# Patient Record
Sex: FEMALE | Race: WHITE | NOT HISPANIC OR LATINO | Employment: FULL TIME | URBAN - METROPOLITAN AREA
[De-identification: names, ages, dates, MRNs, and addresses within clinical notes are randomized per-mention and may not be internally consistent; named-entity substitution may affect disease eponyms.]

---

## 2023-10-17 ENCOUNTER — APPOINTMENT (EMERGENCY)
Dept: RADIOLOGY | Facility: HOSPITAL | Age: 58
DRG: 659 | End: 2023-10-17
Payer: COMMERCIAL

## 2023-10-17 ENCOUNTER — HOSPITAL ENCOUNTER (INPATIENT)
Facility: HOSPITAL | Age: 58
LOS: 8 days | Discharge: HOME | DRG: 659 | End: 2023-10-25
Attending: EMERGENCY MEDICINE | Admitting: INTERNAL MEDICINE
Payer: COMMERCIAL

## 2023-10-17 DIAGNOSIS — N17.9 AKI (ACUTE KIDNEY INJURY) (HCC): ICD-10-CM

## 2023-10-17 DIAGNOSIS — R79.89 ELEVATED SERUM CREATININE: ICD-10-CM

## 2023-10-17 DIAGNOSIS — N12 EMPHYSEMATOUS PYELONEPHRITIS OF RIGHT KIDNEY: ICD-10-CM

## 2023-10-17 DIAGNOSIS — E83.51 HYPOCALCEMIA: ICD-10-CM

## 2023-10-17 DIAGNOSIS — R11.2 NAUSEA AND VOMITING: ICD-10-CM

## 2023-10-17 DIAGNOSIS — E11.65 TYPE 2 DIABETES MELLITUS WITH HYPERGLYCEMIA, WITHOUT LONG-TERM CURRENT USE OF INSULIN (HCC): ICD-10-CM

## 2023-10-17 DIAGNOSIS — R10.9 ABDOMINAL PAIN: ICD-10-CM

## 2023-10-17 DIAGNOSIS — N39.0 UTI (URINARY TRACT INFECTION): ICD-10-CM

## 2023-10-17 DIAGNOSIS — E11.00 HYPEROSMOLAR HYPERGLYCEMIC STATE (HHS) (HCC): ICD-10-CM

## 2023-10-17 DIAGNOSIS — N12 EMPHYSEMATOUS PYELONEPHRITIS: ICD-10-CM

## 2023-10-17 DIAGNOSIS — E27.9 LESION OF ADRENAL GLAND (HCC): ICD-10-CM

## 2023-10-17 DIAGNOSIS — E11.01 HHNC (HYPERGLYCEMIC HYPEROSMOLAR NONKETOTIC COMA) (HCC): Primary | ICD-10-CM

## 2023-10-17 DIAGNOSIS — N13.30 HYDRONEPHROSIS, UNSPECIFIED HYDRONEPHROSIS TYPE: ICD-10-CM

## 2023-10-17 DIAGNOSIS — E86.0 DEHYDRATION: ICD-10-CM

## 2023-10-17 PROBLEM — E78.5 HYPERLIPIDEMIA: Status: ACTIVE | Noted: 2023-10-17

## 2023-10-17 PROBLEM — E16.2 HYPOGLYCEMIA: Status: ACTIVE | Noted: 2023-10-17

## 2023-10-17 PROBLEM — E11.9 TYPE 2 DIABETES MELLITUS (HCC): Status: ACTIVE | Noted: 2023-10-17

## 2023-10-17 PROBLEM — D69.6 THROMBOCYTOPENIA (HCC): Status: ACTIVE | Noted: 2023-10-17

## 2023-10-17 PROBLEM — E66.01 MORBID OBESITY (HCC): Status: ACTIVE | Noted: 2023-10-17

## 2023-10-17 PROBLEM — D72.829 LEUKOCYTOSIS: Status: ACTIVE | Noted: 2023-10-17

## 2023-10-17 PROBLEM — E87.1 HYPONATREMIA: Status: ACTIVE | Noted: 2023-10-17

## 2023-10-17 PROBLEM — I10 ESSENTIAL HYPERTENSION: Status: ACTIVE | Noted: 2023-10-17

## 2023-10-17 PROBLEM — F32.A DEPRESSION: Status: ACTIVE | Noted: 2023-10-17

## 2023-10-17 LAB
2HR DELTA HS TROPONIN: 3 NG/L
4HR DELTA HS TROPONIN: 11 NG/L
ALBUMIN SERPL BCP-MCNC: 2.5 G/DL (ref 3.5–5)
ALP SERPL-CCNC: 85 U/L (ref 34–104)
ALT SERPL W P-5'-P-CCNC: 14 U/L (ref 7–52)
AMPHETAMINES SERPL QL SCN: NEGATIVE
ANION GAP SERPL CALCULATED.3IONS-SCNC: 14 MMOL/L
AST SERPL W P-5'-P-CCNC: 15 U/L (ref 13–39)
ATRIAL RATE: 91 BPM
BACTERIA UR QL AUTO: ABNORMAL /HPF
BARBITURATES UR QL: NEGATIVE
BASE EX.OXY STD BLDV CALC-SCNC: 66.1 % (ref 60–80)
BASE EXCESS BLDV CALC-SCNC: -3.2 MMOL/L
BASOPHILS # BLD MANUAL: 0 THOUSAND/UL (ref 0–0.1)
BASOPHILS NFR MAR MANUAL: 0 % (ref 0–1)
BENZODIAZ UR QL: NEGATIVE
BETA-HYDROXYBUTYRATE: 0.5 MMOL/L
BILIRUB SERPL-MCNC: 0.74 MG/DL (ref 0.2–1)
BILIRUB UR QL STRIP: NEGATIVE
BUN SERPL-MCNC: 68 MG/DL (ref 5–25)
CALCIUM ALBUM COR SERPL-MCNC: 8.8 MG/DL (ref 8.3–10.1)
CALCIUM SERPL-MCNC: 7.6 MG/DL (ref 8.4–10.2)
CARDIAC TROPONIN I PNL SERPL HS: 36 NG/L
CARDIAC TROPONIN I PNL SERPL HS: 39 NG/L
CARDIAC TROPONIN I PNL SERPL HS: 47 NG/L
CHLORIDE SERPL-SCNC: 86 MMOL/L (ref 96–108)
CLARITY UR: ABNORMAL
CO2 SERPL-SCNC: 19 MMOL/L (ref 21–32)
COCAINE UR QL: NEGATIVE
COLOR UR: COLORLESS
CREAT SERPL-MCNC: 1.67 MG/DL (ref 0.6–1.3)
EOSINOPHIL # BLD MANUAL: 0 THOUSAND/UL (ref 0–0.4)
EOSINOPHIL NFR BLD MANUAL: 0 % (ref 0–6)
ERYTHROCYTE [DISTWIDTH] IN BLOOD BY AUTOMATED COUNT: 14 % (ref 11.6–15.1)
FLUAV RNA RESP QL NAA+PROBE: NEGATIVE
FLUBV RNA RESP QL NAA+PROBE: NEGATIVE
GFR SERPL CREATININE-BSD FRML MDRD: 33 ML/MIN/1.73SQ M
GLUCOSE SERPL-MCNC: 105 MG/DL (ref 65–140)
GLUCOSE SERPL-MCNC: 324 MG/DL (ref 65–140)
GLUCOSE SERPL-MCNC: 371 MG/DL (ref 65–140)
GLUCOSE SERPL-MCNC: 77 MG/DL (ref 65–140)
GLUCOSE SERPL-MCNC: 849 MG/DL (ref 65–140)
GLUCOSE SERPL-MCNC: >500 MG/DL (ref 65–140)
GLUCOSE SERPL-MCNC: >500 MG/DL (ref 65–140)
GLUCOSE UR STRIP-MCNC: ABNORMAL MG/DL
HCO3 BLDV-SCNC: 21.9 MMOL/L (ref 24–30)
HCT VFR BLD AUTO: 44 % (ref 34.8–46.1)
HGB BLD-MCNC: 14.9 G/DL (ref 11.5–15.4)
HGB UR QL STRIP.AUTO: ABNORMAL
KETONES UR STRIP-MCNC: NEGATIVE MG/DL
LEUKOCYTE ESTERASE UR QL STRIP: ABNORMAL
LIPASE SERPL-CCNC: 34 U/L (ref 11–82)
LYMPHOCYTES # BLD AUTO: 0.7 THOUSAND/UL (ref 0.6–4.47)
LYMPHOCYTES # BLD AUTO: 5 % (ref 14–44)
MAGNESIUM SERPL-MCNC: 2 MG/DL (ref 1.9–2.7)
MCH RBC QN AUTO: 28.9 PG (ref 26.8–34.3)
MCHC RBC AUTO-ENTMCNC: 33.9 G/DL (ref 31.4–37.4)
MCV RBC AUTO: 85 FL (ref 82–98)
METHADONE UR QL: NEGATIVE
MONOCYTES # BLD AUTO: 0.7 THOUSAND/UL (ref 0–1.22)
MONOCYTES NFR BLD: 5 % (ref 4–12)
MYELOCYTES NFR BLD MANUAL: 2 % (ref 0–1)
NEUTROPHILS # BLD MANUAL: 12.38 THOUSAND/UL (ref 1.85–7.62)
NEUTS BAND NFR BLD MANUAL: 4 % (ref 0–8)
NEUTS SEG NFR BLD AUTO: 84 % (ref 43–75)
NITRITE UR QL STRIP: NEGATIVE
NON-SQ EPI CELLS URNS QL MICRO: ABNORMAL /HPF
O2 CT BLDV-SCNC: 13.3 ML/DL
OPIATES UR QL SCN: NEGATIVE
OSMOLALITY UR/SERPL-RTO: 319 MMOL/KG (ref 282–298)
OSMOLALITY UR: 447 MMOL/KG
OTHER STN SPEC: ABNORMAL
OXYCODONE+OXYMORPHONE UR QL SCN: NEGATIVE
P AXIS: 43 DEGREES
PCO2 BLDV: 39.5 MM HG (ref 42–50)
PCP UR QL: NEGATIVE
PH BLDV: 7.36 [PH] (ref 7.3–7.4)
PH UR STRIP.AUTO: 5.5 [PH]
PLATELET # BLD AUTO: 137 THOUSANDS/UL (ref 149–390)
PLATELET BLD QL SMEAR: ABNORMAL
PMV BLD AUTO: 10.9 FL (ref 8.9–12.7)
PO2 BLDV: 34.4 MM HG (ref 35–45)
POTASSIUM SERPL-SCNC: 3.9 MMOL/L (ref 3.5–5.3)
PR INTERVAL: 140 MS
PROT SERPL-MCNC: 5.9 G/DL (ref 6.4–8.4)
PROT UR STRIP-MCNC: ABNORMAL MG/DL
QRS AXIS: 20 DEGREES
QRSD INTERVAL: 90 MS
QT INTERVAL: 346 MS
QTC INTERVAL: 425 MS
RBC # BLD AUTO: 5.16 MILLION/UL (ref 3.81–5.12)
RBC #/AREA URNS AUTO: ABNORMAL /HPF
RBC MORPH BLD: NORMAL
RSV RNA RESP QL NAA+PROBE: NEGATIVE
SARS-COV-2 RNA RESP QL NAA+PROBE: NEGATIVE
SODIUM 24H UR-SCNC: <10 MOL/L
SODIUM SERPL-SCNC: 119 MMOL/L (ref 135–147)
SP GR UR STRIP.AUTO: <=1.005 (ref 1–1.03)
T WAVE AXIS: 50 DEGREES
THC UR QL: NEGATIVE
TOXIC GRANULES BLD QL SMEAR: PRESENT
UROBILINOGEN UR QL STRIP.AUTO: 0.2 E.U./DL
VENTRICULAR RATE: 91 BPM
WBC # BLD AUTO: 14.07 THOUSAND/UL (ref 4.31–10.16)
WBC #/AREA URNS AUTO: ABNORMAL /HPF

## 2023-10-17 PROCEDURE — 81001 URINALYSIS AUTO W/SCOPE: CPT | Performed by: EMERGENCY MEDICINE

## 2023-10-17 PROCEDURE — 87086 URINE CULTURE/COLONY COUNT: CPT | Performed by: EMERGENCY MEDICINE

## 2023-10-17 PROCEDURE — 99291 CRITICAL CARE FIRST HOUR: CPT | Performed by: EMERGENCY MEDICINE

## 2023-10-17 PROCEDURE — 83930 ASSAY OF BLOOD OSMOLALITY: CPT | Performed by: EMERGENCY MEDICINE

## 2023-10-17 PROCEDURE — 87077 CULTURE AEROBIC IDENTIFY: CPT | Performed by: EMERGENCY MEDICINE

## 2023-10-17 PROCEDURE — 87186 SC STD MICRODIL/AGAR DIL: CPT | Performed by: EMERGENCY MEDICINE

## 2023-10-17 PROCEDURE — G1004 CDSM NDSC: HCPCS

## 2023-10-17 PROCEDURE — 96374 THER/PROPH/DIAG INJ IV PUSH: CPT

## 2023-10-17 PROCEDURE — 74177 CT ABD & PELVIS W/CONTRAST: CPT

## 2023-10-17 PROCEDURE — 80307 DRUG TEST PRSMV CHEM ANLYZR: CPT | Performed by: EMERGENCY MEDICINE

## 2023-10-17 PROCEDURE — 82010 KETONE BODYS QUAN: CPT | Performed by: EMERGENCY MEDICINE

## 2023-10-17 PROCEDURE — 0241U HB NFCT DS VIR RESP RNA 4 TRGT: CPT | Performed by: EMERGENCY MEDICINE

## 2023-10-17 PROCEDURE — 82948 REAGENT STRIP/BLOOD GLUCOSE: CPT

## 2023-10-17 PROCEDURE — 96361 HYDRATE IV INFUSION ADD-ON: CPT

## 2023-10-17 PROCEDURE — 71045 X-RAY EXAM CHEST 1 VIEW: CPT

## 2023-10-17 PROCEDURE — 36415 COLL VENOUS BLD VENIPUNCTURE: CPT | Performed by: EMERGENCY MEDICINE

## 2023-10-17 PROCEDURE — 99223 1ST HOSP IP/OBS HIGH 75: CPT | Performed by: INTERNAL MEDICINE

## 2023-10-17 PROCEDURE — 85027 COMPLETE CBC AUTOMATED: CPT | Performed by: EMERGENCY MEDICINE

## 2023-10-17 PROCEDURE — 84300 ASSAY OF URINE SODIUM: CPT | Performed by: INTERNAL MEDICINE

## 2023-10-17 PROCEDURE — 93010 ELECTROCARDIOGRAM REPORT: CPT | Performed by: INTERNAL MEDICINE

## 2023-10-17 PROCEDURE — 87081 CULTURE SCREEN ONLY: CPT | Performed by: INTERNAL MEDICINE

## 2023-10-17 PROCEDURE — 85007 BL SMEAR W/DIFF WBC COUNT: CPT | Performed by: EMERGENCY MEDICINE

## 2023-10-17 PROCEDURE — 83735 ASSAY OF MAGNESIUM: CPT | Performed by: EMERGENCY MEDICINE

## 2023-10-17 PROCEDURE — 96375 TX/PRO/DX INJ NEW DRUG ADDON: CPT

## 2023-10-17 PROCEDURE — 84484 ASSAY OF TROPONIN QUANT: CPT | Performed by: EMERGENCY MEDICINE

## 2023-10-17 PROCEDURE — 93005 ELECTROCARDIOGRAM TRACING: CPT

## 2023-10-17 PROCEDURE — 99285 EMERGENCY DEPT VISIT HI MDM: CPT

## 2023-10-17 PROCEDURE — 82947 ASSAY GLUCOSE BLOOD QUANT: CPT | Performed by: INTERNAL MEDICINE

## 2023-10-17 PROCEDURE — 80053 COMPREHEN METABOLIC PANEL: CPT | Performed by: EMERGENCY MEDICINE

## 2023-10-17 PROCEDURE — 82805 BLOOD GASES W/O2 SATURATION: CPT | Performed by: EMERGENCY MEDICINE

## 2023-10-17 PROCEDURE — 83690 ASSAY OF LIPASE: CPT | Performed by: EMERGENCY MEDICINE

## 2023-10-17 PROCEDURE — 83935 ASSAY OF URINE OSMOLALITY: CPT | Performed by: INTERNAL MEDICINE

## 2023-10-17 RX ORDER — PRAVASTATIN SODIUM 20 MG
20 TABLET ORAL DAILY
COMMUNITY

## 2023-10-17 RX ORDER — PRAVASTATIN SODIUM 20 MG
20 TABLET ORAL
Status: DISCONTINUED | OUTPATIENT
Start: 2023-10-17 | End: 2023-10-25 | Stop reason: HOSPADM

## 2023-10-17 RX ORDER — PAROXETINE HYDROCHLORIDE 20 MG/1
20 TABLET, FILM COATED ORAL DAILY
Status: DISCONTINUED | OUTPATIENT
Start: 2023-10-18 | End: 2023-10-25 | Stop reason: HOSPADM

## 2023-10-17 RX ORDER — PERINDOPRIL ERBUMINE 8 MG/1
8 TABLET ORAL DAILY
Status: ON HOLD | COMMUNITY
End: 2023-10-19

## 2023-10-17 RX ORDER — EZETIMIBE 10 MG/1
10 TABLET ORAL DAILY
Status: DISCONTINUED | OUTPATIENT
Start: 2023-10-18 | End: 2023-10-25 | Stop reason: HOSPADM

## 2023-10-17 RX ORDER — CEFTRIAXONE 1 G/50ML
1000 INJECTION, SOLUTION INTRAVENOUS ONCE
Status: DISCONTINUED | OUTPATIENT
Start: 2023-10-17 | End: 2023-10-17

## 2023-10-17 RX ORDER — HYDROCHLOROTHIAZIDE 12.5 MG/1
12.5 TABLET ORAL DAILY
COMMUNITY

## 2023-10-17 RX ORDER — CEFTRIAXONE 1 G/50ML
1000 INJECTION, SOLUTION INTRAVENOUS EVERY 24 HOURS
Status: DISCONTINUED | OUTPATIENT
Start: 2023-10-18 | End: 2023-10-17

## 2023-10-17 RX ORDER — CALCIUM CARBONATE 500(1250)
1 TABLET ORAL
Status: DISCONTINUED | OUTPATIENT
Start: 2023-10-18 | End: 2023-10-25 | Stop reason: HOSPADM

## 2023-10-17 RX ORDER — PIOGLITAZONEHYDROCHLORIDE 15 MG/1
15 TABLET ORAL DAILY
COMMUNITY
End: 2023-10-25

## 2023-10-17 RX ORDER — EZETIMIBE 10 MG/1
10 TABLET ORAL DAILY
COMMUNITY

## 2023-10-17 RX ORDER — FAMOTIDINE 10 MG/ML
20 INJECTION, SOLUTION INTRAVENOUS ONCE
Status: COMPLETED | OUTPATIENT
Start: 2023-10-17 | End: 2023-10-17

## 2023-10-17 RX ORDER — MELATONIN
2000 DAILY
Status: DISCONTINUED | OUTPATIENT
Start: 2023-10-18 | End: 2023-10-25 | Stop reason: HOSPADM

## 2023-10-17 RX ORDER — ONDANSETRON 2 MG/ML
4 INJECTION INTRAMUSCULAR; INTRAVENOUS EVERY 4 HOURS PRN
Status: DISCONTINUED | OUTPATIENT
Start: 2023-10-17 | End: 2023-10-25 | Stop reason: HOSPADM

## 2023-10-17 RX ORDER — SODIUM CHLORIDE 9 MG/ML
125 INJECTION, SOLUTION INTRAVENOUS CONTINUOUS
Status: DISCONTINUED | OUTPATIENT
Start: 2023-10-17 | End: 2023-10-19

## 2023-10-17 RX ORDER — PAROXETINE HYDROCHLORIDE 20 MG/1
20 TABLET, FILM COATED ORAL DAILY
COMMUNITY

## 2023-10-17 RX ORDER — PHENOL 1.4 %
600 AEROSOL, SPRAY (ML) MUCOUS MEMBRANE DAILY
Status: ON HOLD | COMMUNITY
End: 2023-10-19

## 2023-10-17 RX ORDER — ACETAMINOPHEN 325 MG/1
650 TABLET ORAL EVERY 6 HOURS PRN
Status: DISCONTINUED | OUTPATIENT
Start: 2023-10-17 | End: 2023-10-25 | Stop reason: HOSPADM

## 2023-10-17 RX ORDER — HEPARIN SODIUM 5000 [USP'U]/ML
5000 INJECTION, SOLUTION INTRAVENOUS; SUBCUTANEOUS EVERY 8 HOURS SCHEDULED
Status: DISCONTINUED | OUTPATIENT
Start: 2023-10-17 | End: 2023-10-25 | Stop reason: HOSPADM

## 2023-10-17 RX ORDER — MELATONIN
2000 DAILY
COMMUNITY

## 2023-10-17 RX ORDER — ONDANSETRON 2 MG/ML
4 INJECTION INTRAMUSCULAR; INTRAVENOUS ONCE
Status: DISCONTINUED | OUTPATIENT
Start: 2023-10-17 | End: 2023-10-18

## 2023-10-17 RX ORDER — ONDANSETRON 2 MG/ML
4 INJECTION INTRAMUSCULAR; INTRAVENOUS ONCE
Status: COMPLETED | OUTPATIENT
Start: 2023-10-17 | End: 2023-10-17

## 2023-10-17 RX ADMIN — SODIUM CHLORIDE 1000 ML: 0.9 INJECTION, SOLUTION INTRAVENOUS at 14:50

## 2023-10-17 RX ADMIN — CEFTRIAXONE 1000 MG: 1 INJECTION, SOLUTION INTRAVENOUS at 16:17

## 2023-10-17 RX ADMIN — SODIUM CHLORIDE 1000 ML: 0.9 INJECTION, SOLUTION INTRAVENOUS at 13:00

## 2023-10-17 RX ADMIN — IOHEXOL 100 ML: 350 INJECTION, SOLUTION INTRAVENOUS at 15:40

## 2023-10-17 RX ADMIN — PIPERACILLIN AND TAZOBACTAM 3.38 G: 36; 4.5 INJECTION, POWDER, FOR SOLUTION INTRAVENOUS at 17:57

## 2023-10-17 RX ADMIN — SODIUM CHLORIDE 1000 ML: 0.9 INJECTION, SOLUTION INTRAVENOUS at 12:07

## 2023-10-17 RX ADMIN — PIPERACILLIN AND TAZOBACTAM 3.38 G: 36; 4.5 INJECTION, POWDER, FOR SOLUTION INTRAVENOUS at 23:11

## 2023-10-17 RX ADMIN — HEPARIN SODIUM 5000 UNITS: 5000 INJECTION INTRAVENOUS; SUBCUTANEOUS at 22:03

## 2023-10-17 RX ADMIN — SODIUM CHLORIDE 12 UNITS/HR: 9 INJECTION, SOLUTION INTRAVENOUS at 16:45

## 2023-10-17 RX ADMIN — INSULIN HUMAN 10 UNITS: 100 INJECTION, SOLUTION PARENTERAL at 15:46

## 2023-10-17 RX ADMIN — FAMOTIDINE 20 MG: 10 INJECTION, SOLUTION INTRAVENOUS at 12:06

## 2023-10-17 RX ADMIN — ONDANSETRON 4 MG: 2 INJECTION INTRAMUSCULAR; INTRAVENOUS at 12:06

## 2023-10-17 RX ADMIN — SODIUM CHLORIDE 125 ML/HR: 0.9 INJECTION, SOLUTION INTRAVENOUS at 17:41

## 2023-10-17 NOTE — ASSESSMENT & PLAN NOTE
Serum sodium only decreased at 119, however, in the setting of marked hyperglycemia, corrected sodium is 131  Anticipate progressive improvement in sodium levels with correction of blood sugars  Okay to continue IV fluids due to primary issue  Continue serial serum sodium monitoring

## 2023-10-17 NOTE — ASSESSMENT & PLAN NOTE
Presents with abdominal discomfort with nausea/vomiting and a profoundly elevated serum glucose of 849  BMP without evidence of anion gap and urinalysis without evidence of ketones  Suspect hyperosmolar hyperglycemic state -> initiated on a non-DKA insulin drip  Continue aggressive IV fluid hydration  Monitor serial Accu-Cheks  Chronically on oral hypoglycemics for type 2 diabetes mellitus -> states that due to recent nausea/vomiting over the last few days, doses were missed  Suspect pyelonephritis as likely triggering factor (see below)  Await endocrinology input

## 2023-10-17 NOTE — ASSESSMENT & PLAN NOTE
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

## 2023-10-17 NOTE — ED PROVIDER NOTES
History  Chief Complaint   Patient presents with    Abdominal Pain     States she started with pain upper abdomen and vomiting on 10/11. Seen at [facility] on 10/14 and was given albuterol inhaler " cause my lungs are tight ". Home test for Covid on 10/13 was negative. Patient on oral agents for diabetes and does not test her blood sugar. Sipping on gatorade on arrival.     Vomiting     13-year-old female with past history of hypertension, type 2 diabetes, presents to the ED for evaluation of nausea, vomiting, and generalized abdominal discomfort over the past week. Patient states that she went on vacation to [place] last week. On her way back patient had a long drive. Patient had some fast food and milkshake over period of 4 hours during her drive back home. Patient is not sure whether the milkshake was bad. Since that time patient has had nausea and vomiting. Patient is having difficulty keeping anything down. Patient is try to drink Gatorade at home. Patient came to the ED today as her symptoms are not getting any better. Patient denies any chest pain or shortness of breath. Patient denies any diarrhea, dysuria, or any increased urinary frequency. History provided by:  Patient  Abdominal Pain  Associated symptoms: nausea and vomiting    Associated symptoms: no chest pain, no chills, no cough, no dysuria, no fever, no hematuria, no shortness of breath and no sore throat    Vomiting  Associated symptoms: abdominal pain    Associated symptoms: no arthralgias, no chills, no cough, no fever and no sore throat        Prior to Admission Medications   Prescriptions Last Dose Informant Patient Reported? Taking?    PARoxetine (PAXIL) 20 mg tablet 10/17/2023  Yes Yes   Sig: Take 20 mg by mouth daily   calcium carbonate (OS-AWA) 600 MG tablet 10/17/2023  Yes Yes   Sig: Take 600 mg by mouth daily   cholecalciferol (VITAMIN D3) 1,000 units tablet 10/17/2023  Yes Yes   Sig: Take 2,000 Units by mouth daily ezetimibe (ZETIA) 10 mg tablet 10/17/2023  Yes Yes   Sig: Take 10 mg by mouth daily   hydrochlorothiazide (HYDRODIURIL) 12.5 mg tablet 10/17/2023  Yes Yes   Sig: Take 12.5 mg by mouth daily   perindopril (ACEON) 8 MG tablet 10/17/2023  Yes Yes   Sig: Take 8 mg by mouth daily   pioglitazone (ACTOS) 15 mg tablet 10/17/2023  Yes Yes   Sig: Take 15 mg by mouth daily   pravastatin (PRAVACHOL) 20 mg tablet 10/17/2023  Yes Yes   Sig: Take 20 mg by mouth daily      Facility-Administered Medications: None       Past Medical History:   Diagnosis Date    Diabetes mellitus ([address])     Hypertension        Past Surgical History:   Procedure Laterality Date    CHOLECYSTECTOMY         History reviewed. No pertinent family history. I have reviewed and agree with the history as documented. E-Cigarette/Vaping    E-Cigarette Use Never User      E-Cigarette/Vaping Substances     Social History     Tobacco Use    Smoking status: Never    Smokeless tobacco: Never   Vaping Use    Vaping Use: Never used   Substance Use Topics    Alcohol use: Never    Drug use: Never       Review of Systems   Constitutional:  Positive for appetite change. Negative for chills and fever. HENT:  Negative for ear pain and sore throat. Eyes:  Negative for pain and visual disturbance. Respiratory:  Negative for cough and shortness of breath. Cardiovascular:  Negative for chest pain and palpitations. Gastrointestinal:  Positive for abdominal pain, nausea and vomiting. Genitourinary:  Negative for dysuria and hematuria. Musculoskeletal:  Negative for arthralgias and back pain. Skin:  Negative for color change and rash. Neurological:  Negative for seizures and syncope. All other systems reviewed and are negative. Physical Exam  Physical Exam  Vitals and nursing note reviewed. Constitutional:       General: She is not in acute distress. Appearance: She is well-developed. HENT:      Head: Normocephalic and atraumatic. Mouth/Throat:      Mouth: Mucous membranes are dry. Comments: Mucous membranes are very dry. Eyes:      Conjunctiva/sclera: Conjunctivae normal.   Cardiovascular:      Rate and Rhythm: Normal rate and regular rhythm. Heart sounds: No murmur heard. Pulmonary:      Effort: Pulmonary effort is normal. No respiratory distress. Breath sounds: Normal breath sounds. Abdominal:      General: Abdomen is flat. Bowel sounds are normal. There is no distension. Palpations: Abdomen is soft. Tenderness: There is abdominal tenderness. Comments: Abdomen is soft, nondistended, with bowel sound present to all 4 quadrants. Generalized mild discomfort noted to suprapubic region. Musculoskeletal:         General: No swelling. Cervical back: Neck supple. Skin:     General: Skin is warm and dry. Capillary Refill: Capillary refill takes less than 2 seconds. Neurological:      Mental Status: She is alert.    Psychiatric:         Mood and Affect: Mood normal.         Vital Signs  ED Triage Vitals [10/17/23 1106]   Temperature Pulse Respirations Blood Pressure SpO2   (!) 97.4 °F (36.3 °C) 96 20 131/61 99 %      Temp Source Heart Rate Source Patient Position - Orthostatic VS BP Location FiO2 (%)   Tympanic Monitor Sitting Left arm --      Pain Score       7           Vitals:    10/17/23 1106 10/17/23 1309 10/17/23 1530 10/17/23 1649   BP: 131/61 137/64 131/61 147/85   Pulse: 96 86 82 90   Patient Position - Orthostatic VS: Sitting Lying Sitting Sitting         Visual Acuity      ED Medications  Medications   ondansetron (ZOFRAN) injection 4 mg (4 mg Intravenous Not Given 10/17/23 1300)   sodium chloride 0.9 % infusion (has no administration in time range)   insulin regular (HumuLIN R,NovoLIN R) 1 Units/mL in sodium chloride 0.9 % 100 mL infusion (12 Units/hr Intravenous New Bag 10/17/23 1645)   heparin (porcine) subcutaneous injection 5,000 Units (has no administration in time range) acetaminophen (TYLENOL) tablet 650 mg (has no administration in time range)   ondansetron (ZOFRAN) injection 4 mg (has no administration in time range)   calcium carbonate (OYSTER SHELL,OSCAL) 500 mg tablet 1 tablet (has no administration in time range)   cholecalciferol (VITAMIN D3) tablet 2,000 Units (has no administration in time range)   ezetimibe (ZETIA) tablet 10 mg (has no administration in time range)   PARoxetine (PAXIL) tablet 20 mg (has no administration in time range)   pravastatin (PRAVACHOL) tablet 20 mg (has no administration in time range)   piperacillin-tazobactam (ZOSYN) IVPB 3.375 g (has no administration in time range)   ondansetron (ZOFRAN) injection 4 mg (4 mg Intravenous Given 10/17/23 1206)   sodium chloride 0.9 % bolus 1,000 mL (0 mL Intravenous Stopped 10/17/23 1307)   Famotidine (PF) (PEPCID) injection 20 mg (20 mg Intravenous Given 10/17/23 1206)   sodium chloride 0.9 % bolus 1,000 mL (0 mL Intravenous Stopped 10/17/23 1423)   sodium chloride 0.9 % bolus 1,000 mL (1,000 mL Intravenous New Bag 10/17/23 1450)   insulin regular (HumuLIN R,NovoLIN R) injection 10 Units (10 Units Intravenous Given 10/17/23 1546)   iohexol (OMNIPAQUE) 350 MG/ML injection (SINGLE-DOSE) 100 mL (100 mL Intravenous Given 10/17/23 1540)       Diagnostic Studies  Results Reviewed       Procedure Component Value Units Date/Time    Sodium, urine, random [[id number]] Collected: 10/17/23 1420    Lab Status:  In process Specimen: Urine, Clean Catch Updated: 10/17/23 1642    Osmolality, urine [[id number]]     Lab Status: No result Specimen: Urine     Fingerstick Glucose (POCT) [[id number]]  (Abnormal) Collected: 10/17/23 1615    Lab Status: Final result Updated: 10/17/23 1616     POC Glucose >500 mg/dl     Urine culture [[id number]]     Lab Status: No result Specimen: Urine     Urine Microscopic [[id number]]  (Abnormal) Collected: 10/17/23 1420    Lab Status: Final result Specimen: Urine, Clean Catch Updated: 10/17/23 1545 RBC, UA 4-10 /hpf      WBC, UA Innumerable /hpf      Epithelial Cells Occasional /hpf      Bacteria, UA Moderate /hpf      OTHER OBSERVATIONS Transitional Epithelial Cells    Urine culture [[id number]] Collected: 10/17/23 1420    Lab Status: In process Specimen: Urine, Clean Catch Updated: 10/17/23 1549    Blood gas, venous [[id number]]  (Abnormal) Collected: 10/17/23 1530    Lab Status: Final result Specimen: Blood from Line, Venous Updated: 10/17/23 1544     pH, Maynor 7.362     pCO2, Maynor 39.5 mm Hg      pO2, Maynor 34.4 mm Hg      HCO3, Maynor 21.9 mmol/L      Base Excess, Maynor -3.2 mmol/L      O2 Content, Maynor 13.3 ml/dL      O2 HGB, VENOUS 66.1 %     Beta Hydroxybutyrate [[id number]]  (Normal) Collected: 10/17/23 1530    Lab Status: Final result Specimen: Blood from Line, Venous Updated: 10/17/23 1538     BETA-HYDROXYBUTYRATE 0.5 mmol/L     Osmolality-"If this is regarding a toxic alcohol, please STOP and consult medical  for further guidance." [[id number]] Collected: 10/17/23 1530    Lab Status: In process Specimen: Blood from Line, Venous Updated: 10/17/23 1533    Rapid drug screen, urine [[id number]]  (Normal) Collected: 10/17/23 1420    Lab Status: Final result Specimen: Urine, Clean Catch Updated: 10/17/23 1452     Amph/Meth UR Negative     Barbiturate Ur Negative     Benzodiazepine Urine Negative     Cocaine Urine Negative     Methadone Urine Negative     Opiate Urine Negative     PCP Ur Negative     THC Urine Negative     Oxycodone Urine Negative    Narrative:      FOR MEDICAL PURPOSES ONLY. IF CONFIRMATION NEEDED PLEASE CONTACT THE LAB WITHIN 5 DAYS.     Drug Screen Cutoff Levels:  AMPHETAMINE/METHAMPHETAMINES  1000 ng/mL  BARBITURATES     200 ng/mL  BENZODIAZEPINES     200 ng/mL  COCAINE      300 ng/mL  METHADONE      300 ng/mL  OPIATES      300 ng/mL  PHENCYCLIDINE     25 ng/mL  THC       50 ng/mL  OXYCODONE      100 ng/mL    UA w Reflex to Microscopic w Reflex to Culture [[id number]]  (Abnormal) Collected: 10/17/23 1420    Lab Status: Final result Specimen: Urine, Clean Catch Updated: 10/17/23 1446     Color, UA Colorless     Clarity, UA Cloudy     Specific Gravity, UA <=1.005     pH, UA 5.5     Leukocytes, UA Small     Nitrite, UA Negative     Protein, UA Trace mg/dl      Glucose, UA >=1000 (1%) mg/dl      Ketones, UA Negative mg/dl      Urobilinogen, UA 0.2 E.U./dl      Bilirubin, UA Negative     Occult Blood, UA Large    Comprehensive metabolic panel [[id number]]  (Abnormal) Collected: 10/17/23 1359    Lab Status: Final result Specimen: Blood from Arm, Right Updated: 10/17/23 1440     Sodium 119 mmol/L      Potassium 3.9 mmol/L      Chloride 86 mmol/L      CO2 19 mmol/L      ANION GAP 14 mmol/L      BUN 68 mg/dL      Creatinine 1.67 mg/dL      Glucose 849 mg/dL      Calcium 7.6 mg/dL      Corrected Calcium 8.8 mg/dL      AST 15 U/L      ALT 14 U/L      Alkaline Phosphatase 85 U/L      Total Protein 5.9 g/dL      Albumin 2.5 g/dL      Total Bilirubin 0.74 mg/dL      eGFR 33 ml/min/1.73sq m     Narrative:      [place] guidelines for Chronic Kidney Disease (CKD):     Stage 1 with normal or high GFR (GFR > 90 mL/min/1.73 square meters)    Stage 2 Mild CKD (GFR = 60-89 mL/min/1.73 square meters)    Stage 3A Moderate CKD (GFR = 45-59 mL/min/1.73 square meters)    Stage 3B Moderate CKD (GFR = 30-44 mL/min/1.73 square meters)    Stage 4 Severe CKD (GFR = 15-29 mL/min/1.73 square meters)    Stage 5 End Stage CKD (GFR <15 mL/min/1.73 square meters)  Note: GFR calculation is accurate only with a steady state creatinine    Magnesium [[id number]]  (Normal) Collected: 10/17/23 1359    Lab Status: Final result Specimen: Blood from Arm, Right Updated: 10/17/23 1438     Magnesium 2.0 mg/dL     Lipase [[id number]]  (Normal) Collected: 10/17/23 1359    Lab Status: Final result Specimen: Blood from Arm, Right Updated: 10/17/23 1438     Lipase 34 u/L     HS Troponin I 2hr [[id number]]  (Normal) Collected: 10/17/23 1359    Lab Status: Final result Specimen: Blood from Arm, Right Updated: 10/17/23 1430     hs TnI 2hr 39 ng/L      Delta 2hr hsTnI 3 ng/L     RBC Morphology Reflex Test [[id number]] Collected: 10/17/23 1200    Lab Status: Final result Specimen: Blood from Arm, Left Updated: 10/17/23 1301    FLU/RSV/COVID - if FLU/RSV clinically relevant [[id number]]  (Normal) Collected: 10/17/23 1200    Lab Status: Final result Specimen: Nares from Nose Updated: 10/17/23 1252     SARS-CoV-2 Negative     INFLUENZA A PCR Negative     INFLUENZA B PCR Negative     RSV PCR Negative    Narrative:      FOR PEDIATRIC PATIENTS - copy/paste COVID Guidelines URL to browser: [email or web address]    SARS-CoV-2 assay is a Nucleic Acid Amplification assay intended for the  qualitative detection of nucleic acid from SARS-CoV-2 in nasopharyngeal  swabs. Results are for the presumptive identification of SARS-CoV-2 RNA. Positive results are indicative of infection with SARS-CoV-2, the virus  causing COVID-19, but do not rule out bacterial infection or co-infection  with other viruses. Laboratories within the [facility] and its  territories are required to report all positive results to the appropriate  public health authorities. Negative results do not preclude SARS-CoV-2  infection and should not be used as the sole basis for treatment or other  patient management decisions. Negative results must be combined with  clinical observations, patient history, and epidemiological information. This test has not been FDA cleared or approved. This test has been authorized by FDA under an Emergency Use Authorization  (EUA).  This test is only authorized for the duration of time the  declaration that circumstances exist justifying the authorization of the  emergency use of an in vitro diagnostic tests for detection of SARS-CoV-2  virus and/or diagnosis of COVID-19 infection under section 564(b)(1) of  the Act, 21 U. S.C. 531ZOT-9(E)(0), unless the authorization is terminated  or revoked sooner. The test has been validated but independent review by FDA  and CLIA is pending. Test performed using [employer] GeneXpert: This RT-PCR assay targets N2,  a region unique to SARS-CoV-2. A conserved region in the E-gene was chosen  for pan-Sarbecovirus detection which includes SARS-CoV-2. According to CMS-2020-01-R, this platform meets the definition of high-throughput technology. CBC and differential [[id number]]  (Abnormal) Collected: 10/17/23 1200    Lab Status: Final result Specimen: Blood from Arm, Left Updated: 10/17/23 1237     WBC 14.07 Thousand/uL      RBC 5.16 Million/uL      Hemoglobin 14.9 g/dL      Hematocrit 44.0 %      MCV 85 fL      MCH 28.9 pg      MCHC 33.9 g/dL      RDW 14.0 %      MPV 10.9 fL      Platelets 305 Thousands/uL     Narrative: This is an appended report. These results have been appended to a previously verified report.     Manual Differential(PHLEBS Do Not Order) [[id number]]  (Abnormal) Collected: 10/17/23 1200    Lab Status: Final result Specimen: Blood from Arm, Left Updated: 10/17/23 1237     Segmented % 84 %      Bands % 4 %      Lymphocytes % 5 %      Monocytes % 5 %      Eosinophils, % 0 %      Basophils % 0 %      Myelocytes % 2 %      Absolute Neutrophils 12.38 Thousand/uL      Lymphocytes Absolute 0.70 Thousand/uL      Monocytes Absolute 0.70 Thousand/uL      Eosinophils Absolute 0.00 Thousand/uL      Basophils Absolute 0.00 Thousand/uL      Total Counted --     Toxic Granulation Present     RBC Morphology Normal     Platelet Estimate Borderline    HS Troponin I 4hr [[id number]]     Lab Status: No result Specimen: Blood     HS Troponin 0hr (reflex protocol) [[id number]]  (Normal) Collected: 10/17/23 1200    Lab Status: Final result Specimen: Blood from Arm, Left Updated: 10/17/23 1233     hs TnI 0hr 36 ng/L                    CT abdomen pelvis with contrast   Final Result by Andrew Lo MD (10/17 9808)   1. Right kidney emphysematous pyelonephritis. No evidence of abscess. 2. Bilobed left adrenal lesion predominantly composed of fat consistent with myelolipoma, previously characterized on 2006 MR abdomen. The study was marked in [facility] for immediate notification. Workstation performed: [id number]         XR chest 1 view portable   Final Result by Prince Mirna MD (10/17 3856)      No acute cardiopulmonary disease. Workstation performed: [id number]                    Procedures  ECG 12 Lead Documentation Only    Date/Time: 10/17/2023 11:22 AM    Performed by: Marta Bethea DO  Authorized by: Marta Bethea DO    Indications / Diagnosis:  Nausea and vomiting  ECG reviewed by me, the ED Provider: yes    Patient location:  ED  Previous ECG:     Previous ECG:  Unavailable    Comparison to cardiac monitor: Yes    Interpretation:     Interpretation: non-specific    Comments:      Sinus rhythm, rate 91, normal axis, normal intervals, no acute ST elevations noted, low amplitude T waves noted throughout suggesting nonspecific T wave abnormalities, no previous EKG available for comparison.   CriticalCare Time    Date/Time: 10/17/2023 4:02 PM    Performed by: Marta Bethea DO  Authorized by: Marta Bethea DO    Critical care provider statement:     Critical care time (minutes):  65    Critical care time was exclusive of:  Separately billable procedures and treating other patients    Critical care was necessary to treat or prevent imminent or life-threatening deterioration of the following conditions:  Endocrine crisis    Critical care was time spent personally by me on the following activities:  Blood draw for specimens, obtaining history from patient or surrogate, development of treatment plan with patient or surrogate, discussions with consultants, evaluation of patient's response to treatment, examination of patient, review of old charts, re-evaluation of patient's condition, ordering and review of laboratory studies, ordering and review of radiographic studies, interpretation of cardiac output measurements and ordering and performing treatments and interventions           ED Course                               SBIRT 20yo+      Flowsheet Row Most Recent Value   Initial Alcohol Screen: US AUDIT-C     1. How often do you have a drink containing alcohol? 0 Filed at: 10/17/2023 1110   Audit-C Score 0 Filed at: 10/17/2023 1110   JESSIE: How many times in the past year have you. .. Used an illegal drug or used a prescription medication for non-medical reasons? Never Filed at: 10/17/2023 1110                      Medical Decision Making  Obtain blood work, UA  Give IV fluids, antiemetics and continue to monitor patient for any worsening symptoms. Patient's lab work initially hemolyzed. Patient did have an IV access and 2L of IV fluid bolus given. Subsequently CMP was redrawn which showed elevated BUN and creatinine suggesting LEANDRO as well as dehydration. Patient's blood glucose level was over 800 without any anion gap noted. At this time patient's symptoms are most likely secondary to [facility]. Insulin bolus started in the ED. Patient is UA showed concern for UTI. CT scan showed concern for right emphysematous pyelonephritis. IV antibiotic started in the ED. At this time patient will be admitted for further evaluation and management. Patient agrees with admission plans. Amount and/or Complexity of Data Reviewed  External Data Reviewed: labs and ECG. Labs: ordered. Decision-making details documented in ED Course. Radiology: ordered. Decision-making details documented in ED Course. ECG/medicine tests: ordered and independent interpretation performed. Decision-making details documented in ED Course. Risk  OTC drugs. Prescription drug management. Decision regarding hospitalization. Disposition  Final diagnoses: HHNC (hyperglycemic hyperosmolar nonketotic coma) ([facility])   Nausea and vomiting   LEANDRO (acute kidney injury) ([address])   Dehydration   Abdominal pain   UTI (urinary tract infection)   Emphysematous pyelonephritis of right kidney   Lesion of adrenal gland ([address])     Time reflects when diagnosis was documented in both MDM as applicable and the Disposition within this note       Time User Action Codes Description Comment    10/17/2023  3:51 PM Danetta Screen [E11.01] [address] (hyperglycemic hyperosmolar nonketotic coma) ([address])     10/17/2023  3:51 PM Hedda Dears Add [R11.2] Nausea and vomiting     10/17/2023  3:51 PM Ferdous, Reesa Mireya Add [N17.9] LEANDRO (acute kidney injury) ([address])     10/17/2023  3:52 PM Danetta Screen [E86.0] Dehydration     10/17/2023  3:52 PM Hedda Dears Add [R10.9] Abdominal pain     10/17/2023  4:05 PM Hedda Dears Add [N39.0] UTI (urinary tract infection)     10/17/2023  4:29 PM Julianne Hernandes Add [E11.00] Hyperosmolar hyperglycemic state (HHS) ([address])     10/17/2023  4:40 PM Jwar Giraldo Add [N12] Emphysematous pyelonephritis     10/17/2023  4:49 PM Ferdous, Reesa Mireya Add [N12] Emphysematous pyelonephritis of right kidney     10/17/2023  4:49 PM Ferdous, Reesa Mireya Add [E27.9] Lesion of adrenal gland [facility])           ED Disposition       ED Disposition   Admit    Condition   Stable    Date/Time   Tue Oct 17, 2023 1502    Comment   Case was discussed with Dr. Ashwin Luna and the patient's admission status was agreed to be Admission Status: inpatient status to the service of Dr. Ashwin Luna. Follow-up Information    None         Patient's Medications   Discharge Prescriptions    No medications on file       No discharge procedures on file.     PDMP Review       None            ED Provider  Electronically Signed by             Octaviano Larson DO  [facility]23 [facility]  10/17/23 4669

## 2023-10-17 NOTE — ASSESSMENT & PLAN NOTE
Presenting creatinine of 1.67 -> continue IV fluid hydration  Likely secondary to renal hypoperfusion and insensible volume loss from recent vomiting  Limit/avoid nephrotoxins as possible - holding HCTZ/ACEI  Monitor renal function and urine output Mucosal Advancement Flap Text: Given the location of the defect, to restore function, the shape of the defect and the proximity to free margins of lip a mucosal advancement flap was deemed most appropriate. Incisions were made with a 15 blade scalpel in the appropriate fashion along the cutaneous vermilion border and the mucosal lip. The remaining actinically damaged mucosal tissue was excised.  The mucosal advancement flap was then elevated to the gingival sulcus with care taken to preserve the neurovascular structures and advanced into the primary defect. Care was taken to ensure that precise realignment of the vermilion border was achieved.

## 2023-10-17 NOTE — ASSESSMENT & PLAN NOTE
Check updated A1c  Initiated on an insulin drip due to marked hyperglycemia (see plan for primary issue above)  Carbohydrate restriction  Hold oral hypoglycemics while hospitalized  Endocrinology to follow

## 2023-10-17 NOTE — H&P
[id number] [facility]  H&P  Name: Marinell Quirk Apgar 60 y.o. female I MRN: [id number]  Unit/Bed#: ICU 06 I Date of Admission: 10/17/2023   Date of Service: 10/17/2023 I Hospital Day: 0        Assessment & Plan:    * Hyperosmolar hyperglycemic state (HHS) ([address])  Assessment & Plan  Presents with abdominal discomfort with nausea/vomiting and a profoundly elevated serum glucose of 849  BMP without evidence of anion gap and urinalysis without evidence of ketones  Suspect hyperosmolar hyperglycemic state -> initiated on a non-DKA insulin drip  Continue aggressive IV fluid hydration  Monitor serial Accu-Cheks  Chronically on oral hypoglycemics for type 2 diabetes mellitus -> states that due to recent nausea/vomiting over the last few days, doses were missed  Suspect pyelonephritis as likely triggering factor (see below)  Await endocrinology input    Emphysematous pyelonephritis  Assessment & Plan  Presented with nonspecific abdominal discomfort and CT imaging evidence of right-sided emphysematous pyelonephritis without abscess  Antibiotic coverage broadened to IV Zosyn pending urine culture   No blood cultures drawn in the ED  Will appreciate urology input  Leukocytosis noted, however, remains afebrile -> hemodynamically stable and currently nontoxic-appearing  PRN pain/emesis control    Type 2 diabetes mellitus ([address])  Assessment & Plan  Check updated A1c  Initiated on an insulin drip due to marked hyperglycemia (see plan for primary issue above)  Carbohydrate restriction  Hold oral hypoglycemics while hospitalized  Endocrinology to follow    Leukocytosis  Assessment & Plan  Likely reactive due to acute medical issue(s) coupled with hemoconcentration  Monitor WBC count    Hyponatremia  Assessment & Plan  Serum sodium only decreased at 119, however, in the setting of marked hyperglycemia, corrected sodium is 131  Anticipate progressive improvement in sodium levels with correction of blood sugars  Okay to continue IV fluids due to primary issue  Continue serial serum sodium monitoring    LEANDRO (acute kidney injury) ([address])  Assessment & Plan  Presenting creatinine of 1.67 -> continue IV fluid hydration  Likely secondary to renal hypoperfusion and insensible volume loss from recent vomiting  Limit/avoid nephrotoxins as possible - holding HCTZ/ACEI  Monitor renal function and urine output    Morbid obesity (HCC)  Assessment & Plan  BMI of 37.39  Lifestyle/diet modifications    Thrombocytopenia (HCC)  Assessment & Plan  Monitor platelet count - monitor for bleeding    Essential hypertension  Assessment & Plan  Holding HCTZ/ACEI in the setting of acute kidney injury  PRN IV Hydralazine for BP spikes    Hyperlipidemia  Assessment & Plan  Continue statin/Zetia    Depression  Assessment & Plan  Continue Paxil      DVT Prophylaxis:  Heparin SC    Code Status:  Full code    Discussion with:  Patient, along with other family members at bedside    Anticipated Length of Stay:  Patient will be admitted on an Inpatient basis with an anticipated length of stay of greater than 2 midnights. Justification for Hospital Stay: Uncontrolled diabetes mellitus with profound hyperglycemia suspicious for HHS, along with emphysematous pyelonephritis requiring IV antibiotics and fluids, and a continuous insulin infusion for blood sugar control. Total Time for Visit, including Counseling / Coordination of Care: 75 minutes. More than 50% of total time spent on counseling and coordination of care, on one or more of the following: performing physical exam; counseling and coordination of care; obtaining or reviewing history; documenting in the medical record; reviewing/ordering tests, medications or procedures; communicating with other healthcare professionals and discussing with patient's family/caregivers.       Chief Complaint:  Abdominal discomfort with nausea/vomiting      History of Present Illness:    Valdene Hutch S Apgar is a 60 y.o. female who presents with complaints of progressively worsening abdominal discomfort with nausea/vomiting over the last several days, impeding her ability to hold down oral meds or food. ED she was discovered to have profound hyperglycemia with a blood sugar > 800, further work-up was fortunately negative for diabetic ketoacidosis, renal, revealed suspicion of nonketotic hyperglycemia. She was initiated on IV fluids and intravenous insulin. Further work-up revealed evidence of a right-sided emphysematous pyelonephritis, without abscess. She was started on IV Rocephin initially, which has now been expanded to empiric IV Zosyn, pending urine culture. She denies any prior history of similar episodes. She states that she is usually compliant with her medications, including oral hypoglycemics, with the exception of the last few days due to nausea and inability to hold down pills. At the time of my encounter, she is resting in bed still complaining of intermittent generalized abdominal discomfort, however, feeling slightly better than on arrival.  Overall, she remains in pleasant and hopeful spirits. Review of Systems:    Review of Systems - A thorough 12 point review systems was conducted. Pertinent positives and negatives are mentioned in the history of present illness. Past Medical and Surgical History:     Past Medical History:   Diagnosis Date    Diabetes mellitus ([address])     Hypertension        Past Surgical History:   Procedure Laterality Date    CHOLECYSTECTOMY           Medications & Allergies:    Prior to Admission medications    Medication Sig Start Date End Date Taking?  Authorizing Provider   calcium carbonate (OS-AWA) 600 MG tablet Take 600 mg by mouth daily   Yes Historical Provider, MD   cholecalciferol (VITAMIN D3) 1,000 units tablet Take 2,000 Units by mouth daily   Yes Historical Provider, MD   ezetimibe (ZETIA) 10 mg tablet Take 10 mg by mouth daily   Yes Historical Provider, MD   hydrochlorothiazide (HYDRODIURIL) 12.5 mg tablet Take 12.5 mg by mouth daily   Yes Historical Provider, MD   PARoxetine (PAXIL) 20 mg tablet Take 20 mg by mouth daily   Yes Historical Provider, MD   perindopril (ACEON) 8 MG tablet Take 8 mg by mouth daily   Yes Historical Provider, MD   pioglitazone (ACTOS) 15 mg tablet Take 15 mg by mouth daily   Yes Historical Provider, MD   pravastatin (PRAVACHOL) 20 mg tablet Take 20 mg by mouth daily   Yes Historical Provider, MD         Allergies: No Known Allergies      Social History:     Substance Use History:   Social History     Substance and Sexual Activity   Alcohol Use Never     Social History     Tobacco Use   Smoking Status Never   Smokeless Tobacco Never     Social History     Substance and Sexual Activity   Drug Use Never         Family History:    Noncontributory      Physical Exam:     Vitals:   Blood Pressure: 141/65 (10/17/23 1800)  Pulse: 85 (10/17/23 1800)  Temperature: 98.3 °F (36.8 °C) (10/17/23 1738)  Temp Source: Tympanic (10/17/23 1738)  Respirations: (!) 35 (10/17/23 1800)  Height: 5' 7" (170.2 cm) (10/17/23 1738)  Weight - Scale: 108 kg (238 lb 12.1 oz) (10/17/23 1738)  SpO2: 98 % (10/17/23 1800)      GENERAL Obese - weak/fatigued   HEAD   Normocephalic - atraumatic   EYES   PERRL - EOMI    MOUTH   Mucosa moist   NECK   Supple - full range of motion   CARDIAC Rate controlled - S1/S2 positive   PULMONARY    Clear breath sounds bilaterally - nonlabored respirations   ABDOMEN   Soft - nondistended but generalized abdominal discomfort - active bowel sounds   MUSCULOSKELETAL   Motor strength/range of motion intact   NEUROLOGIC   Alert/oriented at baseline   SKIN   Chronic wrinkles/blemishes    PSYCHIATRIC   Mood/affect stable         Additional Data:     Labs & Recent Cultures:    Results from last 7 days   Lab Units 10/17/23  1200   WBC Thousand/uL 14.07*   HEMOGLOBIN g/dL 14.9   HEMATOCRIT % 44.0   PLATELETS Thousands/uL 137*   BANDS PCT % 4   LYMPHO PCT % 5*   MONO PCT % 5 EOS PCT % 0     Results from last 7 days   Lab Units 10/17/23  1805 10/17/23  1359   SODIUM mmol/L  --  119*   POTASSIUM mmol/L  --  3.9   CHLORIDE mmol/L  --  86*   CO2 mmol/L  --  19*   BUN mg/dL  --  68*   CREATININE mg/dL  --  1.67*   ANION GAP mmol/L  --  14   CALCIUM mg/dL  --  7.6*   ALBUMIN g/dL  --  2.5*   TOTAL BILIRUBIN mg/dL  --  0.74   ALK PHOS U/L  --  85   ALT U/L  --  14   AST U/L  --  15   GLUCOSE RANDOM mg/dL 324* 849*         Results from last 7 days   Lab Units 10/17/23  1756 10/17/23  1615   POC GLUCOSE mg/dl >500* >500*         Lines/Drains:  Invasive Devices       Peripheral Intravenous Line  Duration             Long-Dwell Peripheral IV (Midline) 10/17/23 Left Brachial <1 day    Peripheral IV 10/17/23 Right Hand <1 day    Peripheral IV 10/17/23 Right;Ventral (anterior) External Jugular <1 day                      Imaging:     CT abdomen pelvis with contrast    Result Date: 10/17/2023  Narrative: CT ABDOMEN AND PELVIS WITH IV CONTRAST INDICATION:   nausea, abd pain. COMPARISON: MR abdomen 2/14/2006. TECHNIQUE:  CT examination of the abdomen and pelvis was performed. Multiplanar 2D reformatted images were created from the source data. This examination, like all CT scans performed in the [facility], was performed utilizing techniques to minimize radiation dose exposure, including the use of iterative reconstruction and automated exposure control. Radiation dose length product (DLP) for this visit:  7681 mGy-cm IV Contrast:  100 mL of iohexol (OMNIPAQUE) Enteric Contrast:  Enteric contrast was not administered. FINDINGS: ABDOMEN LOWER CHEST:  No clinically significant abnormality identified in the visualized lower chest. LIVER/BILIARY TREE:  Unremarkable. GALLBLADDER:  There are gallstone(s) within the gallbladder, without pericholecystic inflammatory changes. SPLEEN:  Unremarkable. PANCREAS:  Unremarkable.  ADRENAL GLANDS: Bilobed left adrenal mass measuring up to 5.4 x 7.8 cm predominantly composed of fat most consistent with a myelolipoma. KIDNEYS/URETERS: Left kidney appears normal. The right kidney is edematous with streaky cortical enhancement. There is wall thickening and hyperemia noted throughout the right ureter. There is soft tissue emphysema surrounding the right kidney within the perinephric space especially inferiorly and medially. Overall findings are consistent with emphysematous pyelonephritis, class 3a by [place]-Sindi classification system. STOMACH AND BOWEL:  There is colonic diverticulosis without evidence of acute diverticulitis. APPENDIX:  A normal appendix was visualized. ABDOMINOPELVIC CAVITY:  No ascites. No pneumoperitoneum. No lymphadenopathy. VESSELS:  Unremarkable for patient's age. PELVIS REPRODUCTIVE ORGANS:  Unremarkable for patient's age. URINARY BLADDER:  Unremarkable. ABDOMINAL WALL/INGUINAL REGIONS:  There is a small fat-containing umbilical hernia. OSSEOUS STRUCTURES:  No acute fracture or destructive osseous lesion. Impression: 1. Right kidney emphysematous pyelonephritis. No evidence of abscess. 2. Bilobed left adrenal lesion predominantly composed of fat consistent with myelolipoma, previously characterized on 2006 MR abdomen. The study was marked in [facility] for immediate notification. Workstation performed: [id number]       XR chest 1 view portable    Result Date: 10/17/2023  Narrative: CHEST INDICATION:   pain. COMPARISON:  None. EXAM PERFORMED/VIEWS:  XR CHEST PORTABLE. FINDINGS: Cardiomediastinal silhouette normal. Lungs clear. No effusion or pneumothorax. Upper abdomen normal. Bones normal for age. Impression: No acute cardiopulmonary disease. Workstation performed: [id number]                 ** Please Note: This note is constructed using a voice recognition dictation system.   An occasional wrong word/phrase or “sound-a-like” substitution may have been picked up by dictation device due to the inherent limitations of voice recognition software. Read the chart carefully and recognize, using reasonable context, where substitutions may have occurred. **

## 2023-10-18 PROBLEM — R79.89 ELEVATED SERUM CREATININE: Status: ACTIVE | Noted: 2023-10-17

## 2023-10-18 LAB
ANION GAP SERPL CALCULATED.3IONS-SCNC: 10 MMOL/L
ANION GAP SERPL CALCULATED.3IONS-SCNC: 10 MMOL/L
ANION GAP SERPL CALCULATED.3IONS-SCNC: 12 MMOL/L
BASOPHILS # BLD AUTO: 0.17 THOUSANDS/ÂΜL (ref 0–0.1)
BUN SERPL-MCNC: 54 MG/DL (ref 5–25)
BUN SERPL-MCNC: 55 MG/DL (ref 5–25)
BUN SERPL-MCNC: 57 MG/DL (ref 5–25)
CALCIUM SERPL-MCNC: 7.1 MG/DL (ref 8.4–10.2)
CALCIUM SERPL-MCNC: 7.3 MG/DL (ref 8.4–10.2)
CALCIUM SERPL-MCNC: 7.3 MG/DL (ref 8.4–10.2)
CHLORIDE SERPL-SCNC: 94 MMOL/L (ref 96–108)
CHLORIDE SERPL-SCNC: 96 MMOL/L (ref 96–108)
CHLORIDE SERPL-SCNC: 98 MMOL/L (ref 96–108)
CO2 SERPL-SCNC: 22 MMOL/L (ref 21–32)
CREAT SERPL-MCNC: 1.61 MG/DL (ref 0.6–1.3)
CREAT SERPL-MCNC: 1.64 MG/DL (ref 0.6–1.3)
CREAT SERPL-MCNC: 1.77 MG/DL (ref 0.6–1.3)
ERYTHROCYTE [DISTWIDTH] IN BLOOD BY AUTOMATED COUNT: 12.9 % (ref 11.6–15.1)
EST. AVERAGE GLUCOSE BLD GHB EST-MCNC: 384 MG/DL
GFR SERPL CREATININE-BSD FRML MDRD: 30 ML/MIN/1.73SQ M
GFR SERPL CREATININE-BSD FRML MDRD: 33 ML/MIN/1.73SQ M
GFR SERPL CREATININE-BSD FRML MDRD: 34 ML/MIN/1.73SQ M
GLUCOSE SERPL-MCNC: 107 MG/DL (ref 65–140)
GLUCOSE SERPL-MCNC: 114 MG/DL (ref 65–140)
GLUCOSE SERPL-MCNC: 143 MG/DL (ref 65–140)
GLUCOSE SERPL-MCNC: 144 MG/DL (ref 65–140)
GLUCOSE SERPL-MCNC: 147 MG/DL (ref 65–140)
GLUCOSE SERPL-MCNC: 153 MG/DL (ref 65–140)
GLUCOSE SERPL-MCNC: 155 MG/DL (ref 65–140)
GLUCOSE SERPL-MCNC: 159 MG/DL (ref 65–140)
GLUCOSE SERPL-MCNC: 168 MG/DL (ref 65–140)
GLUCOSE SERPL-MCNC: 174 MG/DL (ref 65–140)
GLUCOSE SERPL-MCNC: 175 MG/DL (ref 65–140)
GLUCOSE SERPL-MCNC: 177 MG/DL (ref 65–140)
GLUCOSE SERPL-MCNC: 198 MG/DL (ref 65–140)
GLUCOSE SERPL-MCNC: 207 MG/DL (ref 65–140)
GLUCOSE SERPL-MCNC: 98 MG/DL (ref 65–140)
HBA1C MFR BLD: 15 %
HCT VFR BLD AUTO: 34.8 % (ref 34.8–46.1)
HGB BLD-MCNC: 12.5 G/DL (ref 11.5–15.4)
IMM GRANULOCYTES # BLD AUTO: >0.5 THOUSAND/UL (ref 0–0.2)
LACTATE SERPL-SCNC: 1.2 MMOL/L (ref 0.5–2)
MAGNESIUM SERPL-MCNC: 1.7 MG/DL (ref 1.9–2.7)
MCH RBC QN AUTO: 28.5 PG (ref 26.8–34.3)
MCHC RBC AUTO-ENTMCNC: 35.9 G/DL (ref 31.4–37.4)
MCV RBC AUTO: 80 FL (ref 82–98)
NRBC BLD AUTO-RTO: 0 /100 WBCS
PHOSPHATE SERPL-MCNC: 2.2 MG/DL (ref 2.7–4.5)
PLATELET # BLD AUTO: 146 THOUSANDS/UL (ref 149–390)
PMV BLD AUTO: 10.1 FL (ref 8.9–12.7)
POTASSIUM SERPL-SCNC: 2.9 MMOL/L (ref 3.5–5.3)
POTASSIUM SERPL-SCNC: 3 MMOL/L (ref 3.5–5.3)
POTASSIUM SERPL-SCNC: 3.5 MMOL/L (ref 3.5–5.3)
PROCALCITONIN SERPL-MCNC: 7.82 NG/ML
RBC # BLD AUTO: 4.38 MILLION/UL (ref 3.81–5.12)
SODIUM SERPL-SCNC: 128 MMOL/L (ref 135–147)
SODIUM SERPL-SCNC: 128 MMOL/L (ref 135–147)
SODIUM SERPL-SCNC: 130 MMOL/L (ref 135–147)
TSH SERPL DL<=0.05 MIU/L-ACNC: 1.42 UIU/ML (ref 0.45–4.5)
WBC # BLD AUTO: 25.77 THOUSAND/UL (ref 4.31–10.16)

## 2023-10-18 PROCEDURE — 84145 PROCALCITONIN (PCT): CPT | Performed by: INTERNAL MEDICINE

## 2023-10-18 PROCEDURE — 82948 REAGENT STRIP/BLOOD GLUCOSE: CPT

## 2023-10-18 PROCEDURE — 84443 ASSAY THYROID STIM HORMONE: CPT | Performed by: INTERNAL MEDICINE

## 2023-10-18 PROCEDURE — 85027 COMPLETE CBC AUTOMATED: CPT | Performed by: INTERNAL MEDICINE

## 2023-10-18 PROCEDURE — 90471 IMMUNIZATION ADMIN: CPT | Performed by: INTERNAL MEDICINE

## 2023-10-18 PROCEDURE — 90686 IIV4 VACC NO PRSV 0.5 ML IM: CPT | Performed by: INTERNAL MEDICINE

## 2023-10-18 PROCEDURE — 83605 ASSAY OF LACTIC ACID: CPT | Performed by: INTERNAL MEDICINE

## 2023-10-18 PROCEDURE — 99232 SBSQ HOSP IP/OBS MODERATE 35: CPT | Performed by: INTERNAL MEDICINE

## 2023-10-18 PROCEDURE — 80048 BASIC METABOLIC PNL TOTAL CA: CPT | Performed by: INTERNAL MEDICINE

## 2023-10-18 PROCEDURE — 83735 ASSAY OF MAGNESIUM: CPT | Performed by: INTERNAL MEDICINE

## 2023-10-18 PROCEDURE — 84100 ASSAY OF PHOSPHORUS: CPT | Performed by: INTERNAL MEDICINE

## 2023-10-18 PROCEDURE — 83036 HEMOGLOBIN GLYCOSYLATED A1C: CPT | Performed by: INTERNAL MEDICINE

## 2023-10-18 PROCEDURE — 99447 NTRPROF PH1/NTRNET/EHR 11-20: CPT | Performed by: INTERNAL MEDICINE

## 2023-10-18 PROCEDURE — 99222 1ST HOSP IP/OBS MODERATE 55: CPT | Performed by: INTERNAL MEDICINE

## 2023-10-18 RX ORDER — POTASSIUM CHLORIDE 20 MEQ/1
40 TABLET, EXTENDED RELEASE ORAL EVERY 4 HOURS
Status: COMPLETED | OUTPATIENT
Start: 2023-10-18 | End: 2023-10-18

## 2023-10-18 RX ORDER — CALCIUM CARBONATE 500 MG/1
500 TABLET, CHEWABLE ORAL 3 TIMES DAILY PRN
Status: DISCONTINUED | OUTPATIENT
Start: 2023-10-18 | End: 2023-10-25 | Stop reason: HOSPADM

## 2023-10-18 RX ORDER — MAGNESIUM SULFATE HEPTAHYDRATE 40 MG/ML
2 INJECTION, SOLUTION INTRAVENOUS ONCE
Status: COMPLETED | OUTPATIENT
Start: 2023-10-18 | End: 2023-10-18

## 2023-10-18 RX ORDER — POTASSIUM CHLORIDE 20 MEQ/1
40 TABLET, EXTENDED RELEASE ORAL ONCE
Status: COMPLETED | OUTPATIENT
Start: 2023-10-18 | End: 2023-10-18

## 2023-10-18 RX ADMIN — INFLUENZA VIRUS VACCINE 0.5 ML: 15; 15; 15; 15 SUSPENSION INTRAMUSCULAR at 08:16

## 2023-10-18 RX ADMIN — POTASSIUM CHLORIDE 40 MEQ: 1500 TABLET, EXTENDED RELEASE ORAL at 13:40

## 2023-10-18 RX ADMIN — SODIUM CHLORIDE 125 ML/HR: 0.9 INJECTION, SOLUTION INTRAVENOUS at 11:02

## 2023-10-18 RX ADMIN — PAROXETINE 20 MG: 20 TABLET, FILM COATED ORAL at 08:13

## 2023-10-18 RX ADMIN — POTASSIUM CHLORIDE 40 MEQ: 1500 TABLET, EXTENDED RELEASE ORAL at 04:19

## 2023-10-18 RX ADMIN — HEPARIN SODIUM 5000 UNITS: 5000 INJECTION INTRAVENOUS; SUBCUTANEOUS at 21:40

## 2023-10-18 RX ADMIN — ONDANSETRON 4 MG: 2 INJECTION INTRAMUSCULAR; INTRAVENOUS at 18:33

## 2023-10-18 RX ADMIN — HEPARIN SODIUM 5000 UNITS: 5000 INJECTION INTRAVENOUS; SUBCUTANEOUS at 06:14

## 2023-10-18 RX ADMIN — PIPERACILLIN AND TAZOBACTAM 3.38 G: 36; 4.5 INJECTION, POWDER, FOR SOLUTION INTRAVENOUS at 23:08

## 2023-10-18 RX ADMIN — EZETIMIBE 10 MG: 10 TABLET ORAL at 08:13

## 2023-10-18 RX ADMIN — ANTACID TABLETS 500 MG: 500 TABLET, CHEWABLE ORAL at 18:33

## 2023-10-18 RX ADMIN — PRAVASTATIN SODIUM 20 MG: 20 TABLET ORAL at 17:54

## 2023-10-18 RX ADMIN — PIPERACILLIN AND TAZOBACTAM 3.38 G: 36; 4.5 INJECTION, POWDER, FOR SOLUTION INTRAVENOUS at 06:14

## 2023-10-18 RX ADMIN — Medication 2000 UNITS: at 08:16

## 2023-10-18 RX ADMIN — HEPARIN SODIUM 5000 UNITS: 5000 INJECTION INTRAVENOUS; SUBCUTANEOUS at 13:40

## 2023-10-18 RX ADMIN — POTASSIUM PHOSPHATE, MONOBASIC AND POTASSIUM PHOSPHATE, DIBASIC 30 MMOL: 224; 236 INJECTION, SOLUTION, CONCENTRATE INTRAVENOUS at 09:40

## 2023-10-18 RX ADMIN — PIPERACILLIN AND TAZOBACTAM 3.38 G: 36; 4.5 INJECTION, POWDER, FOR SOLUTION INTRAVENOUS at 11:58

## 2023-10-18 RX ADMIN — SODIUM CHLORIDE 125 ML/HR: 0.9 INJECTION, SOLUTION INTRAVENOUS at 19:19

## 2023-10-18 RX ADMIN — PIPERACILLIN AND TAZOBACTAM 3.38 G: 36; 4.5 INJECTION, POWDER, FOR SOLUTION INTRAVENOUS at 18:34

## 2023-10-18 RX ADMIN — MAGNESIUM SULFATE HEPTAHYDRATE 2 G: 40 INJECTION, SOLUTION INTRAVENOUS at 09:29

## 2023-10-18 RX ADMIN — SODIUM CHLORIDE 125 ML/HR: 0.9 INJECTION, SOLUTION INTRAVENOUS at 02:10

## 2023-10-18 RX ADMIN — SODIUM CHLORIDE 0.5 UNITS/HR: 9 INJECTION, SOLUTION INTRAVENOUS at 18:32

## 2023-10-18 RX ADMIN — CALCIUM 1 TABLET: 500 TABLET ORAL at 08:13

## 2023-10-18 RX ADMIN — POTASSIUM CHLORIDE 40 MEQ: 1500 TABLET, EXTENDED RELEASE ORAL at 09:20

## 2023-10-18 NOTE — TREATMENT PLAN
Contacted by urology, who have proceeded to ordered an IR consult/evaluation for possible nephrostomy tube placement for drainage of any present/fluid in the kidney. Also discussed elevation in WBC count today. If WBC count continues to elevate by tomorrow, will repeat CT scan to assess for progression.

## 2023-10-18 NOTE — CONSULTS
H&P Exam - Urology       Patient: Adel Gala Apgar   : 1965 Sex: female   MRN: [id number]     CSN: [id number]      History of Present Illness   HPI:  Adel Gala Apgar is a 60 y.o. female known diabetic developing mild nausea vomiting abdominal pain last Wednesday when vacationing in [place] presenting back to the area last Friday going to urgent care for nausea vomiting with no apparent urine analysis checked presenting to [address] [place] [address] ER yesterday afternoon with abdominal pain nausea vomiting found to have sugar of 800 and white count of 15 CAT scan confirms emphysematous right pyelonephritis patient admitted to the medical service 4:00 yesterday and put in the ICU urologic consult called today at 4:00 review of x-ray with increasing white count interventional radiology consult called for possible nephrostomy tube discussion with radiologist feels tube cannot be placed at this time due to just a minimal amount of gas in the perirenal space and no hydro pelvis hospitalist states that patient is doing well tolerating diet with no pain seen at the bedside tonight for official consultation denying gross hematuria dysuria or any voiding issues        Review of Systems:   Constitutional:  Negative for activity change, fever, chills and diaphoresis. HENT: Negative for hearing loss and trouble swallowing. Eyes: Negative for itching and visual disturbance. Respiratory: Negative for chest tightness and shortness of breath. Cardiovascular: Negative for chest pain, edema. Gastrointestinal: Negative for abdominal distention, na abdominal pain, constipation, diarrhea, Nausea and vomiting. Genitourinary: Negative for decreased urine volume, difficulty urinating, dysuria, enuresis, frequency, hematuria and urgency. Musculoskeletal: Negative for gait problem and myalgias. Neurological: Negative for dizziness and headaches. Hematological: Does not bruise/bleed easily.        Historical Information   Past Medical History:   Diagnosis Date    Diabetes mellitus ([address])     Hypertension      Past Surgical History:   Procedure Laterality Date    CHOLECYSTECTOMY       Social History   Social History     Substance and Sexual Activity   Alcohol Use Never     Social History     Substance and Sexual Activity   Drug Use Never     Social History     Tobacco Use   Smoking Status Never   Smokeless Tobacco Never     Family History: History reviewed. No pertinent family history. Meds/Allergies   Medications Prior to Admission   Medication    calcium carbonate (OS-AWA) 600 MG tablet    cholecalciferol (VITAMIN D3) 1,000 units tablet    ezetimibe (ZETIA) 10 mg tablet    hydrochlorothiazide (HYDRODIURIL) 12.5 mg tablet    PARoxetine (PAXIL) 20 mg tablet    perindopril (ACEON) 8 MG tablet    pioglitazone (ACTOS) 15 mg tablet    pravastatin (PRAVACHOL) 20 mg tablet     No Known Allergies    Objective   Vitals: /67   Pulse 92   Temp 98.6 °F (37 °C) (Tympanic)   Resp (!) 31   Ht 5' 7" (1.702 m)   Wt 107 kg (235 lb 7.2 oz)   SpO2 94%   BMI 36.88 kg/m²     Physical Exam:  General Alert awake   Normocephalic atraumatic PERRLA  Lungs clear bilaterally  Cardiac normal S1 normal S2  Abdomen soft, flank pain none  Extremities no edema    I/O last 24 hours:   In: 4401.6 [P.O.:880; I.V.:3071.6; IV Piggyback:450]  Out: -     Invasive Devices       Peripheral Intravenous Line  Duration             Long-Dwell Peripheral IV (Midline) 10/17/23 Left Brachial 1 day    Peripheral IV 10/17/23 Right Hand 1 day    Peripheral IV 10/17/23 Right;Ventral (anterior) External Jugular 1 day                        Lab Results: CBC:   Lab Results   Component Value Date    WBC 25.77 (H) 10/18/2023    HGB 12.5 10/18/2023    HCT 34.8 10/18/2023    MCV 80 (L) 10/18/2023     (L) 10/18/2023    RBC 4.38 10/18/2023    MCH 28.5 10/18/2023    MCHC 35.9 10/18/2023    RDW 12.9 10/18/2023    MPV 10.1 10/18/2023    NRBC 0 10/18/2023     CMP:   Lab Results Component Value Date    CL 98 10/18/2023    CO2 22 10/18/2023    BUN 54 (H) 10/18/2023    CREATININE 1.77 (H) 10/18/2023    CALCIUM 7.1 (L) 10/18/2023    AST 15 10/17/2023    ALT 14 10/17/2023    ALKPHOS 85 10/17/2023    EGFR 30 10/18/2023     Urinalysis:   Lab Results   Component Value Date    COLORU Colorless 10/17/2023    CLARITYU Cloudy 10/17/2023    SPECGRAV <=1.005 10/17/2023    PHUR 5.5 10/17/2023    LEUKOCYTESUR Small (A) 10/17/2023    NITRITE Negative 10/17/2023    GLUCOSEU >=1000 (1%) (A) 10/17/2023    KETONESU Negative 10/17/2023    BILIRUBINUR Negative 10/17/2023    BLOODU Large (A) 10/17/2023     Urine Culture:   Lab Results   Component Value Date    URINECX >100,000 cfu/ml Gram Negative Willian Enteric Like (A) 10/17/2023     PSA: No results found for: "PSA"        Assessment/ Plan:  Emphysematous pyelonephritis (71% chance of morbidity in light of significant infection and uncontrolled diabetes patient appears stable at this time would make n.p.o. after midnight repeat white count in the morning repeat CAT scan in the morning for reevaluation of gas and possible collection discussed with interventional radiologist if collection larger possible right nephrostomy tube placement tomorrow may need cystoscopy right open-ended stent with pyelogram to identify pelvis and area of concern will reevaluate in the morning Will need antibiotics for at least a few weeks to months      Brigette Mccarthy MD

## 2023-10-18 NOTE — ASSESSMENT & PLAN NOTE
A1c significantly uncontrolled at 15.0  Initiated on an insulin drip due to marked hyperglycemia (see plan for primary issue above)  Carbohydrate restriction  Holding oral hypoglycemics while hospitalized  Endocrinology consultation pending

## 2023-10-18 NOTE — CONSULTS
H&P Exam - Urology       Patient: Santiago Newton Apgar   : 1965 Sex: female   MRN: [id number]     CSN: [id number]      History of Present Illness         Review of Systems:   Constitutional:  Negative for activity change, fever, chills and diaphoresis. HENT: Negative for hearing loss and trouble swallowing. Eyes: Negative for itching and visual disturbance. Respiratory: Negative for chest tightness and shortness of breath. Cardiovascular: Negative for chest pain, edema. Gastrointestinal: Negative for abdominal distention, na abdominal pain, constipation, diarrhea, Nausea and vomiting. Genitourinary: Negative for decreased urine volume, difficulty urinating, dysuria, enuresis, frequency, hematuria and urgency. Musculoskeletal: Negative for gait problem and myalgias. Neurological: Negative for dizziness and headaches. Hematological: Does not bruise/bleed easily. Historical Information   Past Medical History:   Diagnosis Date    Diabetes mellitus ([address])     Hypertension      Past Surgical History:   Procedure Laterality Date    CHOLECYSTECTOMY       Social History   Social History     Substance and Sexual Activity   Alcohol Use Never     Social History     Substance and Sexual Activity   Drug Use Never     Social History     Tobacco Use   Smoking Status Never   Smokeless Tobacco Never     Family History: History reviewed. No pertinent family history.     Meds/Allergies   Medications Prior to Admission   Medication    calcium carbonate (OS-AWA) 600 MG tablet    cholecalciferol (VITAMIN D3) 1,000 units tablet    ezetimibe (ZETIA) 10 mg tablet    hydrochlorothiazide (HYDRODIURIL) 12.5 mg tablet    PARoxetine (PAXIL) 20 mg tablet    perindopril (ACEON) 8 MG tablet    pioglitazone (ACTOS) 15 mg tablet    pravastatin (PRAVACHOL) 20 mg tablet     No Known Allergies    Objective   Vitals: /67   Pulse 92   Temp 98.6 °F (37 °C) (Tympanic)   Resp (!) 31   Ht 5' 7" (1.702 m)   Wt 107 kg (235 lb 7.2 oz)   SpO2 94%   BMI 36.88 kg/m²     Physical Exam:  General Alert awake   Normocephalic atraumatic PERRLA  Lungs clear bilaterally  Cardiac normal S1 normal S2  Abdomen soft, flank pain  Extremities no edema    I/O last 24 hours:   In: 4401.6 [P.O.:880; I.V.:3071.6; IV Piggyback:450]  Out: -     Invasive Devices       Peripheral Intravenous Line  Duration             Long-Dwell Peripheral IV (Midline) 10/17/23 Left Brachial 1 day    Peripheral IV 10/17/23 Right Hand 1 day    Peripheral IV 10/17/23 Right;Ventral (anterior) External Jugular 1 day                        Lab Results: CBC:   Lab Results   Component Value Date    WBC 25.77 (H) 10/18/2023    HGB 12.5 10/18/2023    HCT 34.8 10/18/2023    MCV 80 (L) 10/18/2023     (L) 10/18/2023    RBC 4.38 10/18/2023    MCH 28.5 10/18/2023    MCHC 35.9 10/18/2023    RDW 12.9 10/18/2023    MPV 10.1 10/18/2023    NRBC 0 10/18/2023     CMP:   Lab Results   Component Value Date    CL 98 10/18/2023    CO2 22 10/18/2023    BUN 54 (H) 10/18/2023    CREATININE 1.77 (H) 10/18/2023    CALCIUM 7.1 (L) 10/18/2023    AST 15 10/17/2023    ALT 14 10/17/2023    ALKPHOS 85 10/17/2023    EGFR 30 10/18/2023     Urinalysis:   Lab Results   Component Value Date    COLORU Colorless 10/17/2023    CLARITYU Cloudy 10/17/2023    SPECGRAV <=1.005 10/17/2023    PHUR 5.5 10/17/2023    LEUKOCYTESUR Small (A) 10/17/2023    NITRITE Negative 10/17/2023    GLUCOSEU >=1000 (1%) (A) 10/17/2023    KETONESU Negative 10/17/2023    BILIRUBINUR Negative 10/17/2023    BLOODU Large (A) 10/17/2023     Urine Culture:   Lab Results   Component Value Date    URINECX >100,000 cfu/ml Gram Negative Willian Enteric Like (A) 10/17/2023     PSA: No results found for: "PSA"        Assessment/ Urmila      Kenton Dickens MD  H&P Exam - Urology       Patient: Merl Sabal Apgar   : 1965 Sex: female   MRN: [id number]     CSN: [id number]      History of Present Illness   HPI:  Merl Sabal Apgar is a 60 y.o. female who presented to [address] [place] [address] ER yesterday afternoon 4 PM with a complaint of nausea vomiting abdominal pain found to have uncontrolled diabetes with sugar of 800 undergoing CAT scan abdomen pelvis confirming gas in the right perirenal/renal tissue consistent with emphysematous pyelonephritis admitted for IV antibiotic therapy urologic consult called for at 4 PM today patient placed in the ICU stepdown unit but not ICU discussion with Dr. Gómez Holguin patient rock stable on diet despite leukocytosis reviewing CAT scan confirming gas collection in the right perirenal space immediate consultation with interventional radiology and discussion with Dr. Ashley Jolly feeling that there is no perirenal collection to drain patient kidney is also of poor function with no hydronephrosis or urine noted in the collecting system. After discussion with him and white count climbing will make n.p.o. after midnight despite no obvious symptoms repeat CAT scan in the morning for reevaluation if collection increasing in size then patient will go combined cystoscopy right open ended stent placement for retrograde contrast and elective right drain nephrostomy placement if possible pending clinical outcome tomorrow and white count patient seen at the bedside at this time with no urinary symptoms voiding 3-4 times a day denying fevers chills flank pain hematuria dysuria        Review of Systems:   Constitutional:  Negative for activity change, fever, chills and diaphoresis. HENT: Negative for hearing loss and trouble swallowing. Eyes: Negative for itching and visual disturbance. Respiratory: Negative for chest tightness and shortness of breath. Cardiovascular: Negative for chest pain, edema. Gastrointestinal: Negative for abdominal distention, na abdominal pain, constipation, diarrhea, Nausea and vomiting.    Genitourinary: Negative for decreased urine volume, difficulty urinating, dysuria, enuresis, frequency, hematuria and urgency. Musculoskeletal: Negative for gait problem and myalgias. Neurological: Negative for dizziness and headaches. Hematological: Does not bruise/bleed easily. Historical Information   Past Medical History:   Diagnosis Date    Diabetes mellitus ([address])     Hypertension      Past Surgical History:   Procedure Laterality Date    CHOLECYSTECTOMY       Social History   Social History     Substance and Sexual Activity   Alcohol Use Never     Social History     Substance and Sexual Activity   Drug Use Never     Social History     Tobacco Use   Smoking Status Never   Smokeless Tobacco Never     Family History: History reviewed. No pertinent family history. Meds/Allergies   Medications Prior to Admission   Medication    calcium carbonate (OS-AWA) 600 MG tablet    cholecalciferol (VITAMIN D3) 1,000 units tablet    ezetimibe (ZETIA) 10 mg tablet    hydrochlorothiazide (HYDRODIURIL) 12.5 mg tablet    PARoxetine (PAXIL) 20 mg tablet    perindopril (ACEON) 8 MG tablet    pioglitazone (ACTOS) 15 mg tablet    pravastatin (PRAVACHOL) 20 mg tablet     No Known Allergies    Objective   Vitals: /67   Pulse 92   Temp 98.6 °F (37 °C) (Tympanic)   Resp (!) 31   Ht 5' 7" (1.702 m)   Wt 107 kg (235 lb 7.2 oz)   SpO2 94%   BMI 36.88 kg/m²     Physical Exam:  General Alert awake   Normocephalic atraumatic PERRLA  Lungs clear bilaterally  Cardiac normal S1 normal S2  Abdomen soft, flank pain none  Postvoid residual apparently minimal  Extremities no edema    I/O last 24 hours:   In: 4401.6 [P.O.:880; I.V.:3071.6; IV Piggyback:450]  Out: -     Invasive Devices       Peripheral Intravenous Line  Duration             Long-Dwell Peripheral IV (Midline) 10/17/23 Left Brachial 1 day    Peripheral IV 10/17/23 Right Hand 1 day    Peripheral IV 10/17/23 Right;Ventral (anterior) External Jugular 1 day                        Lab Results: CBC:   Lab Results   Component Value Date    WBC 25.77 (H) 10/18/2023    HGB 12.5 10/18/2023    HCT 34.8 10/18/2023    MCV 80 (L) 10/18/2023     (L) 10/18/2023    RBC 4.38 10/18/2023    MCH 28.5 10/18/2023    MCHC 35.9 10/18/2023    RDW 12.9 10/18/2023    MPV 10.1 10/18/2023    NRBC 0 10/18/2023     CMP:   Lab Results   Component Value Date    CL 98 10/18/2023    CO2 22 10/18/2023    BUN 54 (H) 10/18/2023    CREATININE 1.77 (H) 10/18/2023    CALCIUM 7.1 (L) 10/18/2023    AST 15 10/17/2023    ALT 14 10/17/2023    ALKPHOS 85 10/17/2023    EGFR 30 10/18/2023     Urinalysis:   Lab Results   Component Value Date    COLORU Colorless 10/17/2023    CLARITYU Cloudy 10/17/2023    SPECGRAV <=1.005 10/17/2023    PHUR 5.5 10/17/2023    LEUKOCYTESUR Small (A) 10/17/2023    NITRITE Negative 10/17/2023    GLUCOSEU >=1000 (1%) (A) 10/17/2023    KETONESU Negative 10/17/2023    BILIRUBINUR Negative 10/17/2023    BLOODU Large (A) 10/17/2023     Urine Culture:   Lab Results   Component Value Date    URINECX >100,000 cfu/ml Gram Negative Willian Enteric Like (A) 10/17/2023     PSA: No results found for: "PSA"        Assessment/ Plan:  Emphysematous pyelonephritis uncontrolled diabetic with admitting white count of 15 climbing to 25 with sugars of 800 now regressing on insulin no symptoms at all  Interventional radiology consult does not feel nephrostomy tube can be placed with any type of drainage in light of no hydronephrosis or actual pus to collect  Discussion with IR and hospitalist patient to be made n.p.o. after midnight repeat CAT scan abdomen pelvis in the morning as well as white count if white count climbing and collection present will be scheduled for combined cystoscopy right open-ended stent placement retrograde pyelogram right nephrostomy tube drainage tomorrow emphysematous pyelonephritis [place] Sep is a 71% chance of morbidity if not treated aggressively and should be followed closely with possible nephrostomy tube/intravenous antibiotics for months post procedure we will follow tomorrow closely schedule procedure pending CAT scan      Dean Puga MD

## 2023-10-18 NOTE — TELEMEDICINE
e-Consult ([facility])  - Interventional Radiology  Veneda Graves Apgar 60 y.o. female MRN: [id number]  Unit/Bed#: ICU 06 Encounter: [id number]          Interventional Radiology has been consulted to evaluate Zena Briar S Apgar    We were consulted by urology concerning this patient with emphysematous pyelonephritis. Inpatient Consult to IR  Consult performed by: Mary Velazquez MD  Consult ordered by: Elenita Tellez MD        10/18/23    Assessment/Recommendation:   60year-old female found to have emphysematous pyelonephritis with leukocytosis. CT of the abdomen and pelvis showed emphysematous pyelonephritis of the right kidney with small amount of gas around the kidney, however with no perinephric fluid collections present. There is no dilation of the right renal collecting system. Placement of a PCN in a non-dilated system would be difficult in this particular patient as the CT shows no excretion of contrast on the delayed images. Intra-procedural contrast is usually given in the setting of a non-dilated system to delineate the calyces for puncture. Also, gas around the right kidney would limit ultrasound-guided placement. If obstruction is suspected to be the cause of the emphysematous pyelonephritis, a double J stent can be considered. At this time, I will defer to urology recommendations for conservative management vs double J stent placement vs surgical management. Please contact IR if there are any other questions or concerns. 11-20 minutes, >50% of the total time devoted to medical consultative verbal/EMR discussion between providers. Written report will be generated in the EMR. Thank you for allowing Interventional Radiology to participate in the care of Zena Briar S Apgar. Please don't hesitate to call or TigerText us with any questions.      Mary Velazquez MD

## 2023-10-18 NOTE — ASSESSMENT & PLAN NOTE
Presented with nonspecific abdominal discomfort and CT imaging evidence of right-sided emphysematous pyelonephritis without abscess  Antibiotic coverage broadened to IV Zosyn pending urine culture   No blood cultures were drawn in the ED  Pending appreciate urology input  Leukocytosis noted, however, remains afebrile -> hemodynamically stable and currently nontoxic-appearing  PRN pain/emesis control

## 2023-10-18 NOTE — ASSESSMENT & PLAN NOTE
Presentedwith abdominal discomfort with nausea/vomiting and a profoundly elevated serum glucose of 849 -> progressively improved overnight into this morning now in the 150-200s   BMP without evidence of anion gap and urinalysis without evidence of ketones  Suspect hyperosmolar hyperglycemic state -> initiated on a non-DKA insulin drip  Continue aggressive IV fluid hydration  Monitor serial Accu-Cheks  Chronically on oral hypoglycemics for type 2 diabetes mellitus -> states that due to recent nausea/vomiting over the last few days, doses were missed  Suspect pyelonephritis as likely triggering factor (see below)  Pending endocrinology input

## 2023-10-18 NOTE — UTILIZATION REVIEW
Initial Clinical Review    Admission: Date/Time/Statement:   Admission Orders (From admission, onward)       Ordered        10/17/23 1602  INPATIENT ADMISSION  Once                          Orders Placed This Encounter   Procedures    INPATIENT ADMISSION     Standing Status:   Standing     Number of Occurrences:   1     Order Specific Question:   Level of Care     Answer:   Level 2 Stepdown / HOT [14]     Order Specific Question:   Estimated length of stay     Answer:   More than 2 Midnights     Order Specific Question:   Certification     Answer:   I certify that inpatient services are medically necessary for this patient for a duration of greater than two midnights. See H&P and MD Progress Notes for additional information about the patient's course of treatment. ED Arrival Information       Expected   -    Arrival   10/17/2023 10:32    Acuity   Urgent              Means of arrival   Walk-In    Escorted by   Family Member    Service   Hospitalist    Admission type   Emergency              Arrival complaint   vomiting             Chief Complaint   Patient presents with    Abdominal Pain     States she started with pain upper abdomen and vomiting on 10/11. Seen at [facility] on 10/14 and was given albuterol inhaler " cause my lungs are tight ". Home test for Covid on 10/13 was negative. Patient on oral agents for diabetes and does not test her blood sugar. Sipping on gatorade on arrival.     Vomiting       Initial Presentation: 60 y.o. female from home to ED admitted inpatient due to Hyperosmolar hyperglycemic state/Emphysematous pyelonephritis/LEANDRO. PMH of DM 2. Morbid obesity, hyperlipidemia, depression. Presented due to nausea, vomiting and abdominal discomfort starting week prior to arrival after vacation. Unable to tolerate po or medications   On exam:  mucous membranes very dry. Abdominal tenderness, discomfort to suprapubic region. Wbc 14.07.   glucose 849. Na 119, corrected to 130.   Bun 68, creatinine 1.67 with no baseline documented in care everywhere. UA negative ketones. Serum osmolarity 319. Ct abdomen showed right kidney emphysematous pyelonephritis. In the ED given 1 liter IVF x 2 , Zofran x 2, Pepcid,  Regular insulin and started on insulin drip, started on ceftriaxone. Plan is continue non DKA insulin drip, aggressive IVF hydration, serial accuchecks. Consult Endocrine. Trend BMP and hold HCTZ/ACE. Continue antibiotics and switched to Zosyn, follow cultures, consult Urology. Date: 10/18/23   Day 2: abdominal discomfort improved. Slightly less fatigued. On exam obese. Improved weakness and fatigue. Abdomen general discomfort. Soft. Wbc 25.77.  K 3. Continue IVF, serial accuchecks. Continue IV Zosyn. Per endocrine 10/18/23  - Patient with poorly controlled type 2 DM with A1c of 15%/HHS/Significant weight loss. Mild postprandial hyperglycemia. Plan is continued insulin gtt. Will need insulin at dc. Check thyroid function tests. Replete electrolytes. 10/18/23 per IR patient has emphysematous pyelonephritis. Placement of PCN would be difficult. "If obstruction is suspected to be the cause of the emphysematous pyelonephritis, a double J stent can be considered "  at this time, no IR intervention, defer to Urology for conservative management vs double J stent. 10/18/23 per Urology - patient  has Emphysematous pyelonephritis and there is 71% chance of morbidity due to infection and uncontrolled DM. Plan is NPO after midnight, WBC and Ct in am, if collection increased then discuss with IR concerning possible right nephrostomy tube or may need cystoscopy right open ended stent with pyelogram.   Continued antibiotics and will need weeks or months.      ED Triage Vitals [10/17/23 1106]   Temperature Pulse Respirations Blood Pressure SpO2   (!) 97.4 °F (36.3 °C) 96 20 131/61 99 %      Temp Source Heart Rate Source Patient Position - Orthostatic VS BP Location FiO2 (%)   Tympanic Monitor Sitting Left arm --      Pain Score       7          Wt Readings from Last 1 Encounters:   10/18/23 107 kg (235 lb 7.2 oz)     Additional Vital Signs:   10/18/23 0800 97.5 °F (36.4 °C) 83 21 110/56 78 98 % None (Room air) Lying   10/18/23 0600 -- 84 25 Abnormal  124/60 86 97 % -- --   10/18/23 0421 -- 86 26 Abnormal  -- -- 96 % -- --   10/18/23 0400 99 °F (37.2 °C) 87 22 132/60 86 97 % None (Room air) Lying   10/18/23 0000 97.6 °F (36.4 °C) 87 26 Abnormal  136/62 89 98 % None (Room air) Lying   10/17/23 2200 -- 78 14 134/62 89 98 % -- --   10/17/23 2000 99.6 °F (37.6 °C) 75 20 142/65 94 97 % -- --   10/17/23 1800 -- 85 35 Abnormal  141/65 90 98 % -- --   10/17/23 1738 98.3 °F (36.8 °C) 84 18 144/80 104 98 % None (Room air) Lying   10/17/23 1649 -- 90 16 147/85 -- 100 % None (Room air) Sitting   10/17/23 1530 -- 82 16 131/61 -- 99 % None (Room air) Sitting   10/17/23 1309 -- 86 16 137/64 -- 98 % None (Room air)      Pertinent Labs/Diagnostic Test Results:   CT abdomen pelvis with contrast   Final Result by Madyson Betancur MD (10/17 8892)   1. Right kidney emphysematous pyelonephritis. No evidence of abscess. 2. Bilobed left adrenal lesion predominantly composed of fat consistent with myelolipoma, previously characterized on 2006 MR abdomen. The study was marked in [facility] for immediate notification. Workstation performed: [id number]         XR chest 1 view portable   Final Result by Chantale Simons MD (10/17 7965)      No acute cardiopulmonary disease.                Workstation performed: [id number]           10/17/23 ecg - Normal sinus rhythm  Nonspecific ST and T wave abnormality  Abnormal ECG  No previous ECGs available    Results from last 7 days   Lab Units 10/17/23  1200   SARS-COV-2  Negative     Results from last 7 days   Lab Units 10/18/23  0558 10/17/23  1200   WBC Thousand/uL 25.77* 14.07*   HEMOGLOBIN g/dL 12.5 14.9   HEMATOCRIT % 34.8 44.0   PLATELETS Thousands/uL 146* 137*   BANDS PCT %  --  4     Results from last 7 days   Lab Units 10/18/23  0808 10/18/23  0558 10/18/23  0132 10/17/23  1359   SODIUM mmol/L 128*  --  128* 119*   POTASSIUM mmol/L 3.0*  --  2.9* 3.9   CHLORIDE mmol/L 96  --  94* 86*   CO2 mmol/L 22  --  22 19*   ANION GAP mmol/L 10  --  12 14   BUN mg/dL 55*  --  57* 68*   CREATININE mg/dL 1.64*  --  1.61* 1.67*   EGFR ml/min/1.73sq m 33  --  34 33   CALCIUM mg/dL 7.3*  --  7.3* 7.6*   MAGNESIUM mg/dL  --  1.7*  --  2.0   PHOSPHORUS mg/dL  --  2.2*  --   --      Results from last 7 days   Lab Units 10/17/23  1359   AST U/L 15   ALT U/L 14   ALK PHOS U/L 85   TOTAL PROTEIN g/dL 5.9*   ALBUMIN g/dL 2.5*   TOTAL BILIRUBIN mg/dL 0.74     Results from last 7 days   Lab Units 10/18/23  0803 10/18/23  0600 10/18/23  0407 10/18/23  0132 10/18/23  0007 10/17/23  2310 10/17/23  2206 10/17/23  1946 10/17/23  1756 10/17/23  1615   POC GLUCOSE mg/dl 175* 159* 143* 144* 107 105 77 371* >500* >500*     Results from last 7 days   Lab Units 10/18/23  0808 10/18/23  0132 10/17/23  1805 10/17/23  1359   GLUCOSE RANDOM mg/dL 168* 147* 324* 849*     Results from last 7 days   Lab Units 10/17/23  1530   OSMOLALITY, SERUM mmol/*     BETA-HYDROXYBUTYRATE   Date Value Ref Range Status   10/17/2023 0.5 <0.6 mmol/L Final     Results from last 7 days   Lab Units 10/17/23  1530   PH ÓSCAR  7.362   PCO2 ÓSCAR mm Hg 39.5*   PO2 ÓSCAR mm Hg 34.4*   HCO3 ÓSCAR mmol/L 21.9*   BASE EXC ÓSCAR mmol/L -3.2   O2 CONTENT ÓSCAR ml/dL 13.3   O2 HGB, VENOUS % 66.1     Results from last 7 days   Lab Units 10/17/23  1805 10/17/23  1359 10/17/23  1200   HS TNI 0HR ng/L  --   --  36   HS TNI 2HR ng/L  --  39  --    HSTNI D2 ng/L  --  3  --    HS TNI 4HR ng/L 47  --   --    HSTNI D4 ng/L 11  --   --      Results from last 7 days   Lab Units 10/18/23  0808   PROCALCITONIN ng/ml 7.82*     Results from last 7 days   Lab Units 10/17/23  1359   LIPASE u/L 34     Results from last 7 days   Lab Units 10/17/23  1530 10/17/23  1420   OSMOLALITY, SERUM mmol/*  --    OSMO UR mmol/KG  --  447     Results from last 7 days   Lab Units 10/17/23  1420   CLARITY UA  Cloudy   COLOR UA  Colorless   SPEC GRAV UA  <=1.005   PH UA  5.5   GLUCOSE UA mg/dl >=1000 (1%)*   KETONES UA mg/dl Negative   BLOOD UA  Large*   PROTEIN UA mg/dl Trace*   NITRITE UA  Negative   BILIRUBIN UA  Negative   UROBILINOGEN UA E.U./dl 0.2   LEUKOCYTES UA  Small*   WBC UA /hpf Innumerable*   RBC UA /hpf 4-10*   BACTERIA UA /hpf Moderate*   EPITHELIAL CELLS WET PREP /hpf Occasional   SODIUM UR  <10     Results from last 7 days   Lab Units 10/17/23  1200   INFLUENZA A PCR  Negative   INFLUENZA B PCR  Negative   RSV PCR  Negative     Results from last 7 days   Lab Units 10/17/23  1420   AMPH/METH  Negative   BARBITURATE UR  Negative   BENZODIAZEPINE UR  Negative   COCAINE UR  Negative   METHADONE URINE  Negative   OPIATE UR  Negative   PCP UR  Negative   THC UR  Negative       ED Treatment:   Medication Administration from 10/17/2023 1032 to 10/17/2023 1732         Date/Time Order Dose Route Action Comments     10/17/2023 1206 EDT ondansetron (ZOFRAN) injection 4 mg 4 mg Intravenous Given --     10/17/2023 1207 EDT sodium chloride 0.9 % bolus 1,000 mL 1,000 mL Intravenous New Bag --     10/17/2023 1206 EDT Famotidine (PF) (PEPCID) injection 20 mg 20 mg Intravenous Given --     10/17/2023 1300 EDT sodium chloride 0.9 % bolus 1,000 mL 1,000 mL Intravenous New Bag --     10/17/2023 1300 EDT ondansetron (ZOFRAN) injection 4 mg 4 mg Intravenous Not Given pt reports no more nausea     10/17/2023 1450 EDT sodium chloride 0.9 % bolus 1,000 mL 1,000 mL Intravenous New Bag --     10/17/2023 1546 EDT insulin regular (HumuLIN R,NovoLIN R) injection 10 Units 10 Units Intravenous Given --     10/17/2023 1617 EDT cefTRIAXone (ROCEPHIN) IVPB (premix in dextrose) 1,000 mg 50 mL 1,000 mg Intravenous New Bag --     10/17/2023 1645 EDT insulin regular (HumuLIN R,NovoLIN R) 1 Units/mL in sodium chloride 0.9 % 100 mL infusion 12 Units/hr Intravenous New Bag --          Past Medical History:   Diagnosis Date    Diabetes mellitus ([address])     Hypertension      Present on Admission:  **None**      Admitting Diagnosis: Dehydration [E86.0]  Vomiting [R11.10]  UTI (urinary tract infection) [N39.0]  Abdominal pain [R10.9]  Nausea and vomiting [R11.2]  Emphysematous pyelonephritis [N12]  HHNC (hyperglycemic hyperosmolar nonketotic coma) (HCC) [E11.01]  LEANDRO (acute kidney injury) ([address]) [N17.9]  Emphysematous pyelonephritis of right kidney [N12]  Lesion of adrenal gland ([address]) [E27.9]  Hyperosmolar hyperglycemic state (HHS) ([address]) [E11.00]  Age/Sex: 60 y.o. female  Admission Orders: 10/17/23 1602 inpatient   Scheduled Medications:  calcium carbonate, 1 tablet, Oral, Daily With Breakfast  cholecalciferol, 2,000 Units, Oral, Daily  ezetimibe, 10 mg, Oral, Daily  heparin (porcine), 5,000 Units, Subcutaneous, Q8H NAZ  magnesium sulfate, 2 g, Intravenous, Once  ondansetron, 4 mg, Intravenous, Once  PARoxetine, 20 mg, Oral, Daily  piperacillin-tazobactam, 3.375 g, Intravenous, Q6H  potassium chloride, 40 mEq, Oral, Q4H 10/18 at 0920, 1300  potassium phosphate, 30 mmol, Intravenous, Once 0930 10/18/23   pravastatin, 20 mg, Oral, Daily With Dinner    potassium phosphates 30 mmol in sodium chloride 0.9 % 250 mL infusion  Dose: 30 mmol  Freq: Once Route: IV  Start: 10/18/23 0930     Continuous IV Infusions:  insulin regular (HumuLIN R,NovoLIN R) 1 Units/mL in sodium chloride 0.9 % 100 mL infusion, 0.3-21 Units/hr, Intravenous, Titrated  sodium chloride, 125 mL/hr, Intravenous, Continuous    PRN Meds: not used.    acetaminophen, 650 mg, Oral, Q6H PRN  ondansetron, 4 mg, Intravenous, Q4H PRN    Telemetry     IP CONSULT TO ENDOCRINOLOGY  IP CONSULT TO UROLOGY    Network Utilization Review Department  ATTENTION: Please call with any questions or concerns to [phone] and carefully listen to the prompts so that you are directed to the right person. All voicemails are confidential.   For Discharge needs, contact Care Management DC Support Team at [phone] opt. 2  Send all requests for admission clinical reviews, approved or denied determinations and any other requests to dedicated fax number below belonging to the campus where the patient is receiving treatment.  List of dedicated fax numbers for the Facilities:  [place] DENIALS (Administrative/Medical Necessity) [phone]   DISCHARGE SUPPORT TEAM (NETWORK) [id number] [address] (Maternity/NICU/Pediatrics) [phone]   [address] [facility] [address] [place] [address] [address] [facility] [phone]   [address] [address] [facility] [address] [phone]

## 2023-10-18 NOTE — PLAN OF CARE
Problem: MOBILITY - ADULT  Goal: Maintain or return to baseline ADL function  Description: INTERVENTIONS:  -  Assess patient's ability to carry out ADLs; assess patient's baseline for ADL function and identify physical deficits which impact ability to perform ADLs (bathing, care of mouth/teeth, toileting, grooming, dressing, etc.)  - Assess/evaluate cause of self-care deficits   - Assess range of motion  - Assess patient's mobility; develop plan if impaired  - Assess patient's need for assistive devices and provide as appropriate  - Encourage maximum independence but intervene and supervise when necessary  - Involve family in performance of ADLs  - Assess for home care needs following discharge   - Consider OT consult to assist with ADL evaluation and planning for discharge  - Provide patient education as appropriate  Outcome: Progressing  Goal: Maintains/Returns to pre admission functional level  Description: INTERVENTIONS:  - Perform BMAT or MOVE assessment daily.   - Set and communicate daily mobility goal to care team and patient/family/caregiver. - Collaborate with rehabilitation services on mobility goals if consulted  - Reposition patient every 2 hours.   - Out of bed to chair 2 times a day   - Out of bed for meals 2 times a day  - Out of bed for toileting  - Record patient progress and toleration of activity level   Outcome: Progressing     Problem: INFECTION - ADULT  Goal: Absence of fever/infection during neutropenic period  Description: INTERVENTIONS:  - Monitor WBC    Outcome: Progressing     Problem: SAFETY ADULT  Goal: Maintain or return to baseline ADL function  Description: INTERVENTIONS:  -  Assess patient's ability to carry out ADLs; assess patient's baseline for ADL function and identify physical deficits which impact ability to perform ADLs (bathing, care of mouth/teeth, toileting, grooming, dressing, etc.)  - Assess/evaluate cause of self-care deficits   - Assess range of motion  - Assess patient's mobility; develop plan if impaired  - Assess patient's need for assistive devices and provide as appropriate  - Encourage maximum independence but intervene and supervise when necessary  - Involve family in performance of ADLs  - Assess for home care needs following discharge   - Consider OT consult to assist with ADL evaluation and planning for discharge  - Provide patient education as appropriate  Outcome: Progressing  Goal: Maintains/Returns to pre admission functional level  Description: INTERVENTIONS:  - Perform BMAT or MOVE assessment daily.   - Set and communicate daily mobility goal to care team and patient/family/caregiver. - Collaborate with rehabilitation services on mobility goals if consulted  - Reposition patient every 2 hours.   - Out of bed to chair 2 times a day   - Out of bed for meals 2 times a day  - Out of bed for toileting  - Record patient progress and toleration of activity level   Outcome: Progressing  Goal: Patient will remain free of falls  Description: INTERVENTIONS:  - Educate patient/family on patient safety including physical limitations  - Instruct patient to call for assistance with activity   - Consult OT/PT to assist with strengthening/mobility   - Keep Call bell within reach  - Keep bed low and locked with side rails adjusted as appropriate  - Keep care items and personal belongings within reach  - Initiate and maintain comfort rounds  - Make Fall Risk Sign visible to staff  - Offer Toileting every 2 Hours, in advance of need  - Initiate/Maintain bed alarm  - Obtain necessary fall risk management equipment  - Apply yellow socks and bracelet for high fall risk patients  - Consider moving patient to room near nurses station  Outcome: Progressing     Problem: DISCHARGE PLANNING  Goal: Discharge to home or other facility with appropriate resources  Description: INTERVENTIONS:  - Identify barriers to discharge w/patient and caregiver  - Arrange for needed discharge resources and transportation as appropriate  - Identify discharge learning needs (meds, wound care, etc.)  - Arrange for interpretive services to assist at discharge as needed  - Refer to Case Management Department for coordinating discharge planning if the patient needs post-hospital services based on physician/advanced practitioner order or complex needs related to functional status, cognitive ability, or social support system  Outcome: Progressing     Problem: Knowledge Deficit  Goal: Patient/family/caregiver demonstrates understanding of disease process, treatment plan, medications, and discharge instructions  Description: Complete learning assessment and assess knowledge base.   Interventions:  - Provide teaching at level of understanding  - Provide teaching via preferred learning methods  Outcome: Progressing     Problem: GASTROINTESTINAL - ADULT  Goal: Minimal or absence of nausea and/or vomiting  Description: INTERVENTIONS:  - Administer IV fluids if ordered to ensure adequate hydration  - Maintain NPO status until nausea and vomiting are resolved  - Nasogastric tube if ordered  - Administer ordered antiemetic medications as needed  - Provide nonpharmacologic comfort measures as appropriate  - Advance diet as tolerated, if ordered  - Consider nutrition services referral to assist patient with adequate nutrition and appropriate food choices  Outcome: Progressing  Goal: Maintains adequate nutritional intake  Description: INTERVENTIONS:  - Monitor percentage of each meal consumed  - Identify factors contributing to decreased intake, treat as appropriate  - Assist with meals as needed  - Monitor I&O, weight, and lab values if indicated  - Obtain nutrition services referral as needed  Outcome: Progressing  Goal: Oral mucous membranes remain intact  Description: INTERVENTIONS  - Assess oral mucosa and hygiene practices  - Implement preventative oral hygiene regimen  - Implement oral medicated treatments as ordered  - Initiate Nutrition services referral as needed  Outcome: Progressing     Problem: METABOLIC, FLUID AND ELECTROLYTES - ADULT  Goal: Electrolytes maintained within normal limits  Description: INTERVENTIONS:  - Monitor labs and assess patient for signs and symptoms of electrolyte imbalances  - Administer electrolyte replacement as ordered  - Monitor response to electrolyte replacements, including repeat lab results as appropriate  - Instruct patient on fluid and nutrition as appropriate  Outcome: Progressing  Goal: Glucose maintained within target range  Description: INTERVENTIONS:  - Monitor Blood Glucose as ordered  - Assess for signs and symptoms of hyperglycemia and hypoglycemia  - Administer ordered medications to maintain glucose within target range  - Assess nutritional intake and initiate nutrition service referral as needed  Outcome: Progressing     Problem: SKIN/TISSUE INTEGRITY - ADULT  Goal: Skin Integrity remains intact(Skin Breakdown Prevention)  Description: Assess:  -Perform Jose assessment every shift  -Clean and moisturize skin every shift  -Inspect skin when repositioning, toileting, and assisting with ADLS  -Assess under medical devices  -Assess extremities for adequate circulation and sensation     Bed Management:  -Have minimal linens on bed & keep smooth, unwrinkled  -Change linens as needed when moist or perspiring  -Avoid sitting or lying in one position for more than 2 hours while in bed  -Keep HOB at 30degrees     Toileting:  -Offer bedside commode  -Assess for incontinence   -Use incontinent care products after each incontinent episode     Activity:  -Mobilize patient 2 times a day  -Encourage activity and walks on unit  -Encourage or provide ROM exercises   -Turn and reposition patient every 2 Hours  -Use appropriate equipment to lift or move patient in bed  -Instruct/ Assist with weight shifting every 2 when out of bed in chair  -Consider limitation of chair time 4 hour intervals    Skin Care:  -Avoid use of baby powder, tape, friction and shearing, hot water or constrictive clothing  -Relieve pressure over bony prominences  -Do not massage red bony areas    Next Steps:  -Teach patient strategies to minimize risks   -Consider consults to  interdisciplinary teams  Outcome: Progressing     Problem: MUSCULOSKELETAL - ADULT  Goal: Maintain or return mobility to safest level of function  Description: INTERVENTIONS:  - Assess patient's ability to carry out ADLs; assess patient's baseline for ADL function and identify physical deficits which impact ability to perform ADLs (bathing, care of mouth/teeth, toileting, grooming, dressing, etc.)  - Assess/evaluate cause of self-care deficits   - Assess range of motion  - Assess patient's mobility  - Assess patient's need for assistive devices and provide as appropriate  - Encourage maximum independence but intervene and supervise when necessary  - Involve family in performance of ADLs  - Assess for home care needs following discharge   - Consider OT consult to assist with ADL evaluation and planning for discharge  - Provide patient education as appropriate  Outcome: Progressing

## 2023-10-18 NOTE — CONSULTS
Consultation - Jack Pitts Apgar 60 y.o. female MRN: [id number]    Unit/Bed#: ICU 06 Encounter: [id number]      Assessment/Plan     Type 2 diabetes mellitus not long-term insulin therapy with hyperglycemia  2. HHS  3. Significant weight loss  Poorly controlled A1c 15%  Blood sugars fairly controlled, ranging 143-207 mg/dL  Mild postprandial hyperglycemia. Since oral intake is poor, would recommend continuing insulin drip per protocol for now. Once p.o. intakes improves, can start mealtime insulin and when appropriate, will transition to subcutaneous basal bolus insulin  - check thyroid function tests     Discussed with patient that she will need insulin on discharge. Will need insulin teaching, glucometer, supplies. Recommend following up with endocrinology as an outpatient. 4.  Hypokalemia-replete electrolytes    5. LEANDRO   6. Pyelonephritis   Continue IV fluids, IV antibiotics per primary team    CC: Diabetes Consult    History of Present Illness     HPI: Sydell Sayer S Apgar is a 60y.o. year old female with type diabetes mellitus, hypertension, hyperlipidemia, obesity, who presented with abdominal pain, nausea vomiting. Found to have HHS (BG>800mg, dl, no anion gap, serum osm 319), emphysematous pyelonephritis. Currently on antibiotics    Started on insulin drip for glycemic management. Endocrinology has been consulted to help with management of diabetes mellitus      Diagnosed with diabetes mellitus approximately 10 years ago  Has been following up with her primary care physician in [place] for the same, has not followed up in the last 1 year  Patient was on oral hypoglycemics, Actos, not sure of dose. Did not take over the last week when she was not feeling well. Did not check blood sugars at home  Says that since the beginning of the year she has been trying to eat better, avoid desserts, lost weight  278-->235 lbs  Denies exercise  No history of CAD/CVA/CKD.   Denies changes in vision, no numbness, tingling in hands or feet  Family history-mother with diabetes mellitus    States that she has a poor appetite, oral intake has been about 20%    Inpatient consult to Endocrinology  Consult performed by: Rachna Joseph MD  Consult ordered by: Maggi Zepeda MD          Review of Systems    Historical Information   Past Medical History:   Diagnosis Date    Diabetes mellitus ([address])     Hypertension      Past Surgical History:   Procedure Laterality Date    CHOLECYSTECTOMY       Social History   Social History     Substance and Sexual Activity   Alcohol Use Never     Social History     Substance and Sexual Activity   Drug Use Never     Social History     Tobacco Use   Smoking Status Never   Smokeless Tobacco Never     Family History: History reviewed. No pertinent family history.     Meds/Allergies   Current Facility-Administered Medications   Medication Dose Route Frequency Provider Last Rate Last Admin    acetaminophen (TYLENOL) tablet 650 mg  650 mg Oral Q6H PRN Maggi Zepeda MD        calcium carbonate (OYSTER SHELL,OSCAL) 500 mg tablet 1 tablet  1 tablet Oral Daily With Breakfast Maggi Zepeda MD   1 tablet at 10/18/23 0813    cholecalciferol (VITAMIN D3) tablet 2,000 Units  2,000 Units Oral Daily Maggi Zepeda MD   2,000 Units at 10/18/23 0816    ezetimibe (ZETIA) tablet 10 mg  10 mg Oral Daily Maggi Zepeda MD   10 mg at 10/18/23 0813    heparin (porcine) subcutaneous injection 5,000 Units  5,000 Units Subcutaneous [facility] Maggi Zepeda MD   5,000 Units at 10/18/23 1340    insulin regular (HumuLIN R,NovoLIN R) 1 Units/mL in sodium chloride 0.9 % 100 mL infusion  0.3-21 Units/hr Intravenous Titrated Maggi Zepeda MD 3 mL/hr at 10/18/23 1345 3 Units/hr at 10/18/23 1345    ondansetron (ZOFRAN) injection 4 mg  4 mg Intravenous Once Jourdan Rivera DO        ondansetron (ZOFRAN) injection 4 mg  4 mg Intravenous Q4H PRN Maggi Zepeda MD        PARoxetine (PAXIL) tablet 20 mg  20 mg Oral Daily Maggi Zepeda MD   20 mg at 10/18/23 0813    piperacillin-tazobactam (ZOSYN) IVPB 3.375 g  3.375 g Intravenous Q6H Reba Castillo MD   Stopped at 10/18/23 1300    potassium phosphates 30 mmol in sodium chloride 0.9 % 250 mL infusion  30 mmol Intravenous Once Reba Castillo MD 41.7 mL/hr at 10/18/23 0940 30 mmol at 10/18/23 0940    pravastatin (PRAVACHOL) tablet 20 mg  20 mg Oral Daily With Pancho Rocha MD        sodium chloride 0.9 % infusion  125 mL/hr Intravenous Continuous Reba Castillo  mL/hr at 10/18/23 1102 125 mL/hr at 10/18/23 1102     No Known Allergies    Objective   Vitals: Blood pressure 127/60, pulse 85, temperature 98 °F (36.7 °C), temperature source Tympanic, resp. rate 19, height 5' 7" (1.702 m), weight 107 kg (235 lb 7.2 oz), SpO2 99 %. Intake/Output Summary (Last 24 hours) at 10/18/2023 1421  Last data filed at 10/18/2023 1345  Gross per 24 hour   Intake 4473.82 ml   Output --   Net 4473.82 ml     Invasive Devices       Peripheral Intravenous Line  Duration             Peripheral IV 10/17/23 Right Hand 1 day    Long-Dwell Peripheral IV (Midline) 10/17/23 Left Brachial <1 day    Peripheral IV 10/17/23 Right;Ventral (anterior) External Jugular <1 day                    Physical Exam  Constitutional:Oriented to person, place, and time  Appears well-developed and well-nourished. Not in any acute distress. HENT:   Head: Normocephalic and atraumatic. Neck: Normal range of motion. Pulmonary/Chest: Effort normal/ breathing comfortably on room air   Musculoskeletal: Normal range of motion. Neurological: Alert and oriented to person, place, and time. Skin: Not diaphoretic. Psychiatric: Normal mood and affect. Behavior is normal.       The history was obtained from the review of the chart, patient.     Lab Results:   Results from last 7 days   Lab Units 10/18/23  0558   HEMOGLOBIN A1C % 15.0*     Lab Results   Component Value Date    WBC 25.77 (H) 10/18/2023    HGB 12.5 10/18/2023    HCT 34.8 10/18/2023    MCV 80 (L) 10/18/2023     (L) 10/18/2023     Lab Results   Component Value Date/Time    BUN 55 (H) 10/18/2023 08:08 AM    K 3.0 (L) 10/18/2023 08:08 AM    CL 96 10/18/2023 08:08 AM    CO2 22 10/18/2023 08:08 AM    CREATININE 1.64 (H) 10/18/2023 08:08 AM    AST 15 10/17/2023 01:59 PM    ALT 14 10/17/2023 01:59 PM    TP 5.9 (L) 10/17/2023 01:59 PM    ALB 2.5 (L) 10/17/2023 01:59 PM     No results for input(s): "CHOL", "HDL", "LDL", "TRIG", "VLDL" in the last 72 hours. No results found for: "Cleaster Delilah", "[id number]"  POC Glucose (mg/dl)   Date Value   10/18/2023 155 (H)   10/18/2023 207 (H)   10/18/2023 198 (H)   10/18/2023 175 (H)   10/18/2023 159 (H)   10/18/2023 143 (H)   10/18/2023 144 (H)   10/18/2023 107   10/17/2023 105   10/17/2023 77       Imaging Studies: I have personally reviewed pertinent reports. Portions of the record may have been created with voice recognition software. Occasional wrong word or "sound a like" substitutions may have occurred due to the inherent limitations of voice recognition software. Read the chart carefully and recognize, using context, where substitutions have occurred.

## 2023-10-18 NOTE — ASSESSMENT & PLAN NOTE
Serum sodium only decreased at 119, however, in the setting of marked hyperglycemia, "corrected" sodium was 131  Anticipate progressive improvement in sodium levels with correction of blood sugars -> serum sodium improved to 128 today  Okay to continue IV fluids due to primary issue  Continue serial serum sodium monitoring

## 2023-10-19 ENCOUNTER — APPOINTMENT (INPATIENT)
Dept: RADIOLOGY | Facility: HOSPITAL | Age: 58
DRG: 659 | End: 2023-10-19
Payer: COMMERCIAL

## 2023-10-19 ENCOUNTER — ANESTHESIA EVENT (INPATIENT)
Dept: PERIOP | Facility: HOSPITAL | Age: 58
End: 2023-10-19
Payer: COMMERCIAL

## 2023-10-19 ENCOUNTER — ANESTHESIA (INPATIENT)
Dept: PERIOP | Facility: HOSPITAL | Age: 58
End: 2023-10-19
Payer: COMMERCIAL

## 2023-10-19 PROBLEM — N20.0 NEPHROLITHIASIS: Status: ACTIVE | Noted: 2023-10-19

## 2023-10-19 LAB
ANION GAP SERPL CALCULATED.3IONS-SCNC: 7 MMOL/L
BACTERIA UR CULT: ABNORMAL
BUN SERPL-MCNC: 49 MG/DL (ref 5–25)
CALCIUM SERPL-MCNC: 7.1 MG/DL (ref 8.4–10.2)
CHLORIDE SERPL-SCNC: 104 MMOL/L (ref 96–108)
CO2 SERPL-SCNC: 20 MMOL/L (ref 21–32)
CREAT SERPL-MCNC: 1.73 MG/DL (ref 0.6–1.3)
ERYTHROCYTE [DISTWIDTH] IN BLOOD BY AUTOMATED COUNT: 13.6 % (ref 11.6–15.1)
GFR SERPL CREATININE-BSD FRML MDRD: 31 ML/MIN/1.73SQ M
GLUCOSE SERPL-MCNC: 147 MG/DL (ref 65–140)
GLUCOSE SERPL-MCNC: 147 MG/DL (ref 65–140)
GLUCOSE SERPL-MCNC: 148 MG/DL (ref 65–140)
GLUCOSE SERPL-MCNC: 153 MG/DL (ref 65–140)
GLUCOSE SERPL-MCNC: 160 MG/DL (ref 65–140)
GLUCOSE SERPL-MCNC: 163 MG/DL (ref 65–140)
GLUCOSE SERPL-MCNC: 167 MG/DL (ref 65–140)
GLUCOSE SERPL-MCNC: 171 MG/DL (ref 65–140)
GLUCOSE SERPL-MCNC: 172 MG/DL (ref 65–140)
GLUCOSE SERPL-MCNC: 186 MG/DL (ref 65–140)
GLUCOSE SERPL-MCNC: 194 MG/DL (ref 65–140)
GLUCOSE SERPL-MCNC: 199 MG/DL (ref 65–140)
HCT VFR BLD AUTO: 34.1 % (ref 34.8–46.1)
HGB BLD-MCNC: 12 G/DL (ref 11.5–15.4)
MAGNESIUM SERPL-MCNC: 2.1 MG/DL (ref 1.9–2.7)
MCH RBC QN AUTO: 28.6 PG (ref 26.8–34.3)
MCHC RBC AUTO-ENTMCNC: 35.2 G/DL (ref 31.4–37.4)
MCV RBC AUTO: 81 FL (ref 82–98)
MRSA NOSE QL CULT: NORMAL
PHOSPHATE SERPL-MCNC: 3 MG/DL (ref 2.7–4.5)
PLATELET # BLD AUTO: 190 THOUSANDS/UL (ref 149–390)
PMV BLD AUTO: 9.5 FL (ref 8.9–12.7)
POTASSIUM SERPL-SCNC: 3.8 MMOL/L (ref 3.5–5.3)
PROCALCITONIN SERPL-MCNC: 4.32 NG/ML
RBC # BLD AUTO: 4.19 MILLION/UL (ref 3.81–5.12)
SODIUM SERPL-SCNC: 131 MMOL/L (ref 135–147)
WBC # BLD AUTO: 26.35 THOUSAND/UL (ref 4.31–10.16)

## 2023-10-19 PROCEDURE — C2617 STENT, NON-COR, TEM W/O DEL: HCPCS | Performed by: SPECIALIST

## 2023-10-19 PROCEDURE — C1769 GUIDE WIRE: HCPCS | Performed by: SPECIALIST

## 2023-10-19 PROCEDURE — 84100 ASSAY OF PHOSPHORUS: CPT | Performed by: INTERNAL MEDICINE

## 2023-10-19 PROCEDURE — 94760 N-INVAS EAR/PLS OXIMETRY 1: CPT

## 2023-10-19 PROCEDURE — 74177 CT ABD & PELVIS W/CONTRAST: CPT

## 2023-10-19 PROCEDURE — 74420 UROGRAPHY RTRGR +-KUB: CPT

## 2023-10-19 PROCEDURE — 99232 SBSQ HOSP IP/OBS MODERATE 35: CPT | Performed by: INTERNAL MEDICINE

## 2023-10-19 PROCEDURE — 0T768DZ DILATION OF RIGHT URETER WITH INTRALUMINAL DEVICE, VIA NATURAL OR ARTIFICIAL OPENING ENDOSCOPIC: ICD-10-PCS | Performed by: SPECIALIST

## 2023-10-19 PROCEDURE — 99222 1ST HOSP IP/OBS MODERATE 55: CPT | Performed by: INTERNAL MEDICINE

## 2023-10-19 PROCEDURE — 80048 BASIC METABOLIC PNL TOTAL CA: CPT | Performed by: INTERNAL MEDICINE

## 2023-10-19 PROCEDURE — 85025 COMPLETE CBC W/AUTO DIFF WBC: CPT | Performed by: INTERNAL MEDICINE

## 2023-10-19 PROCEDURE — 82948 REAGENT STRIP/BLOOD GLUCOSE: CPT

## 2023-10-19 PROCEDURE — 84145 PROCALCITONIN (PCT): CPT | Performed by: INTERNAL MEDICINE

## 2023-10-19 PROCEDURE — G1004 CDSM NDSC: HCPCS

## 2023-10-19 PROCEDURE — 83735 ASSAY OF MAGNESIUM: CPT | Performed by: INTERNAL MEDICINE

## 2023-10-19 PROCEDURE — BT1DZZZ FLUOROSCOPY OF RIGHT KIDNEY, URETER AND BLADDER: ICD-10-PCS | Performed by: SPECIALIST

## 2023-10-19 DEVICE — INLAY URETERAL STENT W/O GUIDEWIRE
Type: IMPLANTABLE DEVICE | Site: URETER | Status: FUNCTIONAL
Brand: BARD® INLAY® URETERAL STENT

## 2023-10-19 RX ORDER — MAGNESIUM HYDROXIDE 1200 MG/15ML
LIQUID ORAL AS NEEDED
Status: DISCONTINUED | OUTPATIENT
Start: 2023-10-19 | End: 2023-10-19 | Stop reason: HOSPADM

## 2023-10-19 RX ORDER — MAGNESIUM HYDROXIDE/ALUMINUM HYDROXICE/SIMETHICONE 120; 1200; 1200 MG/30ML; MG/30ML; MG/30ML
30 SUSPENSION ORAL EVERY 6 HOURS PRN
Status: DISCONTINUED | OUTPATIENT
Start: 2023-10-19 | End: 2023-10-25 | Stop reason: HOSPADM

## 2023-10-19 RX ORDER — PIOGLITAZONEHYDROCHLORIDE 15 MG/1
15 TABLET ORAL DAILY
Status: DISCONTINUED | OUTPATIENT
Start: 2023-10-20 | End: 2023-10-20

## 2023-10-19 RX ORDER — FENTANYL CITRATE/PF 50 MCG/ML
50 SYRINGE (ML) INJECTION
Status: DISCONTINUED | OUTPATIENT
Start: 2023-10-19 | End: 2023-10-19 | Stop reason: HOSPADM

## 2023-10-19 RX ORDER — PROPOFOL 10 MG/ML
INJECTION, EMULSION INTRAVENOUS AS NEEDED
Status: DISCONTINUED | OUTPATIENT
Start: 2023-10-19 | End: 2023-10-19

## 2023-10-19 RX ORDER — ONDANSETRON 2 MG/ML
4 INJECTION INTRAMUSCULAR; INTRAVENOUS EVERY 6 HOURS PRN
Status: DISCONTINUED | OUTPATIENT
Start: 2023-10-19 | End: 2023-10-20

## 2023-10-19 RX ORDER — FENTANYL CITRATE 50 UG/ML
INJECTION, SOLUTION INTRAMUSCULAR; INTRAVENOUS AS NEEDED
Status: DISCONTINUED | OUTPATIENT
Start: 2023-10-19 | End: 2023-10-19

## 2023-10-19 RX ORDER — ONDANSETRON 2 MG/ML
4 INJECTION INTRAMUSCULAR; INTRAVENOUS ONCE AS NEEDED
Status: DISCONTINUED | OUTPATIENT
Start: 2023-10-19 | End: 2023-10-19 | Stop reason: HOSPADM

## 2023-10-19 RX ORDER — SODIUM CHLORIDE, SODIUM GLUCONATE, SODIUM ACETATE, POTASSIUM CHLORIDE, MAGNESIUM CHLORIDE, SODIUM PHOSPHATE, DIBASIC, AND POTASSIUM PHOSPHATE .53; .5; .37; .037; .03; .012; .00082 G/100ML; G/100ML; G/100ML; G/100ML; G/100ML; G/100ML; G/100ML
50 INJECTION, SOLUTION INTRAVENOUS CONTINUOUS
Status: DISCONTINUED | OUTPATIENT
Start: 2023-10-19 | End: 2023-10-23

## 2023-10-19 RX ORDER — LIDOCAINE HYDROCHLORIDE 10 MG/ML
INJECTION, SOLUTION EPIDURAL; INFILTRATION; INTRACAUDAL; PERINEURAL AS NEEDED
Status: DISCONTINUED | OUTPATIENT
Start: 2023-10-19 | End: 2023-10-19

## 2023-10-19 RX ORDER — ONDANSETRON 2 MG/ML
INJECTION INTRAMUSCULAR; INTRAVENOUS AS NEEDED
Status: DISCONTINUED | OUTPATIENT
Start: 2023-10-19 | End: 2023-10-19

## 2023-10-19 RX ORDER — HYDROCHLOROTHIAZIDE 12.5 MG/1
12.5 TABLET ORAL DAILY
Status: DISCONTINUED | OUTPATIENT
Start: 2023-10-20 | End: 2023-10-20

## 2023-10-19 RX ADMIN — IOHEXOL 100 ML: 350 INJECTION, SOLUTION INTRAVENOUS at 07:53

## 2023-10-19 RX ADMIN — SODIUM CHLORIDE, SODIUM GLUCONATE, SODIUM ACETATE, POTASSIUM CHLORIDE, MAGNESIUM CHLORIDE, SODIUM PHOSPHATE, DIBASIC, AND POTASSIUM PHOSPHATE 125 ML/HR: .53; .5; .37; .037; .03; .012; .00082 INJECTION, SOLUTION INTRAVENOUS at 11:58

## 2023-10-19 RX ADMIN — SODIUM CHLORIDE 125 ML/HR: 0.9 INJECTION, SOLUTION INTRAVENOUS at 03:09

## 2023-10-19 RX ADMIN — PIPERACILLIN AND TAZOBACTAM 3.38 G: 36; 4.5 INJECTION, POWDER, FOR SOLUTION INTRAVENOUS at 05:46

## 2023-10-19 RX ADMIN — FENTANYL CITRATE 50 MCG: 50 INJECTION, SOLUTION INTRAMUSCULAR; INTRAVENOUS at 18:34

## 2023-10-19 RX ADMIN — PIPERACILLIN AND TAZOBACTAM 3.38 G: 36; 4.5 INJECTION, POWDER, FOR SOLUTION INTRAVENOUS at 18:23

## 2023-10-19 RX ADMIN — HEPARIN SODIUM 5000 UNITS: 5000 INJECTION INTRAVENOUS; SUBCUTANEOUS at 05:48

## 2023-10-19 RX ADMIN — ONDANSETRON 4 MG: 2 INJECTION INTRAMUSCULAR; INTRAVENOUS at 18:44

## 2023-10-19 RX ADMIN — FENTANYL CITRATE 25 MCG: 50 INJECTION, SOLUTION INTRAMUSCULAR; INTRAVENOUS at 18:43

## 2023-10-19 RX ADMIN — LIDOCAINE HYDROCHLORIDE 50 MG: 10 INJECTION, SOLUTION EPIDURAL; INFILTRATION; INTRACAUDAL; PERINEURAL at 18:26

## 2023-10-19 RX ADMIN — FENTANYL CITRATE 50 MCG: 50 INJECTION, SOLUTION INTRAMUSCULAR; INTRAVENOUS at 18:38

## 2023-10-19 RX ADMIN — FENTANYL CITRATE 50 MCG: 50 INJECTION, SOLUTION INTRAMUSCULAR; INTRAVENOUS at 18:26

## 2023-10-19 RX ADMIN — HEPARIN SODIUM 5000 UNITS: 5000 INJECTION INTRAVENOUS; SUBCUTANEOUS at 22:20

## 2023-10-19 RX ADMIN — FENTANYL CITRATE 25 MCG: 50 INJECTION, SOLUTION INTRAMUSCULAR; INTRAVENOUS at 18:41

## 2023-10-19 RX ADMIN — PROPOFOL 150 MG: 10 INJECTION, EMULSION INTRAVENOUS at 18:26

## 2023-10-19 RX ADMIN — PIPERACILLIN AND TAZOBACTAM 3.38 G: 36; 4.5 INJECTION, POWDER, FOR SOLUTION INTRAVENOUS at 23:28

## 2023-10-19 RX ADMIN — SODIUM CHLORIDE, SODIUM GLUCONATE, SODIUM ACETATE, POTASSIUM CHLORIDE, MAGNESIUM CHLORIDE, SODIUM PHOSPHATE, DIBASIC, AND POTASSIUM PHOSPHATE 125 ML/HR: .53; .5; .37; .037; .03; .012; .00082 INJECTION, SOLUTION INTRAVENOUS at 20:00

## 2023-10-19 RX ADMIN — PIPERACILLIN AND TAZOBACTAM 3.38 G: 36; 4.5 INJECTION, POWDER, FOR SOLUTION INTRAVENOUS at 11:58

## 2023-10-19 NOTE — ANESTHESIA PREPROCEDURE EVALUATION
Procedure:  CYSTOSCOPY RETROGRADE PYELOGRAM WITH INSERTION STENT URETERAL (Right: Bladder)    Relevant Problems   CARDIO   (+) Essential hypertension   (+) Hyperlipidemia      ENDO   (+) Type 2 diabetes mellitus (HCC)      /RENAL   (+) Nephrolithiasis      HEMATOLOGY   (+) Thrombocytopenia (HCC)      NEURO/PSYCH   (+) Depression        Physical Exam    Airway    Mallampati score: II  TM Distance: >3 FB  Neck ROM: full     Dental       Cardiovascular  Rhythm: regular, Rate: normal    Pulmonary   Breath sounds clear to auscultation    Other Findings        Anesthesia Plan  ASA Score- 3 Emergent    Anesthesia Type- general with ASA Monitors. Additional Monitors:     Airway Plan: LMA. Plan Factors-    Chart reviewed. Patient is not a current smoker. Induction- intravenous. Postoperative Plan- Plan for postoperative opioid use. Informed Consent- Anesthetic plan and risks discussed with patient. I personally reviewed this patient with the CRNA. Discussed and agreed on the Anesthesia Plan with the CRNA. [place] Vidal

## 2023-10-19 NOTE — ASSESSMENT & PLAN NOTE
Presenting creatinine of 1.67 -> continue IV fluid hydration -> improved to 1.26 today  Unclear if acute kidney injury or CKD as no prior baseline available  Likely contributed to by renal hypoperfusion and insensible volume loss from recent vomiting  Limit/avoid nephrotoxins as possible - holding HCTZ/ACEI  Monitor renal function and urine output -> patient has now received 2 loads of IV contrast dye due to repeat CT scans  Appreciate nephrology input -> IV fluids transitioned to [facility]

## 2023-10-19 NOTE — PLAN OF CARE
Problem: MOBILITY - ADULT  Goal: Maintain or return to baseline ADL function  Description: INTERVENTIONS:  -  Assess patient's ability to carry out ADLs; assess patient's baseline for ADL function and identify physical deficits which impact ability to perform ADLs (bathing, care of mouth/teeth, toileting, grooming, dressing, etc.)  - Assess/evaluate cause of self-care deficits   - Assess range of motion  - Assess patient's mobility; develop plan if impaired  - Assess patient's need for assistive devices and provide as appropriate  - Encourage maximum independence but intervene and supervise when necessary  - Involve family in performance of ADLs  - Assess for home care needs following discharge   - Consider OT consult to assist with ADL evaluation and planning for discharge  - Provide patient education as appropriate  Outcome: Progressing  Goal: Maintains/Returns to pre admission functional level  Description: INTERVENTIONS:  - Perform BMAT or MOVE assessment daily.   - Set and communicate daily mobility goal to care team and patient/family/caregiver.    - Collaborate with rehabilitation services on mobility goals if consulted  - Out of bed for toileting  - Record patient progress and toleration of activity level   Outcome: Progressing     Problem: INFECTION - ADULT  Goal: Absence of fever/infection during neutropenic period  Description: INTERVENTIONS:  - Monitor WBC    Outcome: Progressing     Problem: SAFETY ADULT  Goal: Maintain or return to baseline ADL function  Description: INTERVENTIONS:  -  Assess patient's ability to carry out ADLs; assess patient's baseline for ADL function and identify physical deficits which impact ability to perform ADLs (bathing, care of mouth/teeth, toileting, grooming, dressing, etc.)  - Assess/evaluate cause of self-care deficits   - Assess range of motion  - Assess patient's mobility; develop plan if impaired  - Assess patient's need for assistive devices and provide as appropriate  - Encourage maximum independence but intervene and supervise when necessary  - Involve family in performance of ADLs  - Assess for home care needs following discharge   - Consider OT consult to assist with ADL evaluation and planning for discharge  - Provide patient education as appropriate  Outcome: Progressing  Goal: Maintains/Returns to pre admission functional level  Description: INTERVENTIONS:  - Perform BMAT or MOVE assessment daily.   - Set and communicate daily mobility goal to care team and patient/family/caregiver.    - Collaborate with rehabilitation services on mobility goals if consulted  - Out of bed for toileting  - Record patient progress and toleration of activity level   Outcome: Progressing  Goal: Patient will remain free of falls  Description: INTERVENTIONS:  - Educate patient/family on patient safety including physical limitations  - Instruct patient to call for assistance with activity   - Consult OT/PT to assist with strengthening/mobility   - Keep Call bell within reach  - Keep bed low and locked with side rails adjusted as appropriate  - Keep care items and personal belongings within reach  - Initiate and maintain comfort rounds  - Apply yellow socks and bracelet for high fall risk patients  - Consider moving patient to room near nurses station  Outcome: Progressing     Problem: DISCHARGE PLANNING  Goal: Discharge to home or other facility with appropriate resources  Description: INTERVENTIONS:  - Identify barriers to discharge w/patient and caregiver  - Arrange for needed discharge resources and transportation as appropriate  - Identify discharge learning needs (meds, wound care, etc.)  - Arrange for interpretive services to assist at discharge as needed  - Refer to Case Management Department for coordinating discharge planning if the patient needs post-hospital services based on physician/advanced practitioner order or complex needs related to functional status, cognitive ability, or social support system  Outcome: Progressing     Problem: Knowledge Deficit  Goal: Patient/family/caregiver demonstrates understanding of disease process, treatment plan, medications, and discharge instructions  Description: Complete learning assessment and assess knowledge base.   Interventions:  - Provide teaching at level of understanding  - Provide teaching via preferred learning methods  Outcome: Progressing     Problem: GASTROINTESTINAL - ADULT  Goal: Minimal or absence of nausea and/or vomiting  Description: INTERVENTIONS:  - Administer IV fluids if ordered to ensure adequate hydration  - Maintain NPO status until nausea and vomiting are resolved  - Nasogastric tube if ordered  - Administer ordered antiemetic medications as needed  - Provide nonpharmacologic comfort measures as appropriate  - Advance diet as tolerated, if ordered  - Consider nutrition services referral to assist patient with adequate nutrition and appropriate food choices  Outcome: Progressing  Goal: Maintains adequate nutritional intake  Description: INTERVENTIONS:  - Monitor percentage of each meal consumed  - Identify factors contributing to decreased intake, treat as appropriate  - Assist with meals as needed  - Monitor I&O, weight, and lab values if indicated  - Obtain nutrition services referral as needed  Outcome: Progressing  Goal: Oral mucous membranes remain intact  Description: INTERVENTIONS  - Assess oral mucosa and hygiene practices  - Implement preventative oral hygiene regimen  - Implement oral medicated treatments as ordered  - Initiate Nutrition services referral as needed  Outcome: Progressing     Problem: METABOLIC, FLUID AND ELECTROLYTES - ADULT  Goal: Electrolytes maintained within normal limits  Description: INTERVENTIONS:  - Monitor labs and assess patient for signs and symptoms of electrolyte imbalances  - Administer electrolyte replacement as ordered  - Monitor response to electrolyte replacements, including repeat lab results as appropriate  - Instruct patient on fluid and nutrition as appropriate  Outcome: Progressing  Goal: Glucose maintained within target range  Description: INTERVENTIONS:  - Monitor Blood Glucose as ordered  - Assess for signs and symptoms of hyperglycemia and hypoglycemia  - Administer ordered medications to maintain glucose within target range  - Assess nutritional intake and initiate nutrition service referral as needed  Outcome: Progressing     Problem: MUSCULOSKELETAL - ADULT  Goal: Maintain or return mobility to safest level of function  Description: INTERVENTIONS:  - Assess patient's ability to carry out ADLs; assess patient's baseline for ADL function and identify physical deficits which impact ability to perform ADLs (bathing, care of mouth/teeth, toileting, grooming, dressing, etc.)  - Assess/evaluate cause of self-care deficits   - Assess range of motion  - Assess patient's mobility  - Assess patient's need for assistive devices and provide as appropriate  - Encourage maximum independence but intervene and supervise when necessary  - Involve family in performance of ADLs  - Assess for home care needs following discharge   - Consider OT consult to assist with ADL evaluation and planning for discharge  - Provide patient education as appropriate  Outcome: Progressing

## 2023-10-19 NOTE — ASSESSMENT & PLAN NOTE
A1c significantly uncontrolled at 15.0  Initiated on an insulin drip due to marked hyperglycemia (see plan for primary issue above)  Carbohydrate restriction  Holding oral hypoglycemics while hospitalized  Endocrinology input appreciated (see plan for HHS above) -> transition to basal insulin with SSI coverage for Accu-Cheks -> titrate insulin requirements daily contingent on blood sugars  Patient informed of need for insulin dependence at this time -> will be provided diabetic supplies on discharge including glucometer, testing strips, lancets, and prescriptions for insulin  Will appreciate nutritionist evaluation for diabetic teaching

## 2023-10-19 NOTE — ASSESSMENT & PLAN NOTE
Presented with abdominal discomfort with nausea/vomiting and a profoundly elevated serum glucose of 849 -> progressively improved overnight into this morning now in the 100s  BMP without evidence of anion gap and urinalysis without evidence of ketones  Suspect hyperosmolar hyperglycemic state -> insulin drip now discontinued and transitioned to a basal insulin regimen with SSI coverage per Accu-Cheks  Continue IV fluid hydration  Monitor serial Accu-Cheks  Chronically on oral hypoglycemics for type 2 diabetes mellitus -> states that due to recent nausea/vomiting over the last few days prior to presentation, doses were missed  Suspect pyelonephritis as likely triggering factor (see below)  Appreciate endocrinology input  Highly suspect noncompliance to home medication regimen over a long/extended period of time

## 2023-10-19 NOTE — ASSESSMENT & PLAN NOTE
Likely reactive due to acute medical issue(s) coupled with hemoconcentration  Monitor WBC count -> see plan for pyelonephritis above

## 2023-10-19 NOTE — PROGRESS NOTES
[address] [facility]  Progress Note  Name: Filiberto Rankin Apgar I  MRN: [id number]  Unit/Bed#: 4 [place] 419-01 I Date of Admission: 10/17/2023   Date of Service: 10/19/2023 I Hospital Day: 2      Assessment & Plan:    * Hyperosmolar hyperglycemic state (HHS) ([address] )  Assessment & Plan  Presentedwith abdominal discomfort with nausea/vomiting and a profoundly elevated serum glucose of 849 -> progressively improved overnight into this morning now in the 150-200s   BMP without evidence of anion gap and urinalysis without evidence of ketones  Suspect hyperosmolar hyperglycemic state -> initiated on a non-DKA insulin drip  Continue aggressive IV fluid hydration  Monitor serial Accu-Cheks  Chronically on oral hypoglycemics for type 2 diabetes mellitus -> states that due to recent nausea/vomiting over the last few days, doses were missed  Suspect pyelonephritis as likely triggering factor (see below)  Appreciate endocrinology input -> plan to remain on an insulin drip until acute medical issue(s) stabilizes and patient back on an oral diet    Emphysematous pyelonephritis  Assessment & Plan  Presented with nonspecific abdominal discomfort and CT imaging evidence of right-sided emphysematous pyelonephritis without abscess  Antibiotic coverage broadened to IV Zosyn pending urine culture   No blood cultures were drawn in the ED  Pending appreciate urology input  Leukocytosis noted, however, remains afebrile -> hemodynamically stable and currently nontoxic-appearing  PRN pain/emesis control  Appreciate urology input -> repeat CT imaging today shows improvement (interval decrease in perirenal space soft tissue emphysema volume) -> plan for cystoscopy with right-sided stenting today  Initial imaging reviewed by IR -> no role for nephrostomy tube placement currently    Type 2 diabetes mellitus ([address])  Assessment & Plan  A1c significantly uncontrolled at 15.0  Initiated on an insulin drip due to marked hyperglycemia (see plan for primary issue above)  Carbohydrate restriction  Holding oral hypoglycemics while hospitalized  Endocrinology input appreciated (see plan for HHS above)    Leukocytosis  Assessment & Plan  Likely reactive due to acute medical issue(s) coupled with hemoconcentration  Monitor WBC count -> see plan for pyelonephritis above    Hyponatremia  Assessment & Plan  Serum sodium only decreased at 119, however, in the setting of marked hyperglycemia, "corrected" sodium was 131  Anticipate progressive improvement in sodium levels with correction of blood sugars -> serum sodium improved to 130 today  Okay to continue IV fluids due to primary issue  Continue serial serum sodium monitoring    Elevated serum creatinine  Assessment & Plan  Presenting creatinine of 1.67 -> continue IV fluid hydration -> at 1.73 this morning  Unclear if acute kidney injury or CKD as no prior baseline available  Likely contributed to by renal hypoperfusion and insensible volume loss from recent vomiting  Limit/avoid nephrotoxins as possible - holding HCTZ/ACEI  Monitor renal function and urine output -> patient has now received 2 loads of IV contrast dye due to repeat CT scans  Appreciate nephrology input -> IV fluids transitioned to Isolyte    Morbid obesity (HCC)  Assessment & Plan  BMI of 36.88  Lifestyle/diet modifications    Thrombocytopenia (HCC)  Assessment & Plan  Monitor platelet count - monitor for bleeding    Essential hypertension  Assessment & Plan  Holding HCTZ/ACEI in the setting of acute kidney injury  PRN IV Hydralazine for BP spikes    Hyperlipidemia  Assessment & Plan  Continue statin/Zetia    Depression  Assessment & Plan  Continue Paxil      DVT Prophylaxis:  Heparin SC    Patient Centered Rounds:  I have performed bedside rounds and discussed plan of care with nursing today.   Discussions with Specialists or Other Care Team Provider:  see above assessments if applicable    Education and Discussions with Family / Patient: Patient, along with multiple family members, at bedside today    Time Spent for Care:  35 minutes. More than 50% of total time spent on counseling and coordination of care, on one or more of the following: performing physical exam; counseling and coordination of care, obtaining or reviewing history, documenting in the medical record, reviewing/ordering tests/medications/procedures, and communicating with other healthcare professionals. Current Length of Stay: 2 day(s)  Current Patient Status: Inpatient   Certification Statement:  Patient will continue to require additional hospital stay due to assessments as noted above. Code Status: Level 1 - Full Code        Subjective:     Seen and examined earlier today. Resting fairly comfortably at the time my encounter. States weakness fatigue have improved. Denies fever/chills currently. Acknowledges plan for cystoscopy with right-sided ureteral stenting. Objective:     Vitals:   Temp (24hrs), Av °F (36.7 °C), Min:97.3 °F (36.3 °C), Max:99.1 °F (37.3 °C)    Temp:  [97.3 °F (36.3 °C)-99.1 °F (37.3 °C)] 97.3 °F (36.3 °C)  HR:  [79-92] 91  Resp:  [16-31] 18  BP: (115-143)/(65-81) 140/81  SpO2:  [94 %-100 %] 100 %  Body mass index is 36.88 kg/m². Input and Output Summary (last 24 hours):        Intake/Output Summary (Last 24 hours) at 10/19/2023 1703  Last data filed at 10/19/2023 0309  Gross per 24 hour   Intake 600.86 ml   Output 300 ml   Net 300.86 ml       Physical Exam:     GENERAL Improved weakness/fatigue - obese    HEAD   Normocephalic - atraumatic   EYES   PERRL - EOMI    MOUTH   Mucosa moist   NECK   Supple - full range of motion   CARDIAC Rate controlled - S1/S2 positive   PULMONARY Clear bilateral breath sounds - nonlabored respirations   ABDOMEN   Soft - improving generalized tenderness without rebound/guarding - nondistended - active bowel sounds   MUSCULOSKELETAL   Motor strength/range of motion deconditioned   NEUROLOGIC   Alert/oriented at baseline   SKIN   Chronic wrinkles/blemishes    PSYCHIATRIC   Mood/affect stable         Additional Data:     Labs & Recent Cultures:    Results from last 7 days   Lab Units 10/19/23  0553 10/18/23  0558 10/17/23  1200   WBC Thousand/uL 26.35*   < > 14.07*   HEMOGLOBIN g/dL 12.0   < > 14.9   HEMATOCRIT % 34.1*   < > 44.0   PLATELETS Thousands/uL 190   < > 137*   BANDS PCT %  --   --  4   LYMPHO PCT %  --   --  5*   MONO PCT %  --   --  5   EOS PCT %  --   --  0    < > = values in this interval not displayed. Results from last 7 days   Lab Units 10/19/23  0553 10/18/23  0132 10/17/23  1359   POTASSIUM mmol/L 3.8   < > 3.9   CHLORIDE mmol/L 104   < > 86*   CO2 mmol/L 20*   < > 19*   BUN mg/dL 49*   < > 68*   CREATININE mg/dL 1.73*   < > 1.67*   CALCIUM mg/dL 7.1*   < > 7.6*   ALK PHOS U/L  --   --  85   ALT U/L  --   --  14   AST U/L  --   --  15    < > = values in this interval not displayed.          Results from last 7 days   Lab Units 10/19/23  1557 10/19/23  1422 10/19/23  1212 10/19/23  1048 10/19/23  0756 10/19/23  0550 10/19/23  0359 10/19/23  0155 10/18/23  2355 10/18/23  2142 10/18/23  2000 10/18/23  1750   POC GLUCOSE mg/dl 167* 171* 160* 172* 153* 148* 147* 163* 174* 177* 153* 114     Results from last 7 days   Lab Units 10/18/23  0558   HEMOGLOBIN A1C % 15.0*     Results from last 7 days   Lab Units 10/19/23  0553 10/18/23  0941 10/18/23  0808   LACTIC ACID mmol/L  --  1.2  --    PROCALCITONIN ng/ml 4.32*  --  7.82*         Results from last 7 days   Lab Units 10/17/23  1420   URINE CULTURE  >100,000 cfu/ml Escherichia coli*         Lines/Drains:  Invasive Devices       Peripheral Intravenous Line  Duration             Peripheral IV 10/17/23 Right Hand 2 days    Long-Dwell Peripheral IV (Midline) 10/17/23 Left Brachial 1 day                      Last 24 Hours Medication List:   Current Facility-Administered Medications   Medication Dose Route Frequency Provider Last Rate    acetaminophen  650 mg Oral Q6H PRN KWAME Recinos      calcium carbonate  1 tablet Oral Daily With Breakfast Reba Castillo MD      calcium carbonate  500 mg Oral TID PRN KWAME Recinos      cholecalciferol  2,000 Units Oral Daily Reba Castillo MD      ezetimibe  10 mg Oral Daily Reba Castillo MD      heparin (porcine)  5,000 Units Subcutaneous Q8H [address] KWAME Recinos      insulin regular (HumuLIN R,NovoLIN R) 1 Units/mL in sodium chloride 0.9 % 100 mL infusion  0.3-21 Units/hr Intravenous Titrated Reba Castillo MD 1 Units/hr (10/19/23 1430)    multi-electrolyte  125 mL/hr Intravenous Continuous Elizabeth Shore  mL/hr (10/19/23 1158)    ondansetron  4 mg Intravenous Q4H PRN KWAME Recinos      PARoxetine  20 mg Oral Daily Reab Castillo MD      piperacillin-tazobactam  3.375 g Intravenous Q6H KWAME Recinos 0 g (10/18/23 2338)    pravastatin  20 mg Oral Daily With Pancho Rocha MD                      ** Please Note: This note is constructed using a voice recognition dictation system. An occasional wrong word/phrase or “sound-a-like” substitution may have been picked up by dictation device due to the inherent limitations of voice recognition software. Read the chart carefully and recognize, using reasonable context, where substitutions may have occurred. **

## 2023-10-19 NOTE — PROGRESS NOTES
Progress Note - Urology      Patient: Babs Hager Apgar   : 1965 Sex: female   MRN: [id number]     CSN: [id number]  Unit/Bed#: [address] [place] [address]     SUBJECTIVE:   Patient seen on morning rounds with family members  Notes minimal flank pain  Count 26 trending up  Repeat CAT scan confirming mild right hydro indicative of possible obstruction  Kidney with perirenal tissue looks somewhat better responding to antibiotics  And discussion with patient family to make n.p.o. for emergency cystoscopy right stent obstruction present  Can avoid interventional radiology nephrostomy placement since it does not appear to be anything to drain at this moment      Objective   Vitals: /81 (BP Location: Right arm)   Pulse 91   Temp (!) 97.3 °F (36.3 °C) (Oral)   Resp 18   Ht 5' 7" (1.702 m)   Wt 107 kg (235 lb 7.2 oz)   SpO2 100%   BMI 36.88 kg/m²     I/O last 24 hours:   In: 2424.9 [P.O.:480; I.V.:1594.9; IV Piggyback:350]  Out: 300 [Urine:300]      Physical Exam:   General Alert awake   Normocephalic atraumatic PERRLA  Lungs clear bilaterally  Cardiac normal S1 normal S2  Abdomen soft, flank pain none  Extremities no edema      Lab Results: CBC:   Lab Results   Component Value Date    WBC 26.35 (H) 10/19/2023    HGB 12.0 10/19/2023    HCT 34.1 (L) 10/19/2023    MCV 81 (L) 10/19/2023     10/19/2023    RBC 4.19 10/19/2023    MCH 28.6 10/19/2023    MCHC 35.2 10/19/2023    RDW 13.6 10/19/2023    MPV 9.5 10/19/2023    NRBC 0 10/18/2023     CMP:   Lab Results   Component Value Date    CL 98 10/18/2023    CO2 22 10/18/2023    BUN 54 (H) 10/18/2023    CREATININE 1.77 (H) 10/18/2023    CALCIUM 7.1 (L) 10/18/2023    AST 15 10/17/2023    ALT 14 10/17/2023    ALKPHOS 85 10/17/2023    EGFR 30 10/18/2023     Urinalysis:   Lab Results   Component Value Date    COLORU Colorless 10/17/2023    CLARITYU Cloudy 10/17/2023    SPECGRAV <=1.005 10/17/2023    PHUR 5.5 10/17/2023    LEUKOCYTESUR Small (A) 10/17/2023    NITRITE Negative 10/17/2023    GLUCOSEU >=1000 (1%) (A) 10/17/2023    KETONESU Negative 10/17/2023    BILIRUBINUR Negative 10/17/2023    BLOODU Large (A) 10/17/2023     Urine Culture:   Lab Results   Component Value Date    URINECX >100,000 cfu/ml Escherichia coli (A) 10/17/2023     PSA: No results found for: "PSA"      Assessment/ Plan:  Emphysematous pyelonephritis  White count 2 6 trending up  Urine culture growing E. Coli  . CAT scan confirming decreasing gas somewhat better from admission but now new right mild hydronephrosis indicative ATN starting to clear and urine production possible obstruction?   No stones noted  Discussed with patient and family members to continue n.p.o. to undergo cystoscopy right stent placement today in light of possible obstruction          Imelda Teixeira MD

## 2023-10-19 NOTE — CONSULTS
[address] [facility] S Apgar 60 y.o. female MRN: [id number]  Unit/Bed#: [address] [place] [address] Encounter: [id number]    ASSESSMENT and PLAN:    60 y.o. female with a past medical history diabetes, hyperlipidemia, hypertension, depression, who was admitted to [address] [place] [address] abdominal pain after presenting with . A renal consultation is requested today for assistance in the management of acute kidney injury. Patient initially presented with abdominal pain, nausea, vomiting. Was found to have hyperglycemia with HHNK. 1-acute kidney injury-    - Admission creatinine-1.7 mg/dL 10/17  - Peak creatinine-awaiting peak  - Baseline creatinine-unclear  - Etiology-likely in the setting of sepsis/emphysematous pyelonephritis  - Renal imaging-edematous appearance to the right kidney consistent with pyelonephritis, perirenal soft tissue emphysema redemonstrated with slight interval improvement from prior study. Wall hyperemia throughout right renal pelvis consistent with ureters and slight right hydroureter. No significant right hydronephrosis. No distal obstructive uropathy. - Urinalysis-urinalysis with innumerable WBC, moderate bacteria, 4-10 RBC, glucose urea, large blood, negative nitrate  -    Plan-    - I/os; avoid nephrotoxic agents  - Avoid hypotension  - Agree with holding HCTZ  - Continue holding ACE inhibitor  - Add a BMP this a.m. lab work  - Reviewed risk of contrast injury with the patient and may delay renal recovery but risk of contrast injury to the point where patient may need dialysis appears to be low at this juncture but need to monitor closely as the patient appears to have inflamed/emphysematous right kidney  - BMP in a.m.  - Change fluids to pH balance fluids  - Reviewed renal plan with primary team attending and we are both in agreement to change fluids to isolate.     2-acid/base-bicarbonate appropriate yesterday    3-electrolytes-hyponatremia but in the setting of hyperglycemia initially. Sodium level is appropriate yesterday but await BMP today. 4-emphysematous pyelonephritis-    - Per urology and primary teams  - On broad-spectrum antibiotics  - Awaiting stent placement today per urology team  - We will likely need prolonged antibiotic course  - Leukocytosis worsening. Awaiting urology eval and stent placement potential today  - Follow-up final cultures-thus far urine culture with E. 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 female with a past medical history diabetes, hyperlipidemia, hypertension, depression, who was admitted to [address] [place] [address] abdominal [facility] after presenting with . A renal consultation is requested today for assistance in the management of acute kidney injury. Patient initially presented with abdominal pain, nausea, vomiting. Was found to have hyperglycemia with HHNK. There is also concern for right-sided emphysematous pyelonephritis without abscess. Initially started on antibiotics. Nephrology has been brought on board due to elevated creatinine. Patient states she had 5 days of nausea, vomiting, abdominal pain in the anterior. No flank pain. No dysuria. Was not able to hold medications down and therefore was not taking antihypertensives. Was not taking NSAIDs. Currently feels back to baseline. Was found to have emphysematous pyelonephritis.     PAST MEDICAL HISTORY:  Past Medical History:   Diagnosis Date    Diabetes mellitus ([address])     Hypertension        PAST SURGICAL HISTORY:  Past Surgical History:   Procedure Laterality Date    CHOLECYSTECTOMY         ALLERGIES:  No Known Allergies    SOCIAL HISTORY:  Social History     Substance and Sexual Activity   Alcohol Use Never     Social History     Substance and Sexual Activity   Drug Use Never     Social History     Tobacco Use   Smoking Status Never   Smokeless Tobacco Never       FAMILY HISTORY:  History reviewed. No pertinent family history.     MEDICATIONS:    Current Facility-Administered Medications:     acetaminophen (TYLENOL) tablet 650 mg, 650 mg, Oral, Q6H PRN, KWAME Recinos    calcium carbonate (OYSTER SHELL,OSCAL) 500 mg tablet 1 tablet, 1 tablet, Oral, Daily With Breakfast, Jose Luis Masters MD, 1 tablet at 10/18/23 0813    calcium carbonate (TUMS) chewable tablet 500 mg, 500 mg, Oral, TID PRN, KWAME Recinos, 500 mg at 10/18/23 1833    cholecalciferol (VITAMIN D3) tablet 2,000 Units, 2,000 Units, Oral, Daily, Jose Luis Masters MD, 2,000 Units at 10/18/23 0816    ezetimibe (ZETIA) tablet 10 mg, 10 mg, Oral, Daily, Jose Luis Masters MD, 10 mg at 10/18/23 0813    heparin (porcine) subcutaneous injection 5,000 Units, 5,000 Units, Subcutaneous, Q8H [address], KWAME Recinos, 5,000 Units at 10/19/23 0548    insulin regular (HumuLIN R,NovoLIN R) 1 Units/mL in sodium chloride 0.9 % 100 mL infusion, 0.3-21 Units/hr, Intravenous, Titrated, Jose Luis Masters MD, Last Rate: 1 mL/hr at 10/19/23 0800, 1 Units/hr at 10/19/23 0800    ondansetron (ZOFRAN) injection 4 mg, 4 mg, Intravenous, Q4H PRN, KWAME Recinos, 4 mg at 10/18/23 1833    PARoxetine (PAXIL) tablet 20 mg, 20 mg, Oral, Daily, Jose Luis Masters MD, 20 mg at 10/18/23 0813    piperacillin-tazobactam (ZOSYN) IVPB 3.375 g, 3.375 g, Intravenous, Q6H, KWAME Recinos, Last Rate: 0 mL/hr at 10/18/23 2338, 3.375 g at 10/19/23 0546    pravastatin (PRAVACHOL) tablet 20 mg, 20 mg, Oral, Daily With Sydney Talamantes MD, 20 mg at 10/18/23 1754    sodium chloride 0.9 % infusion, 125 mL/hr, Intravenous, Continuous, Jose Luis Masters MD, Last Rate: 125 mL/hr at 10/19/23 0309, 125 mL/hr at 10/19/23 0309    REVIEW OF SYSTEMS:     All the systems were reviewed and were negative except as documented on the HPI.     PHYSICAL EXAM:  Current Weight: Weight - Scale: 107 kg (235 lb 7.2 oz)  First Weight: Weight - Scale: 106 kg (234 lb)  Vitals:    10/18/23 2100 10/18/23 2223 10/19/23 0316 10/19/23 0758   BP:  115/71 117/70 140/81   BP Location:   Right arm Right arm   Pulse: 86 84 79 91   Resp:  16 16 18   Temp:  98.1 °F (36.7 °C) (!) 97.4 °F (36.3 °C) (!) 97.3 °F (36.3 °C)   TempSrc:   Oral Oral   SpO2:  97% 97% 100%   Weight:       Height:           Intake/Output Summary (Last 24 hours) at 10/19/2023 1043  Last data filed at 10/19/2023 0309  Gross per 24 hour   Intake 2184.86 ml   Output 300 ml   Net 1884.86 ml     Physical Exam  General: NAD  Skin: no rash  Eyes: anicteric sclera  ENT: moist mucous membrane  Neck: supple  Chest: CTA b/l, no ronchii, no wheeze, no rubs, no rales  CVS: s1s2, no murmur, no gallop, no rub  Abdomen: soft, nontender, nl sounds  Extremities: Trace edema LE b/l, no flank tenderness  : no luna  Neuro: AAOX3  Psych: normal affect      Invasive Devices:      Lab Results:   Results from last 7 days   Lab Units 10/19/23  0553 10/18/23  1548 10/18/23  0808 10/18/23  0558 10/18/23  0132 10/17/23  1359 10/17/23  1359 10/17/23  1200   WBC Thousand/uL 26.35*  --   --  25.77*  --   --   --  14.07*   HEMOGLOBIN g/dL 12.0  --   --  12.5  --   --   --  14.9   HEMATOCRIT % 34.1*  --   --  34.8  --   --   --  44.0   PLATELETS Thousands/uL 190  --   --  146*  --   --   --  137*   POTASSIUM mmol/L  --  3.5 3.0*  --  2.9*   < > 3.9  --    CHLORIDE mmol/L  --  98 96  --  94*   < > 86*  --    CO2 mmol/L  --  22 22  --  22   < > 19*  --    BUN mg/dL  --  54* 55*  --  57*   < > 68*  --    CREATININE mg/dL  --  1.77* 1.64*  --  1.61*   < > 1.67*  --    CALCIUM mg/dL  --  7.1* 7.3*  --  7.3*   < > 7.6*  --    MAGNESIUM mg/dL 2.1  --   --  1.7*  --   --  2.0  --    PHOSPHORUS mg/dL 3.0  --   --  2.2*  --   -- --   --    ALK PHOS U/L  --   --   --   --   --   --  85  --    ALT U/L  --   --   --   --   --   --  14  --    AST U/L  --   --   --   --   --   --  15  --     < > = values in this interval not displayed.

## 2023-10-19 NOTE — OP NOTE
OPERATIVE REPORT  PATIENT NAME: Charise Ransom Apgar    :  1965  MRN: [id number]  Pt Location: [facility] OR ROOM 04    SURGERY DATE: 10/19/2023    Surgeon(s) and Role:     * Abdullahi Gonzalez MD - Primary    Preop Diagnosis:  Hydronephrosis, unspecified hydronephrosis type [N13.30]    Post-Op Diagnosis Codes: * Hydronephrosis, unspecified hydronephrosis type [N13.30]    Procedure(s):  Right - CYSTOSCOPY RETROGRADE PYELOGRAM WITH INSERTION STENT URETERAL    Specimen(s):  * No specimens in log *    Estimated Blood Loss:   Minimal    Drains:  * No LDAs found *    Anesthesia Type:   General/LMA    Operative Indications:  Hydronephrosis, unspecified hydronephrosis type []  69-year-old female seen in [address] [place] [address] ER 2 days ago with a 7-day complaint of nausea vomiting abdominal pain starting last week went on vacation in [place] found on ER CAT scan to have emphysematous pyelonephritis complicated UTI severe swelling of the right kidney and perirenal tissue with gas no obvious liquid collection no hydronephrosis and a sugar of 800 admitted started on broad-spectrum antibiotics urologic consult called for 24 hours after admission seen at the bedside with no pain or symptoms immediate interventional radiology consult called for to possibly perirenal drain.   Discussed with radiologist did not feel a drain was warranted since nothing to drain patient made n.p.o. last night repeating CAT scan today now noting some resolution of gas around kidney with mild hydronephrosis indicative of obstruction patient now to undergo urgent cystoscopy right stent placement    Operative Findings:  Severely edematous obstructed right ureteral orifice moderate hydronephrosis noted retrograde 24 cm 6 [place]i stent passed    Complications:   None    Procedure and Technique:  Patient identified in holding area consent signed right side marked placed or suite after anesthesia induced placed in lithotomy position draped and prepped standard fashion timeout performed 22 Japanese cystoscope placed significant polyuric urine noted the right orifice was edematous swollen and erythematous a 5 [place] open-ended catheter placed into it mild retrograde confirmed hydronephrosis to the stenotic orifice a 0.03 wire was then passed upward into the right renal pelvis and over the wire 24 cm 6 Japanese stent was passed the wire removed scope removed patient have [place] awakened taken to recovery in stable condition   I was present for the entire procedure.     Patient Disposition:  PACU         SIGNATURE: Zachary Weiner MD  DATE: October 19, 2023  TIME: 7:02 PM

## 2023-10-19 NOTE — ASSESSMENT & PLAN NOTE
Serum sodium only decreased at 119, however, in the setting of marked hyperglycemia, "corrected" sodium was 131  Anticipate progressive improvement in sodium levels with correction of blood sugars -> serum sodium progressively improved  Okay to continue IV fluids due to primary issue  Continue serial serum sodium monitoring

## 2023-10-19 NOTE — PROGRESS NOTES
Pt has a room 419 bed 1 and was transferred by w/MICHAEL de jesus X4,no c/o pain or distress,mother called to update re transfer ,number is not in service response received,RN aware,all belonging sent with pt

## 2023-10-19 NOTE — ANESTHESIA POSTPROCEDURE EVALUATION
Post-Op Assessment Note    CV Status:  Stable  Pain Score: 0    Pain management: adequate     Mental Status:  Awake   Hydration Status:  Stable   PONV Controlled:  None   Airway Patency:  Patent      Post Op Vitals Reviewed: Yes      Staff: Anesthesiologist         No notable events documented.     /98 (10/19/23 1855)    Temp 99.5 °F (37.5 °C) (10/19/23 1855)    Pulse 83 (10/19/23 1855)   Resp 16 (10/19/23 1855)    SpO2 100 % (10/19/23 1855)
Initial (On Arrival)

## 2023-10-20 LAB
ALBUMIN SERPL BCP-MCNC: 2.1 G/DL (ref 3.5–5)
ALP SERPL-CCNC: 106 U/L (ref 34–104)
ALT SERPL W P-5'-P-CCNC: 23 U/L (ref 7–52)
ANION GAP SERPL CALCULATED.3IONS-SCNC: 7 MMOL/L
AST SERPL W P-5'-P-CCNC: 22 U/L (ref 13–39)
BASOPHILS # BLD MANUAL: 0 THOUSAND/UL (ref 0–0.1)
BASOPHILS NFR MAR MANUAL: 0 % (ref 0–1)
BILIRUB SERPL-MCNC: 1.47 MG/DL (ref 0.2–1)
BUN SERPL-MCNC: 38 MG/DL (ref 5–25)
CALCIUM ALBUM COR SERPL-MCNC: 8.8 MG/DL (ref 8.3–10.1)
CALCIUM SERPL-MCNC: 7.3 MG/DL (ref 8.4–10.2)
CHLORIDE SERPL-SCNC: 107 MMOL/L (ref 96–108)
CO2 SERPL-SCNC: 21 MMOL/L (ref 21–32)
CREAT SERPL-MCNC: 1.64 MG/DL (ref 0.6–1.3)
DOHLE BOD BLD QL SMEAR: PRESENT
EOSINOPHIL # BLD MANUAL: 0.78 THOUSAND/UL (ref 0–0.4)
EOSINOPHIL NFR BLD MANUAL: 3 % (ref 0–6)
ERYTHROCYTE [DISTWIDTH] IN BLOOD BY AUTOMATED COUNT: 14 % (ref 11.6–15.1)
GFR SERPL CREATININE-BSD FRML MDRD: 33 ML/MIN/1.73SQ M
GLUCOSE SERPL-MCNC: 108 MG/DL (ref 65–140)
GLUCOSE SERPL-MCNC: 126 MG/DL (ref 65–140)
GLUCOSE SERPL-MCNC: 127 MG/DL (ref 65–140)
GLUCOSE SERPL-MCNC: 134 MG/DL (ref 65–140)
GLUCOSE SERPL-MCNC: 141 MG/DL (ref 65–140)
GLUCOSE SERPL-MCNC: 142 MG/DL (ref 65–140)
GLUCOSE SERPL-MCNC: 155 MG/DL (ref 65–140)
GLUCOSE SERPL-MCNC: 179 MG/DL (ref 65–140)
GLUCOSE SERPL-MCNC: 185 MG/DL (ref 65–140)
HCT VFR BLD AUTO: 33.5 % (ref 34.8–46.1)
HGB BLD-MCNC: 11.6 G/DL (ref 11.5–15.4)
LYMPHOCYTES # BLD AUTO: 0.78 THOUSAND/UL (ref 0.6–4.47)
LYMPHOCYTES # BLD AUTO: 3 % (ref 14–44)
MAGNESIUM SERPL-MCNC: 2 MG/DL (ref 1.9–2.7)
MCH RBC QN AUTO: 28.4 PG (ref 26.8–34.3)
MCHC RBC AUTO-ENTMCNC: 34.6 G/DL (ref 31.4–37.4)
MCV RBC AUTO: 82 FL (ref 82–98)
METAMYELOCYTES NFR BLD MANUAL: 1 % (ref 0–1)
MONOCYTES # BLD AUTO: 0.52 THOUSAND/UL (ref 0–1.22)
MONOCYTES NFR BLD: 2 % (ref 4–12)
NEUTROPHILS # BLD MANUAL: 23.56 THOUSAND/UL (ref 1.85–7.62)
NEUTS BAND NFR BLD MANUAL: 4 % (ref 0–8)
NEUTS SEG NFR BLD AUTO: 87 % (ref 43–75)
PHOSPHATE SERPL-MCNC: 2.7 MG/DL (ref 2.7–4.5)
PLATELET # BLD AUTO: 231 THOUSANDS/UL (ref 149–390)
PLATELET BLD QL SMEAR: ADEQUATE
PMV BLD AUTO: 9.9 FL (ref 8.9–12.7)
POTASSIUM SERPL-SCNC: 3.5 MMOL/L (ref 3.5–5.3)
PROCALCITONIN SERPL-MCNC: 2.04 NG/ML
PROT SERPL-MCNC: 4.8 G/DL (ref 6.4–8.4)
RBC # BLD AUTO: 4.08 MILLION/UL (ref 3.81–5.12)
RBC MORPH BLD: NORMAL
SODIUM SERPL-SCNC: 135 MMOL/L (ref 135–147)
TOXIC GRANULES BLD QL SMEAR: PRESENT
WBC # BLD AUTO: 25.89 THOUSAND/UL (ref 4.31–10.16)

## 2023-10-20 PROCEDURE — 99232 SBSQ HOSP IP/OBS MODERATE 35: CPT | Performed by: INTERNAL MEDICINE

## 2023-10-20 PROCEDURE — 82948 REAGENT STRIP/BLOOD GLUCOSE: CPT

## 2023-10-20 PROCEDURE — 80053 COMPREHEN METABOLIC PANEL: CPT | Performed by: SPECIALIST

## 2023-10-20 PROCEDURE — 84145 PROCALCITONIN (PCT): CPT | Performed by: SPECIALIST

## 2023-10-20 PROCEDURE — 83735 ASSAY OF MAGNESIUM: CPT | Performed by: SPECIALIST

## 2023-10-20 PROCEDURE — 84100 ASSAY OF PHOSPHORUS: CPT | Performed by: SPECIALIST

## 2023-10-20 PROCEDURE — 85027 COMPLETE CBC AUTOMATED: CPT | Performed by: SPECIALIST

## 2023-10-20 PROCEDURE — 85007 BL SMEAR W/DIFF WBC COUNT: CPT | Performed by: SPECIALIST

## 2023-10-20 PROCEDURE — 99223 1ST HOSP IP/OBS HIGH 75: CPT | Performed by: INTERNAL MEDICINE

## 2023-10-20 RX ORDER — INSULIN GLARGINE 100 [IU]/ML
10 INJECTION, SOLUTION SUBCUTANEOUS
Status: DISCONTINUED | OUTPATIENT
Start: 2023-10-20 | End: 2023-10-20

## 2023-10-20 RX ORDER — INSULIN GLARGINE 100 [IU]/ML
15 INJECTION, SOLUTION SUBCUTANEOUS
Status: DISCONTINUED | OUTPATIENT
Start: 2023-10-20 | End: 2023-10-25 | Stop reason: HOSPADM

## 2023-10-20 RX ORDER — SACCHAROMYCES BOULARDII 250 MG
250 CAPSULE ORAL 2 TIMES DAILY
Status: DISCONTINUED | OUTPATIENT
Start: 2023-10-20 | End: 2023-10-25 | Stop reason: HOSPADM

## 2023-10-20 RX ORDER — CEFAZOLIN SODIUM 2 G/50ML
2000 SOLUTION INTRAVENOUS EVERY 8 HOURS
Status: DISCONTINUED | OUTPATIENT
Start: 2023-10-20 | End: 2023-10-25 | Stop reason: HOSPADM

## 2023-10-20 RX ORDER — INSULIN LISPRO 100 [IU]/ML
1-6 INJECTION, SOLUTION INTRAVENOUS; SUBCUTANEOUS
Status: DISCONTINUED | OUTPATIENT
Start: 2023-10-20 | End: 2023-10-25 | Stop reason: HOSPADM

## 2023-10-20 RX ADMIN — INSULIN LISPRO 1 UNITS: 100 INJECTION, SOLUTION INTRAVENOUS; SUBCUTANEOUS at 21:50

## 2023-10-20 RX ADMIN — HEPARIN SODIUM 5000 UNITS: 5000 INJECTION INTRAVENOUS; SUBCUTANEOUS at 21:50

## 2023-10-20 RX ADMIN — Medication 2000 UNITS: at 08:09

## 2023-10-20 RX ADMIN — HEPARIN SODIUM 5000 UNITS: 5000 INJECTION INTRAVENOUS; SUBCUTANEOUS at 05:23

## 2023-10-20 RX ADMIN — CALCIUM 1 TABLET: 500 TABLET ORAL at 08:09

## 2023-10-20 RX ADMIN — Medication 250 MG: at 12:58

## 2023-10-20 RX ADMIN — HEPARIN SODIUM 5000 UNITS: 5000 INJECTION INTRAVENOUS; SUBCUTANEOUS at 14:43

## 2023-10-20 RX ADMIN — SODIUM CHLORIDE, SODIUM GLUCONATE, SODIUM ACETATE, POTASSIUM CHLORIDE, MAGNESIUM CHLORIDE, SODIUM PHOSPHATE, DIBASIC, AND POTASSIUM PHOSPHATE 125 ML/HR: .53; .5; .37; .037; .03; .012; .00082 INJECTION, SOLUTION INTRAVENOUS at 03:27

## 2023-10-20 RX ADMIN — PIPERACILLIN AND TAZOBACTAM 3.38 G: 36; 4.5 INJECTION, POWDER, FOR SOLUTION INTRAVENOUS at 11:50

## 2023-10-20 RX ADMIN — EZETIMIBE 10 MG: 10 TABLET ORAL at 08:08

## 2023-10-20 RX ADMIN — Medication 250 MG: at 17:20

## 2023-10-20 RX ADMIN — PRAVASTATIN SODIUM 20 MG: 20 TABLET ORAL at 15:35

## 2023-10-20 RX ADMIN — SODIUM CHLORIDE 3 UNITS/HR: 9 INJECTION, SOLUTION INTRAVENOUS at 02:09

## 2023-10-20 RX ADMIN — PIOGLITAZONE HYDROCHLORIDE 15 MG: 15 TABLET ORAL at 08:08

## 2023-10-20 RX ADMIN — HYDROCHLOROTHIAZIDE 12.5 MG: 12.5 TABLET ORAL at 08:09

## 2023-10-20 RX ADMIN — INSULIN LISPRO 1 UNITS: 100 INJECTION, SOLUTION INTRAVENOUS; SUBCUTANEOUS at 17:20

## 2023-10-20 RX ADMIN — PIPERACILLIN AND TAZOBACTAM 3.38 G: 36; 4.5 INJECTION, POWDER, FOR SOLUTION INTRAVENOUS at 05:28

## 2023-10-20 RX ADMIN — CEFAZOLIN SODIUM 2000 MG: 2 SOLUTION INTRAVENOUS at 17:20

## 2023-10-20 RX ADMIN — PAROXETINE 20 MG: 20 TABLET, FILM COATED ORAL at 08:09

## 2023-10-20 RX ADMIN — INSULIN GLARGINE 15 UNITS: 100 INJECTION, SOLUTION SUBCUTANEOUS at 21:50

## 2023-10-20 NOTE — CASE MANAGEMENT
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    Leukocytosis 10/17/2023    Hyponatremia 10/17/2023    Elevated serum creatinine 10/17/2023    Morbid obesity ([address]) 10/17/2023    Thrombocytopenia ([address]) 10/17/2023    Essential hypertension 10/17/2023    Hyperlipidemia 10/17/2023    Depression 10/17/2023      LOS (days): 3  Geometric Mean LOS (GMLOS) (days): 3.90  Days to GMLOS:1.1     OBJECTIVE:    Risk of Unplanned Readmission Score: 15.92      Current admission status: Inpatient     Preferred Pharmacy:   [place] [address]  Phone: [phone] Fax: [phone]    Primary Care Provider: Negrito Lindsey DO    Primary Insurance: [facility] [address]  Secondary Insurance:     ASSESSMENT:  [facility] Proxies    There are no active Health Care Proxies on file. Readmission Root Cause  30 Day Readmission: No    Patient Information  Admitted from[de-identified] Home  Mental Status: Alert  During Assessment patient was accompanied by: Parent  Assessment information provided by[de-identified] Parent, Patient  Primary Caregiver: Self  Support Systems: Family members, Parent  [place] of Residence: [address] [place] [address] do you live in?: [address] entry access options.  Select all that apply.: Stairs  Number of steps to enter home.: 2  Type of Current Residence: [address] [place] home  Upon entering residence, is there a bedroom on the main floor (no further steps)?: Yes  Upon entering residence, is there a bathroom on the main floor (no further steps)?: Yes  In the last 12 months, was there a time when you were not able to pay the mortgage or rent on time?: No  In the last 12 months, how many places have you lived?: 2  In the last 12 months, was there a time when you did not have a steady place to sleep or slept in a shelter (including now)?: No  Homeless/housing insecurity resource given?: N/A  Living Arrangements: Lives w/ Parent(s) (Pt currently staying with mother/aunt at [address] [facility], [address])    Activities of Daily Living Prior to Admission  Functional Status: Independent  Completes ADLs independently?: Yes  Ambulates independently?: Yes  Does patient use assisted devices?: No  Does patient currently own DME?: No     Patient Information Continued  Income Source: Employed  Does patient have prescription coverage?: Yes  Within the past 12 months, you worried that your food would run out before you got the money to buy more.: Never true  Within the past 12 months, the food you bought just didn't last and you didn't have money to get more.: Never true  Food insecurity resource given?: N/A  Does patient receive dialysis treatments?: No     Means of Transportation  Means of Transport to Appts[de-identified] Drives Self  In the past 12 months, has lack of transportation kept you from medical appointments or from getting medications?: No  In the past 12 months, has lack of transportation kept you from meetings, work, or from getting things needed for daily living?: No  Was application for public transport provided?: N/A    DISCHARGE DETAILS:    Discharge planning discussed with[de-identified] Patient, Mother Frank Hatch, [address] [place] [address] Road of Choice: Yes  Comments - Freedom of Choice: Choice is for patient to return back to mother/aunt's home on discharge. No anticipated discharge needs at this time.   CM contacted family/caregiver?: Yes  Were Treatment Team discharge recommendations reviewed with patient/caregiver?: Yes  Did patient/caregiver verbalize understanding of patient care needs?: Yes  Were patient/caregiver advised of the risks associated with not following Treatment Team discharge recommendations?: Yes    Contacts  Patient Contacts: Mary Apgar (mother)  Relationship to Patient[de-identified] Family  Contact Method:  In Person  Reason/Outcome: Continuity of Care, Emergency Contact, Discharge  [address]         Is the patient interested in [address] at discharge?: No    DME Referral Provided  Referral made for DME?: No    Other Referral/Resources/Interventions Provided:  Interventions: None Indicated     Treatment Team Recommendation: Home  Discharge Destination Plan[de-identified] Home  Transport at Discharge : Family

## 2023-10-20 NOTE — PROGRESS NOTES
Aimee SANDHU Apgar 60 y.o. female MRN: [id number]  Unit/Bed#: [address] [place] [address] Encounter: [id number]  Reason for Consult: LEANDRO    ASSESSMENT and PLAN:    Pt seen on 10/20. This Is addendum only. 60 y.o. female with a past medical history diabetes, hyperlipidemia, hypertension, depression, who was admitted to [address] [place] [address] abdominal pain after presenting with . A renal consultation is requested today for assistance in the management of acute kidney injury. Patient initially presented with abdominal pain, nausea, vomiting. Was found to have hyperglycemia with HHNK. 1-acute kidney injury-     - Admission creatinine-1.7 mg/dL 10/17  - Peak creatinine-awaiting peak  - Baseline creatinine-unclear  - Etiology-likely in the setting of sepsis/emphysematous pyelonephritis/contrast X 2  - Renal imaging-edematous appearance to the right kidney consistent with pyelonephritis, perirenal soft tissue emphysema redemonstrated with slight interval improvement from prior study. Wall hyperemia throughout right renal pelvis consistent with ureters and slight right hydroureter. No significant right hydronephrosis. No distal obstructive uropathy. - Urinalysis-urinalysis with innumerable WBC, moderate bacteria, 4-10 RBC, glucose urea, large blood, negative nitrate  - 10/19-patient went to the OR with cystoscopy retrograde pyelogram with stent insertion. Severely edematous obstructed right ureteral orifice. - 10/20-creatinine stable 1.6. Sodium, potassium are appropriate.      Plan-     - I/os; avoid nephrotoxic agents  - Avoid hypotension  - Agree with holding HCTZ-I have held off for now  - Continue holding ACE inhibitor  - Add a BMP this a.m. lab work  - Reviewed risk of contrast injury with the patient and may delay renal recovery but risk of contrast injury to the point where patient may need dialysis appears to be low at this juncture but need to monitor closely as the patient appears to have inflamed/emphysematous right kidney  - Change fluids to pH balance fluids-lower dose today  - Reviewed renal plan with primary team attending and we are both in agreement to monitor renal function today with intravenous fluids. This may be new baseline for now. -Patient is not been receiving lab work as outpatient in years. Patient still reporting.  - Hyperbilirubinemia-per primary team     2-acid/base-bicarbonate appropriate today     3-electrolytes-hyponatremia but in the setting of hyperglycemia initially. Sodium level is appropriate status post stent placement 10/19.     4-emphysematous pyelonephritis-     - Per urology and primary teams  - On broad-spectrum antibiotics  - Awaiting stent placement today per urology team  - We will likely need prolonged antibiotic course  - Leukocytosis worsening.    - Follow-up final cultures-thus far urine culture with E. coli     5-HH NK-per primary team and endocrine team     6-hypertension-     - Holding ACE inhibitor and HCTZ in the setting of acute kidney injury  - Continue holding HCTZ. Was restarted today but will hold for now. 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 Afebrile. On room air. Urine output 500 cc. Patient denies complaints.   States he is feeling improved overall    OBJECTIVE:  Current Weight: Weight - Scale: 107 kg (235 lb 7.2 oz)  Vitals:    10/20/23 0100 10/20/23 0331 10/20/23 0332 10/20/23 0737   BP: 130/73 131/75 131/75 134/74   Pulse: 78 86 87 90   Resp:   18 18   Temp:   (!) 97.2 °F (36.2 °C) 97.5 °F (36.4 °C)   TempSrc:       SpO2: 95% 97% 98% 94%   Weight:       Height:           Intake/Output Summary (Last 24 hours) at 10/20/2023 1148  Last data filed at 10/20/2023 0629  Gross per 24 hour   Intake 210 ml   Output 500 ml   Net -290 ml     General: NAD  Skin: no rash  Eyes: anicteric sclera  ENT: moist mucous membrane  Neck: supple  Chest: CTA b/l, no ronchii, no wheeze, no rubs, no rales  CVS: s1s2, no murmur, no gallop, no rub  Abdomen: soft, nontender, nl sounds  Extremities: no edema LE b/l  : no luna  Neuro: AAOX3  Psych: normal affect    Medications:    Current Facility-Administered Medications:     acetaminophen (TYLENOL) tablet 650 mg, 650 mg, Oral, Q6H PRN, Jennifer Castro MD    aluminum-magnesium hydroxide-simethicone (MAALOX) oral suspension 30 mL, 30 mL, Oral, Q6H PRN, Jennifer Castro MD    calcium carbonate (OYSTER SHELL,OSCAL) 500 mg tablet 1 tablet, 1 tablet, Oral, Daily With Breakfast, Jennifer Castro MD, 1 tablet at 10/20/23 0809    calcium carbonate (TUMS) chewable tablet 500 mg, 500 mg, Oral, TID PRN, Jennifer Castro MD, 500 mg at 10/18/23 1833    cholecalciferol (VITAMIN D3) tablet 2,000 Units, 2,000 Units, Oral, Daily, Jennifer Catsro MD, 2,000 Units at 10/20/23 0809    ezetimibe (ZETIA) tablet 10 mg, 10 mg, Oral, Daily, Jennifer Castro MD, 10 mg at 10/20/23 8637    heparin (porcine) subcutaneous injection 5,000 Units, 5,000 Units, Subcutaneous, Q8H [facility], Jennifer Castro MD, 5,000 Units at 10/20/23 0790    hydrochlorothiazide (HYDRODIURIL) tablet 12.5 mg, 12.5 mg, Oral, Daily, Jennifer Castro MD, 12.5 mg at 10/20/23 0809    insulin glargine (LANTUS) subcutaneous injection 10 Units 0.1 mL, 10 Units, Subcutaneous, HS, Marylin Aguilar MD    insulin lispro (HumaLOG) 100 units/mL subcutaneous injection 1-6 Units, 1-6 Units, Subcutaneous, 4x Daily (AC & HS) **AND** Fingerstick Glucose (POCT), , , 4x Daily AC and at bedtime, Marylin Aguilar MD    lactated ringers bolus 1,000 mL, 1,000 mL, Intravenous, Once PRN **AND** lactated ringers bolus 1,000 mL, 1,000 mL, Intravenous, Once PRN, Jennifer Castro MD    multi-electrolyte (PLASMALYTE-A/ISOLYTE-S PH 7.4) IV solution, 125 mL/hr, Intravenous, Continuous, Jennifer Castro MD, Last Rate: 125 mL/hr at 10/20/23 0327, 125 mL/hr at 10/20/23 0327    ondansetron (ZOFRAN) injection 4 mg, 4 mg, Intravenous, Q4H PRN, Bridget Keller MD, 4 mg at 10/18/23 1833    ondansetron (ZOFRAN) injection 4 mg, 4 mg, Intravenous, Q6H PRN, Bridget Keller MD    PARoxetine (PAXIL) tablet 20 mg, 20 mg, Oral, Daily, Bridget Keller MD, 20 mg at 10/20/23 0809    pioglitazone (ACTOS) tablet 15 mg, 15 mg, Oral, Daily, Bridget Keller MD, 15 mg at 10/20/23 0808    piperacillin-tazobactam (ZOSYN) IVPB 3.375 g, 3.375 g, Intravenous, Q6H, Bridget Keller MD, Last Rate: 0 mL/hr at 10/18/23 2338, 3.375 g at 10/20/23 0528    pravastatin (PRAVACHOL) tablet 20 mg, 20 mg, Oral, Daily With Astrid Jaffe MD, 20 mg at 10/18/23 1754    sodium chloride 0.9 % bolus 1,000 mL, 1,000 mL, Intravenous, Once PRN **AND** sodium chloride 0.9 % bolus 1,000 mL, 1,000 mL, Intravenous, Once PRN, Bridget Keller MD    Laboratory Results:  Results from last 7 days   Lab Units 10/20/23  0521 10/19/23  0553 10/18/23  1548 10/18/23  0808 10/18/23  0558 10/18/23  0132 10/17/23  1359 10/17/23  1200   WBC Thousand/uL 25.89* 26.35*  --   --  25.77*  --   --  14.07*   HEMOGLOBIN g/dL 11.6 12.0  --   --  12.5  --   --  14.9   HEMATOCRIT % 33.5* 34.1*  --   --  34.8  --   --  44.0   PLATELETS Thousands/uL 231 190  --   --  146*  --   --  137*   POTASSIUM mmol/L 3.5 3.8 3.5 3.0*  --  2.9* 3.9  --    CHLORIDE mmol/L 107 104 98 96  --  94* 86*  --    CO2 mmol/L 21 20* 22 22  --  22 19*  --    BUN mg/dL 38* 49* 54* 55*  --  57* 68*  --    CREATININE mg/dL 1.64* 1.73* 1.77* 1.64*  --  1.61* 1.67*  --    CALCIUM mg/dL 7.3* 7.1* 7.1* 7.3*  --  7.3* 7.6*  --    MAGNESIUM mg/dL 2.0 2.1  --   --  1.7*  --  2.0  --    PHOSPHORUS mg/dL 2.7 3.0  --   --  2.2*  --   --   --

## 2023-10-20 NOTE — PROGRESS NOTES
Progress Note - Urology      Patient: Dyana Monica Apgar   : 1965 Sex: female   MRN: [id number]     CSN: [id number]  Unit/Bed#: [address] [place] [address]     SUBJECTIVE:   Status post cystoscopy right stent insertion day #1  No pain discomfort some stent discomfort  Count not coming down but stabilizing      Objective   Vitals: /74   Pulse 90   Temp 97.5 °F (36.4 °C)   Resp 18   Ht 5' 7" (1.702 m)   Wt 107 kg (235 lb 7.2 oz)   SpO2 94%   BMI 36.88 kg/m²     I/O last 24 hours: In: 210 [P.O.:200;  I.V.:10]  Out: 500 [Urine:500]      Physical Exam:   General Alert awake   Normocephalic atraumatic PERRLA  Lungs clear bilaterally  Cardiac normal S1 normal S2  Abdomen soft, flank pain  Extremities no edema      Lab Results: CBC:   Lab Results   Component Value Date    WBC 25.89 (H) 10/20/2023    HGB 11.6 10/20/2023    HCT 33.5 (L) 10/20/2023    MCV 82 10/20/2023     10/20/2023    RBC 4.08 10/20/2023    MCH 28.4 10/20/2023    MCHC 34.6 10/20/2023    RDW 14.0 10/20/2023    MPV 9.9 10/20/2023    NRBC 0 10/18/2023     CMP:   Lab Results   Component Value Date     10/20/2023    CO2 21 10/20/2023    BUN 38 (H) 10/20/2023    CREATININE 1.64 (H) 10/20/2023    CALCIUM 7.3 (L) 10/20/2023    AST 22 10/20/2023    ALT 23 10/20/2023    ALKPHOS 106 (H) 10/20/2023    EGFR 33 10/20/2023     Urinalysis:   Lab Results   Component Value Date    COLORU Colorless 10/17/2023    CLARITYU Cloudy 10/17/2023    SPECGRAV <=1.005 10/17/2023    PHUR 5.5 10/17/2023    LEUKOCYTESUR Small (A) 10/17/2023    NITRITE Negative 10/17/2023    GLUCOSEU >=1000 (1%) (A) 10/17/2023    KETONESU Negative 10/17/2023    BILIRUBINUR Negative 10/17/2023    BLOODU Large (A) 10/17/2023     Urine Culture:   Lab Results   Component Value Date    URINECX >100,000 cfu/ml Escherichia coli (A) 10/17/2023     PSA: No results found for: "PSA"      Assessment/ Plan:  E. coli emphysematous pyelonephritis  Right hydronephrosis documented on repeat CAT scan yesterday status post cystoscopy right stent day #1  Recommend ID consult  No indication for right nephrostomy tube on last CAT scan if anything kidney looked better  If white count does not start to trend down would repeat CAT scan tomorrow          Elenita Tellez, MD

## 2023-10-20 NOTE — PROGRESS NOTES
[address]  Progress Note  Name: Claudia Mackie Apgar I  MRN: [id number]  Unit/Bed#: 4 [place] 419-01 I Date of Admission: 10/17/2023   Date of Service: 10/20/2023 I Hospital Day: 3      Assessment & Plan:    * Hyperosmolar hyperglycemic state (HHS) [facility])  Assessment & Plan  Presentedwith abdominal discomfort with nausea/vomiting and a profoundly elevated serum glucose of 849 -> progressively improved overnight into this morning now in the 100s  BMP without evidence of anion gap and urinalysis without evidence of ketones  Suspect hyperosmolar hyperglycemic state -> insulin drip now discontinued and transitioned to a basal insulin regimen with SSI coverage per Accu-Cheks  Continue aggressive IV fluid hydration  Monitor serial Accu-Cheks  Chronically on oral hypoglycemics for type 2 diabetes mellitus -> states that due to recent nausea/vomiting over the last few days, doses were missed  Suspect pyelonephritis as likely triggering factor (see below)  Appreciate endocrinology input    Emphysematous pyelonephritis  Assessment & Plan  Presented with nonspecific abdominal discomfort and CT imaging evidence of right-sided emphysematous pyelonephritis without abscess  Antibiotic coverage broadened to IV Zosyn pending urine culture   No blood cultures were drawn in the ED  Pending appreciate urology input  Leukocytosis noted, however, remains afebrile -> hemodynamically stable and currently nontoxic-appearing  PRN pain/emesis control  Appreciate urology input -> repeat CT imaging today showed improvement (interval decrease in perirenal space soft tissue emphysema volume) -> status post on 10/19 cystoscopy with right-sided stenting today  Initial imaging reviewed by IR -> no role for nephrostomy tube placement currently    Type 2 diabetes mellitus ([address])  Assessment & Plan  A1c significantly uncontrolled at 15.0  Initiated on an insulin drip due to marked hyperglycemia (see plan for primary issue above)  Carbohydrate restriction  Holding oral hypoglycemics while hospitalized  Endocrinology input appreciated (see plan for HHS above) -> transition to basal insulin with SSI coverage for Accu-Cheks -> titrate insulin requirements daily contingent on blood sugars  Patient informed of need for insulin dependence at this time -> will be provided diabetic supplies on discharge including glucometer, testing strips, lancets, and prescriptions for insulin  Will appreciate nutritionist evaluation for diabetic teaching    Leukocytosis  Assessment & Plan  Likely reactive due to acute medical issue(s) coupled with hemoconcentration  Monitor WBC count -> see plan for pyelonephritis above    Hyponatremia  Assessment & Plan  Serum sodium only decreased at 119, however, in the setting of marked hyperglycemia, "corrected" sodium was 131  Anticipate progressive improvement in sodium levels with correction of blood sugars -> serum sodium normalized today  Okay to continue IV fluids due to primary issue  Continue serial serum sodium monitoring    Elevated serum creatinine  Assessment & Plan  Presenting creatinine of 1.67 -> continue IV fluid hydration -> at 1.64this morning  Unclear if acute kidney injury or CKD as no prior baseline available  Likely contributed to by renal hypoperfusion and insensible volume loss from recent vomiting  Limit/avoid nephrotoxins as possible - holding HCTZ/ACEI  Monitor renal function and urine output -> patient has now received 2 loads of IV contrast dye due to repeat CT scans  Appreciate nephrology input -> IV fluids transitioned to Isolyte    Morbid obesity ([address])  Assessment & Plan  BMI of 36.88  Lifestyle/diet modifications    Thrombocytopenia (HCC)  Assessment & Plan  Monitor platelet count - monitor for bleeding    Essential hypertension  Assessment & Plan  Holding HCTZ/ACEI in the setting of acute kidney injury  PRN IV Hydralazine for BP spikes    Hyperlipidemia  Assessment & Plan  Continue statin/Zetia    Depression  Assessment & Plan  Continue Paxil      DVT Prophylaxis:  Heparin SC    Patient Centered Rounds:  I have performed bedside rounds and discussed plan of care with nursing today. Discussions with Specialists or Other Care Team Provider:  see above assessments if applicable    Education and Discussions with Family / Patient:  Patient, along with multiple family members, at bedside today    Time Spent for Care:  35 minutes. More than 50% of total time spent on counseling and coordination of care, on one or more of the following: performing physical exam; counseling and coordination of care, obtaining or reviewing history, documenting in the medical record, reviewing/ordering tests/medications/procedures, and communicating with other healthcare professionals. Current Length of Stay: 3 day(s)  Current Patient Status: Inpatient   Certification Statement:  Patient will continue to require additional hospital stay due to assessments as noted above. Code Status: Level 1 - Full Code        Subjective:     Encountered earlier in the day. States she feels "better" today. Denies fever/chills this time. Denies abdominal discomfort currently. Objective:     Vitals:   Temp (24hrs), Av.8 °F (36.6 °C), Min:97.2 °F (36.2 °C), Max:99.5 °F (37.5 °C)    Temp:  [97.2 °F (36.2 °C)-99.5 °F (37.5 °C)] 97.8 °F (36.6 °C)  HR:  [78-90] 88  Resp:  [16-20] 18  BP: (121-141)/(63-98) 134/72  SpO2:  [93 %-100 %] 96 %  Body mass index is 36.88 kg/m². Input and Output Summary (last 24 hours):        Intake/Output Summary (Last 24 hours) at 10/20/2023 1605  Last data filed at 10/20/2023 4971  Gross per 24 hour   Intake 210 ml   Output 500 ml   Net -290 ml       Physical Exam:     GENERAL Improved weakness/fatigue - obese    HEAD   Normocephalic - atraumatic   EYES   PERRL - EOMI    MOUTH   Mucosa moist   NECK   Supple - full range of motion   CARDIAC Rate controlled - S1/S2 positive   PULMONARY Clear bilateral breath sounds - nonlabored respirations   ABDOMEN   Soft - improving generalized tenderness without rebound/guarding - nondistended - active bowel sounds   MUSCULOSKELETAL   Motor strength/range of motion deconditioned   NEUROLOGIC   Alert/oriented at baseline   SKIN   Chronic wrinkles/blemishes    PSYCHIATRIC   Mood/affect stable         Additional Data:     Labs & Recent Cultures:    Results from last 7 days   Lab Units 10/20/23  0521   WBC Thousand/uL 25.89*   HEMOGLOBIN g/dL 11.6   HEMATOCRIT % 33.5*   PLATELETS Thousands/uL 231   BANDS PCT % 4   LYMPHO PCT % 3*   MONO PCT % 2*   EOS PCT % 3     Results from last 7 days   Lab Units 10/20/23  0521   POTASSIUM mmol/L 3.5   CHLORIDE mmol/L 107   CO2 mmol/L 21   BUN mg/dL 38*   CREATININE mg/dL 1.64*   CALCIUM mg/dL 7.3*   ALK PHOS U/L 106*   ALT U/L 23   AST U/L 22         Results from last 7 days   Lab Units 10/20/23  1122 10/20/23  0948 10/20/23  0756 10/20/23  0628 10/20/23  0409 10/20/23  0206 10/19/23  2357 10/19/23  2202 10/19/23  1953 10/19/23  1557 10/19/23  1422 10/19/23  1212   POC GLUCOSE mg/dl 126 108 127 142* 141* 155* 186* 199* 194* 167* 171* 160*     Results from last 7 days   Lab Units 10/18/23  0558   HEMOGLOBIN A1C % 15.0*     Results from last 7 days   Lab Units 10/20/23  0521 10/19/23  0553 10/18/23  0941 10/18/23  0808   LACTIC ACID mmol/L  --   --  1.2  --    PROCALCITONIN ng/ml 2.04* 4.32*  --  7.82*         Results from last 7 days   Lab Units 10/17/23  1420   URINE CULTURE  >100,000 cfu/ml Escherichia coli*         Lines/Drains:  Invasive Devices       Peripheral Intravenous Line  Duration             Peripheral IV 10/17/23 Right Hand 3 days    Long-Dwell Peripheral IV (Midline) 10/17/23 Left Brachial 2 days                      Last 24 Hours Medication List:   Current Facility-Administered Medications   Medication Dose Route Frequency Provider Last Rate    acetaminophen  650 mg Oral Q6H PRN Mary Jane Lucas MD aluminum-magnesium hydroxide-simethicone  30 mL Oral Q6H PRN Brigette Mccarthy MD      calcium carbonate  1 tablet Oral Daily With Breakfast Brigette Mccarthy MD      calcium carbonate  500 mg Oral TID PRN Brigette Mccarthy MD      cholecalciferol  2,000 Units Oral Daily Brigette Mccarthy MD      ezetimibe  10 mg Oral Daily Brigette Mccarthy MD      heparin (porcine)  5,000 Units Subcutaneous [facility] Brigette Mccarthy MD      insulin glargine  15 Units Subcutaneous HS Clementina Banuelos MD      insulin lispro  1-6 Units Subcutaneous 4x Daily (AC & HS) Clementina Banuelos MD      lactated ringers  1,000 mL Intravenous Once PRN Brigette Mccarthy MD      And    lactated ringers  1,000 mL Intravenous Once PRN Brigette Mccarthy MD      multi-electrolyte  50 mL/hr Intravenous Continuous Kristal Clark MD 50 mL/hr (10/20/23 1258)    ondansetron  4 mg Intravenous Q4H PRN Brigette Mccarthy MD      PARoxetine  20 mg Oral Daily Brigette Mccarthy MD      piperacillin-tazobactam  3.375 g Intravenous Q6H Brigette Mccarthy MD 0 g (10/18/23 5388)    pravastatin  20 mg Oral Daily With Payton Masters MD      saccharomyces boulardii  250 mg Oral BID Clementina Banuelos MD      sodium chloride  1,000 mL Intravenous Once PRN Brigette Mccarthy MD      And    sodium chloride  1,000 mL Intravenous Once PRN Brigette Mccarthy MD                      ** Please Note: This note is constructed using a voice recognition dictation system. An occasional wrong word/phrase or “sound-a-like” substitution may have been picked up by dictation device due to the inherent limitations of voice recognition software. Read the chart carefully and recognize, using reasonable context, where substitutions may have occurred. **

## 2023-10-20 NOTE — CONSULTS
Consultation - Infectious Disease   Alden Merle Apgar 60 y.o. female MRN: [id number]  Unit/Bed#: [address] [place] [address] Encounter: [id number]      IMPRESSION & RECOMMENDATIONS:   1.  E coli Emphysematous pyelonephritis. status post cystoscopy and stent placement on 10/19/2023.  10/19/2023 cystoscopy with findings of severely edematous obstructed right ureteral orifice with moderate hydronephrosis and placement of a 24 cm 6 French stent. Urine culture growing pansensitive E. coli  -Can de-escalate Zosyn down to cefazolin 2 g IV every 8 hours. -monitor temperature and hemodynamics  -serial exam  -Monitor urinary output  -Monitor serial WBC. -Follow-up repeat imaging early next week if not clinically improving  -Urology following     2. Leukocytosis, POA 14 < 25 K  -antibiotics as above  -work up/management as above and below  -monitor CBC     3. LEANDRO. POA. Creatinine 1.67  -renal dose adjust antibiotic as needed  -volume management   -recheck BMP     4. Uncontrolled diabetes mellitus with hyperglycemic state on admission. 10/18/23 HgbA1c 15. Risk factor for infection  -Glycemic management per primary care team    Antibiotics:  Cefazolin     I have discussed the above management plan in detail with patient, RN, and Dr. Sandhya Brown of the primary service. I have spent a total time of 80 minutes on 10/20/23 in caring for this patient including Diagnostic results, Prognosis, Risks and benefits of tx options, Instructions for management, Patient and family education, Importance of tx compliance, Risk factor reductions, Impressions, Counseling / Coordination of care, Documenting in the medical record, Reviewing / ordering tests, medicine, procedures  , Obtaining or reviewing history  , and Communicating with other healthcare professionals . HISTORY OF PRESENT ILLNESS:  Reason for Consult: 1.   Emphysematous pyelonephritis    HPI: Alden Merle Apgar is a 60y.o. year old female with uncontrolled diabetes mellitus and HTN who presented to the ER 10/17/23 with progressively worsening abdominal discomfort with predominant nausea/vomiting over the last several days. In the ER her blood sugar > 800, further work-up was fortunately negative for diabetic ketoacidosis. She had a white count of 14 and creatinine 1.67. She was initiated on IV fluids and intravenous insulin. 1/17/2023 CT abdomen pelvis showed evidence of a right-sided emphysematous pyelonephritis, without abscess. She was started on IV Rocephin initially, which was broadened to IV Zosyn on admission. Patient underwent 10/19/2023 cystoscopy with findings of severely edematous obstructed right ureteral orifice with moderate hydronephrosis and placement of a 24 cm 6 [place] stent. Infectious disease now being consulted regarding evaluation and management of emphysematous pyelonephritis. Denies any known fever, chills, sweats. No nausea and vomiting at present. No diarrhea, no chest pain, shortness of breath, cough, dysuria, back/flank pain, or suprapubic tenderness. REVIEW OF SYSTEMS:  A complete review of systems is negative other than that noted in the HPI.     PAST MEDICAL HISTORY:  Past Medical History:   Diagnosis Date    Diabetes mellitus ([address])     Hypertension      Past Surgical History:   Procedure Laterality Date    CHOLECYSTECTOMY      FL RETROGRADE PYELOGRAM  10/19/2023    MS CYSTO BLADDER W/URETERAL CATHETERIZATION Right 10/19/2023    Procedure: CYSTOSCOPY RETROGRADE PYELOGRAM WITH INSERTION STENT URETERAL;  Surgeon: Sunshine Antoine MD;  Location: [facility];  Service: Urology       FAMILY HISTORY:  Non-contributory    SOCIAL HISTORY:  Social History   Social History     Substance and Sexual Activity   Alcohol Use Never     Social History     Substance and Sexual Activity   Drug Use Never     Social History     Tobacco Use   Smoking Status Never   Smokeless Tobacco Never       ALLERGIES:  No Known Allergies    MEDICATIONS:  All current active medications have been reviewed. PHYSICAL EXAM:  Temp:  [97.2 °F (36.2 °C)-99.5 °F (37.5 °C)] 97.8 °F (36.6 °C)  HR:  [78-90] 88  Resp:  [16-20] 18  BP: (121-141)/(63-98) 134/72  SpO2:  [93 %-100 %] 96 %  Temp (24hrs), Av.8 °F (36.6 °C), Min:97.2 °F (36.2 °C), Max:99.5 °F (37.5 °C)  Current: Temperature: 97.8 °F (36.6 °C)    Intake/Output Summary (Last 24 hours) at 10/20/2023 1545  Last data filed at 10/20/2023 8694  Gross per 24 hour   Intake 210 ml   Output 500 ml   Net -290 ml       General Appearance:  60year old female, appearing propped comfortably in bed, nontoxic appearance at present, and in no distress   Head:  Normocephalic, without obvious abnormality, atraumatic   Eyes:  Conjunctiva pink and sclera anicteric, both eyes   Nose: Nares normal, mucosa normal, no drainage   Throat: Oropharynx moist without lesions   Neck: Supple, symmetrical, no adenopathy, no tenderness/mass/nodules   Back:   Symmetric, no curvature, ROM normal, no CVA tenderness   Lungs:   Clear to auscultation bilaterally, respirations unlabored   Chest Wall:  No tenderness or deformity   Heart:  RRR; no murmur, rub or gallop   Abdomen:   Soft, non-tender, non-distended, positive bowel sounds    : No luna, no SPT, flank or CVAT   Extremities: No cyanosis, clubbing or edema   Skin: No rashes or lesions. No draining wounds noted. IV sites nontender. Lymph nodes: Cervical, supraclavicular nodes normal   Neurologic: Alert and oriented times 3, extremity strength 5/5 and symmetric       LABS, IMAGING, & OTHER STUDIES:  Lab Results:  I have personally reviewed pertinent labs.   Results from last 7 days   Lab Units 10/20/23  0521 10/19/23  0553 10/18/23  0558   WBC Thousand/uL 25.89* 26.35* 25.77*   HEMOGLOBIN g/dL 11.6 12.0 12.5   PLATELETS Thousands/uL 231 190 146*     Results from last 7 days   Lab Units 10/20/23  0521 10/19/23  0553 10/18/23  1548 10/18/23  0132 10/17/23  1359   SODIUM mmol/L 135 131* 130*   < > 119*   POTASSIUM mmol/L 3.5 3.8 3.5   < > 3.9   CHLORIDE mmol/L 107 104 98   < > 86*   CO2 mmol/L 21 20* 22   < > 19*   BUN mg/dL 38* 49* 54*   < > 68*   CREATININE mg/dL 1.64* 1.73* 1.77*   < > 1.67*   EGFR ml/min/1.73sq m 33 31 30   < > 33   CALCIUM mg/dL 7.3* 7.1* 7.1*   < > 7.6*   AST U/L 22  --   --   --  15   ALT U/L 23  --   --   --  14   ALK PHOS U/L 106*  --   --   --  85    < > = values in this interval not displayed. Results from last 7 days   Lab Units 10/17/23  1748 10/17/23  1420   URINE CULTURE   --  >100,000 cfu/ml Escherichia coli*   MRSA CULTURE ONLY  No Methicillin Resistant Staphlyococcus aureus (MRSA) isolated  --      Results from last 7 days   Lab Units 10/20/23  0521 10/19/23  0553 10/18/23  0808   PROCALCITONIN ng/ml 2.04* 4.32* 7.82*                   Imaging Studies:   Imaging study reports and images in PACS reviewed personally. 10/19/2023 CT abdomen pelvis: Right emphysematous pyelonephritis and ureteritis with slight interval decrease in volume of perirenal space soft tissue emphysema. No abscess collection. No significant hydronephrosis. 10/17/2023 CT abdomen pelvis: Right kidney emphysematous pyelonephritis. No abscess. Other Studies:   I have personally reviewed pertinent reports. 10/19/2023 cystoscopy with findings of severely edematous obstructed right ureteral orifice with moderate hydronephrosis and placement of a 24 cm 6 Syrian stent.

## 2023-10-20 NOTE — RESPIRATORY THERAPY NOTE
RT Protocol Note  Zhen Radford Apgar 60 y.o. female MRN: [id number]  Unit/Bed#: [address] [place] [address] Encounter: [id number]    Assessment    Principal Problem:    Hyperosmolar hyperglycemic state (HHS) ([address] [facility])  Active Problems:    Type 2 diabetes mellitus (HCC)    Emphysematous pyelonephritis    Leukocytosis    Hyponatremia    Elevated serum creatinine    Morbid obesity (HCC)    Thrombocytopenia (HCC)    Essential hypertension    Hyperlipidemia    Depression      Home Pulmonary Medications:  none       Past Medical History:   Diagnosis Date    Diabetes mellitus ([address] [facility])     Hypertension      Social History     Socioeconomic History    Marital status: Single     Spouse name: None    Number of children: None    Years of education: None    Highest education level: None   Occupational History    None   Tobacco Use    Smoking status: Never    Smokeless tobacco: Never   Vaping Use    Vaping Use: Never used   Substance and Sexual Activity    Alcohol use: Never    Drug use: Never    Sexual activity: None   Other Topics Concern    None   Social History Narrative    None     Social Determinants of Health     Financial Resource Strain: Not on file   Food Insecurity: Not on file   Transportation Needs: Not on file   Physical Activity: Not on file   Stress: Not on file   Social Connections: Not on file   Intimate Partner Violence: Not on file   Housing Stability: Not on file       Subjective         Objective    Physical Exam:   Assessment Type: Assess only  General Appearance: Sleeping, Eyes open/responds to voice  Respiratory Pattern: Normal  Chest Assessment: Chest expansion symmetrical  Bilateral Breath Sounds: Diminished  Cough: None  O2 Device: RA    Vitals:  Blood pressure 121/70, pulse 80, temperature (!) 97.3 °F (36.3 °C), resp. rate 18, height 5' 7" (1.702 m), weight 107 kg (235 lb 7.2 oz), SpO2 96 %.               O2 Device: RA     Plan    Respiratory Plan: Discontinue Protocol

## 2023-10-20 NOTE — TREATMENT PLAN
Chart reviewed, discussed with Dr Anna Younger. Patient has started taking orally, insulin drip was discontinued earlier today. Recommend the following for now  -Discontinue Actos  -Start Lantus 15 units at bedtime  As oral intake improves, if patient has postprandial hyperglycemia, start Humalog 5-8 units with meals  -Start sliding scale insulin   Will continue to follow.      Avelino Hunter MD  Endocrinology

## 2023-10-20 NOTE — PLAN OF CARE
Problem: MOBILITY - ADULT  Goal: Maintain or return to baseline ADL function  Description: INTERVENTIONS:  -  Assess patient's ability to carry out ADLs; assess patient's baseline for ADL function and identify physical deficits which impact ability to perform ADLs (bathing, care of mouth/teeth, toileting, grooming, dressing, etc.)  - Assess/evaluate cause of self-care deficits   - Assess range of motion  - Assess patient's mobility; develop plan if impaired  - Assess patient's need for assistive devices and provide as appropriate  - Encourage maximum independence but intervene and supervise when necessary  - Involve family in performance of ADLs  - Assess for home care needs following discharge   - Consider OT consult to assist with ADL evaluation and planning for discharge  - Provide patient education as appropriate  Outcome: Progressing  Goal: Maintains/Returns to pre admission functional level  Description: INTERVENTIONS:  - Perform BMAT or MOVE assessment daily.   - Set and communicate daily mobility goal to care team and patient/family/caregiver.    - Collaborate with rehabilitation services on mobility goals if consulted  - Out of bed for toileting  - Record patient progress and toleration of activity level   Outcome: Progressing     Problem: INFECTION - ADULT  Goal: Absence of fever/infection during neutropenic period  Description: INTERVENTIONS:  - Monitor WBC    Outcome: Progressing     Problem: SAFETY ADULT  Goal: Maintain or return to baseline ADL function  Description: INTERVENTIONS:  -  Assess patient's ability to carry out ADLs; assess patient's baseline for ADL function and identify physical deficits which impact ability to perform ADLs (bathing, care of mouth/teeth, toileting, grooming, dressing, etc.)  - Assess/evaluate cause of self-care deficits   - Assess range of motion  - Assess patient's mobility; develop plan if impaired  - Assess patient's need for assistive devices and provide as appropriate  - Encourage maximum independence but intervene and supervise when necessary  - Involve family in performance of ADLs  - Assess for home care needs following discharge   - Consider OT consult to assist with ADL evaluation and planning for discharge  - Provide patient education as appropriate  Outcome: Progressing  Goal: Maintains/Returns to pre admission functional level  Description: INTERVENTIONS:  - Perform BMAT or MOVE assessment daily.   - Set and communicate daily mobility goal to care team and patient/family/caregiver.    - Collaborate with rehabilitation services on mobility goals if consulted  - Out of bed for toileting  - Record patient progress and toleration of activity level   Outcome: Progressing  Goal: Patient will remain free of falls  Description: INTERVENTIONS:  - Educate patient/family on patient safety including physical limitations  - Instruct patient to call for assistance with activity   - Consult OT/PT to assist with strengthening/mobility   - Keep Call bell within reach  - Keep bed low and locked with side rails adjusted as appropriate  - Keep care items and personal belongings within reach  - Initiate and maintain comfort rounds  - Make Fall Risk Sign visible to staff  - Offer Toileting every 2 Hours, in advance of need  - Initiate/Maintain bed alarm    - Apply yellow socks and bracelet for high fall risk patients  - Consider moving patient to room near nurses station  Outcome: Progressing     Problem: DISCHARGE PLANNING  Goal: Discharge to home or other facility with appropriate resources  Description: INTERVENTIONS:  - Identify barriers to discharge w/patient and caregiver  - Arrange for needed discharge resources and transportation as appropriate  - Identify discharge learning needs (meds, wound care, etc.)  - Arrange for interpretive services to assist at discharge as needed  - Refer to Case Management Department for coordinating discharge planning if the patient needs post-hospital services based on physician/advanced practitioner order or complex needs related to functional status, cognitive ability, or social support system  Outcome: Progressing     Problem: Knowledge Deficit  Goal: Patient/family/caregiver demonstrates understanding of disease process, treatment plan, medications, and discharge instructions  Description: Complete learning assessment and assess knowledge base.   Interventions:  - Provide teaching at level of understanding  - Provide teaching via preferred learning methods  Outcome: Progressing     Problem: GASTROINTESTINAL - ADULT  Goal: Minimal or absence of nausea and/or vomiting  Description: INTERVENTIONS:  - Administer IV fluids if ordered to ensure adequate hydration  - Maintain NPO status until nausea and vomiting are resolved  - Nasogastric tube if ordered  - Administer ordered antiemetic medications as needed  - Provide nonpharmacologic comfort measures as appropriate  - Advance diet as tolerated, if ordered  - Consider nutrition services referral to assist patient with adequate nutrition and appropriate food choices  Outcome: Progressing  Goal: Maintains adequate nutritional intake  Description: INTERVENTIONS:  - Monitor percentage of each meal consumed  - Identify factors contributing to decreased intake, treat as appropriate  - Assist with meals as needed  - Monitor I&O, weight, and lab values if indicated  - Obtain nutrition services referral as needed  Outcome: Progressing  Goal: Oral mucous membranes remain intact  Description: INTERVENTIONS  - Assess oral mucosa and hygiene practices  - Implement preventative oral hygiene regimen  - Implement oral medicated treatments as ordered  - Initiate Nutrition services referral as needed  Outcome: Progressing     Problem: METABOLIC, FLUID AND ELECTROLYTES - ADULT  Goal: Electrolytes maintained within normal limits  Description: INTERVENTIONS:  - Monitor labs and assess patient for signs and symptoms of electrolyte imbalances  - Administer electrolyte replacement as ordered  - Monitor response to electrolyte replacements, including repeat lab results as appropriate  - Instruct patient on fluid and nutrition as appropriate  Outcome: Progressing  Goal: Glucose maintained within target range  Description: INTERVENTIONS:  - Monitor Blood Glucose as ordered  - Assess for signs and symptoms of hyperglycemia and hypoglycemia  - Administer ordered medications to maintain glucose within target range  - Assess nutritional intake and initiate nutrition service referral as needed  Outcome: Progressing     Problem: SKIN/TISSUE INTEGRITY - ADULT  Goal: Skin Integrity remains intact(Skin Breakdown Prevention)  Description: Assess:  -Perform Jose assessment every shift  -Clean and moisturize skin every 2  -Inspect skin when repositioning, toileting, and assisting with ADLS  -Assess under medical devices such as tubings every 2 hrs  -Assess extremities for adequate circulation and sensation     Bed Management:  -Have minimal linens on bed & keep smooth, unwrinkled  -Change linens as needed when moist or perspiring  -Avoid sitting or lying in one position for more than 2 hours while in bed  -Keep HOB at 30 degrees     Toileting:  -Offer bedside commode  -Assess for incontinence every 2hrs      Activity:  -Mobilize patient 4 times a day  -Encourage activity and walks on unit  -Encourage or provide ROM exercises   -Turn and reposition patient every 2 Hours  -Use appropriate equipment to lift or move patient in bed  -Instruct/ Assist with weight shifting every 2hrs  when out of bed in chair  -Consider limitation of chair time 2 hour intervals    Skin Care:  -Avoid use of baby powder, tape, friction and shearing, hot water or constrictive clothing  -Relieve pressure over bony prominences using allevyn  -Do not massage red bony areas  Outcome: Progressing     Problem: MUSCULOSKELETAL - ADULT  Goal: Maintain or return mobility to safest level of function  Description: INTERVENTIONS:  - Assess patient's ability to carry out ADLs; assess patient's baseline for ADL function and identify physical deficits which impact ability to perform ADLs (bathing, care of mouth/teeth, toileting, grooming, dressing, etc.)  - Assess/evaluate cause of self-care deficits   - Assess range of motion  - Assess patient's mobility  - Assess patient's need for assistive devices and provide as appropriate  - Encourage maximum independence but intervene and supervise when necessary  - Involve family in performance of ADLs  - Assess for home care needs following discharge   - Consider OT consult to assist with ADL evaluation and planning for discharge  - Provide patient education as appropriate  Outcome: Progressing     Problem: Nutrition/Hydration-ADULT  Goal: Nutrient/Hydration intake appropriate for improving, restoring or maintaining nutritional needs  Description: Monitor and assess patient's nutrition/hydration status for malnutrition. Collaborate with interdisciplinary team and initiate plan and interventions as ordered. Monitor patient's weight and dietary intake as ordered or per policy. Utilize nutrition screening tool and intervene as necessary. Determine patient's food preferences and provide high-protein, high-caloric foods as appropriate.      INTERVENTIONS:  - Monitor oral intake, urinary output, labs, and treatment plans  - Assess nutrition and hydration status and recommend course of action  - Evaluate amount of meals eaten  - Assist patient with eating if necessary   - Allow adequate time for meals  - Recommend/ encourage appropriate diets, oral nutritional supplements, and vitamin/mineral supplements  - Order, calculate, and assess calorie counts as needed  - Recommend, monitor, and adjust tube feedings and TPN/PPN based on assessed needs  - Assess need for intravenous fluids  - Provide specific nutrition/hydration education as appropriate  - Include patient/family/caregiver in decisions related to nutrition  Outcome: Progressing

## 2023-10-21 LAB
ANION GAP SERPL CALCULATED.3IONS-SCNC: 8 MMOL/L
BUN SERPL-MCNC: 28 MG/DL (ref 5–25)
CALCIUM SERPL-MCNC: 7.1 MG/DL (ref 8.4–10.2)
CHLORIDE SERPL-SCNC: 106 MMOL/L (ref 96–108)
CO2 SERPL-SCNC: 19 MMOL/L (ref 21–32)
CREAT SERPL-MCNC: 1.38 MG/DL (ref 0.6–1.3)
ERYTHROCYTE [DISTWIDTH] IN BLOOD BY AUTOMATED COUNT: 14.1 % (ref 11.6–15.1)
GFR SERPL CREATININE-BSD FRML MDRD: 41 ML/MIN/1.73SQ M
GLUCOSE SERPL-MCNC: 145 MG/DL (ref 65–140)
GLUCOSE SERPL-MCNC: 169 MG/DL (ref 65–140)
GLUCOSE SERPL-MCNC: 181 MG/DL (ref 65–140)
GLUCOSE SERPL-MCNC: 184 MG/DL (ref 65–140)
GLUCOSE SERPL-MCNC: 234 MG/DL (ref 65–140)
HCT VFR BLD AUTO: 33.8 % (ref 34.8–46.1)
HGB BLD-MCNC: 11.9 G/DL (ref 11.5–15.4)
MAGNESIUM SERPL-MCNC: 1.8 MG/DL (ref 1.9–2.7)
MCH RBC QN AUTO: 28.7 PG (ref 26.8–34.3)
MCHC RBC AUTO-ENTMCNC: 35.2 G/DL (ref 31.4–37.4)
MCV RBC AUTO: 82 FL (ref 82–98)
PHOSPHATE SERPL-MCNC: 2.5 MG/DL (ref 2.7–4.5)
PLATELET # BLD AUTO: 263 THOUSANDS/UL (ref 149–390)
PMV BLD AUTO: 9.4 FL (ref 8.9–12.7)
POTASSIUM SERPL-SCNC: 3.3 MMOL/L (ref 3.5–5.3)
RBC # BLD AUTO: 4.14 MILLION/UL (ref 3.81–5.12)
SODIUM SERPL-SCNC: 133 MMOL/L (ref 135–147)
WBC # BLD AUTO: 24.77 THOUSAND/UL (ref 4.31–10.16)

## 2023-10-21 PROCEDURE — 80048 BASIC METABOLIC PNL TOTAL CA: CPT | Performed by: INTERNAL MEDICINE

## 2023-10-21 PROCEDURE — 82948 REAGENT STRIP/BLOOD GLUCOSE: CPT

## 2023-10-21 PROCEDURE — 83735 ASSAY OF MAGNESIUM: CPT | Performed by: SPECIALIST

## 2023-10-21 PROCEDURE — 84100 ASSAY OF PHOSPHORUS: CPT | Performed by: SPECIALIST

## 2023-10-21 PROCEDURE — 99232 SBSQ HOSP IP/OBS MODERATE 35: CPT | Performed by: INTERNAL MEDICINE

## 2023-10-21 PROCEDURE — 85027 COMPLETE CBC AUTOMATED: CPT | Performed by: SPECIALIST

## 2023-10-21 RX ORDER — INSULIN LISPRO 100 [IU]/ML
2 INJECTION, SOLUTION INTRAVENOUS; SUBCUTANEOUS
Status: DISCONTINUED | OUTPATIENT
Start: 2023-10-21 | End: 2023-10-25 | Stop reason: HOSPADM

## 2023-10-21 RX ORDER — MAGNESIUM SULFATE HEPTAHYDRATE 40 MG/ML
2 INJECTION, SOLUTION INTRAVENOUS ONCE
Status: COMPLETED | OUTPATIENT
Start: 2023-10-21 | End: 2023-10-21

## 2023-10-21 RX ORDER — POTASSIUM CHLORIDE 20 MEQ/1
40 TABLET, EXTENDED RELEASE ORAL ONCE
Status: COMPLETED | OUTPATIENT
Start: 2023-10-21 | End: 2023-10-21

## 2023-10-21 RX ADMIN — SODIUM PHOSPHATE, MONOBASIC, MONOHYDRATE AND SODIUM PHOSPHATE, DIBASIC, ANHYDROUS 30 MMOL: 142; 276 INJECTION, SOLUTION INTRAVENOUS at 11:48

## 2023-10-21 RX ADMIN — POTASSIUM CHLORIDE 40 MEQ: 1500 TABLET, EXTENDED RELEASE ORAL at 09:29

## 2023-10-21 RX ADMIN — PAROXETINE 20 MG: 20 TABLET, FILM COATED ORAL at 08:30

## 2023-10-21 RX ADMIN — CEFAZOLIN SODIUM 2000 MG: 2 SOLUTION INTRAVENOUS at 23:49

## 2023-10-21 RX ADMIN — INSULIN LISPRO 1 UNITS: 100 INJECTION, SOLUTION INTRAVENOUS; SUBCUTANEOUS at 17:06

## 2023-10-21 RX ADMIN — Medication 250 MG: at 17:06

## 2023-10-21 RX ADMIN — CEFAZOLIN SODIUM 2000 MG: 2 SOLUTION INTRAVENOUS at 08:30

## 2023-10-21 RX ADMIN — MAGNESIUM SULFATE HEPTAHYDRATE 2 G: 40 INJECTION, SOLUTION INTRAVENOUS at 11:28

## 2023-10-21 RX ADMIN — HEPARIN SODIUM 5000 UNITS: 5000 INJECTION INTRAVENOUS; SUBCUTANEOUS at 05:06

## 2023-10-21 RX ADMIN — INSULIN LISPRO 2 UNITS: 100 INJECTION, SOLUTION INTRAVENOUS; SUBCUTANEOUS at 12:41

## 2023-10-21 RX ADMIN — HEPARIN SODIUM 5000 UNITS: 5000 INJECTION INTRAVENOUS; SUBCUTANEOUS at 14:46

## 2023-10-21 RX ADMIN — INSULIN LISPRO 3 UNITS: 100 INJECTION, SOLUTION INTRAVENOUS; SUBCUTANEOUS at 12:41

## 2023-10-21 RX ADMIN — CALCIUM 1 TABLET: 500 TABLET ORAL at 08:30

## 2023-10-21 RX ADMIN — Medication 250 MG: at 08:30

## 2023-10-21 RX ADMIN — INSULIN LISPRO 2 UNITS: 100 INJECTION, SOLUTION INTRAVENOUS; SUBCUTANEOUS at 17:06

## 2023-10-21 RX ADMIN — Medication 2000 UNITS: at 08:30

## 2023-10-21 RX ADMIN — CEFAZOLIN SODIUM 2000 MG: 2 SOLUTION INTRAVENOUS at 00:15

## 2023-10-21 RX ADMIN — CEFAZOLIN SODIUM 2000 MG: 2 SOLUTION INTRAVENOUS at 17:06

## 2023-10-21 RX ADMIN — POTASSIUM CHLORIDE 40 MEQ: 1500 TABLET, EXTENDED RELEASE ORAL at 12:41

## 2023-10-21 RX ADMIN — PRAVASTATIN SODIUM 20 MG: 20 TABLET ORAL at 17:06

## 2023-10-21 RX ADMIN — SODIUM CHLORIDE, SODIUM GLUCONATE, SODIUM ACETATE, POTASSIUM CHLORIDE, MAGNESIUM CHLORIDE, SODIUM PHOSPHATE, DIBASIC, AND POTASSIUM PHOSPHATE 50 ML/HR: .53; .5; .37; .037; .03; .012; .00082 INJECTION, SOLUTION INTRAVENOUS at 06:15

## 2023-10-21 RX ADMIN — INSULIN LISPRO 1 UNITS: 100 INJECTION, SOLUTION INTRAVENOUS; SUBCUTANEOUS at 08:31

## 2023-10-21 RX ADMIN — EZETIMIBE 10 MG: 10 TABLET ORAL at 08:30

## 2023-10-21 RX ADMIN — INSULIN LISPRO 1 UNITS: 100 INJECTION, SOLUTION INTRAVENOUS; SUBCUTANEOUS at 21:05

## 2023-10-21 RX ADMIN — INSULIN GLARGINE 15 UNITS: 100 INJECTION, SOLUTION SUBCUTANEOUS at 21:05

## 2023-10-21 RX ADMIN — HEPARIN SODIUM 5000 UNITS: 5000 INJECTION INTRAVENOUS; SUBCUTANEOUS at 21:06

## 2023-10-21 NOTE — PROGRESS NOTES
[id number] [facility]  Progress Note  Name: Stacie Areola Apgar I  MRN: [id number]  Unit/Bed#: [address] [place] [address]-01 I Date of Admission: 10/17/2023   Date of Service: 10/21/2023 I Hospital Day: 4      Assessment & Plan:    * Hyperosmolar hyperglycemic state (HHS) ([address])  Assessment & Plan  Presented with abdominal discomfort with nausea/vomiting and a profoundly elevated serum glucose of 849 -> progressively improved overnight into this morning now in the 100s  BMP without evidence of anion gap and urinalysis without evidence of ketones  Suspect hyperosmolar hyperglycemic state -> insulin drip now discontinued and transitioned to a basal insulin regimen with SSI coverage per Accu-Cheks  Continue IV fluid hydration  Monitor serial Accu-Cheks  Chronically on oral hypoglycemics for type 2 diabetes mellitus -> states that due to recent nausea/vomiting over the last few days, doses were missed  Suspect pyelonephritis as likely triggering factor (see below)  Appreciate endocrinology input    Emphysematous pyelonephritis  Assessment & Plan  Presented with nonspecific abdominal discomfort and CT imaging evidence of right-sided emphysematous pyelonephritis without abscess  Antibiotic coverage broadened to IV Zosyn pending urine culture   No blood cultures were drawn in the ED  Pending appreciate urology input  Leukocytosis noted, however, remains afebrile -> hemodynamically stable and currently nontoxic-appearing  PRN pain/emesis control  Appreciate urology input -> repeat CT imaging showed improvement (interval decrease in perirenal space soft tissue emphysema volume) -> status post on 10/19 cystoscopy with right-sided stenting  Initial imaging reviewed by IR -> no role for nephrostomy tube placement currently    Type 2 diabetes mellitus ([address])  Assessment & Plan  A1c significantly uncontrolled at 15.0  Initiated on an insulin drip due to marked hyperglycemia (see plan for primary issue above)  Carbohydrate restriction  Holding oral hypoglycemics while hospitalized  Endocrinology input appreciated (see plan for HHS above) -> transition to basal insulin with SSI coverage for Accu-Cheks -> titrate insulin requirements daily contingent on blood sugars  Patient informed of need for insulin dependence at this time -> will be provided diabetic supplies on discharge including glucometer, testing strips, lancets, and prescriptions for insulin  Will appreciate nutritionist evaluation for diabetic teaching    Leukocytosis  Assessment & Plan  Likely reactive due to acute medical issue(s) coupled with hemoconcentration  Monitor WBC count -> see plan for pyelonephritis above    Hyponatremia  Assessment & Plan  Serum sodium only decreased at 119, however, in the setting of marked hyperglycemia, "corrected" sodium was 131  Anticipate progressive improvement in sodium levels with correction of blood sugars -> serum sodium progressively improved  Okay to continue IV fluids due to primary issue  Continue serial serum sodium monitoring    Elevated serum creatinine  Assessment & Plan  Presenting creatinine of 1.67 -> continue IV fluid hydration -> improved to 1.38 today  Unclear if acute kidney injury or CKD as no prior baseline available  Likely contributed to by renal hypoperfusion and insensible volume loss from recent vomiting  Limit/avoid nephrotoxins as possible - holding HCTZ/ACEI  Monitor renal function and urine output -> patient has now received 2 loads of IV contrast dye due to repeat CT scans  Appreciate nephrology input -> IV fluids transitioned to Isolyte    Morbid obesity ([address])  Assessment & Plan  BMI of 36.88  Lifestyle/diet modifications    Thrombocytopenia (HCC)  Assessment & Plan  Monitor platelet count - monitor for bleeding    Essential hypertension  Assessment & Plan  Holding HCTZ/ACEI in the setting of acute kidney injury  PRN IV Hydralazine for BP spikes    Hyperlipidemia  Assessment & Plan  Continue statin/Zetia    Depression  Assessment & Plan  Continue Paxil      DVT Prophylaxis:  Heparin SC    Patient Centered Rounds:  I have performed bedside rounds and discussed plan of care with nursing today. Discussions with Specialists or Other Care Team Provider:  see above assessments if applicable    Education and Discussions with Family / Patient:  Patient at bedside, who will self-update family today    Time Spent for Care:  35 minutes. More than 50% of total time spent on counseling and coordination of care, on one or more of the following: performing physical exam; counseling and coordination of care, obtaining or reviewing history, documenting in the medical record, reviewing/ordering tests/medications/procedures, and communicating with other healthcare professionals. Current Length of Stay: 4 day(s)  Current Patient Status: Inpatient   Certification Statement:  Patient will continue to require additional hospital stay due to assessments as noted above. Code Status: Level 1 - Full Code        Subjective:     Seen and examined earlier today. Denies pain at this time. Denies fever/chills currently. Does acknowledge some weakness/fatigue, however. Tolerating insulin thus far. Objective:     Vitals:   Temp (24hrs), Av.5 °F (36.4 °C), Min:97.3 °F (36.3 °C), Max:97.8 °F (36.6 °C)    Temp:  [97.3 °F (36.3 °C)-97.8 °F (36.6 °C)] 97.3 °F (36.3 °C)  HR:  [86-88] 86  Resp:  [16] 16  BP: (146-151)/(66-77) 146/66  SpO2:  [96 %-98 %] 98 %  Body mass index is 36.88 kg/m². Input and Output Summary (last 24 hours):        Intake/Output Summary (Last 24 hours) at 10/21/2023 1600  Last data filed at 10/20/2023 2301  Gross per 24 hour   Intake 480 ml   Output 950 ml   Net -470 ml       Physical Exam:     GENERAL Improved weakness/fatigue - obese    HEAD   Normocephalic - atraumatic   EYES   PERRL - EOMI    MOUTH   Mucosa moist   NECK   Supple - full range of motion   CARDIAC Rate controlled - S1/S2 positive PULMONARY Fairly clear to auscultation - nonlabored respirations   ABDOMEN   Soft - improving generalized tenderness without rebound/guarding - nondistended - active bowel sounds   MUSCULOSKELETAL   Motor strength/range of motion deconditioned   NEUROLOGIC   Alert/oriented at baseline   SKIN   Chronic wrinkles/blemishes    PSYCHIATRIC   Mood/affect stable         Additional Data:     Labs & Recent Cultures:    Results from last 7 days   Lab Units 10/21/23  0515 10/20/23  0521   WBC Thousand/uL 24.77* 25.89*   HEMOGLOBIN g/dL 11.9 11.6   HEMATOCRIT % 33.8* 33.5*   PLATELETS Thousands/uL 263 231   BANDS PCT %  --  4   LYMPHO PCT %  --  3*   MONO PCT %  --  2*   EOS PCT %  --  3     Results from last 7 days   Lab Units 10/21/23  0516 10/20/23  0521   POTASSIUM mmol/L 3.3* 3.5   CHLORIDE mmol/L 106 107   CO2 mmol/L 19* 21   BUN mg/dL 28* 38*   CREATININE mg/dL 1.38* 1.64*   CALCIUM mg/dL 7.1* 7.3*   ALK PHOS U/L  --  106*   ALT U/L  --  23   AST U/L  --  22         Results from last 7 days   Lab Units 10/21/23  1548 10/21/23  1133 10/21/23  0755 10/20/23  2127 10/20/23  1610 10/20/23  1122 10/20/23  0948 10/20/23  0756 10/20/23  0628 10/20/23  0409 10/20/23  0206 10/19/23  2357   POC GLUCOSE mg/dl 184* 234* 169* 179* 185* 126 108 127 142* 141* 155* 186*     Results from last 7 days   Lab Units 10/18/23  0558   HEMOGLOBIN A1C % 15.0*     Results from last 7 days   Lab Units 10/20/23  0521 10/19/23  0553 10/18/23  0941 10/18/23  0808   LACTIC ACID mmol/L  --   --  1.2  --    PROCALCITONIN ng/ml 2.04* 4.32*  --  7.82*         Results from last 7 days   Lab Units 10/17/23  1420   URINE CULTURE  >100,000 cfu/ml Escherichia coli*         Lines/Drains:  Invasive Devices       Peripheral Intravenous Line  Duration             Long-Dwell Peripheral IV (Midline) 10/17/23 Left Brachial 3 days    Peripheral IV 10/21/23 Distal;Right;Upper;Ventral (anterior) Arm <1 day                      Last 24 Hours Medication List:   Current Facility-Administered Medications   Medication Dose Route Frequency Provider Last Rate    acetaminophen  650 mg Oral Q6H PRN Janay Bañuelos MD      aluminum-magnesium hydroxide-simethicone  30 mL Oral Q6H PRN Janay Bañuelos MD      calcium carbonate  1 tablet Oral Daily With Breakfast Janay Bañuelos MD      calcium carbonate  500 mg Oral TID PRN Janay Bañuelos MD      cefazolin  2,000 mg Intravenous Q8H Anum Littlejohn PA-C 2,000 mg (10/21/23 0830)    cholecalciferol  2,000 Units Oral Daily Janay Bañuelos MD      ezetimibe  10 mg Oral Daily Janay Bañuelos MD      heparin (porcine)  5,000 Units Subcutaneous Count includes the [facility] Janay Bañuelos MD      insulin glargine  15 Units Subcutaneous HS Sherwood Soulier, MD      insulin lispro  1-6 Units Subcutaneous 4x Daily (AC & HS) Sherwood Soulier, MD      insulin lispro  2 Units Subcutaneous TID With Meals Sherwood Soulier, MD      multi-electrolyte  50 mL/hr Intravenous Continuous Cullen Lozano MD 50 mL/hr (10/21/23 0615)    ondansetron  4 mg Intravenous Q4H PRN Janay Bañuelos MD      PARoxetine  20 mg Oral Daily Janay Bañuelos MD      pravastatin  20 mg Oral Daily With Carlene Yanes MD      saccharomyces boulardii  250 mg Oral BID Sherwood Soulier, MD      sodium phosphate  30 mmol Intravenous Once Sherwood Soulier, MD 30 mmol (10/21/23 1148)                    ** Please Note: This note is constructed using a voice recognition dictation system. An occasional wrong word/phrase or “sound-a-like” substitution may have been picked up by dictation device due to the inherent limitations of voice recognition software. Read the chart carefully and recognize, using reasonable context, where substitutions may have occurred. **

## 2023-10-21 NOTE — TREATMENT PLAN
Renal short note-I attempted multiple times see the patient. She was not in her bed at that time. Reviewed with primary team attending. We are in agreement from renal standpoint to replete potassium and it is ordered. Defer rest of plans to primary team and please call the renal team back if questions or issues arise in the interim.   Thank you

## 2023-10-21 NOTE — PROGRESS NOTES
Progress Note - Urology      Patient: Lui Duckworth Apgar   : 1965 Sex: female   MRN: [id number]     CSN: [id number]  Unit/Bed#: [address] [place] [address]     SUBJECTIVE:   Vital signs stable  No pain discomfort feeling good      Objective   Vitals: /77   Pulse 88   Temp 97.5 °F (36.4 °C)   Resp 16   Ht 5' 7" (1.702 m)   Wt 107 kg (235 lb 7.2 oz)   SpO2 96%   BMI 36.88 kg/m²     I/O last 24 hours:   In: 1440 [P.O.:1440]  Out: 950 [Urine:950]      Physical Exam:   General Alert awake   Normocephalic atraumatic PERRLA  Lungs clear bilaterally  Cardiac normal S1 normal S2  Abdomen soft, flank pain  Extremities no edema      Lab Results: CBC:   Lab Results   Component Value Date    WBC 24.77 (H) 10/21/2023    HGB 11.9 10/21/2023    HCT 33.8 (L) 10/21/2023    MCV 82 10/21/2023     10/21/2023    RBC 4.14 10/21/2023    MCH 28.7 10/21/2023    MCHC 35.2 10/21/2023    RDW 14.1 10/21/2023    MPV 9.4 10/21/2023    NRBC 0 10/18/2023     CMP:   Lab Results   Component Value Date     10/21/2023    CO2 19 (L) 10/21/2023    BUN 28 (H) 10/21/2023    CREATININE 1.38 (H) 10/21/2023    CALCIUM 7.1 (L) 10/21/2023    AST 22 10/20/2023    ALT 23 10/20/2023    ALKPHOS 106 (H) 10/20/2023    EGFR 41 10/21/2023     Urinalysis:   Lab Results   Component Value Date    COLORU Colorless 10/17/2023    CLARITYU Cloudy 10/17/2023    SPECGRAV <=1.005 10/17/2023    PHUR 5.5 10/17/2023    LEUKOCYTESUR Small (A) 10/17/2023    NITRITE Negative 10/17/2023    GLUCOSEU >=1000 (1%) (A) 10/17/2023    KETONESU Negative 10/17/2023    BILIRUBINUR Negative 10/17/2023    BLOODU Large (A) 10/17/2023     Urine Culture:   Lab Results   Component Value Date    URINECX >100,000 cfu/ml Escherichia coli (A) 10/17/2023     PSA: No results found for: "PSA"      Assessment/ Plan:  Emphysematous pyelonephritis  ID consult noted  Ancef  Clinically patient looks great  Count still 25 should start to trend down once renal swelling subsides allowing adequate antibiotic flow within the next 24 to 48 hours  If no change in white count by October 23 we will repeat CAT scan          Surjit Lewis MD

## 2023-10-21 NOTE — PLAN OF CARE
Problem: MOBILITY - ADULT  Goal: Maintain or return to baseline ADL function  Description: INTERVENTIONS:  -  Assess patient's ability to carry out ADLs; assess patient's baseline for ADL function and identify physical deficits which impact ability to perform ADLs (bathing, care of mouth/teeth, toileting, grooming, dressing, etc.)  - Assess/evaluate cause of self-care deficits   - Assess range of motion  - Assess patient's mobility; develop plan if impaired  - Assess patient's need for assistive devices and provide as appropriate  - Encourage maximum independence but intervene and supervise when necessary  - Involve family in performance of ADLs  - Assess for home care needs following discharge   - Consider OT consult to assist with ADL evaluation and planning for discharge  - Provide patient education as appropriate  Outcome: Progressing  Goal: Maintains/Returns to pre admission functional level  Description: INTERVENTIONS:  - Perform BMAT or MOVE assessment daily.   - Set and communicate daily mobility goal to care team and patient/family/caregiver.    - Collaborate with rehabilitation services on mobility goals if consulted  - Out of bed for toileting  - Record patient progress and toleration of activity level   Outcome: Progressing     Problem: INFECTION - ADULT  Goal: Absence of fever/infection during neutropenic period  Description: INTERVENTIONS:  - Monitor WBC    Outcome: Progressing     Problem: SAFETY ADULT  Goal: Maintain or return to baseline ADL function  Description: INTERVENTIONS:  -  Assess patient's ability to carry out ADLs; assess patient's baseline for ADL function and identify physical deficits which impact ability to perform ADLs (bathing, care of mouth/teeth, toileting, grooming, dressing, etc.)  - Assess/evaluate cause of self-care deficits   - Assess range of motion  - Assess patient's mobility; develop plan if impaired  - Assess patient's need for assistive devices and provide as appropriate  - Encourage maximum independence but intervene and supervise when necessary  - Involve family in performance of ADLs  - Assess for home care needs following discharge   - Consider OT consult to assist with ADL evaluation and planning for discharge  - Provide patient education as appropriate  Outcome: Progressing  Goal: Maintains/Returns to pre admission functional level  Description: INTERVENTIONS:  - Perform BMAT or MOVE assessment daily.   - Set and communicate daily mobility goal to care team and patient/family/caregiver.    - Collaborate with rehabilitation services on mobility goals if consulted  - Out of bed for toileting  - Record patient progress and toleration of activity level   Outcome: Progressing  Goal: Patient will remain free of falls  Description: INTERVENTIONS:  - Educate patient/family on patient safety including physical limitations  - Instruct patient to call for assistance with activity   - Consult OT/PT to assist with strengthening/mobility   - Keep Call bell within reach  - Keep bed low and locked with side rails adjusted as appropriate  - Keep care items and personal belongings within reach  - Initiate and maintain comfort rounds  - Make Fall Risk Sign visible to staff  - Offer Toileting every 2 Hours, in advance of need  - Initiate/Maintain bed alarm  - Obtain necessary fall risk management equipment: bed alarm   - Apply yellow socks and bracelet for high fall risk patients  - Consider moving patient to room near nurses station  Outcome: Progressing     Problem: DISCHARGE PLANNING  Goal: Discharge to home or other facility with appropriate resources  Description: INTERVENTIONS:  - Identify barriers to discharge w/patient and caregiver  - Arrange for needed discharge resources and transportation as appropriate  - Identify discharge learning needs (meds, wound care, etc.)  - Arrange for interpretive services to assist at discharge as needed  - Refer to Case Management Department for coordinating discharge planning if the patient needs post-hospital services based on physician/advanced practitioner order or complex needs related to functional status, cognitive ability, or social support system  Outcome: Progressing     Problem: Knowledge Deficit  Goal: Patient/family/caregiver demonstrates understanding of disease process, treatment plan, medications, and discharge instructions  Description: Complete learning assessment and assess knowledge base.   Interventions:  - Provide teaching at level of understanding  - Provide teaching via preferred learning methods  Outcome: Progressing     Problem: GASTROINTESTINAL - ADULT  Goal: Minimal or absence of nausea and/or vomiting  Description: INTERVENTIONS:  - Administer IV fluids if ordered to ensure adequate hydration  - Maintain NPO status until nausea and vomiting are resolved  - Nasogastric tube if ordered  - Administer ordered antiemetic medications as needed  - Provide nonpharmacologic comfort measures as appropriate  - Advance diet as tolerated, if ordered  - Consider nutrition services referral to assist patient with adequate nutrition and appropriate food choices  Outcome: Progressing  Goal: Maintains adequate nutritional intake  Description: INTERVENTIONS:  - Monitor percentage of each meal consumed  - Identify factors contributing to decreased intake, treat as appropriate  - Assist with meals as needed  - Monitor I&O, weight, and lab values if indicated  - Obtain nutrition services referral as needed  Outcome: Progressing  Goal: Oral mucous membranes remain intact  Description: INTERVENTIONS  - Assess oral mucosa and hygiene practices  - Implement preventative oral hygiene regimen  - Implement oral medicated treatments as ordered  - Initiate Nutrition services referral as needed  Outcome: Progressing     Problem: METABOLIC, FLUID AND ELECTROLYTES - ADULT  Goal: Electrolytes maintained within normal limits  Description: INTERVENTIONS:  - Monitor labs and assess patient for signs and symptoms of electrolyte imbalances  - Administer electrolyte replacement as ordered  - Monitor response to electrolyte replacements, including repeat lab results as appropriate  - Instruct patient on fluid and nutrition as appropriate  Outcome: Progressing  Goal: Glucose maintained within target range  Description: INTERVENTIONS:  - Monitor Blood Glucose as ordered  - Assess for signs and symptoms of hyperglycemia and hypoglycemia  - Administer ordered medications to maintain glucose within target range  - Assess nutritional intake and initiate nutrition service referral as needed  Outcome: Progressing     Problem: SKIN/TISSUE INTEGRITY - ADULT  Goal: Skin Integrity remains intact(Skin Breakdown Prevention)  Description: Assess:  -Perform Jose assessment every shift  -Clean and moisturize skin every shift  -Inspect skin when repositioning, toileting, and assisting with ADLS  -Assess under medical devices such as IV every shift  -Assess extremities for adequate circulation and sensation     Bed Management:  -Have minimal linens on bed & keep smooth, unwrinkled  -Change linens as needed when moist or perspiring  -Avoid sitting or lying in one position for more than 2 hours while in bed  -Keep HOB at 30 degrees     Toileting:  -Offer bedside commode  -Assess for incontinence every hour  -Use incontinent care products after each incontinent episode such as bath wipes     Activity:  -Mobilize patient 3 times a day  -Encourage activity and walks on unit  -Encourage or provide ROM exercises   -Turn and reposition patient every 2 Hours  -Use appropriate equipment to lift or move patient in bed  -Instruct/ Assist with weight shifting every hour when out of bed in chair  -Consider limitation of chair time 1 hour intervals    Skin Care:  -Avoid use of baby powder, tape, friction and shearing, hot water or constrictive clothing  -Relieve pressure over bony prominences using pillows  -Do not massage red bony areas    Next Steps:  -Teach patient strategies to minimize risks such as using call bell   -Consider consults to  interdisciplinary teams such as PT  Outcome: Progressing     Problem: MUSCULOSKELETAL - ADULT  Goal: Maintain or return mobility to safest level of function  Description: INTERVENTIONS:  - Assess patient's ability to carry out ADLs; assess patient's baseline for ADL function and identify physical deficits which impact ability to perform ADLs (bathing, care of mouth/teeth, toileting, grooming, dressing, etc.)  - Assess/evaluate cause of self-care deficits   - Assess range of motion  - Assess patient's mobility  - Assess patient's need for assistive devices and provide as appropriate  - Encourage maximum independence but intervene and supervise when necessary  - Involve family in performance of ADLs  - Assess for home care needs following discharge   - Consider OT consult to assist with ADL evaluation and planning for discharge  - Provide patient education as appropriate  Outcome: Progressing     Problem: Nutrition/Hydration-ADULT  Goal: Nutrient/Hydration intake appropriate for improving, restoring or maintaining nutritional needs  Description: Monitor and assess patient's nutrition/hydration status for malnutrition. Collaborate with interdisciplinary team and initiate plan and interventions as ordered. Monitor patient's weight and dietary intake as ordered or per policy. Utilize nutrition screening tool and intervene as necessary. Determine patient's food preferences and provide high-protein, high-caloric foods as appropriate.      INTERVENTIONS:  - Monitor oral intake, urinary output, labs, and treatment plans  - Assess nutrition and hydration status and recommend course of action  - Evaluate amount of meals eaten  - Assist patient with eating if necessary   - Allow adequate time for meals  - Recommend/ encourage appropriate diets, oral nutritional supplements, and vitamin/mineral supplements  - Order, calculate, and assess calorie counts as needed  - Recommend, monitor, and adjust tube feedings and TPN/PPN based on assessed needs  - Assess need for intravenous fluids  - Provide specific nutrition/hydration education as appropriate  - Include patient/family/caregiver in decisions related to nutrition  Outcome: Progressing

## 2023-10-22 LAB
ANION GAP SERPL CALCULATED.3IONS-SCNC: 13 MMOL/L
BASOPHILS # BLD MANUAL: 0 THOUSAND/UL (ref 0–0.1)
BASOPHILS NFR MAR MANUAL: 0 % (ref 0–1)
BUN SERPL-MCNC: 19 MG/DL (ref 5–25)
CALCIUM SERPL-MCNC: 7.1 MG/DL (ref 8.4–10.2)
CHLORIDE SERPL-SCNC: 106 MMOL/L (ref 96–108)
CO2 SERPL-SCNC: 15 MMOL/L (ref 21–32)
CREAT SERPL-MCNC: 1.26 MG/DL (ref 0.6–1.3)
EOSINOPHIL # BLD MANUAL: 0.27 THOUSAND/UL (ref 0–0.4)
EOSINOPHIL NFR BLD MANUAL: 1 % (ref 0–6)
ERYTHROCYTE [DISTWIDTH] IN BLOOD BY AUTOMATED COUNT: 14.8 % (ref 11.6–15.1)
GFR SERPL CREATININE-BSD FRML MDRD: 46 ML/MIN/1.73SQ M
GLUCOSE SERPL-MCNC: 166 MG/DL (ref 65–140)
GLUCOSE SERPL-MCNC: 166 MG/DL (ref 65–140)
GLUCOSE SERPL-MCNC: 193 MG/DL (ref 65–140)
GLUCOSE SERPL-MCNC: 203 MG/DL (ref 65–140)
GLUCOSE SERPL-MCNC: 218 MG/DL (ref 65–140)
HCT VFR BLD AUTO: 35.5 % (ref 34.8–46.1)
HGB BLD-MCNC: 11.9 G/DL (ref 11.5–15.4)
HYPERCHROMIA BLD QL SMEAR: PRESENT
LYMPHOCYTES # BLD AUTO: 2.13 THOUSAND/UL (ref 0.6–4.47)
LYMPHOCYTES # BLD AUTO: 8 % (ref 14–44)
MAGNESIUM SERPL-MCNC: 1.8 MG/DL (ref 1.9–2.7)
MCH RBC QN AUTO: 28.7 PG (ref 26.8–34.3)
MCHC RBC AUTO-ENTMCNC: 33.5 G/DL (ref 31.4–37.4)
MCV RBC AUTO: 86 FL (ref 82–98)
MICROCYTES BLD QL AUTO: PRESENT
MONOCYTES # BLD AUTO: 1.6 THOUSAND/UL (ref 0–1.22)
MONOCYTES NFR BLD: 6 % (ref 4–12)
MYELOCYTES NFR BLD MANUAL: 3 % (ref 0–1)
NEUTROPHILS # BLD MANUAL: 21.84 THOUSAND/UL (ref 1.85–7.62)
NEUTS BAND NFR BLD MANUAL: 2 % (ref 0–8)
NEUTS SEG NFR BLD AUTO: 80 % (ref 43–75)
PHOSPHATE SERPL-MCNC: 2.6 MG/DL (ref 2.7–4.5)
PLATELET # BLD AUTO: 190 THOUSANDS/UL (ref 149–390)
PLATELET BLD QL SMEAR: ADEQUATE
PMV BLD AUTO: 9.8 FL (ref 8.9–12.7)
POTASSIUM SERPL-SCNC: 3.6 MMOL/L (ref 3.5–5.3)
RBC # BLD AUTO: 4.14 MILLION/UL (ref 3.81–5.12)
RBC MORPH BLD: PRESENT
SODIUM SERPL-SCNC: 134 MMOL/L (ref 135–147)
TOXIC GRANULES BLD QL SMEAR: PRESENT
WBC # BLD AUTO: 26.64 THOUSAND/UL (ref 4.31–10.16)

## 2023-10-22 PROCEDURE — 85007 BL SMEAR W/DIFF WBC COUNT: CPT | Performed by: SPECIALIST

## 2023-10-22 PROCEDURE — 99232 SBSQ HOSP IP/OBS MODERATE 35: CPT | Performed by: INTERNAL MEDICINE

## 2023-10-22 PROCEDURE — 85027 COMPLETE CBC AUTOMATED: CPT | Performed by: SPECIALIST

## 2023-10-22 PROCEDURE — 84100 ASSAY OF PHOSPHORUS: CPT | Performed by: SPECIALIST

## 2023-10-22 PROCEDURE — 82948 REAGENT STRIP/BLOOD GLUCOSE: CPT

## 2023-10-22 PROCEDURE — 80048 BASIC METABOLIC PNL TOTAL CA: CPT | Performed by: INTERNAL MEDICINE

## 2023-10-22 PROCEDURE — 83735 ASSAY OF MAGNESIUM: CPT | Performed by: SPECIALIST

## 2023-10-22 RX ORDER — MAGNESIUM SULFATE HEPTAHYDRATE 40 MG/ML
2 INJECTION, SOLUTION INTRAVENOUS ONCE
Status: COMPLETED | OUTPATIENT
Start: 2023-10-22 | End: 2023-10-22

## 2023-10-22 RX ADMIN — HEPARIN SODIUM 5000 UNITS: 5000 INJECTION INTRAVENOUS; SUBCUTANEOUS at 21:04

## 2023-10-22 RX ADMIN — PRAVASTATIN SODIUM 20 MG: 20 TABLET ORAL at 16:59

## 2023-10-22 RX ADMIN — INSULIN LISPRO 2 UNITS: 100 INJECTION, SOLUTION INTRAVENOUS; SUBCUTANEOUS at 21:04

## 2023-10-22 RX ADMIN — Medication 250 MG: at 08:29

## 2023-10-22 RX ADMIN — INSULIN LISPRO 2 UNITS: 100 INJECTION, SOLUTION INTRAVENOUS; SUBCUTANEOUS at 16:59

## 2023-10-22 RX ADMIN — CEFAZOLIN SODIUM 2000 MG: 2 SOLUTION INTRAVENOUS at 08:29

## 2023-10-22 RX ADMIN — EZETIMIBE 10 MG: 10 TABLET ORAL at 08:29

## 2023-10-22 RX ADMIN — MAGNESIUM SULFATE HEPTAHYDRATE 2 G: 40 INJECTION, SOLUTION INTRAVENOUS at 14:12

## 2023-10-22 RX ADMIN — INSULIN GLARGINE 15 UNITS: 100 INJECTION, SOLUTION SUBCUTANEOUS at 21:04

## 2023-10-22 RX ADMIN — INSULIN LISPRO 2 UNITS: 100 INJECTION, SOLUTION INTRAVENOUS; SUBCUTANEOUS at 12:07

## 2023-10-22 RX ADMIN — INSULIN LISPRO 1 UNITS: 100 INJECTION, SOLUTION INTRAVENOUS; SUBCUTANEOUS at 08:29

## 2023-10-22 RX ADMIN — INSULIN LISPRO 2 UNITS: 100 INJECTION, SOLUTION INTRAVENOUS; SUBCUTANEOUS at 08:29

## 2023-10-22 RX ADMIN — CEFAZOLIN SODIUM 2000 MG: 2 SOLUTION INTRAVENOUS at 18:16

## 2023-10-22 RX ADMIN — HEPARIN SODIUM 5000 UNITS: 5000 INJECTION INTRAVENOUS; SUBCUTANEOUS at 13:13

## 2023-10-22 RX ADMIN — Medication 250 MG: at 17:00

## 2023-10-22 RX ADMIN — CALCIUM 1 TABLET: 500 TABLET ORAL at 08:29

## 2023-10-22 RX ADMIN — PAROXETINE 20 MG: 20 TABLET, FILM COATED ORAL at 08:29

## 2023-10-22 RX ADMIN — POTASSIUM PHOSPHATE, MONOBASIC AND POTASSIUM PHOSPHATE, DIBASIC 30 MMOL: 224; 236 INJECTION, SOLUTION, CONCENTRATE INTRAVENOUS at 15:23

## 2023-10-22 RX ADMIN — SODIUM CHLORIDE, SODIUM GLUCONATE, SODIUM ACETATE, POTASSIUM CHLORIDE, MAGNESIUM CHLORIDE, SODIUM PHOSPHATE, DIBASIC, AND POTASSIUM PHOSPHATE 50 ML/HR: .53; .5; .37; .037; .03; .012; .00082 INJECTION, SOLUTION INTRAVENOUS at 09:36

## 2023-10-22 RX ADMIN — HEPARIN SODIUM 5000 UNITS: 5000 INJECTION INTRAVENOUS; SUBCUTANEOUS at 05:17

## 2023-10-22 RX ADMIN — Medication 2000 UNITS: at 08:29

## 2023-10-22 RX ADMIN — CEFAZOLIN SODIUM 2000 MG: 2 SOLUTION INTRAVENOUS at 23:16

## 2023-10-22 NOTE — PROGRESS NOTES
[address] [facility]  Progress Note  Name: Camillia Dines Apgar I  MRN: [id number]  Unit/Bed#: 4 [address] 419-01 I Date of Admission: 10/17/2023   Date of Service: 10/22/2023 I Hospital Day: 5      Assessment & Plan:    * Hyperosmolar hyperglycemic state (HHS) ([address])  Assessment & Plan  Presented with abdominal discomfort with nausea/vomiting and a profoundly elevated serum glucose of 849 -> progressively improved overnight into this morning now in the 100s  BMP without evidence of anion gap and urinalysis without evidence of ketones  Suspect hyperosmolar hyperglycemic state -> insulin drip now discontinued and transitioned to a basal insulin regimen with SSI coverage per Accu-Cheks  Continue IV fluid hydration  Monitor serial Accu-Cheks  Chronically on oral hypoglycemics for type 2 diabetes mellitus -> states that due to recent nausea/vomiting over the last few days prior to presentation, doses were missed  Suspect pyelonephritis as likely triggering factor (see below)  Appreciate endocrinology input  Highly suspect noncompliance to home medication regimen over a long/extended period of time    Emphysematous pyelonephritis  Assessment & Plan  Presented with nonspecific abdominal discomfort and 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 coli  No blood cultures were drawn in the ED  Leukocytosis noted, however, remains afebrile -> hemodynamically stable and currently nontoxic-appearing  PRN pain/emesis control  Appreciate urology input -> repeat CT imaging showed improvement (interval decrease in perirenal space soft tissue emphysema volume) -> status post on 10/19 cystoscopy with right-sided stenting  Initial imaging reviewed by IR -> no role for nephrostomy tube placement currently    Type 2 diabetes mellitus ([address])  Assessment & Plan  A1c significantly uncontrolled at 15.0  Initiated on an insulin drip due to marked hyperglycemia (see plan for primary issue above)  Carbohydrate restriction  Holding oral hypoglycemics while hospitalized  Endocrinology input appreciated (see plan for HHS above) -> transition to basal insulin with SSI coverage for Accu-Cheks -> titrate insulin requirements daily contingent on blood sugars  Patient informed of need for insulin dependence at this time -> will be provided diabetic supplies on discharge including glucometer, testing strips, lancets, and prescriptions for insulin  Will appreciate nutritionist evaluation for diabetic teaching    Leukocytosis  Assessment & Plan  Likely reactive due to acute medical issue(s) coupled with hemoconcentration  Monitor WBC count -> see plan for pyelonephritis above    Hyponatremia  Assessment & Plan  Serum sodium only decreased at 119, however, in the setting of marked hyperglycemia, "corrected" sodium was 131  Anticipate progressive improvement in sodium levels with correction of blood sugars -> serum sodium progressively improved  Okay to continue IV fluids due to primary issue  Continue serial serum sodium monitoring    Elevated serum creatinine  Assessment & Plan  Presenting creatinine of 1.67 -> continue IV fluid hydration -> improved to 1.26 today  Unclear if acute kidney injury or CKD as no prior baseline available  Likely contributed to by renal hypoperfusion and insensible volume loss from recent vomiting  Limit/avoid nephrotoxins as possible - holding HCTZ/ACEI  Monitor renal function and urine output -> patient has now received 2 loads of IV contrast dye due to repeat CT scans  Appreciate nephrology input -> IV fluids transitioned to Isolyte    Morbid obesity ([address])  Assessment & Plan  BMI of 36.88  Lifestyle/diet modifications    Thrombocytopenia (HCC)  Assessment & Plan  Monitor platelet count - monitor for bleeding    Essential hypertension  Assessment & Plan  Holding HCTZ/ACEI in the setting of acute kidney injury  PRN IV Hydralazine for BP spikes    Hyperlipidemia  Assessment & Plan  Continue statin/Zetia    Depression  Assessment & Plan  Continue Paxil      DVT Prophylaxis:  Heparin SC    Patient Centered Rounds:  I have performed bedside rounds and discussed plan of care with nursing today. Discussions with Specialists or Other Care Team Provider:  see above assessments if applicable    Education and Discussions with Family / Patient:  Patient at bedside, who will self update family as necessary    Time Spent for Care:  35 minutes. More than 50% of total time spent on counseling and coordination of care, on one or more of the following: performing physical exam; counseling and coordination of care, obtaining or reviewing history, documenting in the medical record, reviewing/ordering tests/medications/procedures, and communicating with other healthcare professionals. Current Length of Stay: 5 day(s)  Current Patient Status: Inpatient   Certification Statement:  Patient will continue to require additional hospital stay due to assessments as noted above. Code Status: Level 1 - Full Code        Subjective:     Encountered earlier in the day. Continues to deny abdominal pain currently. Weakness and fatigue are starting to improve. Objective:     Vitals:   Temp (24hrs), Av.6 °F (36.4 °C), Min:97.3 °F (36.3 °C), Max:97.9 °F (36.6 °C)    Temp:  [97.3 °F (36.3 °C)-97.9 °F (36.6 °C)] 97.8 °F (36.6 °C)  HR:  [86-93] 89  Resp:  [16-20] 20  BP: (138-152)/(63-69) 138/63  SpO2:  [94 %-98 %] 96 %  Body mass index is 36.88 kg/m². Input and Output Summary (last 24 hours):        Intake/Output Summary (Last 24 hours) at 10/22/2023 1405  Last data filed at 10/22/2023 1303  Gross per 24 hour   Intake 1200 ml   Output 500 ml   Net 700 ml       Physical Exam:     GENERAL Improved weakness/fatigue - obese    HEAD   Normocephalic - atraumatic   EYES   PERRL - EOMI MOUTH   Mucosa moist   NECK   Supple - full range of motion   CARDIAC Rate controlled - S1/S2 positive   PULMONARY Fairly clear to auscultation - nonlabored respirations   ABDOMEN   Soft - improving generalized tenderness without rebound/guarding - nondistended - active bowel sounds   MUSCULOSKELETAL   Motor strength/range of motion deconditioned   NEUROLOGIC   Alert/oriented at baseline   SKIN   Chronic wrinkles/blemishes    PSYCHIATRIC   Mood/affect stable           Additional Data:     Labs & Recent Cultures:    Results from last 7 days   Lab Units 10/22/23  0527   WBC Thousand/uL 26.64*   HEMOGLOBIN g/dL 11.9   HEMATOCRIT % 35.5   PLATELETS Thousands/uL 190   BANDS PCT % 2   LYMPHO PCT % 8*   MONO PCT % 6   EOS PCT % 1     Results from last 7 days   Lab Units 10/22/23  0527 10/21/23  0516 10/20/23  0521   POTASSIUM mmol/L 3.6   < > 3.5   CHLORIDE mmol/L 106   < > 107   CO2 mmol/L 15*   < > 21   BUN mg/dL 19   < > 38*   CREATININE mg/dL 1.26   < > 1.64*   CALCIUM mg/dL 7.1*   < > 7.3*   ALK PHOS U/L  --   --  106*   ALT U/L  --   --  23   AST U/L  --   --  22    < > = values in this interval not displayed.          Results from last 7 days   Lab Units 10/22/23  1147 10/22/23  0729 10/21/23  2015 10/21/23  1548 10/21/23  1133 10/21/23  0755 10/20/23  2127 10/20/23  1610 10/20/23  1122 10/20/23  0948 10/20/23  0756 10/20/23  0628   POC GLUCOSE mg/dl 193* 166* 181* 184* 234* 169* 179* 185* 126 108 127 142*     Results from last 7 days   Lab Units 10/18/23  0558   HEMOGLOBIN A1C % 15.0*     Results from last 7 days   Lab Units 10/20/23  0521 10/19/23  0553 10/18/23  0941 10/18/23  0808   LACTIC ACID mmol/L  --   --  1.2  --    PROCALCITONIN ng/ml 2.04* 4.32*  --  7.82*         Results from last 7 days   Lab Units 10/17/23  1420   URINE CULTURE  >100,000 cfu/ml Escherichia coli*         Lines/Drains:  Invasive Devices       Peripheral Intravenous Line  Duration             Long-Dwell Peripheral IV (Midline) 10/17/23 Left Brachial 4 days    Peripheral IV 10/21/23 Distal;Right;Upper;Ventral (anterior) Arm 1 day                      Last 24 Hours Medication List:   Current Facility-Administered Medications   Medication Dose Route Frequency Provider Last Rate    acetaminophen  650 mg Oral Q6H PRN Burgess Osiel MD      aluminum-magnesium hydroxide-simethicone  30 mL Oral Q6H PRN Burgess Osiel MD      calcium carbonate  1 tablet Oral Daily With Breakfast Burgess Osiel MD      calcium carbonate  500 mg Oral TID PRN Burgess Osiel MD      cefazolin  2,000 mg Intravenous Q8H Sierra Gordon PA-C 2,000 mg (10/22/23 6415)    cholecalciferol  2,000 Units Oral Daily Burgess Osiel MD      ezetimibe  10 mg Oral Daily Burgess Osiel MD      heparin (porcine)  5,000 Units Subcutaneous [facility] Burgess Osiel MD      insulin glargine  15 Units Subcutaneous HS Maggi Zepeda MD      insulin lispro  1-6 Units Subcutaneous 4x Daily (AC & HS) Maggi Zepeda MD      insulin lispro  2 Units Subcutaneous TID With Meals Maggi Zepeda MD      magnesium sulfate  2 g Intravenous Once Maggi Zepeda MD      multi-electrolyte  50 mL/hr Intravenous Continuous Jagdish West MD 50 mL/hr (10/22/23 0903)    ondansetron  4 mg Intravenous Q4H PRN Burgess Osiel MD      PARoxetine  20 mg Oral Daily Burgess Osiel MD      potassium phosphate  30 mmol Intravenous Once Maggi Zepeda MD      pravastatin  20 mg Oral Daily With Lexii Oglesby MD      saccharomyces boulardii  250 mg Oral BID Maggi Zepeda MD                    ** Please Note: This note is constructed using a voice recognition dictation system. An occasional wrong word/phrase or “sound-a-like” substitution may have been picked up by dictation device due to the inherent limitations of voice recognition software. Read the chart carefully and recognize, using reasonable context, where substitutions may have occurred. **

## 2023-10-22 NOTE — PROGRESS NOTES
Progress Note - Urology      Patient: Josiane Velazquez Apgar   : 1965 Sex: female   MRN: [id number]     CSN: [id number]  Unit/Bed#: [address] [place] [address]     SUBJECTIVE:   Patient seen on morning rounds  No pain discomfort issues whatsoever  White count still elevated  In light of limited renal artery access and severe edema from pyelonephritis may take a good 7 to 10 days for antibiotics to infuse her emphysematous pyelonephritis      Objective   Vitals: /63   Pulse 89   Temp 97.8 °F (36.6 °C)   Resp 20   Ht 5' 7" (1.702 m)   Wt 107 kg (235 lb 7.2 oz)   SpO2 96%   BMI 36.88 kg/m²     I/O last 24 hours:   In: 3120 [P.O.:3120]  Out: 300 [Urine:300]      Physical Exam:   General Alert awake   Normocephalic atraumatic PERRLA  Lungs clear bilaterally  Cardiac normal S1 normal S2  Abdomen soft, flank pain none  Extremities no edema      Lab Results: CBC:   Lab Results   Component Value Date    WBC 26.64 (H) 10/22/2023    HGB 11.9 10/22/2023    HCT 35.5 10/22/2023    MCV 86 10/22/2023     10/22/2023    RBC 4.14 10/22/2023    MCH 28.7 10/22/2023    MCHC 33.5 10/22/2023    RDW 14.8 10/22/2023    MPV 9.8 10/22/2023    NRBC 0 10/18/2023     CMP:   Lab Results   Component Value Date     10/22/2023    CO2 15 (L) 10/22/2023    BUN 19 10/22/2023    CREATININE 1.26 10/22/2023    CALCIUM 7.1 (L) 10/22/2023    AST 22 10/20/2023    ALT 23 10/20/2023    ALKPHOS 106 (H) 10/20/2023    EGFR 46 10/22/2023     Urinalysis:   Lab Results   Component Value Date    COLORU Colorless 10/17/2023    CLARITYU Cloudy 10/17/2023    SPECGRAV <=1.005 10/17/2023    PHUR 5.5 10/17/2023    LEUKOCYTESUR Small (A) 10/17/2023    NITRITE Negative 10/17/2023    GLUCOSEU >=1000 (1%) (A) 10/17/2023    KETONESU Negative 10/17/2023    BILIRUBINUR Negative 10/17/2023    BLOODU Large (A) 10/17/2023     Urine Culture:   Lab Results   Component Value Date    URINECX >100,000 cfu/ml Escherichia coli (A) 10/17/2023     PSA: No results found for: "PSA"      Assessment/ Plan:  Emphysematous pyelonephritis  Right hydronephrosis severe edema swelling at the right ureteral orifice status post cystoscopy right stent insertion day #3  Patient clinically rock stable  If white count does not start to trend down by tomorrow we will repeat CAT scan for evaluation possible pus/abscess?           Lawson Richardson MD

## 2023-10-22 NOTE — PLAN OF CARE
Problem: MOBILITY - ADULT  Goal: Maintain or return to baseline ADL function  Description: INTERVENTIONS:  -  Assess patient's ability to carry out ADLs; assess patient's baseline for ADL function and identify physical deficits which impact ability to perform ADLs (bathing, care of mouth/teeth, toileting, grooming, dressing, etc.)  - Assess/evaluate cause of self-care deficits   - Assess range of motion  - Assess patient's mobility; develop plan if impaired  - Assess patient's need for assistive devices and provide as appropriate  - Encourage maximum independence but intervene and supervise when necessary  - Involve family in performance of ADLs  - Assess for home care needs following discharge   - Consider OT consult to assist with ADL evaluation and planning for discharge  - Provide patient education as appropriate  Outcome: Progressing  Goal: Maintains/Returns to pre admission functional level  Description: INTERVENTIONS:  - Perform BMAT or MOVE assessment daily.   - Set and communicate daily mobility goal to care team and patient/family/caregiver.    - Collaborate with rehabilitation services on mobility goals if consulted  - Out of bed for toileting  - Record patient progress and toleration of activity level   Outcome: Progressing     Problem: INFECTION - ADULT  Goal: Absence of fever/infection during neutropenic period  Description: INTERVENTIONS:  - Monitor WBC    Outcome: Progressing     Problem: SAFETY ADULT  Goal: Maintain or return to baseline ADL function  Description: INTERVENTIONS:  -  Assess patient's ability to carry out ADLs; assess patient's baseline for ADL function and identify physical deficits which impact ability to perform ADLs (bathing, care of mouth/teeth, toileting, grooming, dressing, etc.)  - Assess/evaluate cause of self-care deficits   - Assess range of motion  - Assess patient's mobility; develop plan if impaired  - Assess patient's need for assistive devices and provide as appropriate  - Encourage maximum independence but intervene and supervise when necessary  - Involve family in performance of ADLs  - Assess for home care needs following discharge   - Consider OT consult to assist with ADL evaluation and planning for discharge  - Provide patient education as appropriate  Outcome: Progressing  Goal: Maintains/Returns to pre admission functional level  Description: INTERVENTIONS:  - Perform BMAT or MOVE assessment daily.   - Set and communicate daily mobility goal to care team and patient/family/caregiver.    - Collaborate with rehabilitation services on mobility goals if consulted  - Out of bed for toileting  - Record patient progress and toleration of activity level   Outcome: Progressing  Goal: Patient will remain free of falls  Description: INTERVENTIONS:  - Educate patient/family on patient safety including physical limitations  - Instruct patient to call for assistance with activity   - Consult OT/PT to assist with strengthening/mobility   - Keep Call bell within reach  - Keep bed low and locked with side rails adjusted as appropriate  - Keep care items and personal belongings within reach  - Initiate and maintain comfort rounds  - Make Fall Risk Sign visible to staff  - Offer Toileting every 2 Hours, in advance of need  - Initiate/Maintain bed alarm  - Obtain necessary fall risk management equipment: yellow socks  - Apply yellow socks and bracelet for high fall risk patients  - Consider moving patient to room near nurses station  Outcome: Progressing     Problem: DISCHARGE PLANNING  Goal: Discharge to home or other facility with appropriate resources  Description: INTERVENTIONS:  - Identify barriers to discharge w/patient and caregiver  - Arrange for needed discharge resources and transportation as appropriate  - Identify discharge learning needs (meds, wound care, etc.)  - Arrange for interpretive services to assist at discharge as needed  - Refer to Case Management Department for coordinating discharge planning if the patient needs post-hospital services based on physician/advanced practitioner order or complex needs related to functional status, cognitive ability, or social support system  Outcome: Progressing     Problem: Knowledge Deficit  Goal: Patient/family/caregiver demonstrates understanding of disease process, treatment plan, medications, and discharge instructions  Description: Complete learning assessment and assess knowledge base.   Interventions:  - Provide teaching at level of understanding  - Provide teaching via preferred learning methods  Outcome: Progressing     Problem: GASTROINTESTINAL - ADULT  Goal: Minimal or absence of nausea and/or vomiting  Description: INTERVENTIONS:  - Administer IV fluids if ordered to ensure adequate hydration  - Maintain NPO status until nausea and vomiting are resolved  - Nasogastric tube if ordered  - Administer ordered antiemetic medications as needed  - Provide nonpharmacologic comfort measures as appropriate  - Advance diet as tolerated, if ordered  - Consider nutrition services referral to assist patient with adequate nutrition and appropriate food choices  Outcome: Progressing  Goal: Maintains adequate nutritional intake  Description: INTERVENTIONS:  - Monitor percentage of each meal consumed  - Identify factors contributing to decreased intake, treat as appropriate  - Assist with meals as needed  - Monitor I&O, weight, and lab values if indicated  - Obtain nutrition services referral as needed  Outcome: Progressing  Goal: Oral mucous membranes remain intact  Description: INTERVENTIONS  - Assess oral mucosa and hygiene practices  - Implement preventative oral hygiene regimen  - Implement oral medicated treatments as ordered  - Initiate Nutrition services referral as needed  Outcome: Progressing     Problem: METABOLIC, FLUID AND ELECTROLYTES - ADULT  Goal: Electrolytes maintained within normal limits  Description: INTERVENTIONS:  - Monitor labs and assess patient for signs and symptoms of electrolyte imbalances  - Administer electrolyte replacement as ordered  - Monitor response to electrolyte replacements, including repeat lab results as appropriate  - Instruct patient on fluid and nutrition as appropriate  Outcome: Progressing  Goal: Glucose maintained within target range  Description: INTERVENTIONS:  - Monitor Blood Glucose as ordered  - Assess for signs and symptoms of hyperglycemia and hypoglycemia  - Administer ordered medications to maintain glucose within target range  - Assess nutritional intake and initiate nutrition service referral as needed  Outcome: Progressing     Problem: SKIN/TISSUE INTEGRITY - ADULT  Goal: Skin Integrity remains intact(Skin Breakdown Prevention)  Description: Assess:  -Perform Jose assessment every shift   -Clean and moisturize skin   -Inspect skin when repositioning, toileting, and assisting with ADLS  -Assess under medical devices   -Assess extremities for adequate circulation and sensation     Bed Management:  -Have minimal linens on bed & keep smooth, unwrinkled  -Change linens as needed when moist or perspiring  -Avoid sitting or lying in one position for more than 2 hours while in bed  -Keep HOB at 45 degrees     Toileting:  -Offer bedside commode  -Assess for incontinence   -Use incontinent care products after each incontinent episode     Activity:  -Mobilize patient 4 times a day  -Encourage activity and walks on unit  -Encourage or provide ROM exercises   -Turn and reposition patient every 2 Hours  -Use appropriate equipment to lift or move patient in bed  -Instruct/ Assist with weight shifting every 30 minutes when out of bed in chair  -Consider limitation of chair time 2 hour intervals    Skin Care:  -Avoid use of baby powder, tape, friction and shearing, hot water or constrictive clothing  -Relieve pressure over bony prominences   -Do not massage red bony areas    Next Steps:  -Teach patient strategies to minimize risks    -Consider consults to  interdisciplinary teams   Outcome: Progressing     Problem: MUSCULOSKELETAL - ADULT  Goal: Maintain or return mobility to safest level of function  Description: INTERVENTIONS:  - Assess patient's ability to carry out ADLs; assess patient's baseline for ADL function and identify physical deficits which impact ability to perform ADLs (bathing, care of mouth/teeth, toileting, grooming, dressing, etc.)  - Assess/evaluate cause of self-care deficits   - Assess range of motion  - Assess patient's mobility  - Assess patient's need for assistive devices and provide as appropriate  - Encourage maximum independence but intervene and supervise when necessary  - Involve family in performance of ADLs  - Assess for home care needs following discharge   - Consider OT consult to assist with ADL evaluation and planning for discharge  - Provide patient education as appropriate  Outcome: Progressing     Problem: Nutrition/Hydration-ADULT  Goal: Nutrient/Hydration intake appropriate for improving, restoring or maintaining nutritional needs  Description: Monitor and assess patient's nutrition/hydration status for malnutrition. Collaborate with interdisciplinary team and initiate plan and interventions as ordered. Monitor patient's weight and dietary intake as ordered or per policy. Utilize nutrition screening tool and intervene as necessary. Determine patient's food preferences and provide high-protein, high-caloric foods as appropriate.      INTERVENTIONS:  - Monitor oral intake, urinary output, labs, and treatment plans  - Assess nutrition and hydration status and recommend course of action  - Evaluate amount of meals eaten  - Assist patient with eating if necessary   - Allow adequate time for meals  - Recommend/ encourage appropriate diets, oral nutritional supplements, and vitamin/mineral supplements  - Order, calculate, and assess calorie counts as needed  - Recommend, monitor, and adjust tube feedings and TPN/PPN based on assessed needs  - Assess need for intravenous fluids  - Provide specific nutrition/hydration education as appropriate  - Include patient/family/caregiver in decisions related to nutrition  Outcome: Progressing

## 2023-10-22 NOTE — PLAN OF CARE
Problem: MOBILITY - ADULT  Goal: Maintain or return to baseline ADL function  Description: INTERVENTIONS:  -  Assess patient's ability to carry out ADLs; assess patient's baseline for ADL function and identify physical deficits which impact ability to perform ADLs (bathing, care of mouth/teeth, toileting, grooming, dressing, etc.)  - Assess/evaluate cause of self-care deficits   - Assess range of motion  - Assess patient's mobility; develop plan if impaired  - Assess patient's need for assistive devices and provide as appropriate  - Encourage maximum independence but intervene and supervise when necessary  - Involve family in performance of ADLs  - Assess for home care needs following discharge   - Consider OT consult to assist with ADL evaluation and planning for discharge  - Provide patient education as appropriate  Outcome: Progressing  Goal: Maintains/Returns to pre admission functional level  Description: INTERVENTIONS:  - Perform BMAT or MOVE assessment daily.   - Set and communicate daily mobility goal to care team and patient/family/caregiver.    - Collaborate with rehabilitation services on mobility goals if consulted  - Out of bed for toileting  - Record patient progress and toleration of activity level   Outcome: Progressing     Problem: INFECTION - ADULT  Goal: Absence of fever/infection during neutropenic period  Description: INTERVENTIONS:  - Monitor WBC    Outcome: Progressing     Problem: SAFETY ADULT  Goal: Maintain or return to baseline ADL function  Description: INTERVENTIONS:  -  Assess patient's ability to carry out ADLs; assess patient's baseline for ADL function and identify physical deficits which impact ability to perform ADLs (bathing, care of mouth/teeth, toileting, grooming, dressing, etc.)  - Assess/evaluate cause of self-care deficits   - Assess range of motion  - Assess patient's mobility; develop plan if impaired  - Assess patient's need for assistive devices and provide as appropriate  - Encourage maximum independence but intervene and supervise when necessary  - Involve family in performance of ADLs  - Assess for home care needs following discharge   - Consider OT consult to assist with ADL evaluation and planning for discharge  - Provide patient education as appropriate  Outcome: Progressing  Goal: Maintains/Returns to pre admission functional level  Description: INTERVENTIONS:  - Perform BMAT or MOVE assessment daily.   - Set and communicate daily mobility goal to care team and patient/family/caregiver. - Collaborate with rehabilitation services on mobility goals if consulted  - Out of bed for toileting  - Record patient progress and toleration of activity level   Outcome: Progressing     Problem: DISCHARGE PLANNING  Goal: Discharge to home or other facility with appropriate resources  Description: INTERVENTIONS:  - Identify barriers to discharge w/patient and caregiver  - Arrange for needed discharge resources and transportation as appropriate  - Identify discharge learning needs (meds, wound care, etc.)  - Arrange for interpretive services to assist at discharge as needed  - Refer to Case Management Department for coordinating discharge planning if the patient needs post-hospital services based on physician/advanced practitioner order or complex needs related to functional status, cognitive ability, or social support system  Outcome: Progressing     Problem: Knowledge Deficit  Goal: Patient/family/caregiver demonstrates understanding of disease process, treatment plan, medications, and discharge instructions  Description: Complete learning assessment and assess knowledge base.   Interventions:  - Provide teaching at level of understanding  - Provide teaching via preferred learning methods  Outcome: Progressing     Problem: GASTROINTESTINAL - ADULT  Goal: Minimal or absence of nausea and/or vomiting  Description: INTERVENTIONS:  - Administer IV fluids if ordered to ensure adequate hydration  - Maintain NPO status until nausea and vomiting are resolved  - Nasogastric tube if ordered  - Administer ordered antiemetic medications as needed  - Provide nonpharmacologic comfort measures as appropriate  - Advance diet as tolerated, if ordered  - Consider nutrition services referral to assist patient with adequate nutrition and appropriate food choices  Outcome: Progressing  Goal: Maintains adequate nutritional intake  Description: INTERVENTIONS:  - Monitor percentage of each meal consumed  - Identify factors contributing to decreased intake, treat as appropriate  - Assist with meals as needed  - Monitor I&O, weight, and lab values if indicated  - Obtain nutrition services referral as needed  Outcome: Progressing  Goal: Oral mucous membranes remain intact  Description: INTERVENTIONS  - Assess oral mucosa and hygiene practices  - Implement preventative oral hygiene regimen  - Implement oral medicated treatments as ordered  - Initiate Nutrition services referral as needed  Outcome: Progressing     Problem: METABOLIC, FLUID AND ELECTROLYTES - ADULT  Goal: Electrolytes maintained within normal limits  Description: INTERVENTIONS:  - Monitor labs and assess patient for signs and symptoms of electrolyte imbalances  - Administer electrolyte replacement as ordered  - Monitor response to electrolyte replacements, including repeat lab results as appropriate  - Instruct patient on fluid and nutrition as appropriate  Outcome: Progressing  Goal: Glucose maintained within target range  Description: INTERVENTIONS:  - Monitor Blood Glucose as ordered  - Assess for signs and symptoms of hyperglycemia and hypoglycemia  - Administer ordered medications to maintain glucose within target range  - Assess nutritional intake and initiate nutrition service referral as needed  Outcome: Progressing     Problem: SKIN/TISSUE INTEGRITY - ADULT  Goal: Skin Integrity remains intact(Skin Breakdown Prevention)  Description: Assess:  -Perform Jose assessment every shift   -Clean and moisturize skin every shift   -Inspect skin when repositioning, toileting, and assisting with ADLS  -Assess under medical devices such as liam every 4h  -Assess extremities for adequate circulation and sensation     Bed Management:  -Have minimal linens on bed & keep smooth, unwrinkled  -Change linens as needed when moist or perspiring  -Avoid sitting or lying in one position for more than 2 hours while in bed  -Keep HOB at 30degrees     Toileting:  -Offer bedside commode  -Assess for incontinence every 2  -Use incontinent care products after each incontinent episode such as foam cleanser     Activity:  -Mobilize patient 3 times a day  -Encourage activity and walks on unit  -Encourage or provide ROM exercises   -Turn and reposition patient every 2 Hours  -Use appropriate equipment to lift or move patient in bed  -Instruct/ Assist with weight shifting every 3 when out of bed in chair  -Consider limitation of chair time 4 hour intervals    Skin Care:  -Avoid use of baby powder, tape, friction and shearing, hot water or constrictive clothing  -Relieve pressure over bony prominences using waffle cushion   -Do not massage red bony areas    Problem: MUSCULOSKELETAL - ADULT  Goal: Maintain or return mobility to safest level of function  Description: INTERVENTIONS:  - Assess patient's ability to carry out ADLs; assess patient's baseline for ADL function and identify physical deficits which impact ability to perform ADLs (bathing, care of mouth/teeth, toileting, grooming, dressing, etc.)  - Assess/evaluate cause of self-care deficits   - Assess range of motion  - Assess patient's mobility  - Assess patient's need for assistive devices and provide as appropriate  - Encourage maximum independence but intervene and supervise when necessary  - Involve family in performance of ADLs  - Assess for home care needs following discharge   - Consider OT consult to assist with ADL evaluation and planning for discharge  - Provide patient education as appropriate  Outcome: Progressing     Problem: Nutrition/Hydration-ADULT  Goal: Nutrient/Hydration intake appropriate for improving, restoring or maintaining nutritional needs  Description: Monitor and assess patient's nutrition/hydration status for malnutrition. Collaborate with interdisciplinary team and initiate plan and interventions as ordered. Monitor patient's weight and dietary intake as ordered or per policy. Utilize nutrition screening tool and intervene as necessary. Determine patient's food preferences and provide high-protein, high-caloric foods as appropriate.      INTERVENTIONS:  - Monitor oral intake, urinary output, labs, and treatment plans  - Assess nutrition and hydration status and recommend course of action  - Evaluate amount of meals eaten  - Assist patient with eating if necessary   - Allow adequate time for meals  - Recommend/ encourage appropriate diets, oral nutritional supplements, and vitamin/mineral supplements  - Order, calculate, and assess calorie counts as needed  - Recommend, monitor, and adjust tube feedings and TPN/PPN based on assessed needs  - Assess need for intravenous fluids  - Provide specific nutrition/hydration education as appropriate  - Include patient/family/caregiver in decisions related to nutrition  Outcome: Progressing

## 2023-10-22 NOTE — TREATMENT TEAM
Renal short note. BMP pending today. Please call if any questions arise. Please replete K if low again today. Thank you.

## 2023-10-23 ENCOUNTER — APPOINTMENT (INPATIENT)
Dept: RADIOLOGY | Facility: HOSPITAL | Age: 58
DRG: 659 | End: 2023-10-23
Payer: COMMERCIAL

## 2023-10-23 LAB
ANION GAP SERPL CALCULATED.3IONS-SCNC: 7 MMOL/L
BASOPHILS # BLD AUTO: 0.1 THOUSANDS/ÂΜL (ref 0–0.1)
BUN SERPL-MCNC: 13 MG/DL (ref 5–25)
CALCIUM SERPL-MCNC: 6.9 MG/DL (ref 8.4–10.2)
CHLORIDE SERPL-SCNC: 105 MMOL/L (ref 96–108)
CO2 SERPL-SCNC: 21 MMOL/L (ref 21–32)
CREAT SERPL-MCNC: 1.04 MG/DL (ref 0.6–1.3)
ERYTHROCYTE [DISTWIDTH] IN BLOOD BY AUTOMATED COUNT: 14.5 % (ref 11.6–15.1)
GFR SERPL CREATININE-BSD FRML MDRD: 58 ML/MIN/1.73SQ M
GLUCOSE SERPL-MCNC: 132 MG/DL (ref 65–140)
GLUCOSE SERPL-MCNC: 191 MG/DL (ref 65–140)
GLUCOSE SERPL-MCNC: 268 MG/DL (ref 65–140)
GLUCOSE SERPL-MCNC: 82 MG/DL (ref 65–140)
GLUCOSE SERPL-MCNC: 86 MG/DL (ref 65–140)
HCT VFR BLD AUTO: 32 % (ref 34.8–46.1)
HGB BLD-MCNC: 10.8 G/DL (ref 11.5–15.4)
IMM GRANULOCYTES # BLD AUTO: >0.5 THOUSAND/UL (ref 0–0.2)
MAGNESIUM SERPL-MCNC: 1.8 MG/DL (ref 1.9–2.7)
MCH RBC QN AUTO: 27.8 PG (ref 26.8–34.3)
MCHC RBC AUTO-ENTMCNC: 33.8 G/DL (ref 31.4–37.4)
MCV RBC AUTO: 83 FL (ref 82–98)
NRBC BLD AUTO-RTO: 0 /100 WBCS
PHOSPHATE SERPL-MCNC: 2.5 MG/DL (ref 2.7–4.5)
PLATELET # BLD AUTO: 324 THOUSANDS/UL (ref 149–390)
PMV BLD AUTO: 9.1 FL (ref 8.9–12.7)
POTASSIUM SERPL-SCNC: 3.3 MMOL/L (ref 3.5–5.3)
RBC # BLD AUTO: 3.88 MILLION/UL (ref 3.81–5.12)
SODIUM SERPL-SCNC: 133 MMOL/L (ref 135–147)
WBC # BLD AUTO: 27.82 THOUSAND/UL (ref 4.31–10.16)

## 2023-10-23 PROCEDURE — 82948 REAGENT STRIP/BLOOD GLUCOSE: CPT

## 2023-10-23 PROCEDURE — 99232 SBSQ HOSP IP/OBS MODERATE 35: CPT | Performed by: STUDENT IN AN ORGANIZED HEALTH CARE EDUCATION/TRAINING PROGRAM

## 2023-10-23 PROCEDURE — G1004 CDSM NDSC: HCPCS

## 2023-10-23 PROCEDURE — 83735 ASSAY OF MAGNESIUM: CPT | Performed by: SPECIALIST

## 2023-10-23 PROCEDURE — 99233 SBSQ HOSP IP/OBS HIGH 50: CPT | Performed by: INTERNAL MEDICINE

## 2023-10-23 PROCEDURE — 99232 SBSQ HOSP IP/OBS MODERATE 35: CPT | Performed by: INTERNAL MEDICINE

## 2023-10-23 PROCEDURE — 85027 COMPLETE CBC AUTOMATED: CPT | Performed by: SPECIALIST

## 2023-10-23 PROCEDURE — 84100 ASSAY OF PHOSPHORUS: CPT | Performed by: SPECIALIST

## 2023-10-23 PROCEDURE — 80048 BASIC METABOLIC PNL TOTAL CA: CPT | Performed by: INTERNAL MEDICINE

## 2023-10-23 PROCEDURE — 74177 CT ABD & PELVIS W/CONTRAST: CPT

## 2023-10-23 RX ORDER — POTASSIUM CHLORIDE 20 MEQ/1
40 TABLET, EXTENDED RELEASE ORAL ONCE
Status: COMPLETED | OUTPATIENT
Start: 2023-10-23 | End: 2023-10-23

## 2023-10-23 RX ORDER — SODIUM CHLORIDE, SODIUM GLUCONATE, SODIUM ACETATE, POTASSIUM CHLORIDE, MAGNESIUM CHLORIDE, SODIUM PHOSPHATE, DIBASIC, AND POTASSIUM PHOSPHATE .53; .5; .37; .037; .03; .012; .00082 G/100ML; G/100ML; G/100ML; G/100ML; G/100ML; G/100ML; G/100ML
100 INJECTION, SOLUTION INTRAVENOUS CONTINUOUS
Status: DISCONTINUED | OUTPATIENT
Start: 2023-10-23 | End: 2023-10-24

## 2023-10-23 RX ADMIN — Medication 250 MG: at 18:19

## 2023-10-23 RX ADMIN — CEFAZOLIN SODIUM 2000 MG: 2 SOLUTION INTRAVENOUS at 08:27

## 2023-10-23 RX ADMIN — Medication 2000 UNITS: at 08:27

## 2023-10-23 RX ADMIN — PRAVASTATIN SODIUM 20 MG: 20 TABLET ORAL at 16:21

## 2023-10-23 RX ADMIN — HEPARIN SODIUM 5000 UNITS: 5000 INJECTION INTRAVENOUS; SUBCUTANEOUS at 21:07

## 2023-10-23 RX ADMIN — INSULIN LISPRO 2 UNITS: 100 INJECTION, SOLUTION INTRAVENOUS; SUBCUTANEOUS at 16:20

## 2023-10-23 RX ADMIN — INSULIN LISPRO 2 UNITS: 100 INJECTION, SOLUTION INTRAVENOUS; SUBCUTANEOUS at 11:30

## 2023-10-23 RX ADMIN — POTASSIUM CHLORIDE 40 MEQ: 1500 TABLET, EXTENDED RELEASE ORAL at 16:21

## 2023-10-23 RX ADMIN — CEFAZOLIN SODIUM 2000 MG: 2 SOLUTION INTRAVENOUS at 23:21

## 2023-10-23 RX ADMIN — IOHEXOL 100 ML: 350 INJECTION, SOLUTION INTRAVENOUS at 09:40

## 2023-10-23 RX ADMIN — INSULIN LISPRO 2 UNITS: 100 INJECTION, SOLUTION INTRAVENOUS; SUBCUTANEOUS at 16:21

## 2023-10-23 RX ADMIN — Medication 250 MG: at 08:27

## 2023-10-23 RX ADMIN — INSULIN LISPRO 3 UNITS: 100 INJECTION, SOLUTION INTRAVENOUS; SUBCUTANEOUS at 21:07

## 2023-10-23 RX ADMIN — INSULIN GLARGINE 15 UNITS: 100 INJECTION, SOLUTION SUBCUTANEOUS at 21:07

## 2023-10-23 RX ADMIN — CEFAZOLIN SODIUM 2000 MG: 2 SOLUTION INTRAVENOUS at 16:21

## 2023-10-23 RX ADMIN — PAROXETINE 20 MG: 20 TABLET, FILM COATED ORAL at 08:27

## 2023-10-23 RX ADMIN — CALCIUM 1 TABLET: 500 TABLET ORAL at 08:27

## 2023-10-23 RX ADMIN — HEPARIN SODIUM 5000 UNITS: 5000 INJECTION INTRAVENOUS; SUBCUTANEOUS at 16:20

## 2023-10-23 RX ADMIN — INSULIN LISPRO 2 UNITS: 100 INJECTION, SOLUTION INTRAVENOUS; SUBCUTANEOUS at 08:28

## 2023-10-23 RX ADMIN — EZETIMIBE 10 MG: 10 TABLET ORAL at 08:27

## 2023-10-23 RX ADMIN — SODIUM CHLORIDE, SODIUM GLUCONATE, SODIUM ACETATE, POTASSIUM CHLORIDE, MAGNESIUM CHLORIDE, SODIUM PHOSPHATE, DIBASIC, AND POTASSIUM PHOSPHATE 100 ML/HR: .53; .5; .37; .037; .03; .012; .00082 INJECTION, SOLUTION INTRAVENOUS at 09:28

## 2023-10-23 RX ADMIN — HEPARIN SODIUM 5000 UNITS: 5000 INJECTION INTRAVENOUS; SUBCUTANEOUS at 06:07

## 2023-10-23 NOTE — PROGRESS NOTES
Progress Note - Urology      Patient: Bryon Kiel Apgar   : 1965 Sex: female   MRN: [id number]     CSN: [id number]  Unit/Bed#: [address] [place] [address]     SUBJECTIVE:   Patient seen on evening rounds  Still with no symptoms feels fine repeat CAT scan confirms stable right emphysematous pyelonephritis no obvious pus collection White count has yet to trend down      Objective   Vitals: /74   Pulse 97   Temp 98.2 °F (36.8 °C)   Resp 18   Ht 5' 7" (1.702 m)   Wt 107 kg (235 lb 7.2 oz)   SpO2 97%   BMI 36.88 kg/m²     I/O last 24 hours:   In: 480 [P.O.:480]  Out: 1600 [Urine:1600]      Physical Exam:   General Alert awake   Normocephalic atraumatic PERRLA  Lungs clear bilaterally  Cardiac normal S1 normal S2  Abdomen soft, flank pain none  Extremities no edema      Lab Results: CBC:   Lab Results   Component Value Date    WBC 27.82 (H) 10/23/2023    HGB 10.8 (L) 10/23/2023    HCT 32.0 (L) 10/23/2023    MCV 83 10/23/2023     10/23/2023    RBC 3.88 10/23/2023    MCH 27.8 10/23/2023    MCHC 33.8 10/23/2023    RDW 14.5 10/23/2023    MPV 9.1 10/23/2023    NRBC 0 10/23/2023     CMP:   Lab Results   Component Value Date     10/23/2023    CO2 21 10/23/2023    BUN 13 10/23/2023    CREATININE 1.04 10/23/2023    CALCIUM 6.9 (L) 10/23/2023    AST 22 10/20/2023    ALT 23 10/20/2023    ALKPHOS 106 (H) 10/20/2023    EGFR 58 10/23/2023     Urinalysis:   Lab Results   Component Value Date    COLORU Colorless 10/17/2023    CLARITYU Cloudy 10/17/2023    SPECGRAV <=1.005 10/17/2023    PHUR 5.5 10/17/2023    LEUKOCYTESUR Small (A) 10/17/2023    NITRITE Negative 10/17/2023    GLUCOSEU >=1000 (1%) (A) 10/17/2023    KETONESU Negative 10/17/2023    BILIRUBINUR Negative 10/17/2023    BLOODU Large (A) 10/17/2023     Urine Culture:   Lab Results   Component Value Date    URINECX >100,000 cfu/ml Escherichia coli (A) 10/17/2023     PSA: No results found for: "PSA"      Assessment/ Plan:  Emphysematous pyelonephritis  Follow-up CAT scan showing no pus collections  Continue antibiotics as per ID  No surgical intervention warranted at this time patient completely stable   status post cystoscopy right stent for hydronephrosis day #4          Mary Jane Lucas MD

## 2023-10-23 NOTE — ASSESSMENT & PLAN NOTE
Improved. Pseudohyponatremia on presentation due to hyperglycemia.      Recent Labs     10/21/23  0516 10/22/23  0527 10/23/23  0411   SODIUM 133* 134* 133*     Results from last 7 days   Lab Units 10/17/23  1530 10/17/23  1420   OSMO UR mmol/KG  --  447   SODIUM UR   --  <10   OSMOLALITY, SERUM mmol/*  --

## 2023-10-23 NOTE — ASSESSMENT & PLAN NOTE
Admission creatinine 1.67. Etiology: ATN / pre-renal azotemia.  Improved  Monitor due to planned ct with contrast by urology today

## 2023-10-23 NOTE — PROGRESS NOTES
[id number] [facility]  Progress Note  Name: Alpheus Flor Apgar I  MRN: [id number]  Unit/Bed#: 4 [place] 419-01 I Date of Admission: 10/17/2023   Date of Service: 10/23/2023 I Hospital Day: 6    Assessment/Plan   * Hyperosmolar hyperglycemic state (HHS) [facility])  Assessment & Plan  70-year-old female presenting with abdominal discomfort, nausea, and vomiting, and HHS. Currently at appropriate levels with glargine 15 units daily, lispro 2 units 3 times daily with meals, and sliding scale. Appreciate endocrinology assistance. Recent Labs     10/21/23  2015 10/22/23  0729 10/22/23  1147 10/22/23  1553 10/22/23  2003 10/23/23  0730   POCGLU 181* 166* 193* 218* 203* 86       Type 2 diabetes mellitus ([address] [facility])  Assessment & Plan  Diabetic supplies on discharge. Treatment plan as written above    Emphysematous pyelonephritis  Assessment & Plan  CT Imaging showing right-sided emphysematous pyelonephritis without abscess. S/p 10/19 cystoscopy with right sided stenting  For CT abdomen and pelvis with contrast today as ordered by urology    Depression  Assessment & Plan  Continue Paxil    Hyperlipidemia  Assessment & Plan  Continue statin / zetia    Essential hypertension  Assessment & Plan  Above goal  HCTZ / ACEi on hold due to LEANDRO  Reported that she is on Aceon (perindopril) and HCTZ at home. Acute kidney injury [facility])  Assessment & Plan  Admission creatinine 1.67. Etiology: ATN / pre-renal azotemia. Improved  Monitor due to planned ct with contrast by urology today    Hyponatremia  Assessment & Plan  Improved. Pseudohyponatremia on presentation due to hyperglycemia.      Recent Labs     10/21/23  0516 10/22/23  0527 10/23/23  0411   SODIUM 133* 134* 133*     Results from last 7 days   Lab Units 10/17/23  1530 10/17/23  1420   OSMO UR mmol/KG  --  447   SODIUM UR   --  <10   OSMOLALITY, SERUM mmol/*  --          Leukocytosis  Assessment & Plan  Workup in progress to rule out abscess    Recent Labs 10/21/23  0515 10/22/23  0527 10/23/23  0411   WBC 24.77* 26.64* 27.82*                VTE Pharmacologic Prophylaxis:   Pharmacologic: Heparin  Mechanical VTE Prophylaxis in Place: Yes    Discussions with Specialists or Other Care Team Provider: nursing    Education and Discussions with Family / Patient: patient    Current Length of Stay: 6 day(s)    Current Patient Status: Inpatient   Certification Statement: The patient will continue to require additional inpatient hospital stay due to iv antibiotics, ct scan, monitor for ceasar    Discharge Plan: active    Code Status: Level 1 - Full Code      Subjective:   Patient seen and examined at bedside. Comfortable, she has no new complaints. Objective:     Vitals:   Temp (24hrs), Av.3 °F (36.8 °C), Min:98.1 °F (36.7 °C), Max:98.4 °F (36.9 °C)    Temp:  [98.1 °F (36.7 °C)-98.4 °F (36.9 °C)] 98.3 °F (36.8 °C)  HR:  [84-92] 84  Resp:  [18] 18  BP: (112-159)/(67-77) 140/67  SpO2:  [96 %-99 %] 96 %  Body mass index is 36.88 kg/m². Input and Output Summary (last 24 hours): Intake/Output Summary (Last 24 hours) at 10/23/2023 0928  Last data filed at 10/23/2023 0703  Gross per 24 hour   Intake 480 ml   Output 1600 ml   Net -1120 ml       Physical Exam:     Physical Exam  Vitals reviewed. Constitutional:       General: She is not in acute distress. HENT:      Head: Normocephalic. Nose: Nose normal.      Mouth/Throat:      Mouth: Mucous membranes are moist.   Eyes:      General: No scleral icterus. Cardiovascular:      Rate and Rhythm: Normal rate. Pulmonary:      Effort: Pulmonary effort is normal. No respiratory distress. Abdominal:      General: There is no distension. Palpations: Abdomen is soft. Tenderness: There is no abdominal tenderness. Skin:     General: Skin is warm. Neurological:      Mental Status: She is alert. Mental status is at baseline.    Psychiatric:         Mood and Affect: Mood normal.         Behavior: Behavior normal. Additional Data:     Labs:    Results from last 7 days   Lab Units 10/23/23  0411 10/22/23  0527   WBC Thousand/uL 27.82* 26.64*   HEMOGLOBIN g/dL 10.8* 11.9   HEMATOCRIT % 32.0* 35.5   PLATELETS Thousands/uL 324 190   BANDS PCT %  --  2   LYMPHO PCT %  --  8*   MONO PCT %  --  6   EOS PCT %  --  1     Results from last 7 days   Lab Units 10/23/23  0411 10/21/23  0516 10/20/23  0521   SODIUM mmol/L 133*   < > 135   POTASSIUM mmol/L 3.3*   < > 3.5   CHLORIDE mmol/L 105   < > 107   CO2 mmol/L 21   < > 21   BUN mg/dL 13   < > 38*   CREATININE mg/dL 1.04   < > 1.64*   ANION GAP mmol/L 7   < > 7   CALCIUM mg/dL 6.9*   < > 7.3*   ALBUMIN g/dL  --   --  2.1*   TOTAL BILIRUBIN mg/dL  --   --  1.47*   ALK PHOS U/L  --   --  106*   ALT U/L  --   --  23   AST U/L  --   --  22   GLUCOSE RANDOM mg/dL 82   < > 134    < > = values in this interval not displayed. Results from last 7 days   Lab Units 10/23/23  0730 10/22/23  2003 10/22/23  1553 10/22/23  1147 10/22/23  0729 10/21/23  2015 10/21/23  1548 10/21/23  1133 10/21/23  0755 10/20/23  2127 10/20/23  1610 10/20/23  1122   POC GLUCOSE mg/dl 86 203* 218* 193* 166* 181* 184* 234* 169* 179* 185* 126     Results from last 7 days   Lab Units 10/18/23  0558   HEMOGLOBIN A1C % 15.0*     Results from last 7 days   Lab Units 10/20/23  0521 10/19/23  0553 10/18/23  0941 10/18/23  0808   LACTIC ACID mmol/L  --   --  1.2  --    PROCALCITONIN ng/ml 2.04* 4.32*  --  7.82*           * I Have Reviewed All Lab Data Listed Above. * Additional Pertinent Lab Tests Reviewed:  [facility] Admission Reviewed      Lines:   Invasive Devices       Peripheral Intravenous Line  Duration             Long-Dwell Peripheral IV (Midline) 10/17/23 Left Brachial 5 days    Peripheral IV 10/21/23 Distal;Right;Upper;Ventral (anterior) Arm 1 day                       Imaging:    Imaging Reports Reviewed Today Include: imaging since admission    Recent Cultures (last 7 days): Results from last 7 days   Lab Units 10/17/23  1420   URINE CULTURE  >100,000 cfu/ml Escherichia coli*       Last 24 Hours Medication List:   Current Facility-Administered Medications   Medication Dose Route Frequency Provider Last Rate    acetaminophen  650 mg Oral Q6H PRN Lucio Hayes MD      aluminum-magnesium hydroxide-simethicone  30 mL Oral Q6H PRN Lucio Hayes MD      calcium carbonate  1 tablet Oral Daily With Breakfast Lucio Hayes MD      calcium carbonate  500 mg Oral TID PRN Lucio Hayes MD      cefazolin  2,000 mg Intravenous Q8H Michelle Candelario PA-C 2,000 mg (10/23/23 0808)    cholecalciferol  2,000 Units Oral Daily Lucio Hayes MD      ezetimibe  10 mg Oral Daily Lucio Hayes MD      heparin (porcine)  5,000 Units Subcutaneous [facility]      insulin glargine  15 Units Subcutaneous HS Cathi Jones MD      insulin lispro  1-6 Units Subcutaneous 4x Daily (AC & HS) Cathi Jones MD      insulin lispro  2 Units Subcutaneous TID With Meals Cathi Jones MD      multi-electrolyte  100 mL/hr Intravenous Continuous Day Walters MD      ondansetron  4 mg Intravenous Q4H PRN Lucio Hayes MD      PARoxetine  20 mg Oral Daily Lucio Hayes MD      potassium chloride  40 mEq Oral Once Day Walters MD      pravastatin  20 mg Oral Daily With Lashawn Bianchi MD      saccharomyces boulardii  250 mg Oral BID Cathi Jones MD          Today, Patient Was Seen By: Nicolette Ryan MD    ** Please Note: Dictation voice to text software may have been used in the creation of this document.  **

## 2023-10-23 NOTE — ASSESSMENT & PLAN NOTE
Workup in progress to rule out abscess    Recent Labs     10/21/23  0515 10/22/23  0527 10/23/23  0411   WBC 24.77* 26.64* 27.82*

## 2023-10-23 NOTE — PLAN OF CARE
Problem: MOBILITY - ADULT  Goal: Maintain or return to baseline ADL function  Description: INTERVENTIONS:  -  Assess patient's ability to carry out ADLs; assess patient's baseline for ADL function and identify physical deficits which impact ability to perform ADLs (bathing, care of mouth/teeth, toileting, grooming, dressing, etc.)  - Assess/evaluate cause of self-care deficits   - Assess range of motion  - Assess patient's mobility; develop plan if impaired  - Assess patient's need for assistive devices and provide as appropriate  - Encourage maximum independence but intervene and supervise when necessary  - Involve family in performance of ADLs  - Assess for home care needs following discharge   - Consider OT consult to assist with ADL evaluation and planning for discharge  - Provide patient education as appropriate  Outcome: Progressing  Goal: Maintains/Returns to pre admission functional level  Description: INTERVENTIONS:  - Perform BMAT or MOVE assessment daily.   - Set and communicate daily mobility goal to care team and patient/family/caregiver.    - Collaborate with rehabilitation services on mobility goals if consulted  - Out of bed for toileting  - Record patient progress and toleration of activity level   Outcome: Progressing     Problem: INFECTION - ADULT  Goal: Absence of fever/infection during neutropenic period  Description: INTERVENTIONS:  - Monitor WBC    Outcome: Progressing     Problem: SAFETY ADULT  Goal: Maintain or return to baseline ADL function  Description: INTERVENTIONS:  -  Assess patient's ability to carry out ADLs; assess patient's baseline for ADL function and identify physical deficits which impact ability to perform ADLs (bathing, care of mouth/teeth, toileting, grooming, dressing, etc.)  - Assess/evaluate cause of self-care deficits   - Assess range of motion  - Assess patient's mobility; develop plan if impaired  - Assess patient's need for assistive devices and provide as appropriate  - Encourage maximum independence but intervene and supervise when necessary  - Involve family in performance of ADLs  - Assess for home care needs following discharge   - Consider OT consult to assist with ADL evaluation and planning for discharge  - Provide patient education as appropriate  Outcome: Progressing  Goal: Maintains/Returns to pre admission functional level  Description: INTERVENTIONS:  - Perform BMAT or MOVE assessment daily.   - Set and communicate daily mobility goal to care team and patient/family/caregiver.    - Collaborate with rehabilitation services on mobility goals if consulted  - Out of bed for toileting  - Record patient progress and toleration of activity level   Outcome: Progressing  Goal: Patient will remain free of falls  Description: INTERVENTIONS:  - Educate patient/family on patient safety including physical limitations  - Instruct patient to call for assistance with activity   - Consult OT/PT to assist with strengthening/mobility   - Keep Call bell within reach  - Keep bed low and locked with side rails adjusted as appropriate  - Keep care items and personal belongings within reach  - Initiate and maintain comfort rounds  - Make Fall Risk Sign visible to staff  - Offer Toileting every 2 Hours, in advance of need  - Apply yellow socks and bracelet for high fall risk patients  - Consider moving patient to room near nurses station  Outcome: Progressing     Problem: DISCHARGE PLANNING  Goal: Discharge to home or other facility with appropriate resources  Description: INTERVENTIONS:  - Identify barriers to discharge w/patient and caregiver  - Arrange for needed discharge resources and transportation as appropriate  - Identify discharge learning needs (meds, wound care, etc.)  - Arrange for interpretive services to assist at discharge as needed  - Refer to Case Management Department for coordinating discharge planning if the patient needs post-hospital services based on physician/advanced practitioner order or complex needs related to functional status, cognitive ability, or social support system  Outcome: Progressing     Problem: Knowledge Deficit  Goal: Patient/family/caregiver demonstrates understanding of disease process, treatment plan, medications, and discharge instructions  Description: Complete learning assessment and assess knowledge base.   Interventions:  - Provide teaching at level of understanding  - Provide teaching via preferred learning methods  Outcome: Progressing     Problem: GASTROINTESTINAL - ADULT  Goal: Minimal or absence of nausea and/or vomiting  Description: INTERVENTIONS:  - Administer IV fluids if ordered to ensure adequate hydration  - Maintain NPO status until nausea and vomiting are resolved  - Nasogastric tube if ordered  - Administer ordered antiemetic medications as needed  - Provide nonpharmacologic comfort measures as appropriate  - Advance diet as tolerated, if ordered  - Consider nutrition services referral to assist patient with adequate nutrition and appropriate food choices  Outcome: Progressing  Goal: Maintains adequate nutritional intake  Description: INTERVENTIONS:  - Monitor percentage of each meal consumed  - Identify factors contributing to decreased intake, treat as appropriate  - Assist with meals as needed  - Monitor I&O, weight, and lab values if indicated  - Obtain nutrition services referral as needed  Outcome: Progressing  Goal: Oral mucous membranes remain intact  Description: INTERVENTIONS  - Assess oral mucosa and hygiene practices  - Implement preventative oral hygiene regimen  - Implement oral medicated treatments as ordered  - Initiate Nutrition services referral as needed  Outcome: Progressing     Problem: METABOLIC, FLUID AND ELECTROLYTES - ADULT  Goal: Electrolytes maintained within normal limits  Description: INTERVENTIONS:  - Monitor labs and assess patient for signs and symptoms of electrolyte imbalances  - Administer electrolyte replacement as ordered  - Monitor response to electrolyte replacements, including repeat lab results as appropriate  - Instruct patient on fluid and nutrition as appropriate  Outcome: Progressing  Goal: Glucose maintained within target range  Description: INTERVENTIONS:  - Monitor Blood Glucose as ordered  - Assess for signs and symptoms of hyperglycemia and hypoglycemia  - Administer ordered medications to maintain glucose within target range  - Assess nutritional intake and initiate nutrition service referral as needed  Outcome: Progressing     Problem: MUSCULOSKELETAL - ADULT  Goal: Maintain or return mobility to safest level of function  Description: INTERVENTIONS:  - Assess patient's ability to carry out ADLs; assess patient's baseline for ADL function and identify physical deficits which impact ability to perform ADLs (bathing, care of mouth/teeth, toileting, grooming, dressing, etc.)  - Assess/evaluate cause of self-care deficits   - Assess range of motion  - Assess patient's mobility  - Assess patient's need for assistive devices and provide as appropriate  - Encourage maximum independence but intervene and supervise when necessary  - Involve family in performance of ADLs  - Assess for home care needs following discharge   - Consider OT consult to assist with ADL evaluation and planning for discharge  - Provide patient education as appropriate  Outcome: Progressing     Problem: Nutrition/Hydration-ADULT  Goal: Nutrient/Hydration intake appropriate for improving, restoring or maintaining nutritional needs  Description: Monitor and assess patient's nutrition/hydration status for malnutrition. Collaborate with interdisciplinary team and initiate plan and interventions as ordered. Monitor patient's weight and dietary intake as ordered or per policy. Utilize nutrition screening tool and intervene as necessary.  Determine patient's food preferences and provide high-protein, high-caloric foods as appropriate.      INTERVENTIONS:  - Monitor oral intake, urinary output, labs, and treatment plans  - Assess nutrition and hydration status and recommend course of action  - Evaluate amount of meals eaten  - Assist patient with eating if necessary   - Allow adequate time for meals  - Recommend/ encourage appropriate diets, oral nutritional supplements, and vitamin/mineral supplements  - Order, calculate, and assess calorie counts as needed  - Recommend, monitor, and adjust tube feedings and TPN/PPN based on assessed needs  - Assess need for intravenous fluids  - Provide specific nutrition/hydration education as appropriate  - Include patient/family/caregiver in decisions related to nutrition  Outcome: Progressing

## 2023-10-23 NOTE — ASSESSMENT & PLAN NOTE
Above goal  HCTZ / ACEi on hold due to LEANDRO  Reported that she is on Aceon (perindopril) and HCTZ at home.

## 2023-10-23 NOTE — PLAN OF CARE
Problem: MOBILITY - ADULT  Goal: Maintain or return to baseline ADL function  Description: INTERVENTIONS:  -  Assess patient's ability to carry out ADLs; assess patient's baseline for ADL function and identify physical deficits which impact ability to perform ADLs (bathing, care of mouth/teeth, toileting, grooming, dressing, etc.)  - Assess/evaluate cause of self-care deficits   - Assess range of motion  - Assess patient's mobility; develop plan if impaired  - Assess patient's need for assistive devices and provide as appropriate  - Encourage maximum independence but intervene and supervise when necessary  - Involve family in performance of ADLs  - Assess for home care needs following discharge   - Consider OT consult to assist with ADL evaluation and planning for discharge  - Provide patient education as appropriate  Outcome: Progressing  Goal: Maintains/Returns to pre admission functional level  Description: INTERVENTIONS:  - Perform BMAT or MOVE assessment daily.   - Set and communicate daily mobility goal to care team and patient/family/caregiver.    - Collaborate with rehabilitation services on mobility goals if consulted  - Out of bed for toileting  - Record patient progress and toleration of activity level   Outcome: Progressing     Problem: INFECTION - ADULT  Goal: Absence of fever/infection during neutropenic period  Description: INTERVENTIONS:  - Monitor WBC    Outcome: Progressing     Problem: SAFETY ADULT  Goal: Maintain or return to baseline ADL function  Description: INTERVENTIONS:  -  Assess patient's ability to carry out ADLs; assess patient's baseline for ADL function and identify physical deficits which impact ability to perform ADLs (bathing, care of mouth/teeth, toileting, grooming, dressing, etc.)  - Assess/evaluate cause of self-care deficits   - Assess range of motion  - Assess patient's mobility; develop plan if impaired  - Assess patient's need for assistive devices and provide as appropriate  - Encourage maximum independence but intervene and supervise when necessary  - Involve family in performance of ADLs  - Assess for home care needs following discharge   - Consider OT consult to assist with ADL evaluation and planning for discharge  - Provide patient education as appropriate  Outcome: Progressing  Goal: Maintains/Returns to pre admission functional level  Description: INTERVENTIONS:  - Perform BMAT or MOVE assessment daily.   - Set and communicate daily mobility goal to care team and patient/family/caregiver.    - Collaborate with rehabilitation services on mobility goals if consulted  - Out of bed for toileting  - Record patient progress and toleration of activity level   Outcome: Progressing  Goal: Patient will remain free of falls  Description: INTERVENTIONS:  - Educate patient/family on patient safety including physical limitations  - Instruct patient to call for assistance with activity   - Consult OT/PT to assist with strengthening/mobility   - Keep Call bell within reach  - Keep bed low and locked with side rails adjusted as appropriate  - Keep care items and personal belongings within reach  - Initiate and maintain comfort rounds  - Make Fall Risk Sign visible to staff  - Offer Toileting every 2 Hours, in advance of need  - Initiate/Maintain bed alarm  - Obtain necessary fall risk management equipment: yellow socks  - Apply yellow socks and bracelet for high fall risk patients  - Consider moving patient to room near nurses station  Outcome: Progressing     Problem: DISCHARGE PLANNING  Goal: Discharge to home or other facility with appropriate resources  Description: INTERVENTIONS:  - Identify barriers to discharge w/patient and caregiver  - Arrange for needed discharge resources and transportation as appropriate  - Identify discharge learning needs (meds, wound care, etc.)  - Arrange for interpretive services to assist at discharge as needed  - Refer to Case Management Department for coordinating discharge planning if the patient needs post-hospital services based on physician/advanced practitioner order or complex needs related to functional status, cognitive ability, or social support system  Outcome: Progressing     Problem: Knowledge Deficit  Goal: Patient/family/caregiver demonstrates understanding of disease process, treatment plan, medications, and discharge instructions  Description: Complete learning assessment and assess knowledge base.   Interventions:  - Provide teaching at level of understanding  - Provide teaching via preferred learning methods  Outcome: Progressing     Problem: GASTROINTESTINAL - ADULT  Goal: Minimal or absence of nausea and/or vomiting  Description: INTERVENTIONS:  - Administer IV fluids if ordered to ensure adequate hydration  - Maintain NPO status until nausea and vomiting are resolved  - Nasogastric tube if ordered  - Administer ordered antiemetic medications as needed  - Provide nonpharmacologic comfort measures as appropriate  - Advance diet as tolerated, if ordered  - Consider nutrition services referral to assist patient with adequate nutrition and appropriate food choices  Outcome: Progressing  Goal: Maintains adequate nutritional intake  Description: INTERVENTIONS:  - Monitor percentage of each meal consumed  - Identify factors contributing to decreased intake, treat as appropriate  - Assist with meals as needed  - Monitor I&O, weight, and lab values if indicated  - Obtain nutrition services referral as needed  Outcome: Progressing  Goal: Oral mucous membranes remain intact  Description: INTERVENTIONS  - Assess oral mucosa and hygiene practices  - Implement preventative oral hygiene regimen  - Implement oral medicated treatments as ordered  - Initiate Nutrition services referral as needed  Outcome: Progressing     Problem: METABOLIC, FLUID AND ELECTROLYTES - ADULT  Goal: Electrolytes maintained within normal limits  Description: INTERVENTIONS:  - Monitor labs and assess patient for signs and symptoms of electrolyte imbalances  - Administer electrolyte replacement as ordered  - Monitor response to electrolyte replacements, including repeat lab results as appropriate  - Instruct patient on fluid and nutrition as appropriate  Outcome: Progressing  Goal: Glucose maintained within target range  Description: INTERVENTIONS:  - Monitor Blood Glucose as ordered  - Assess for signs and symptoms of hyperglycemia and hypoglycemia  - Administer ordered medications to maintain glucose within target range  - Assess nutritional intake and initiate nutrition service referral as needed  Outcome: Progressing     Problem: SKIN/TISSUE INTEGRITY - ADULT  Goal: Skin Integrity remains intact(Skin Breakdown Prevention)  Description: Assess:  -Perform Jose assessment every shift  -Clean and moisturize skin   -Inspect skin when repositioning, toileting, and assisting with ADLS  -Assess under medical devices   -Assess extremities for adequate circulation and sensation     Bed Management:  -Have minimal linens on bed & keep smooth, unwrinkled  -Change linens as needed when moist or perspiring  -Avoid sitting or lying in one position for more than 2 hours while in bed  -Keep HOB at 45 degrees     Toileting:  -Offer bedside commode  -Assess for incontinence   -Use incontinent care products after each incontinent episode     Activity:  -Mobilize patient 4 times a day  -Encourage activity and walks on unit  -Encourage or provide ROM exercises   -Turn and reposition patient every 2 Hours  -Use appropriate equipment to lift or move patient in bed  -Instruct/ Assist with weight shifting every 30 minutes when out of bed in chair  -Consider limitation of chair time 2 hour intervals    Skin Care:  -Avoid use of baby powder, tape, friction and shearing, hot water or constrictive clothing  -Relieve pressure over bony prominences   -Do not massage red bony areas    Next Steps:  -Teach patient strategies to minimize risks    -Consider consults to  interdisciplinary teams   Outcome: Progressing     Problem: MUSCULOSKELETAL - ADULT  Goal: Maintain or return mobility to safest level of function  Description: INTERVENTIONS:  - Assess patient's ability to carry out ADLs; assess patient's baseline for ADL function and identify physical deficits which impact ability to perform ADLs (bathing, care of mouth/teeth, toileting, grooming, dressing, etc.)  - Assess/evaluate cause of self-care deficits   - Assess range of motion  - Assess patient's mobility  - Assess patient's need for assistive devices and provide as appropriate  - Encourage maximum independence but intervene and supervise when necessary  - Involve family in performance of ADLs  - Assess for home care needs following discharge   - Consider OT consult to assist with ADL evaluation and planning for discharge  - Provide patient education as appropriate  Outcome: Progressing

## 2023-10-23 NOTE — PROGRESS NOTES
Progress Note - Infectious Disease   Raphael Moreno Apgar 60 y.o. female MRN: [id number]  Unit/Bed#: [address] [place] [address] Encounter: [id number]      Impression/Plan:  1.  E coli Emphysematous pyelonephritis. status post cystoscopy and stent placement on 10/19/2023.  10/19/2023 cystoscopy with findings of severely edematous obstructed right ureteral orifice with moderate hydronephrosis and placement of a 24 cm 6 French stent. Urine culture growing pansensitive E. Coli  10/23/23 CT A/P right ureteral stent in place, emphysematous pyelonephritis same  -Continues cefazolin 2 g IV every 8 hours while in house.  -monitor temperature and hemodynamics  -serial exam  -Monitor urinary output  -Monitor serial WBC  -Urology following  -can transition to po keflex upon discharge to complete 14 day total antibiotic course through 10/30/23      2. Leukocytosis, POA 14 < 27 K  -antibiotics as above  -work up/management as above and below  -monitor CBC     3. LEANDRO. POA. Creatinine 1.67 > 1.04  -renal dose adjust antibiotic as needed  -volume management   -recheck BMP     4. Uncontrolled diabetes mellitus with hyperglycemic state on admission. 10/18/23 HgbA1c 15. Risk factor for infection  -Glycemic management per primary care team     Antibiotics:  Cefazolin D4 - abx D7     Above impression and plan discussed in detail with patient, RN, and primary care team.    Subjective:  Patient has no fever, chills, sweats; no nausea, vomiting, diarrhea; no cough, shortness of breath; no flank pain, no dysuria. No new symptoms. Objective:  Vitals:  Temp:  [97.9 °F (36.6 °C)-98.3 °F (36.8 °C)] 98.2 °F (36.8 °C)  HR:  [84-97] 97  Resp:  [18] 18  BP: (140-159)/(67-74) 156/74  SpO2:  [96 %-97 %] 97 %  Temp (24hrs), Av.2 °F (36.8 °C), Min:97.9 °F (36.6 °C), Max:98.3 °F (36.8 °C)  Current: Temperature: 98.2 °F (36.8 °C)    Physical Exam:   General Appearance:  60year old female propped comfortably in bed, nontoxic appearance, no acute distress. HEENT: Atraumatic normocephalic   Throat: Oropharynx moist. Poor dentition   Pulmonary:   Normal respiratory excursion without accessory muscle use   Cardiac:  RRR   Abdomen:   Soft, non-tender, non-distended   Extremities: No edema   : No luna, no SPT, no right flank/CVAT   Psychiatric: Awake, cooperative   Skin: No new rashes. IV site nontender. Labs, Imaging, & Other studies:   All pertinent labs and imaging studies were personally reviewed  Results from last 7 days   Lab Units 10/23/23  0411 10/22/23  0527 10/21/23  0515   WBC Thousand/uL 27.82* 26.64* 24.77*   HEMOGLOBIN g/dL 10.8* 11.9 11.9   PLATELETS Thousands/uL 324 190 263     Results from last 7 days   Lab Units 10/23/23  0411 10/22/23  0527 10/21/23  0516 10/20/23  0521 10/18/23  0132 10/17/23  1359   SODIUM mmol/L 133* 134* 133* 135   < > 119*   POTASSIUM mmol/L 3.3* 3.6 3.3* 3.5   < > 3.9   CHLORIDE mmol/L 105 106 106 107   < > 86*   CO2 mmol/L 21 15* 19* 21   < > 19*   BUN mg/dL 13 19 28* 38*   < > 68*   CREATININE mg/dL 1.04 1.26 1.38* 1.64*   < > 1.67*   EGFR ml/min/1.73sq m 58 46 41 33   < > 33   CALCIUM mg/dL 6.9* 7.1* 7.1* 7.3*   < > 7.6*   AST U/L  --   --   --  22  --  15   ALT U/L  --   --   --  23  --  14   ALK PHOS U/L  --   --   --  106*  --  85    < > = values in this interval not displayed.      Results from last 7 days   Lab Units 10/17/23  1748 10/17/23  1420   URINE CULTURE   --  >100,000 cfu/ml Escherichia coli*   MRSA CULTURE ONLY  No Methicillin Resistant Staphlyococcus aureus (MRSA) isolated  --      Results from last 7 days   Lab Units 10/20/23  0521 10/19/23  0553 10/18/23  0808   PROCALCITONIN ng/ml 2.04* 4.32* 7.82*

## 2023-10-23 NOTE — PROGRESS NOTES
Aimee SANDHU Apgar 60 y.o. female MRN: [id number]  Unit/Bed#: [address] [place] [address] Encounter: [id number]  Reason for Consult: LEANDRO    ASSESSMENT and PLAN:  Acute kidney injury (POA)  Admission creatinine 1.67 on 10/17 2023. Etiology felt to be due to ATN and prerenal azotemia. Renal function improving. Creatinine 1.04 today. Monitor off IV fluids. Emphysematous pyelonephritis  Currently on cefazolin. ID following. WBC count still elevated. Urology ordered CT with IV contrast.     Hypertension:  Reports that she is on Aceon (perindopril) and HCTZ at home. Of note, perindopril is not on her MAR. BP is currently acceptable. Monitor off BP medications. Hyponatremia:  Na was 119 on admission in setting of Gluc 849. Na stable at 133 today. Monitor with isotonic fluids. Hypokalemia:  K 3.3. Give Kdur 40 meq PO x a. Hypomagnesemia:  Give Mg sulfate 2 gm IV x 1. Diabetes mellitus  Glucose 849 on admission. Hypophosphatemia:  IV K phos ordered by primary team.     DISPOSITION:  Give Isolyte at 100 cc/hr in light of going for contrast study. Agree with Mg and phos replacement. Give Kdur 40 meq PO x 1.     SUBJECTIVE / 24H INTERVAL HISTORY:  No new acute complaints. Admits poor compliance to medications prior to admission. No CP or SOB.      OBJECTIVE:  Current Weight: Weight - Scale: 107 kg (235 lb 7.2 oz)  Vitals:    10/22/23 1519 10/22/23 1814 10/22/23 2209 10/23/23 0759   BP: 157/77 112/74 159/73 140/67   BP Location:    Right arm   Pulse: 91 84 92 84   Resp: 18 18 18    Temp: 98.4 °F (36.9 °C) 98.1 °F (36.7 °C) 98.3 °F (36.8 °C) 98.3 °F (36.8 °C)   TempSrc:   Oral Oral   SpO2: 96% 99% 96% 96%   Weight:       Height:           Intake/Output Summary (Last 24 hours) at 10/23/2023 0859  Last data filed at 10/23/2023 0703  Gross per 24 hour   Intake 480 ml   Output 1600 ml   Net -1120 ml     General: conscious, cooperative, no distress  Skin: dry  Eyes: pink conjunctivae  ENT: moist mucous membranes  Respiratory: equal chest expansion, clear breath sounds. Cardiovascular: distinct heart sounds, normal rate, regular rhythm, no rub  Abdomen: soft, non tender, non distended, normal bowel sounds  Extremities: trace LE edema. Genitourinary: no luna catheter. Neuro: awake, alert.    Psych: appropriate affect    Medications:    Current Facility-Administered Medications:     acetaminophen (TYLENOL) tablet 650 mg, 650 mg, Oral, Q6H PRN, Sunshine Antoine MD    aluminum-magnesium hydroxide-simethicone (MAALOX) oral suspension 30 mL, 30 mL, Oral, Q6H PRN, Sunshine Antoine MD    calcium carbonate (OYSTER SHELL,OSCAL) 500 mg tablet 1 tablet, 1 tablet, Oral, Daily With Breakfast, Sunshine Antoine MD, 1 tablet at 10/23/23 0827    calcium carbonate (TUMS) chewable tablet 500 mg, 500 mg, Oral, TID PRN, Sunshine Antoine MD, 500 mg at 10/18/23 1833    ceFAZolin (ANCEF) IVPB (premix in dextrose) 2,000 mg 50 mL, 2,000 mg, Intravenous, Q8H, [facility], Last Rate: 100 mL/hr at 10/23/23 0827, 2,000 mg at 10/23/23 0827    cholecalciferol (VITAMIN D3) tablet 2,000 Units, 2,000 Units, Oral, Daily, Sunshine Antoine MD, 2,000 Units at 10/23/23 0827    ezetimibe (ZETIA) tablet 10 mg, 10 mg, Oral, Daily, Sunshine Antoine MD, 10 mg at 10/23/23 0827    heparin (porcine) subcutaneous injection 5,000 Units, 5,000 Units, Subcutaneous, Q8H [address], Sunshine Antoine MD, 5,000 Units at 10/23/23 0607    insulin glargine (LANTUS) subcutaneous injection 15 Units 0.15 mL, 15 Units, Subcutaneous, HS, Delmy Cotton MD, 15 Units at 10/22/23 2104    insulin lispro (HumaLOG) 100 units/mL subcutaneous injection 1-6 Units, 1-6 Units, Subcutaneous, 4x Daily (AC & HS), 2 Units at 10/22/23 2104 **AND** Fingerstick Glucose (POCT), , , 4x Daily AC and at bedtime, Delmy Cotton MD    insulin lispro (HumaLOG) 100 units/mL subcutaneous injection 2 Units, 2 Units, Subcutaneous, TID With Meals, Delmy Cotton MD, 2 Units at 10/23/23 0828    multi-electrolyte (PLASMALYTE-A/ISOLYTE-S PH 7.4) IV solution, 50 mL/hr, Intravenous, Continuous, Rubin Acevedo MD, Last Rate: 50 mL/hr at 10/22/23 0936, 50 mL/hr at 10/22/23 0936    ondansetron [facility]) injection 4 mg, 4 mg, Intravenous, Q4H PRN, Rahat Garcia MD, 4 mg at 10/18/23 1833    PARoxetine (PAXIL) tablet 20 mg, 20 mg, Oral, Daily, Rahat Garcia MD, 20 mg at 10/23/23 0827    pravastatin (PRAVACHOL) tablet 20 mg, 20 mg, Oral, Daily With Kevin Sumner MD, 20 mg at 10/22/23 1659    saccharomyces boulardii (FLORASTOR) capsule 250 mg, 250 mg, Oral, BID, Jw Giraldo MD, 250 mg at 10/23/23 0827    Laboratory Results:  Results from last 7 days   Lab Units 10/23/23  0411 10/22/23  0527 10/21/23  0516 10/21/23  0515 10/20/23  0521 10/19/23  0553 10/18/23  1548 10/18/23  0808 10/18/23  0558 10/18/23  0132 10/17/23  1359 10/17/23  1200   WBC Thousand/uL 27.82* 26.64*  --  24.77* 25.89* 26.35*  --   --  25.77*  --   --  14.07*   HEMOGLOBIN g/dL 10.8* 11.9  --  11.9 11.6 12.0  --   --  12.5  --   --  14.9   HEMATOCRIT % 32.0* 35.5  --  33.8* 33.5* 34.1*  --   --  34.8  --   --  44.0   PLATELETS Thousands/uL 324 190  --  263 231 190  --   --  146*  --   --  137*   POTASSIUM mmol/L 3.3* 3.6 3.3*  --  3.5 3.8 3.5 3.0*  --    < > 3.9  --    CHLORIDE mmol/L 105 106 106  --  107 104 98 96  --    < > 86*  --    CO2 mmol/L 21 15* 19*  --  21 20* 22 22  --    < > 19*  --    BUN mg/dL 13 19 28*  --  38* 49* 54* 55*  --    < > 68*  --    CREATININE mg/dL 1.04 1.26 1.38*  --  1.64* 1.73* 1.77* 1.64*  --    < > 1.67*  --    CALCIUM mg/dL 6.9* 7.1* 7.1*  --  7.3* 7.1* 7.1* 7.3*  --    < > 7.6*  --    MAGNESIUM mg/dL 1.8* 1.8* 1.8*  --  2.0 2.1  --   --  1.7*  --  2.0  --    PHOSPHORUS mg/dL 2.5* 2.6* 2.5*  --  2.7 3.0  --   --  2.2*  --   --   --     < > = values in this interval not displayed.

## 2023-10-23 NOTE — ASSESSMENT & PLAN NOTE
CT Imaging showing right-sided emphysematous pyelonephritis without abscess.   S/p 10/19 cystoscopy with right sided stenting  For CT abdomen and pelvis with contrast today as ordered by urology

## 2023-10-23 NOTE — ASSESSMENT & PLAN NOTE
11-year-old female presenting with abdominal discomfort, nausea, and vomiting, and HHS. Currently at appropriate levels with glargine 15 units daily, lispro 2 units 3 times daily with meals, and sliding scale. Appreciate endocrinology assistance.     Recent Labs     10/21/23  2015 10/22/23  0729 10/22/23  1147 10/22/23  1553 10/22/23  2003 10/23/23  0730   POCGLU 181* 166* 193* 218* 203* 86

## 2023-10-24 PROBLEM — E11.65 TYPE 2 DIABETES MELLITUS WITH HYPERGLYCEMIA, WITHOUT LONG-TERM CURRENT USE OF INSULIN (HCC): Status: ACTIVE | Noted: 2023-10-24

## 2023-10-24 LAB
ANION GAP SERPL CALCULATED.3IONS-SCNC: 6 MMOL/L
BASOPHILS # BLD AUTO: 0.08 THOUSANDS/ÂΜL (ref 0–0.1)
BASOPHILS NFR BLD AUTO: 0 % (ref 0–1)
BUN SERPL-MCNC: 10 MG/DL (ref 5–25)
CALCIUM SERPL-MCNC: 7.3 MG/DL (ref 8.4–10.2)
CHLORIDE SERPL-SCNC: 104 MMOL/L (ref 96–108)
CO2 SERPL-SCNC: 23 MMOL/L (ref 21–32)
CREAT SERPL-MCNC: 1.01 MG/DL (ref 0.6–1.3)
EOSINOPHIL # BLD AUTO: 0.22 THOUSAND/ÂΜL (ref 0–0.61)
EOSINOPHIL NFR BLD AUTO: 1 % (ref 0–6)
ERYTHROCYTE [DISTWIDTH] IN BLOOD BY AUTOMATED COUNT: 14.5 % (ref 11.6–15.1)
GFR SERPL CREATININE-BSD FRML MDRD: 61 ML/MIN/1.73SQ M
GLUCOSE SERPL-MCNC: 123 MG/DL (ref 65–140)
GLUCOSE SERPL-MCNC: 132 MG/DL (ref 65–140)
GLUCOSE SERPL-MCNC: 146 MG/DL (ref 65–140)
GLUCOSE SERPL-MCNC: 178 MG/DL (ref 65–140)
GLUCOSE SERPL-MCNC: 181 MG/DL (ref 65–140)
HCT VFR BLD AUTO: 31.1 % (ref 34.8–46.1)
HGB BLD-MCNC: 10.6 G/DL (ref 11.5–15.4)
IMM GRANULOCYTES # BLD AUTO: 0.41 THOUSAND/UL (ref 0–0.2)
IMM GRANULOCYTES NFR BLD AUTO: 2 % (ref 0–2)
LYMPHOCYTES # BLD AUTO: 1.2 THOUSANDS/ÂΜL (ref 0.6–4.47)
LYMPHOCYTES NFR BLD AUTO: 5 % (ref 14–44)
MAGNESIUM SERPL-MCNC: 1.5 MG/DL (ref 1.9–2.7)
MCH RBC QN AUTO: 28.6 PG (ref 26.8–34.3)
MCHC RBC AUTO-ENTMCNC: 34.1 G/DL (ref 31.4–37.4)
MCV RBC AUTO: 84 FL (ref 82–98)
MONOCYTES # BLD AUTO: 1.22 THOUSAND/ÂΜL (ref 0.17–1.22)
MONOCYTES NFR BLD AUTO: 5 % (ref 4–12)
NEUTROPHILS # BLD AUTO: 19.38 THOUSANDS/ÂΜL (ref 1.85–7.62)
NEUTS SEG NFR BLD AUTO: 87 % (ref 43–75)
NRBC BLD AUTO-RTO: 0 /100 WBCS
PHOSPHATE SERPL-MCNC: 2.3 MG/DL (ref 2.7–4.5)
PLATELET # BLD AUTO: 330 THOUSANDS/UL (ref 149–390)
PMV BLD AUTO: 8.8 FL (ref 8.9–12.7)
POTASSIUM SERPL-SCNC: 3.7 MMOL/L (ref 3.5–5.3)
RBC # BLD AUTO: 3.7 MILLION/UL (ref 3.81–5.12)
SODIUM SERPL-SCNC: 133 MMOL/L (ref 135–147)
WBC # BLD AUTO: 22.51 THOUSAND/UL (ref 4.31–10.16)

## 2023-10-24 PROCEDURE — 80048 BASIC METABOLIC PNL TOTAL CA: CPT | Performed by: INTERNAL MEDICINE

## 2023-10-24 PROCEDURE — 99232 SBSQ HOSP IP/OBS MODERATE 35: CPT | Performed by: INTERNAL MEDICINE

## 2023-10-24 PROCEDURE — 83735 ASSAY OF MAGNESIUM: CPT | Performed by: SPECIALIST

## 2023-10-24 PROCEDURE — 85025 COMPLETE CBC W/AUTO DIFF WBC: CPT | Performed by: SPECIALIST

## 2023-10-24 PROCEDURE — 84100 ASSAY OF PHOSPHORUS: CPT | Performed by: SPECIALIST

## 2023-10-24 PROCEDURE — 82948 REAGENT STRIP/BLOOD GLUCOSE: CPT

## 2023-10-24 RX ORDER — MAGNESIUM SULFATE HEPTAHYDRATE 40 MG/ML
4 INJECTION, SOLUTION INTRAVENOUS ONCE
Status: DISCONTINUED | OUTPATIENT
Start: 2023-10-24 | End: 2023-10-24

## 2023-10-24 RX ORDER — INSULIN GLARGINE 100 [IU]/ML
15 INJECTION, SOLUTION SUBCUTANEOUS
Qty: 10 ML | Refills: 0 | Status: SHIPPED | OUTPATIENT
Start: 2023-10-24

## 2023-10-24 RX ORDER — INSULIN LISPRO 100 [IU]/ML
2 INJECTION, SOLUTION SUBCUTANEOUS
Qty: 3 ML | Refills: 0 | Status: SHIPPED | OUTPATIENT
Start: 2023-10-24

## 2023-10-24 RX ORDER — HYDRALAZINE HYDROCHLORIDE 20 MG/ML
5 INJECTION INTRAMUSCULAR; INTRAVENOUS EVERY 6 HOURS PRN
Status: DISCONTINUED | OUTPATIENT
Start: 2023-10-24 | End: 2023-10-25 | Stop reason: HOSPADM

## 2023-10-24 RX ORDER — MAGNESIUM SULFATE HEPTAHYDRATE 40 MG/ML
4 INJECTION, SOLUTION INTRAVENOUS ONCE
Status: COMPLETED | OUTPATIENT
Start: 2023-10-24 | End: 2023-10-24

## 2023-10-24 RX ORDER — CALCIUM CARBONATE 500(1250)
1 TABLET ORAL
Qty: 30 TABLET | Refills: 0 | Status: SHIPPED | OUTPATIENT
Start: 2023-10-25

## 2023-10-24 RX ADMIN — Medication 2000 UNITS: at 08:20

## 2023-10-24 RX ADMIN — HEPARIN SODIUM 5000 UNITS: 5000 INJECTION INTRAVENOUS; SUBCUTANEOUS at 14:40

## 2023-10-24 RX ADMIN — CEFAZOLIN SODIUM 2000 MG: 2 SOLUTION INTRAVENOUS at 16:08

## 2023-10-24 RX ADMIN — POTASSIUM PHOSPHATE, MONOBASIC AND POTASSIUM PHOSPHATE, DIBASIC 30 MMOL: 224; 236 INJECTION, SOLUTION, CONCENTRATE INTRAVENOUS at 12:24

## 2023-10-24 RX ADMIN — INSULIN LISPRO 1 UNITS: 100 INJECTION, SOLUTION INTRAVENOUS; SUBCUTANEOUS at 16:09

## 2023-10-24 RX ADMIN — INSULIN LISPRO 1 UNITS: 100 INJECTION, SOLUTION INTRAVENOUS; SUBCUTANEOUS at 21:22

## 2023-10-24 RX ADMIN — INSULIN LISPRO 2 UNITS: 100 INJECTION, SOLUTION INTRAVENOUS; SUBCUTANEOUS at 13:00

## 2023-10-24 RX ADMIN — INSULIN LISPRO 2 UNITS: 100 INJECTION, SOLUTION INTRAVENOUS; SUBCUTANEOUS at 16:08

## 2023-10-24 RX ADMIN — EZETIMIBE 10 MG: 10 TABLET ORAL at 08:20

## 2023-10-24 RX ADMIN — CALCIUM 1 TABLET: 500 TABLET ORAL at 08:20

## 2023-10-24 RX ADMIN — INSULIN GLARGINE 15 UNITS: 100 INJECTION, SOLUTION SUBCUTANEOUS at 21:27

## 2023-10-24 RX ADMIN — HEPARIN SODIUM 5000 UNITS: 5000 INJECTION INTRAVENOUS; SUBCUTANEOUS at 05:34

## 2023-10-24 RX ADMIN — PRAVASTATIN SODIUM 20 MG: 20 TABLET ORAL at 16:08

## 2023-10-24 RX ADMIN — PAROXETINE 20 MG: 20 TABLET, FILM COATED ORAL at 08:20

## 2023-10-24 RX ADMIN — Medication 250 MG: at 17:41

## 2023-10-24 RX ADMIN — HEPARIN SODIUM 5000 UNITS: 5000 INJECTION INTRAVENOUS; SUBCUTANEOUS at 21:22

## 2023-10-24 RX ADMIN — CEFAZOLIN SODIUM 2000 MG: 2 SOLUTION INTRAVENOUS at 08:20

## 2023-10-24 RX ADMIN — INSULIN LISPRO 2 UNITS: 100 INJECTION, SOLUTION INTRAVENOUS; SUBCUTANEOUS at 08:20

## 2023-10-24 RX ADMIN — SODIUM CHLORIDE, SODIUM GLUCONATE, SODIUM ACETATE, POTASSIUM CHLORIDE, MAGNESIUM CHLORIDE, SODIUM PHOSPHATE, DIBASIC, AND POTASSIUM PHOSPHATE 100 ML/HR: .53; .5; .37; .037; .03; .012; .00082 INJECTION, SOLUTION INTRAVENOUS at 01:47

## 2023-10-24 RX ADMIN — POTASSIUM & SODIUM PHOSPHATES POWDER PACK 280-160-250 MG 2 PACKET: 280-160-250 PACK at 09:04

## 2023-10-24 RX ADMIN — Medication 250 MG: at 08:20

## 2023-10-24 RX ADMIN — MAGNESIUM SULFATE HEPTAHYDRATE 4 G: 40 INJECTION, SOLUTION INTRAVENOUS at 09:04

## 2023-10-24 RX ADMIN — CEFAZOLIN SODIUM 2000 MG: 2 SOLUTION INTRAVENOUS at 23:20

## 2023-10-24 NOTE — PROGRESS NOTES
[id number] [facility]  Progress Note  Name: Camillia Dines Apgar I  MRN: [id number]  Unit/Bed#: 4 [place] 419-01 I Date of Admission: 10/17/2023   Date of Service: 10/24/2023 I Hospital Day: 7      Assessment & Plan:    * Hyperosmolar hyperglycemic state (HHS) [facility])  Assessment & Plan  Presented with abdominal discomfort with nausea/vomiting and a profoundly elevated serum glucose of 849 -> progressively improved overnight into this morning now in the 100s  BMP without evidence of anion gap and urinalysis without evidence of ketones  Suspect hyperosmolar hyperglycemic state -> insulin drip now discontinued and transitioned to a basal insulin regimen with SSI coverage per Accu-Cheks  Now off IV fluids due to adequate oral intake  Monitor serial Accu-Cheks  Chronically on oral hypoglycemics for type 2 diabetes mellitus -> states that due to recent nausea/vomiting over the last few days prior to presentation, doses were missed  Suspect pyelonephritis as likely triggering factor (see below)  Appreciate endocrinology input  Highly suspect noncompliance to home medication regimen over a long/extended period of time     Emphysematous pyelonephritis  Assessment & Plan  Presented with nonspecific abdominal discomfort and 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 coli -> with continued improvement in WBC count, will likely transition to an oral regimen to complete a 3-week course through   No blood cultures were drawn in the ED  Leukocytosis noted, however, remains afebrile -> hemodynamically stable and currently nontoxic-appearing  PRN pain/emesis control  Appreciate urology input -> repeat CT imaging showed improvement (interval decrease in perirenal space soft tissue emphysema volume) -> status post on 10/19 cystoscopy with right-sided stenting  Initial imaging reviewed by IR -> no role for nephrostomy tube placement currently     Type 2 diabetes mellitus (HCC)  Assessment & Plan  A1c significantly uncontrolled at 15.0  Initiated on an insulin drip due to marked hyperglycemia (see plan for primary issue above)  Carbohydrate restriction  Holding oral hypoglycemics while hospitalized  Endocrinology input appreciated (see plan for HHS above) -> transition to basal insulin with SSI coverage for Accu-Cheks -> titrate insulin requirements daily contingent on blood sugars  Patient informed of need for insulin dependence at this time -> will be provided diabetic supplies on discharge including glucometer, testing strips, lancets, and prescriptions for insulin  Will appreciate nutritionist evaluation for diabetic teaching     Leukocytosis  Assessment & Plan  Likely reactive due to acute medical issue(s) coupled with hemoconcentration  Monitor WBC count -> see plan for pyelonephritis above     Hyponatremia  Assessment & Plan  Serum sodium only decreased at 119, however, in the setting of marked hyperglycemia, "corrected" sodium was 131  Anticipate progressive improvement in sodium levels with correction of blood sugars -> serum sodium progressively improved and currently stable at 133  IV fluids now discontinued  Continue serial serum sodium monitoring     Elevated serum creatinine  Assessment & Plan  Presenting creatinine of 1.67 -> status post IV fluid hydration -> improved to 1.01 today  Unclear if acute kidney injury or CKD as no prior baseline available  Likely contributed to by renal hypoperfusion and insensible volume loss from recent vomiting  Limit/avoid nephrotoxins as possible - holding HCTZ/ACEI  Monitor renal function and urine output -> patient has now received 2 loads of IV contrast dye due to repeat CT scans  Appreciate nephrology input -> now off IV fluids     Morbid obesity (HCC)  Assessment & Plan  BMI of 36.88  Lifestyle/diet modifications     Thrombocytopenia (HCC)  Assessment & Plan  Monitor platelet count - monitor for bleeding     Essential hypertension  Assessment & Plan  Holding HCTZ/ACEI in the setting of resolving acute kidney injury  PRN IV Hydralazine for BP spikes     Hyperlipidemia  Assessment & Plan  Continue statin/Zetia     Depression  Assessment & Plan  Continue Paxil       DVT Prophylaxis:  Heparin SC    Patient Centered Rounds:  I have performed bedside rounds and discussed plan of care with nursing today. Discussions with Specialists or Other Care Team Provider:  see above assessments if applicable    Education and Discussions with Family / Patient:  Patient at bedside, who will self update family as necessary    Time Spent for Care:  35 minutes. More than 50% of total time spent on counseling and coordination of care, on one or more of the following: performing physical exam; counseling and coordination of care, obtaining or reviewing history, documenting in the medical record, reviewing/ordering tests/medications/procedures, and communicating with other healthcare professionals. Current Length of Stay: 7 day(s)  Current Patient Status: Inpatient   Certification Statement:  Patient will continue to require additional hospital stay due to assessments as noted above. Code Status: Level 1 - Full Code        Subjective:     Seen and examined earlier today. Feels less fatigued today. Objective:     Vitals:   Temp (24hrs), Av.4 °F (36.9 °C), Min:98 °F (36.7 °C), Max:99 °F (37.2 °C)    Temp:  [98 °F (36.7 °C)-99 °F (37.2 °C)] 99 °F (37.2 °C)  HR:  [95-97] 97  Resp:  [17-18] 17  BP: (151-156)/(72-74) 151/72  SpO2:  [95 %-97 %] 95 %  Body mass index is 36.88 kg/m². Input and Output Summary (last 24 hours):        Intake/Output Summary (Last 24 hours) at 10/24/2023 1646  Last data filed at 10/24/2023 1307  Gross per 24 hour   Intake --   Output 1600 ml   Net -1600 ml         Physical Exam:     GENERAL Improved weakness/fatigue - obese    HEAD   Normocephalic - atraumatic   EYES   PERRL - EOMI    MOUTH   Mucosa moist   NECK   Supple - full range of motion   CARDIAC Rate controlled - S1/S2 positive   PULMONARY Clear bilateral breath sounds  nonlabored respirations   ABDOMEN   Soft - improving generalized tenderness without rebound/guarding - nondistended - active bowel sounds   MUSCULOSKELETAL   Motor strength/range of motion deconditioned   NEUROLOGIC   Alert/oriented at baseline   SKIN   Chronic wrinkles/blemishes    PSYCHIATRIC   Mood/affect stable           Additional Data:     Labs & Recent Cultures:    Results from last 7 days   Lab Units 10/24/23  0605 10/23/23  0411 10/22/23  0527   WBC Thousand/uL 22.51*   < > 26.64*   HEMOGLOBIN g/dL 10.6*   < > 11.9   HEMATOCRIT % 31.1*   < > 35.5   PLATELETS Thousands/uL 330   < > 190   BANDS PCT %  --   --  2   NEUTROS PCT % 87*  --   --    LYMPHS PCT % 5*  --   --    LYMPHO PCT %  --   --  8*   MONOS PCT % 5  --   --    MONO PCT %  --   --  6   EOS PCT % 1  --  1    < > = values in this interval not displayed. Results from last 7 days   Lab Units 10/24/23  0605 10/21/23  0516 10/20/23  0521   POTASSIUM mmol/L 3.7   < > 3.5   CHLORIDE mmol/L 104   < > 107   CO2 mmol/L 23   < > 21   BUN mg/dL 10   < > 38*   CREATININE mg/dL 1.01   < > 1.64*   CALCIUM mg/dL 7.3*   < > 7.3*   ALK PHOS U/L  --   --  106*   ALT U/L  --   --  23   AST U/L  --   --  22    < > = values in this interval not displayed.            Results from last 7 days   Lab Units 10/24/23  1606 10/24/23  1106 10/24/23  0737 10/23/23  2011 10/23/23  1615 10/23/23  1114 10/23/23  0730 10/22/23  2003 10/22/23  1553 10/22/23  1147 10/22/23  0729 10/21/23  2015   POC GLUCOSE mg/dl 181* 146* 123 268* 191* 132 86 203* 218* 193* 166* 181*       Results from last 7 days   Lab Units 10/18/23  0558   HEMOGLOBIN A1C % 15.0*       Results from last 7 days   Lab Units 10/20/23  0521 10/19/23  0553 10/18/23  0941 10/18/23  0808   LACTIC ACID mmol/L  --   --  1.2  --    PROCALCITONIN ng/ml 2.04* 4.32*  --  7.82*                     Lines/Drains:  Invasive Devices       Peripheral Intravenous Line  Duration             Long-Dwell Peripheral IV (Midline) 10/17/23 Left Brachial 6 days    Peripheral IV 10/21/23 Distal;Right;Upper;Ventral (anterior) Arm 3 days                      Last 24 Hours Medication List:   Current Facility-Administered Medications   Medication Dose Route Frequency Provider Last Rate    acetaminophen  650 mg Oral Q6H PRN Mary Jane Lucas MD      aluminum-magnesium hydroxide-simethicone  30 mL Oral Q6H PRN Mary Jane Lucas MD      calcium carbonate  1 tablet Oral Daily With Breakfast Mary Jane Lucas MD      calcium carbonate  500 mg Oral TID PRN Mary Jane Lucas MD      cefazolin  2,000 mg Intravenous Q8H Cristhian Hopper PA-C 2,000 mg (10/24/23 1608)    cholecalciferol  2,000 Units Oral Daily Mary Jane Lucas MD      ezetimibe  10 mg Oral Daily Mary Jane Lucas MD      heparin (porcine)  5,000 Units Subcutaneous [facility] Mary Jane Lucas MD      insulin glargine  15 Units Subcutaneous HS Isak Lombardo MD      insulin lispro  1-6 Units Subcutaneous 4x Daily (AC & HS) Isak Lombardo MD      insulin lispro  2 Units Subcutaneous TID With Meals Isak Lombardo MD      ondansetron  4 mg Intravenous Q4H PRN Mary Jane Lucas MD      PARoxetine  20 mg Oral Daily Mary Jane Lucas MD      potassium phosphate  30 mmol Intravenous Once Isak Lombardo MD 30 mmol (10/24/23 1224)    pravastatin  20 mg Oral Daily With Dinner Mary Jane Lucas MD      saccharomyces boulardii  250 mg Oral BID Isak Lombardo MD                    ** Please Note: This note is constructed using a voice recognition dictation system. An occasional wrong word/phrase or “sound-a-like” substitution may have been picked up by dictation device due to the inherent limitations of voice recognition software.   Read the chart carefully and recognize, using reasonable context, where substitutions may have occurred. **

## 2023-10-24 NOTE — PROGRESS NOTES
Aimee SANDHU Apgar 60 y.o. female MRN: [id number]  Unit/Bed#: [address] [address] Encounter: [id number]  Reason for Consult: LEANDRO    ASSESSMENT and PLAN:  Acute kidney injury (POA)  Admission creatinine 1.67 on 10/17 2023. Etiology felt to be due to ATN and prerenal azotemia. LEANDRO resolved. SCr 1.01 today. No evidence of contrast nephropathy. Stop IVF. Emphysematous pyelonephritis  Currently on cefazolin. ID following. WBC count still elevated but improved today. Hypertension:  BP on the high side today. Reports that she is on Aceon (perindopril) and HCTZ at home. Of note, perindopril is not on her MAR. Monitor BP with stopping IVF. Hyponatremia:  Na was 119 on admission in setting of Gluc 849. Na stable at 133 today. Hypokalemia:  K 3.7 today. Hypomagnesemia:  Mg still 1.5 - probably due to uncontrolled DM history. Give Mg sulfate 2 gm IV x 2 runs today. Start Mg oxide 400 mg BID tomorrow. Diabetes mellitus  Uncontrolled - Glucose 849 on admission. Hypophosphatemia:  Give Phosnak x 3 doses today. DISPOSITION:  Stop IVF. Mg sulfate 2 gm IV x 2. Phosnak 2 packets x 3 doses. SUBJECTIVE / 24H INTERVAL HISTORY:  Good appetite. No CP or SOB. UO 1200 cc.      OBJECTIVE:  Current Weight: Weight - Scale: 107 kg (235 lb 7.2 oz)  Vitals:    10/23/23 1515 10/23/23 1812 10/23/23 2207 10/24/23 0753   BP: 157/72 156/74 156/74 152/73   BP Location:       Pulse: 94 97 96 95   Resp: 18 18 18    Temp: 97.9 °F (36.6 °C) 98.2 °F (36.8 °C) 98 °F (36.7 °C) 99 °F (37.2 °C)   TempSrc:   Oral    SpO2: 96% 97% 95% 95%   Weight:       Height:           Intake/Output Summary (Last 24 hours) at 10/24/2023 0896  Last data filed at 10/23/2023 1924  Gross per 24 hour   Intake --   Output 400 ml   Net -400 ml     General: conscious, cooperative, no distress  Skin: dry  Eyes: pink conjunctivae  ENT: moist mucous membranes  Respiratory: equal chest expansion, clear breath sounds. Cardiovascular: distinct heart sounds, normal rate, regular rhythm, no rub  Abdomen: soft, non tender, non distended, normal bowel sounds  Extremities: no edema. Genitourinary: no luna catheter. Neuro: awake, alert.    Psych: appropriate affect    Medications:    Current Facility-Administered Medications:     acetaminophen (TYLENOL) tablet 650 mg, 650 mg, Oral, Q6H PRN, Ramsey Clancy MD    aluminum-magnesium hydroxide-simethicone (MAALOX) oral suspension 30 mL, 30 mL, Oral, Q6H PRN, Ramsey Clancy MD    calcium carbonate (OYSTER SHELL,OSCAL) 500 mg tablet 1 tablet, 1 tablet, Oral, Daily With Breakfast, Ramsey Clancy MD, 1 tablet at 10/24/23 0820    calcium carbonate (TUMS) chewable tablet 500 mg, 500 mg, Oral, TID PRN, Ramsey Clancy MD, 500 mg at 10/18/23 1833    ceFAZolin (ANCEF) IVPB (premix in dextrose) 2,000 mg 50 mL, 2,000 mg, Intravenous, Q8H, Marquita Guevara PA-C, Last Rate: 100 mL/hr at 10/24/23 0820, 2,000 mg at 10/24/23 0820    cholecalciferol (VITAMIN D3) tablet 2,000 Units, 2,000 Units, Oral, Daily, Ramsey Clancy MD, 2,000 Units at 10/24/23 0820    ezetimibe (ZETIA) tablet 10 mg, 10 mg, Oral, Daily, Ramsey Clancy MD, 10 mg at 10/24/23 0820    heparin (porcine) subcutaneous injection 5,000 Units, 5,000 Units, Subcutaneous, Q8H [address], Ramsey Clancy MD, 5,000 Units at 10/24/23 0534    insulin glargine (LANTUS) subcutaneous injection 15 Units 0.15 mL, 15 Units, Subcutaneous, HS, Herlinda Cano MD, 15 Units at 10/23/23 2107    insulin lispro (HumaLOG) 100 units/mL subcutaneous injection 1-6 Units, 1-6 Units, Subcutaneous, 4x Daily (AC & HS), 3 Units at 10/23/23 2107 **AND** Fingerstick Glucose (POCT), , , 4x Daily AC and at bedtime, Herlinda Cano MD    insulin lispro (HumaLOG) 100 units/mL subcutaneous injection 2 Units, 2 Units, Subcutaneous, TID With Meals, Herlinda Cano MD, 2 Units at 10/24/23 0820    multi-electrolyte (PLASMALYTE-A/ISOLYTE-S PH 7.4) IV solution, 100 mL/hr, Intravenous, Continuous, Donna Odell MD, Last Rate: 100 mL/hr at 10/24/23 0147, 100 mL/hr at 10/24/23 0147    ondansetron [facility]) injection 4 mg, 4 mg, Intravenous, Q4H PRN, Abdullahi Gonzalez MD, 4 mg at 10/18/23 1833    PARoxetine (PAXIL) tablet 20 mg, 20 mg, Oral, Daily, Abdullahi Gonzalez MD, 20 mg at 10/24/23 0820    pravastatin (PRAVACHOL) tablet 20 mg, 20 mg, Oral, Daily With Vane Hassan MD, 20 mg at 10/23/23 1621    saccharomyces boulardii (FLORASTOR) capsule 250 mg, 250 mg, Oral, BID, Jenna Castano MD, 250 mg at 10/24/23 0820    Laboratory Results:  Results from last 7 days   Lab Units 10/24/23  0605 10/23/23  0411 10/22/23  0527 10/21/23  0516 10/21/23  0515 10/20/23  0521 10/19/23  0553 10/18/23  1548 10/18/23  0808 10/18/23  0558   WBC Thousand/uL 22.51* 27.82* 26.64*  --  24.77* 25.89* 26.35*  --   --  25.77*   HEMOGLOBIN g/dL 10.6* 10.8* 11.9  --  11.9 11.6 12.0  --   --  12.5   HEMATOCRIT % 31.1* 32.0* 35.5  --  33.8* 33.5* 34.1*  --   --  34.8   PLATELETS Thousands/uL 330 324 190  --  263 231 190  --   --  146*   POTASSIUM mmol/L 3.7 3.3* 3.6 3.3*  --  3.5 3.8 3.5   < >  --    CHLORIDE mmol/L 104 105 106 106  --  107 104 98   < >  --    CO2 mmol/L 23 21 15* 19*  --  21 20* 22   < >  --    BUN mg/dL 10 13 19 28*  --  38* 49* 54*   < >  --    CREATININE mg/dL 1.01 1.04 1.26 1.38*  --  1.64* 1.73* 1.77*   < >  --    CALCIUM mg/dL 7.3* 6.9* 7.1* 7.1*  --  7.3* 7.1* 7.1*   < >  --    MAGNESIUM mg/dL 1.5* 1.8* 1.8* 1.8*  --  2.0 2.1  --   --  1.7*   PHOSPHORUS mg/dL 2.3* 2.5* 2.6* 2.5*  --  2.7 3.0  --   --  2.2*    < > = values in this interval not displayed.

## 2023-10-24 NOTE — PROGRESS NOTES
Progress Note - Infectious Disease   Fayrene Rater Apgar 60 y.o. female MRN: [id number]  Unit/Bed#: [address] [place] [address] Encounter: [id number]      Impression/Plan:  1.  E coli Emphysematous pyelonephritis. status post cystoscopy and stent placement on 10/19/2023.  10/19/2023 cystoscopy with findings of severely edematous obstructed right ureteral orifice with moderate hydronephrosis and placement of a 24 cm 6 French stent. Urine culture growing pansensitive E. Coli  10/23/23 CT A/P right ureteral stent in place, emphysematous pyelonephritis same  -Continues cefazolin 2 g IV every 8 hours while in house.  -monitor temperature and hemodynamics  -serial exam  -Monitor urinary output  -Monitor serial WBC  -Urology following  -if WBC continues to downtrend, can transition to po keflex in the next 24 hours and plan to complete a total 3 week antibiotic course through 23      2. Leukocytosis, POA 14 < 27 > 22 K  -antibiotics as above  -work up/management as above and below  -monitor CBC     3. LEANDRO. POA. Creatinine 1.67 > 1.04  -renal dose adjust antibiotic as needed  -volume management   -recheck BMP     4. Uncontrolled diabetes mellitus with hyperglycemic state on admission. 10/18/23 HgbA1c 15. Risk factor for infection  -Glycemic management per primary care team     Antibiotics:  Cefazolin D5 - abx D8     Above impression and plan discussed in detail with patient, RN, and primary care team.    Subjective:  Patient has no fever, chills, sweats; no nausea, vomiting, diarrhea; no cough, shortness of breath; no flank pain, no dysuria. No new symptoms.     Objective:  Vitals:  Temp:  [97.9 °F (36.6 °C)-99 °F (37.2 °C)] 99 °F (37.2 °C)  HR:  [94-97] 95  Resp:  [18] 18  BP: (152-157)/(72-74) 152/73  SpO2:  [95 %-97 %] 95 %  Temp (24hrs), Av.3 °F (36.8 °C), Min:97.9 °F (36.6 °C), Max:99 °F (37.2 °C)  Current: Temperature: 99 °F (37.2 °C)    Physical Exam:   General Appearance:  60year old female propped comfortably in bed, nontoxic appearance, no acute distress. HEENT: Atraumatic normocephalic   Throat: Oropharynx moist. Poor dentition   Pulmonary:   Normal respiratory excursion without accessory muscle use   Cardiac:  RRR   Abdomen:   Soft, non-tender, non-distended   Extremities: No edema   : No luna, no SPT, no right flank/CVAT   Psychiatric: Awake, cooperative   Skin: No new rashes. IV site nontender. Labs, Imaging, & Other studies:   All pertinent labs and imaging studies were personally reviewed  Results from last 7 days   Lab Units 10/24/23  0605 10/23/23  0411 10/22/23  0527   WBC Thousand/uL 22.51* 27.82* 26.64*   HEMOGLOBIN g/dL 10.6* 10.8* 11.9   PLATELETS Thousands/uL 330 324 190       Results from last 7 days   Lab Units 10/24/23  0605 10/23/23  0411 10/22/23  0527 10/21/23  0516 10/20/23  0521   SODIUM mmol/L 133* 133* 134*   < > 135   POTASSIUM mmol/L 3.7 3.3* 3.6   < > 3.5   CHLORIDE mmol/L 104 105 106   < > 107   CO2 mmol/L 23 21 15*   < > 21   BUN mg/dL 10 13 19   < > 38*   CREATININE mg/dL 1.01 1.04 1.26   < > 1.64*   EGFR ml/min/1.73sq m 61 58 46   < > 33   CALCIUM mg/dL 7.3* 6.9* 7.1*   < > 7.3*   AST U/L  --   --   --   --  22   ALT U/L  --   --   --   --  23   ALK PHOS U/L  --   --   --   --  106*    < > = values in this interval not displayed.        Results from last 7 days   Lab Units 10/17/23  1748   MRSA CULTURE ONLY  No Methicillin Resistant Staphlyococcus aureus (MRSA) isolated       Results from last 7 days   Lab Units 10/20/23  0521 10/19/23  0553 10/18/23  0808   PROCALCITONIN ng/ml 2.04* 4.32* 7.82*

## 2023-10-24 NOTE — PLAN OF CARE
Problem: MOBILITY - ADULT  Goal: Maintain or return to baseline ADL function  Description: INTERVENTIONS:  -  Assess patient's ability to carry out ADLs; assess patient's baseline for ADL function and identify physical deficits which impact ability to perform ADLs (bathing, care of mouth/teeth, toileting, grooming, dressing, etc.)  - Assess/evaluate cause of self-care deficits   - Assess range of motion  - Assess patient's mobility; develop plan if impaired  - Assess patient's need for assistive devices and provide as appropriate  - Encourage maximum independence but intervene and supervise when necessary  - Involve family in performance of ADLs  - Assess for home care needs following discharge   - Consider OT consult to assist with ADL evaluation and planning for discharge  - Provide patient education as appropriate  Outcome: Progressing  Goal: Maintains/Returns to pre admission functional level  Description: INTERVENTIONS:  - Perform BMAT or MOVE assessment daily.   - Set and communicate daily mobility goal to care team and patient/family/caregiver.    - Collaborate with rehabilitation services on mobility goals if consulted  - Out of bed for toileting  - Record patient progress and toleration of activity level   Outcome: Progressing     Problem: INFECTION - ADULT  Goal: Absence of fever/infection during neutropenic period  Description: INTERVENTIONS:  - Monitor WBC    Outcome: Progressing     Problem: SAFETY ADULT  Goal: Maintain or return to baseline ADL function  Description: INTERVENTIONS:  -  Assess patient's ability to carry out ADLs; assess patient's baseline for ADL function and identify physical deficits which impact ability to perform ADLs (bathing, care of mouth/teeth, toileting, grooming, dressing, etc.)  - Assess/evaluate cause of self-care deficits   - Assess range of motion  - Assess patient's mobility; develop plan if impaired  - Assess patient's need for assistive devices and provide as appropriate  - Encourage maximum independence but intervene and supervise when necessary  - Involve family in performance of ADLs  - Assess for home care needs following discharge   - Consider OT consult to assist with ADL evaluation and planning for discharge  - Provide patient education as appropriate  Outcome: Progressing  Goal: Maintains/Returns to pre admission functional level  Description: INTERVENTIONS:  - Perform BMAT or MOVE assessment daily.   - Set and communicate daily mobility goal to care team and patient/family/caregiver.    - Collaborate with rehabilitation services on mobility goals if consulted  - Out of bed for toileting  - Record patient progress and toleration of activity level   Outcome: Progressing  Goal: Patient will remain free of falls  Description: INTERVENTIONS:  - Educate patient/family on patient safety including physical limitations  - Instruct patient to call for assistance with activity   - Consult OT/PT to assist with strengthening/mobility   - Keep Call bell within reach  - Keep bed low and locked with side rails adjusted as appropriate  - Keep care items and personal belongings within reach  - Initiate and maintain comfort rounds  - Make Fall Risk Sign visible to staff  - Apply yellow socks and bracelet for high fall risk patients  - Consider moving patient to room near nurses station  Outcome: Progressing     Problem: DISCHARGE PLANNING  Goal: Discharge to home or other facility with appropriate resources  Description: INTERVENTIONS:  - Identify barriers to discharge w/patient and caregiver  - Arrange for needed discharge resources and transportation as appropriate  - Identify discharge learning needs (meds, wound care, etc.)  - Arrange for interpretive services to assist at discharge as needed  - Refer to Case Management Department for coordinating discharge planning if the patient needs post-hospital services based on physician/advanced practitioner order or complex needs related to functional status, cognitive ability, or social support system  Outcome: Progressing     Problem: Knowledge Deficit  Goal: Patient/family/caregiver demonstrates understanding of disease process, treatment plan, medications, and discharge instructions  Description: Complete learning assessment and assess knowledge base.   Interventions:  - Provide teaching at level of understanding  - Provide teaching via preferred learning methods  Outcome: Progressing     Problem: GASTROINTESTINAL - ADULT  Goal: Minimal or absence of nausea and/or vomiting  Description: INTERVENTIONS:  - Administer IV fluids if ordered to ensure adequate hydration  - Maintain NPO status until nausea and vomiting are resolved  - Nasogastric tube if ordered  - Administer ordered antiemetic medications as needed  - Provide nonpharmacologic comfort measures as appropriate  - Advance diet as tolerated, if ordered  - Consider nutrition services referral to assist patient with adequate nutrition and appropriate food choices  Outcome: Progressing  Goal: Maintains adequate nutritional intake  Description: INTERVENTIONS:  - Monitor percentage of each meal consumed  - Identify factors contributing to decreased intake, treat as appropriate  - Assist with meals as needed  - Monitor I&O, weight, and lab values if indicated  - Obtain nutrition services referral as needed  Outcome: Progressing  Goal: Oral mucous membranes remain intact  Description: INTERVENTIONS  - Assess oral mucosa and hygiene practices  - Implement preventative oral hygiene regimen  - Implement oral medicated treatments as ordered  - Initiate Nutrition services referral as needed  Outcome: Progressing     Problem: METABOLIC, FLUID AND ELECTROLYTES - ADULT  Goal: Electrolytes maintained within normal limits  Description: INTERVENTIONS:  - Monitor labs and assess patient for signs and symptoms of electrolyte imbalances  - Administer electrolyte replacement as ordered  - Monitor response to electrolyte replacements, including repeat lab results as appropriate  - Instruct patient on fluid and nutrition as appropriate  Outcome: Progressing  Goal: Glucose maintained within target range  Description: INTERVENTIONS:  - Monitor Blood Glucose as ordered  - Assess for signs and symptoms of hyperglycemia and hypoglycemia  - Administer ordered medications to maintain glucose within target range  - Assess nutritional intake and initiate nutrition service referral as needed  Outcome: Progressing     Problem: MUSCULOSKELETAL - ADULT  Goal: Maintain or return mobility to safest level of function  Description: INTERVENTIONS:  - Assess patient's ability to carry out ADLs; assess patient's baseline for ADL function and identify physical deficits which impact ability to perform ADLs (bathing, care of mouth/teeth, toileting, grooming, dressing, etc.)  - Assess/evaluate cause of self-care deficits   - Assess range of motion  - Assess patient's mobility  - Assess patient's need for assistive devices and provide as appropriate  - Encourage maximum independence but intervene and supervise when necessary  - Involve family in performance of ADLs  - Assess for home care needs following discharge   - Consider OT consult to assist with ADL evaluation and planning for discharge  - Provide patient education as appropriate  Outcome: Progressing

## 2023-10-24 NOTE — UTILIZATION REVIEW
Continued Stay Review    Date: 10/24/23                          Current Patient Class: inpatient  Current Level of Care: med surg  HPI:58 y.o. female initially admitted on 10/17/23     Assessment/Plan:   POD#4: cystoscopy right stent for hydronephrosis   IVABT continued for emphysematous pyelonephritis. Renal consulted for LEANDRO. Follow up CT done 10/23. Per renal:  LEANDRO  Etiology felt to be due to ATN and prerenal azotemia, resolved, trial off IVF. IV MG repletion. Vital Signs:   /73   Pulse 95   Temp 99 °F (37.2 °C) (Oral)   Resp 18   Ht 5' 7" (1.702 m)   Wt 107 kg (235 lb 7.2 oz)   SpO2 95%   BMI 36.88 kg/m²     Pertinent Labs/Diagnostic Results:   10/23 CT A/P. w/contrast  INDICATION:   emph pyelonephritis. IMPRESSION:  1. Right ureteral stent appears to be in good position. 2.  Right-sided emphysematous pyelonephritis with little change since 10/19/2023.   3.  Small bilateral pleural effusions with compressive subsegmental atelectasis in the bases of both lower lobes    Results from last 7 days   Lab Units 10/24/23  0605 10/23/23  0411 10/22/23  0527 10/21/23  0515 10/20/23  0521   WBC Thousand/uL 22.51* 27.82* 26.64* 24.77* 25.89*   HEMOGLOBIN g/dL 10.6* 10.8* 11.9 11.9 11.6   HEMATOCRIT % 31.1* 32.0* 35.5 33.8* 33.5*   PLATELETS Thousands/uL 330 324 190 263 231   NEUTROS ABS Thousands/µL 19.38*  --   --   --   --    BANDS PCT %  --   --  2  --  4         Results from last 7 days   Lab Units 10/24/23  0605 10/23/23  0411 10/22/23  0527 10/21/23  0516 10/20/23  0521   SODIUM mmol/L 133* 133* 134* 133* 135   POTASSIUM mmol/L 3.7 3.3* 3.6 3.3* 3.5   CHLORIDE mmol/L 104 105 106 106 107   CO2 mmol/L 23 21 15* 19* 21   ANION GAP mmol/L 6 7 13 8 7   BUN mg/dL 10 13 19 28* 38*   CREATININE mg/dL 1.01 1.04 1.26 1.38* 1.64*   EGFR ml/min/1.73sq m 61 58 46 41 33   CALCIUM mg/dL 7.3* 6.9* 7.1* 7.1* 7.3*   MAGNESIUM mg/dL 1.5* 1.8* 1.8* 1.8* 2.0   PHOSPHORUS mg/dL 2.3* 2.5* 2.6* 2.5* 2.7     Results from last 7 days   Lab Units 10/20/23  0521 10/17/23  1359   AST U/L 22 15   ALT U/L 23 14   ALK PHOS U/L 106* 85   TOTAL PROTEIN g/dL 4.8* 5.9*   ALBUMIN g/dL 2.1* 2.5*   TOTAL BILIRUBIN mg/dL 1.47* 0.74     Results from last 7 days   Lab Units 10/24/23  1106 10/24/23  0737 10/23/23  2011 10/23/23  1615 10/23/23  1114 10/23/23  0730 10/22/23  2003 10/22/23  1553 10/22/23  1147 10/22/23  0729 10/21/23  2015 10/21/23  1548   POC GLUCOSE mg/dl 146* 123 268* 191* 132 86 203* 218* 193* 166* 181* 184*     Results from last 7 days   Lab Units 10/24/23  0605 10/23/23  0411 10/22/23  0527 10/21/23  0516 10/20/23  0521 10/19/23  0553 10/18/23  1548 10/18/23  0808 10/18/23  0132 10/17/23  1805 10/17/23  1359   GLUCOSE RANDOM mg/dL 132 82 166* 145* 134 147* 98 168* 147* 324* 849*     Results from last 7 days   Lab Units 10/17/23  1530   OSMOLALITY, SERUM mmol/*     Results from last 7 days   Lab Units 10/18/23  0558   HEMOGLOBIN A1C % 15.0*   EAG mg/dl 384     BETA-HYDROXYBUTYRATE   Date Value Ref Range Status   10/17/2023 0.5 <0.6 mmol/L Final        Results from last 7 days   Lab Units 10/17/23  1530   PH ÓSCAR  7.362   PCO2 ÓSCAR mm Hg 39.5*   PO2 ÓSCAR mm Hg 34.4*   HCO3 ÓSCAR mmol/L 21.9*   BASE EXC ÓSCAR mmol/L -3.2   O2 CONTENT ÓSCAR ml/dL 13.3   O2 HGB, VENOUS % 66.1       Results from last 7 days   Lab Units 10/17/23  1805 10/17/23  1359   HS TNI 2HR ng/L  --  39   HSTNI D2 ng/L  --  3   HS TNI 4HR ng/L 47  --    HSTNI D4 ng/L 11  --        Results from last 7 days   Lab Units 10/18/23  0808   TSH 3RD GENERATON uIU/mL 1.419     Results from last 7 days   Lab Units 10/20/23  0521 10/19/23  0553 10/18/23  0808   PROCALCITONIN ng/ml 2.04* 4.32* 7.82*     Results from last 7 days   Lab Units 10/18/23  0941   LACTIC ACID mmol/L 1.2     Results from last 7 days   Lab Units 10/17/23  1359   LIPASE u/L 34     Results from last 7 days   Lab Units 10/17/23  1530 10/17/23  1420   OSMOLALITY, SERUM mmol/*  --    OSMO UR mmol/KG --  447     Results from last 7 days   Lab Units 10/17/23  1420   CLARITY UA  Cloudy   COLOR UA  Colorless   SPEC GRAV UA  <=1.005   PH UA  5.5   GLUCOSE UA mg/dl >=1000 (1%)*   KETONES UA mg/dl Negative   BLOOD UA  Large*   PROTEIN UA mg/dl Trace*   NITRITE UA  Negative   BILIRUBIN UA  Negative   UROBILINOGEN UA E.U./dl 0.2   LEUKOCYTES UA  Small*   WBC UA /hpf Innumerable*   RBC UA /hpf 4-10*   BACTERIA UA /hpf Moderate*   EPITHELIAL CELLS WET PREP /hpf Occasional   SODIUM UR  <10     Results from last 7 days   Lab Units 10/17/23  1420   AMPH/METH  Negative   BARBITURATE UR  Negative   BENZODIAZEPINE UR  Negative   COCAINE UR  Negative   METHADONE URINE  Negative   OPIATE UR  Negative   PCP UR  Negative   THC UR  Negative     Results from last 7 days   Lab Units 10/17/23  1420   URINE CULTURE  >100,000 cfu/ml Escherichia coli*       Medications:   Scheduled Medications:  calcium carbonate, 1 tablet, Oral, Daily With Breakfast  cefazolin, 2,000 mg, Intravenous, Q8H  cholecalciferol, 2,000 Units, Oral, Daily  ezetimibe, 10 mg, Oral, Daily  heparin (porcine), 5,000 Units, Subcutaneous, Q8H NAZ  insulin glargine, 15 Units, Subcutaneous, HS  insulin lispro, 1-6 Units, Subcutaneous, 4x Daily (AC & HS)  insulin lispro, 2 Units, Subcutaneous, TID With Meals  magnesium sulfate, 4 g, Intravenous, Once  PARoxetine, 20 mg, Oral, Daily  potassium phosphate, 30 mmol, Intravenous, Once  pravastatin, 20 mg, Oral, Daily With Dinner  saccharomyces boulardii, 250 mg, Oral, BID    PRN Meds:  acetaminophen, 650 mg, Oral, Q6H PRN  aluminum-magnesium hydroxide-simethicone, 30 mL, Oral, Q6H PRN  calcium carbonate, 500 mg, Oral, TID PRN  ondansetron, 4 mg, Intravenous, Q4H PRN    Discharge Plan: [facility]    Network Utilization Review Department  ATTENTION: Please call with any questions or concerns to [phone] and carefully listen to the prompts so that you are directed to the right person.  All voicemails are confidential.   For Discharge needs, contact Care Management DC Support Team at [phone] opt. 2  Send all requests for admission clinical reviews, approved or denied determinations and any other requests to dedicated fax number below belonging to the campus where the patient is receiving treatment.  List of dedicated fax numbers for the Facilities:  [place] DENIALS (Administrative/Medical Necessity) [phone]   DISCHARGE SUPPORT TEAM (NETWORK) [id number] [address] [facility] (Maternity/NICU/Pediatrics) [phone]   [address] [place] [address] Drive [address] [facility] 1000 [facility] [phone] [address] [place] [address] [address] [address] [facility] [address] [facility] [address] [facility] [address] [place] [address] Nn [phone]

## 2023-10-24 NOTE — PROGRESS NOTES
Progress Note - Urology      Patient: Kashmir Koroma Apgar   : 1965 Sex: female   MRN: [id number]     CSN: [id number]  Unit/Bed#: [address] [place] [address]     SUBJECTIVE:   Patient seen on evening rounds  Feeling better White count finally trending down      Objective   Vitals: /72   Pulse 97   Temp 99 °F (37.2 °C) (Oral)   Resp 17   Ht 5' 7" (1.702 m)   Wt 107 kg (235 lb 7.2 oz)   SpO2 95%   BMI 36.88 kg/m²     I/O last 24 hours:   In: -   Out: 2400 [Urine:2400]      Physical Exam:   General Alert awake   Normocephalic atraumatic PERRLA  Lungs clear bilaterally  Cardiac normal S1 normal S2  Abdomen soft, flank pain  Extremities no edema      Lab Results: CBC:   Lab Results   Component Value Date    WBC 22.51 (H) 10/24/2023    HGB 10.6 (L) 10/24/2023    HCT 31.1 (L) 10/24/2023    MCV 84 10/24/2023     10/24/2023    RBC 3.70 (L) 10/24/2023    MCH 28.6 10/24/2023    MCHC 34.1 10/24/2023    RDW 14.5 10/24/2023    MPV 8.8 (L) 10/24/2023    NRBC 0 10/24/2023     CMP:   Lab Results   Component Value Date     10/24/2023    CO2 23 10/24/2023    BUN 10 10/24/2023    CREATININE 1.01 10/24/2023    CALCIUM 7.3 (L) 10/24/2023    AST 22 10/20/2023    ALT 23 10/20/2023    ALKPHOS 106 (H) 10/20/2023    EGFR 61 10/24/2023     Urinalysis:   Lab Results   Component Value Date    COLORU Colorless 10/17/2023    CLARITYU Cloudy 10/17/2023    SPECGRAV <=1.005 10/17/2023    PHUR 5.5 10/17/2023    LEUKOCYTESUR Small (A) 10/17/2023    NITRITE Negative 10/17/2023    GLUCOSEU >=1000 (1%) (A) 10/17/2023    KETONESU Negative 10/17/2023    BILIRUBINUR Negative 10/17/2023    BLOODU Large (A) 10/17/2023     Urine Culture:   Lab Results   Component Value Date    URINECX >100,000 cfu/ml Escherichia coli (A) 10/17/2023     PSA: No results found for: "PSA"      Assessment/ Plan:  Emphysematous pyelonephritis  P CAT scan shows no obvious collection  White count finally trending down  Status post cystoscopy right stent for hydronephrosis day #5  We discharged home in a few days  Continue p.o.  Keflex for at least 4 weeks  Reevaluation of kidney at that time with CAT scan  Schedule cystoscopy stent removal retrograde 6 to 8 weeks from now          Ramsey Clancy MD

## 2023-10-25 VITALS
TEMPERATURE: 99.8 F | RESPIRATION RATE: 18 BRPM | SYSTOLIC BLOOD PRESSURE: 150 MMHG | HEIGHT: 67 IN | WEIGHT: 235.45 LBS | HEART RATE: 88 BPM | OXYGEN SATURATION: 91 % | DIASTOLIC BLOOD PRESSURE: 71 MMHG | BODY MASS INDEX: 36.96 KG/M2

## 2023-10-25 LAB
ANION GAP SERPL CALCULATED.3IONS-SCNC: 7 MMOL/L
BASOPHILS # BLD AUTO: 0.13 THOUSANDS/ÂΜL (ref 0–0.1)
BASOPHILS NFR BLD AUTO: 1 % (ref 0–1)
BUN SERPL-MCNC: 9 MG/DL (ref 5–25)
CALCIUM SERPL-MCNC: 7.5 MG/DL (ref 8.4–10.2)
CHLORIDE SERPL-SCNC: 101 MMOL/L (ref 96–108)
CO2 SERPL-SCNC: 25 MMOL/L (ref 21–32)
CREAT SERPL-MCNC: 0.95 MG/DL (ref 0.6–1.3)
EOSINOPHIL # BLD AUTO: 0.2 THOUSAND/ÂΜL (ref 0–0.61)
EOSINOPHIL NFR BLD AUTO: 1 % (ref 0–6)
ERYTHROCYTE [DISTWIDTH] IN BLOOD BY AUTOMATED COUNT: 14.8 % (ref 11.6–15.1)
GFR SERPL CREATININE-BSD FRML MDRD: 65 ML/MIN/1.73SQ M
GLUCOSE SERPL-MCNC: 169 MG/DL (ref 65–140)
GLUCOSE SERPL-MCNC: 175 MG/DL (ref 65–140)
GLUCOSE SERPL-MCNC: 177 MG/DL (ref 65–140)
GLUCOSE SERPL-MCNC: 193 MG/DL (ref 65–140)
HCT VFR BLD AUTO: 32 % (ref 34.8–46.1)
HGB BLD-MCNC: 10.7 G/DL (ref 11.5–15.4)
IMM GRANULOCYTES # BLD AUTO: 0.38 THOUSAND/UL (ref 0–0.2)
IMM GRANULOCYTES NFR BLD AUTO: 2 % (ref 0–2)
LYMPHOCYTES # BLD AUTO: 1.4 THOUSANDS/ÂΜL (ref 0.6–4.47)
LYMPHOCYTES NFR BLD AUTO: 7 % (ref 14–44)
MAGNESIUM SERPL-MCNC: 1.7 MG/DL (ref 1.9–2.7)
MCH RBC QN AUTO: 28.8 PG (ref 26.8–34.3)
MCHC RBC AUTO-ENTMCNC: 33.4 G/DL (ref 31.4–37.4)
MCV RBC AUTO: 86 FL (ref 82–98)
MONOCYTES # BLD AUTO: 1.2 THOUSAND/ÂΜL (ref 0.17–1.22)
MONOCYTES NFR BLD AUTO: 6 % (ref 4–12)
NEUTROPHILS # BLD AUTO: 17.19 THOUSANDS/ÂΜL (ref 1.85–7.62)
NEUTS SEG NFR BLD AUTO: 83 % (ref 43–75)
NRBC BLD AUTO-RTO: 0 /100 WBCS
PHOSPHATE SERPL-MCNC: 2.9 MG/DL (ref 2.7–4.5)
PLATELET # BLD AUTO: 351 THOUSANDS/UL (ref 149–390)
PMV BLD AUTO: 8.9 FL (ref 8.9–12.7)
POTASSIUM SERPL-SCNC: 3.8 MMOL/L (ref 3.5–5.3)
RBC # BLD AUTO: 3.71 MILLION/UL (ref 3.81–5.12)
SODIUM SERPL-SCNC: 133 MMOL/L (ref 135–147)
WBC # BLD AUTO: 20.5 THOUSAND/UL (ref 4.31–10.16)

## 2023-10-25 PROCEDURE — 80048 BASIC METABOLIC PNL TOTAL CA: CPT | Performed by: INTERNAL MEDICINE

## 2023-10-25 PROCEDURE — 84100 ASSAY OF PHOSPHORUS: CPT | Performed by: SPECIALIST

## 2023-10-25 PROCEDURE — 99239 HOSP IP/OBS DSCHRG MGMT >30: CPT | Performed by: INTERNAL MEDICINE

## 2023-10-25 PROCEDURE — 99232 SBSQ HOSP IP/OBS MODERATE 35: CPT | Performed by: INTERNAL MEDICINE

## 2023-10-25 PROCEDURE — 85025 COMPLETE CBC W/AUTO DIFF WBC: CPT | Performed by: SPECIALIST

## 2023-10-25 PROCEDURE — 83735 ASSAY OF MAGNESIUM: CPT | Performed by: SPECIALIST

## 2023-10-25 PROCEDURE — 99233 SBSQ HOSP IP/OBS HIGH 50: CPT | Performed by: INTERNAL MEDICINE

## 2023-10-25 PROCEDURE — 82948 REAGENT STRIP/BLOOD GLUCOSE: CPT

## 2023-10-25 RX ORDER — MAGNESIUM SULFATE HEPTAHYDRATE 40 MG/ML
4 INJECTION, SOLUTION INTRAVENOUS ONCE
Status: COMPLETED | OUTPATIENT
Start: 2023-10-25 | End: 2023-10-25

## 2023-10-25 RX ORDER — CEPHALEXIN 500 MG/1
500 CAPSULE ORAL EVERY 6 HOURS SCHEDULED
Qty: 48 CAPSULE | Refills: 0 | Status: SHIPPED | OUTPATIENT
Start: 2023-10-25 | End: 2023-11-06

## 2023-10-25 RX ADMIN — EZETIMIBE 10 MG: 10 TABLET ORAL at 08:19

## 2023-10-25 RX ADMIN — MAGNESIUM SULFATE HEPTAHYDRATE 4 G: 40 INJECTION, SOLUTION INTRAVENOUS at 10:56

## 2023-10-25 RX ADMIN — Medication 2000 UNITS: at 08:19

## 2023-10-25 RX ADMIN — INSULIN LISPRO 1 UNITS: 100 INJECTION, SOLUTION INTRAVENOUS; SUBCUTANEOUS at 11:50

## 2023-10-25 RX ADMIN — PAROXETINE 20 MG: 20 TABLET, FILM COATED ORAL at 08:19

## 2023-10-25 RX ADMIN — PRAVASTATIN SODIUM 20 MG: 20 TABLET ORAL at 16:03

## 2023-10-25 RX ADMIN — CEFAZOLIN SODIUM 2000 MG: 2 SOLUTION INTRAVENOUS at 08:19

## 2023-10-25 RX ADMIN — HEPARIN SODIUM 5000 UNITS: 5000 INJECTION INTRAVENOUS; SUBCUTANEOUS at 05:14

## 2023-10-25 RX ADMIN — Medication 250 MG: at 08:19

## 2023-10-25 RX ADMIN — INSULIN LISPRO 2 UNITS: 100 INJECTION, SOLUTION INTRAVENOUS; SUBCUTANEOUS at 08:19

## 2023-10-25 RX ADMIN — INSULIN LISPRO 1 UNITS: 100 INJECTION, SOLUTION INTRAVENOUS; SUBCUTANEOUS at 16:03

## 2023-10-25 RX ADMIN — INSULIN LISPRO 2 UNITS: 100 INJECTION, SOLUTION INTRAVENOUS; SUBCUTANEOUS at 11:50

## 2023-10-25 RX ADMIN — CALCIUM 1 TABLET: 500 TABLET ORAL at 08:19

## 2023-10-25 RX ADMIN — INSULIN LISPRO 2 UNITS: 100 INJECTION, SOLUTION INTRAVENOUS; SUBCUTANEOUS at 16:02

## 2023-10-25 RX ADMIN — INSULIN LISPRO 1 UNITS: 100 INJECTION, SOLUTION INTRAVENOUS; SUBCUTANEOUS at 08:19

## 2023-10-25 NOTE — PLAN OF CARE
Problem: MOBILITY - ADULT  Goal: Maintain or return to baseline ADL function  Description: INTERVENTIONS:  -  Assess patient's ability to carry out ADLs; assess patient's baseline for ADL function and identify physical deficits which impact ability to perform ADLs (bathing, care of mouth/teeth, toileting, grooming, dressing, etc.)  - Assess/evaluate cause of self-care deficits   - Assess range of motion  - Assess patient's mobility; develop plan if impaired  - Assess patient's need for assistive devices and provide as appropriate  - Encourage maximum independence but intervene and supervise when necessary  - Involve family in performance of ADLs  - Assess for home care needs following discharge   - Consider OT consult to assist with ADL evaluation and planning for discharge  - Provide patient education as appropriate  Outcome: Progressing     Problem: MOBILITY - ADULT  Goal: Maintains/Returns to pre admission functional level  Description: INTERVENTIONS:  - Perform BMAT or MOVE assessment daily.   - Set and communicate daily mobility goal to care team and patient/family/caregiver.    - Collaborate with rehabilitation services on mobility goals if consulted  - Out of bed for toileting  - Record patient progress and toleration of activity level   Outcome: Progressing     Problem: INFECTION - ADULT  Goal: Absence of fever/infection during neutropenic period  Description: INTERVENTIONS:  - Monitor WBC    Outcome: Progressing     Problem: SAFETY ADULT  Goal: Maintain or return to baseline ADL function  Description: INTERVENTIONS:  -  Assess patient's ability to carry out ADLs; assess patient's baseline for ADL function and identify physical deficits which impact ability to perform ADLs (bathing, care of mouth/teeth, toileting, grooming, dressing, etc.)  - Assess/evaluate cause of self-care deficits   - Assess range of motion  - Assess patient's mobility; develop plan if impaired  - Assess patient's need for assistive devices and provide as appropriate  - Encourage maximum independence but intervene and supervise when necessary  - Involve family in performance of ADLs  - Assess for home care needs following discharge   - Consider OT consult to assist with ADL evaluation and planning for discharge  - Provide patient education as appropriate  Outcome: Progressing     Problem: SAFETY ADULT  Goal: Maintains/Returns to pre admission functional level  Description: INTERVENTIONS:  - Perform BMAT or MOVE assessment daily.   - Set and communicate daily mobility goal to care team and patient/family/caregiver.    - Collaborate with rehabilitation services on mobility goals if consulted  - Out of bed for toileting  - Record patient progress and toleration of activity level   Outcome: Progressing     Problem: SAFETY ADULT  Goal: Patient will remain free of falls  Description: INTERVENTIONS:  -  Assess patient's ability to carry out ADLs; assess patient's baseline for ADL function and identify physical deficits which impact ability to perform ADLs (bathing, care of mouth/teeth, toileting, grooming, dressing, etc.)  - Assess/evaluate cause of self-care deficits   - Assess range of motion  - Assess patient's mobility; develop plan if impaired  - Assess patient's need for assistive devices and provide as appropriate  - Encourage maximum independence but intervene and supervise when necessary  - Involve family in performance of ADLs  - Assess for home care needs following discharge   - Consider OT consult to assist with ADL evaluation and planning for discharge  - Provide patient education as appropriate  Outcome: Progressing     Problem: DISCHARGE PLANNING  Goal: Discharge to home or other facility with appropriate resources  Description: INTERVENTIONS:  - Identify barriers to discharge w/patient and caregiver  - Arrange for needed discharge resources and transportation as appropriate  - Identify discharge learning needs (meds, wound care, etc.)  - Arrange for interpretive services to assist at discharge as needed  - Refer to Case Management Department for coordinating discharge planning if the patient needs post-hospital services based on physician/advanced practitioner order or complex needs related to functional status, cognitive ability, or social support system  Outcome: Progressing     Problem: Knowledge Deficit  Goal: Patient/family/caregiver demonstrates understanding of disease process, treatment plan, medications, and discharge instructions  Description: Complete learning assessment and assess knowledge base.   Interventions:  - Provide teaching at level of understanding  - Provide teaching via preferred learning methods  Outcome: Progressing     Problem: GASTROINTESTINAL - ADULT  Goal: Minimal or absence of nausea and/or vomiting  Description: INTERVENTIONS:  - Administer IV fluids if ordered to ensure adequate hydration  - Maintain NPO status until nausea and vomiting are resolved  - Nasogastric tube if ordered  - Administer ordered antiemetic medications as needed  - Provide nonpharmacologic comfort measures as appropriate  - Advance diet as tolerated, if ordered  - Consider nutrition services referral to assist patient with adequate nutrition and appropriate food choices  Outcome: Progressing     Problem: GASTROINTESTINAL - ADULT  Goal: Maintains adequate nutritional intake  Description: INTERVENTIONS:  - Monitor percentage of each meal consumed  - Identify factors contributing to decreased intake, treat as appropriate  - Assist with meals as needed  - Monitor I&O, weight, and lab values if indicated  - Obtain nutrition services referral as needed  Outcome: Progressing     Problem: GASTROINTESTINAL - ADULT  Goal: Oral mucous membranes remain intact  Description: INTERVENTIONS  - Assess oral mucosa and hygiene practices  - Implement preventative oral hygiene regimen  - Implement oral medicated treatments as ordered  - Initiate Nutrition services referral as needed  Outcome: Progressing     Problem: METABOLIC, FLUID AND ELECTROLYTES - ADULT  Goal: Electrolytes maintained within normal limits  Description: INTERVENTIONS:  - Monitor labs and assess patient for signs and symptoms of electrolyte imbalances  - Administer electrolyte replacement as ordered  - Monitor response to electrolyte replacements, including repeat lab results as appropriate  - Instruct patient on fluid and nutrition as appropriate  Outcome: Progressing     Problem: METABOLIC, FLUID AND ELECTROLYTES - ADULT  Goal: Glucose maintained within target range  Description: INTERVENTIONS:  - Monitor Blood Glucose as ordered  - Assess for signs and symptoms of hyperglycemia and hypoglycemia  - Administer ordered medications to maintain glucose within target range  - Assess nutritional intake and initiate nutrition service referral as needed  Outcome: Progressing     Problem: SKIN/TISSUE INTEGRITY - ADULT  Goal: Skin Integrity remains intact(Skin Breakdown Prevention)  Description: Assess:  -Perform Jose assessment every shift.   -Clean and moisturize skin every shift  -Inspect skin when repositioning, toileting, and assisting with ADLS  -Assess extremities for adequate circulation and sensation     Bed Management:  -Have minimal linens on bed & keep smooth, unwrinkled  -Change linens as needed when moist or perspiring  -Avoid sitting or lying in one position for more than 2 hours while in bed  -Keep HOB at 45 degrees. Toileting:  -Offer bedside commode  -Assess for incontinence every shift.  -Use incontinent care products after each incontinent episode such as protective barrier. Activity:  -Mobilize patient 3 times a day  -Encourage activity and walks on unit  -Encourage or provide ROM exercises   -Actively turns self.    -Use appropriate equipment to lift or move patient in bed  -Instruct/ Assist with weight shifting every 2 hours when out of bed in chair  -Consider limitation of chair time 2 hour intervals    Skin Care:  -Avoid use of baby powder, tape, friction and shearing, hot water or constrictive clothing  -Relieve pressure over bony prominences using pillows.  -Do not massage red bony areas    Outcome: Progressing     Problem: MUSCULOSKELETAL - ADULT  Goal: Maintain or return mobility to safest level of function  Description: INTERVENTIONS:  - Assess patient's ability to carry out ADLs; assess patient's baseline for ADL function and identify physical deficits which impact ability to perform ADLs (bathing, care of mouth/teeth, toileting, grooming, dressing, etc.)  - Assess/evaluate cause of self-care deficits   - Assess range of motion  - Assess patient's mobility  - Assess patient's need for assistive devices and provide as appropriate  - Encourage maximum independence but intervene and supervise when necessary  - Involve family in performance of ADLs  - Assess for home care needs following discharge   - Consider OT consult to assist with ADL evaluation and planning for discharge  - Provide patient education as appropriate  Outcome: Progressing

## 2023-10-25 NOTE — CASE MANAGEMENT
Case Management Discharge Planning Note    Patient name Curry Pacas Apgar  Location [address] MRN [id number]  : 1965 Date 10/25/2023       Current Admission Date: 10/17/2023  Current Admission Diagnosis:Hyperosmolar hyperglycemic state (HHS) [facility])   Patient Active Problem List    Diagnosis Date Noted    Type 2 diabetes mellitus with hyperglycemia, without long-term current use of insulin ([address]) 10/24/2023    Nephrolithiasis 10/19/2023    Hyperosmolar hyperglycemic state (HHS) ([address]) 10/17/2023    Type 2 diabetes mellitus ([address]) 10/17/2023    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    Leukocytosis 10/17/2023    Hyponatremia 10/17/2023    Acute kidney injury ([address]) 10/17/2023    Morbid obesity ([address]) 10/17/2023    Thrombocytopenia ([address]) 10/17/2023    Essential hypertension 10/17/2023    Hyperlipidemia 10/17/2023    Depression 10/17/2023      LOS (days): 8  Geometric Mean LOS (GMLOS) (days): 5.90  Days to GMLOS:-1.9     OBJECTIVE:  Risk of Unplanned Readmission Score: 15.75      Current admission status: Inpatient   Preferred Pharmacy:   [address] [place] [address]  Phone: [phone] Fax: [phone]    Primary Care Provider: Melissa Roy DO    Primary Insurance: [address] [place] [address]  Secondary Insurance:     DISCHARGE DETAILS:    Discharge planning discussed with[de-identified] Patient    Other Referral/Resources/Interventions Provided:  Interventions: Prescription Price Check  Referral Comments: Call made to patient's preferred pharmacy of [facility]. Confirmed new start insulin include Humalog and Insulin Glargine. Per Pharmacist, medications both have a copay of $21. Attending/patient notified.      Treatment Team Recommendation: Home  Discharge Destination Plan[de-identified] Home  Transport at Discharge : Family

## 2023-10-25 NOTE — PROGRESS NOTES
Progress Note - Urology      Patient: Joan Heath Apgar   : 1965 Sex: female   MRN: [id number]     CSN: [id number]  Unit/Bed#: [address] [place] [address]     SUBJECTIVE:   Patient seen on afternoon rounds  No pain feeling good  White count slowly trending down      Objective   Vitals: /71   Pulse 88   Temp 99.8 °F (37.7 °C)   Resp 18   Ht 5' 7" (1.702 m)   Wt 107 kg (235 lb 7.2 oz)   SpO2 91%   BMI 36.88 kg/m²     I/O last 24 hours: In: 56 [P.O.:1000;  I.V.:30]  Out: 1900 [Urine:1900]      Physical Exam:   General Alert awake   Normocephalic atraumatic PERRLA  Lungs clear bilaterally  Cardiac normal S1 normal S2  Abdomen soft, flank pain  Extremities no edema      Lab Results: CBC:   Lab Results   Component Value Date    WBC 20.50 (H) 10/25/2023    HGB 10.7 (L) 10/25/2023    HCT 32.0 (L) 10/25/2023    MCV 86 10/25/2023     10/25/2023    RBC 3.71 (L) 10/25/2023    MCH 28.8 10/25/2023    MCHC 33.4 10/25/2023    RDW 14.8 10/25/2023    MPV 8.9 10/25/2023    NRBC 0 10/25/2023     CMP:   Lab Results   Component Value Date     10/25/2023    CO2 25 10/25/2023    BUN 9 10/25/2023    CREATININE 0.95 10/25/2023    CALCIUM 7.5 (L) 10/25/2023    AST 22 10/20/2023    ALT 23 10/20/2023    ALKPHOS 106 (H) 10/20/2023    EGFR 65 10/25/2023     Urinalysis:   Lab Results   Component Value Date    COLORU Colorless 10/17/2023    CLARITYU Cloudy 10/17/2023    SPECGRAV <=1.005 10/17/2023    PHUR 5.5 10/17/2023    LEUKOCYTESUR Small (A) 10/17/2023    NITRITE Negative 10/17/2023    GLUCOSEU >=1000 (1%) (A) 10/17/2023    KETONESU Negative 10/17/2023    BILIRUBINUR Negative 10/17/2023    BLOODU Large (A) 10/17/2023     Urine Culture:   Lab Results   Component Value Date    URINECX >100,000 cfu/ml Escherichia coli (A) 10/17/2023     PSA: No results found for: "PSA"      Assessment/ Plan:  Right emphysematous pyelonephritis  White count slowly trending down continue antibiotics  Patient will need elective cystoscopy stent removal retrograde for evaluation of hydronephrosis some 4 to 6 weeks from now after clearing infection          Candi Mccoy MD

## 2023-10-25 NOTE — DISCHARGE SUMMARY
Discharge Summary - [facility] Internal Medicine  Patient: Basilia Dago Apgar 60 y.o. female   MRN: [id number]  PCP: Carlos Chappell DO  Unit/Bed#: [address] [place] [address] Encounter: [id number]            Discharging Physician / Practitioner: Brenna Andrews MD  PCP: Carlos Chappell DO  Admission Date:   Admission Orders (From admission, onward)       Ordered        10/17/23 1602  INPATIENT ADMISSION  Once                          Discharge Date: 10/25/23      Reason for Admission:  Nausea/vomiting and abdominal pain      Discharge Diagnoses:     Principal Problem:    Hyperosmolar hyperglycemic state (HHS) ([address])    Active Problems:    Emphysematous pyelonephritis    Type 2 diabetes mellitus ([address])    Leukocytosis    Hyponatremia    Acute kidney injury ([address])    Morbid obesity ([address])    Thrombocytopenia ([address])    Essential hypertension    Hyperlipidemia    Depression    Type 2 diabetes mellitus with hyperglycemia ([address])      Consultations During Hospital Stay:  Infectious diseases  Urology  Nephrology  Endocrinology      Hospital Course:     * Hyperosmolar hyperglycemic state (HHS) ([address])  Assessment & Plan  Presented with abdominal discomfort with nausea/vomiting and a profoundly elevated serum glucose of 849 -> progressively improved overnight into this morning now in the 100s  BMP without evidence of anion gap and urinalysis without evidence of ketones  Suspect hyperosmolar hyperglycemic state -> insulin drip now discontinued and transitioned to a basal insulin regimen with SSI coverage per Accu-Cheks  Now off IV fluids due to adequate oral intake  Monitor serial Accu-Cheks  Chronically on oral hypoglycemics for type 2 diabetes mellitus -> states that due to recent nausea/vomiting over the last few days prior to presentation, doses were missed  Suspect pyelonephritis as likely triggering factor (see below)  Appreciate endocrinology input  Highly suspect noncompliance to home medication regimen over a long/extended period of time     Emphysematous pyelonephritis  Assessment & Plan  Presented with nonspecific abdominal discomfort and 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 coli -> with continued improvement in WBC count, transitioned to an oral Keflex regimen to complete a 3-week course through 11/6  No blood cultures were drawn in the ED  Leukocytosis noted, however, remains afebrile -> hemodynamically stable and currently nontoxic-appearing  PRN pain/emesis control  Appreciate urology input -> repeat CT imaging showed improvement (interval decrease in perirenal space soft tissue emphysema volume) -> status post on 10/19 cystoscopy with right-sided stenting  Initial imaging reviewed by IR -> no role for nephrostomy tube placement currently     Type 2 diabetes mellitus with hyperglycemia (HCC)  Assessment & Plan  A1c significantly uncontrolled at 15.0  Initially placed on an insulin drip due to marked hyperglycemia (see plan for primary issue above)  Carbohydrate restriction  Holding oral hypoglycemics while hospitalized  Endocrinology input appreciated (see plan for HHS above) -> transitioned to basal insulin with SSI coverage for Accu-Cheks -> titrate insulin requirements daily contingent on blood sugars  Patient informed of need for insulin dependence at this time -> will be provided diabetic supplies on discharge including glucometer, testing strips, lancets, and prescriptions for insulin  Underwent diabetic teaching prior to discharge     Leukocytosis  Assessment & Plan  Likely reactive due to acute medical issue(s) coupled with hemoconcentration  WBC count progressively improved     Hyponatremia  Assessment & Plan  Serum sodium only decreased at 119, however, in the setting of marked hyperglycemia, "corrected" sodium was 131  Anticipate progressive improvement in sodium levels with correction of blood sugars -> serum sodium progressively improved and currently stable at 133 over the last several days  IV fluids now discontinued  Continue serial serum sodium monitoring     Elevated serum creatinine  Assessment & Plan  Presenting creatinine of 1.67 -> status post IV fluid hydration -> improved to 0.95 today  Unclear if acute kidney injury or CKD as no prior baseline available  Likely contributed to by renal hypoperfusion and insensible volume loss from recent vomiting  Limit/avoid nephrotoxins as possible - initially held HCTZ, which can be resumed on discharge  Monitored renal function and urine output -> patient has now received 2 loads of IV contrast dye due to repeat CT scans  Appreciated neurology input     Morbid obesity ([address])  Assessment & Plan  BMI of 36.88  Lifestyle/diet modifications     Thrombocytopenia (HCC)  Assessment & Plan  Platelet count stable     Essential hypertension  Assessment & Plan  Initially held HCTZ in the setting of resolving acute kidney injury -> resume on discharge  PRN IV Hydralazine for BP spikes on board during hospital course     Hyperlipidemia  Assessment & Plan  Continue statin/Zetia     Depression  Assessment & Plan  Continue Paxil      Condition at Discharge: fair       Discharge Day Visit / Exam:     Vitals:  Blood Pressure: 150/71 (10/25/23 1452)  Pulse: 88 (10/25/23 1452)  Temperature: 99.8 °F (37.7 °C) (10/25/23 0736)  Temp Source: Oral (10/24/23 2227)  Respirations: 18 (10/25/23 1452)  Height: 5' 7" (170.2 cm) (10/17/23 1738)  Weight - Scale: 107 kg (235 lb 7.2 oz) (10/18/23 0421)  SpO2: 91 % (10/25/23 1452)      Physical exam:  I had a face-to-face encounter with the patient on day of discharge. Discussion with Patient and/or Family:  The patient has been advised to return to the ER immediately if any symptoms recur or worsen.        Discharge instructions/Information to Patient and/or Family:   See after visit summary for information provided to patient and/or family. Provisions for Follow-Up Care:  See after visit summary for information related to follow-up care and any pertinent home health orders. Disposition:   Home      Discharge Medications:  See after visit summary for reconciled discharge medications provided to patient and/or family. Discharge Statement:  I spent 38 minutes discharging the patient. This time was spent on the day of discharge. I had direct contact with the patient on the day of discharge. Greater than 50% of the total time was spent examining patient, answering all patient questions, arranging and discussing plan of care with patient as well as directly providing post-discharge instructions. Additional time then spent on discharge activities. ** Please Note: This note is constructed using a voice recognition dictation system. An occasional wrong word/phrase or “sound-a-like” substitution may have been picked up by dictation device due to the inherent limitations of voice recognition software. Read the chart carefully and recognize, using reasonable context, where substitutions may have occurred. **

## 2023-10-25 NOTE — PROGRESS NOTES
Aimee SANDHU Apgar 60 y.o. female MRN: [id number]  Unit/Bed#: [address] [place] [address] Encounter: [id number]  Reason for Consult: LEANDRO    ASSESSMENT and PLAN:  Acute kidney injury (POA)  Admission creatinine 1.67 on 10/17 2023. Etiology felt to be due to ATN and prerenal azotemia. LEANDRO resolved. SCr down to 0.95 today. No evidence of contrast nephropathy after contrast exposure on 10/23/23    Emphysematous pyelonephritis  Currently on cefazolin. ID following. WBC count trending down slowly. Hypertension:  BP on the high side but acceptable. Reports that she is on Aceon (perindopril) and HCTZ at home. Of note, perindopril is not on her MAR. Would avoid restarting HCTZ on discharge if she remains hyponatremic. Okay to restart perindopril on discharge. Hyponatremia:  Na was 119 on admission in setting of Gluc 849. Stable Na level at 133. Add fluid restriction 2000 cc/24 hours. Hypomagnesemia:  Probably due to uncontrolled DM history. Mg up to 1.7. Continue Mg oxide 400 mg BID  Needs better glycemic control. Diabetes mellitus  Uncontrolled - Glucose 849 on admission. Hypophosphatemia:  S/p phosnak replacement. DISPOSITION:  Stable renal function. LEANDRO resolved. Avoid restarting HCTZ on discharge if she remains hyponatremic. Okay to restart perindopril on discharge. Nothing further to add from a renal standpoint. Will sign off. Feel free to call us back for any questions or concerns. Thank you! SUBJECTIVE / 24H INTERVAL HISTORY:  Denies any complaints. No CP or SOB. UO 1900 cc.      OBJECTIVE:  Current Weight: Weight - Scale: 107 kg (235 lb 7.2 oz)  Vitals:    10/24/23 1455 10/24/23 1844 10/24/23 2227 10/25/23 0736   BP: 151/72 152/75 152/74 153/71   BP Location:   Right arm    Pulse: 97 90 97 91   Resp:  18 18 16   Temp:  97.7 °F (36.5 °C) 98.5 °F (36.9 °C) 99.8 °F (37.7 °C)   TempSrc:   Oral    SpO2: 95% 95% 95% 93%   Weight:       Height: Intake/Output Summary (Last 24 hours) at 10/25/2023 0832  Last data filed at 10/25/2023 0601  Gross per 24 hour   Intake 1030 ml   Output 1900 ml   Net -870 ml     General: conscious, cooperative, no distress  Skin: dry  Eyes: pink conjunctivae  ENT: moist mucous membranes  Respiratory: equal chest expansion, clear breath sounds. Cardiovascular: distinct heart sounds, normal rate, regular rhythm, no rub  Abdomen: soft, non tender, non distended, normal bowel sounds  Extremities: no edema. Genitourinary: no luna catheter. Neuro: awake, alert.    Psych: appropriate affect    Medications:    Current Facility-Administered Medications:     acetaminophen (TYLENOL) tablet 650 mg, 650 mg, Oral, Q6H PRN, Krunal Moise MD    aluminum-magnesium hydroxide-simethicone (MAALOX) oral suspension 30 mL, 30 mL, Oral, Q6H PRN, Krunal Moise MD    calcium carbonate (OYSTER SHELL,OSCAL) 500 mg tablet 1 tablet, 1 tablet, Oral, Daily With Breakfast, Krunal Moise MD, 1 tablet at 10/25/23 4993    calcium carbonate (TUMS) chewable tablet 500 mg, 500 mg, Oral, TID PRN, Krunal Moise MD, 500 mg at 10/18/23 1833    ceFAZolin (ANCEF) IVPB (premix in dextrose) 2,000 mg 50 mL, 2,000 mg, Intravenous, Q8H, Milton Carlton PA-C, Last Rate: 100 mL/hr at 10/25/23 0819, 2,000 mg at 10/25/23 2222    cholecalciferol (VITAMIN D3) tablet 2,000 Units, 2,000 Units, Oral, Daily, Krunal Moise MD, 2,000 Units at 10/25/23 0819    ezetimibe (ZETIA) tablet 10 mg, 10 mg, Oral, Daily, Krunal Moise MD, 10 mg at 10/25/23 0819    heparin (porcine) subcutaneous injection 5,000 Units, 5,000 Units, Subcutaneous, Q8H [address], Krunal Moise MD, 5,000 Units at 10/25/23 0514    hydrALAZINE (APRESOLINE) injection 5 mg, 5 mg, Intravenous, Q6H PRN, Doris Hagen MD    insulin glargine (LANTUS) subcutaneous injection 15 Units 0.15 mL, 15 Units, Subcutaneous, HS, Doris Hagen MD, 15 Units at 10/24/23 2127    insulin lispro (HumaLOG) 100 units/mL subcutaneous injection 1-6 Units, 1-6 Units, Subcutaneous, 4x Daily (AC & HS), 1 Units at 10/25/23 0819 **AND** Fingerstick Glucose (POCT), , , 4x Daily AC and at bedtime, Paige Muñoz MD    insulin lispro (HumaLOG) 100 units/mL subcutaneous injection 2 Units, 2 Units, Subcutaneous, TID With Meals, Paige Muñoz MD, 2 Units at 10/25/23 0819    [facility]ron [facility]) injection 4 mg, 4 mg, Intravenous, Q4H PRN, Gracy Díaz MD, 4 mg at 10/18/23 1833    PARoxetine (PAXIL) tablet 20 mg, 20 mg, Oral, Daily, Gracy Díaz MD, 20 mg at 10/25/23 0819    pravastatin (PRAVACHOL) tablet 20 mg, 20 mg, Oral, Daily With Sejal Hassan MD, 20 mg at 10/24/23 1608    saccharomyces boulardii (FLORASTOR) capsule 250 mg, 250 mg, Oral, BID, Paige Muñoz MD, 250 mg at 10/25/23 4727    Laboratory Results:  Results from last 7 days   Lab Units 10/25/23  0519 10/24/23  0605 10/23/23  0411 10/22/23  0527 10/21/23  0516 10/21/23  0515 10/20/23  0521 10/19/23  0553   WBC Thousand/uL 20.50* 22.51* 27.82* 26.64*  --  24.77* 25.89* 26.35*   HEMOGLOBIN g/dL 10.7* 10.6* 10.8* 11.9  --  11.9 11.6 12.0   HEMATOCRIT % 32.0* 31.1* 32.0* 35.5  --  33.8* 33.5* 34.1*   PLATELETS Thousands/uL 351 330 324 190  --  263 231 190   POTASSIUM mmol/L 3.8 3.7 3.3* 3.6 3.3*  --  3.5 3.8   CHLORIDE mmol/L 101 104 105 106 106  --  107 104   CO2 mmol/L 25 23 21 15* 19*  --  21 20*   BUN mg/dL 9 10 13 19 28*  --  38* 49*   CREATININE mg/dL 0.95 1.01 1.04 1.26 1.38*  --  1.64* 1.73*   CALCIUM mg/dL 7.5* 7.3* 6.9* 7.1* 7.1*  --  7.3* 7.1*   MAGNESIUM mg/dL 1.7* 1.5* 1.8* 1.8* 1.8*  --  2.0 2.1   PHOSPHORUS mg/dL 2.9 2.3* 2.5* 2.6* 2.5*  --  2.7 3.0

## 2023-10-25 NOTE — PROGRESS NOTES
Progress Note - Infectious Disease   Veneda Bessie Apgar 60 y.o. female MRN: [id number]  Unit/Bed#: [address] [place] [address] Encounter: [id number]      Impression/Plan:  1.  E coli Emphysematous pyelonephritis. status post cystoscopy and stent placement on 10/19/2023.  10/19/2023 cystoscopy with findings of severely edematous obstructed right ureteral orifice with moderate hydronephrosis and placement of a 24 cm 6 French stent. Urine culture growing pansensitive E. Coli  10/23/23 CT A/P right ureteral stent in place, emphysematous pyelonephritis same  -Continues cefazolin 2 g IV every 8 hours while in house.  -monitor temperature and hemodynamics  -serial exam  -Monitor urinary output  -Monitor serial WBC  -Urology following  -as WBC continues to downtrend, can transition to keflex 500 mg PO q 6 hours and plan to complete a total 3 week antibiotic course through 23      2. Leukocytosis, POA 14 < 27 > 22 > 20 K  -antibiotics as above  -work up/management as above and below  -monitor CBC     3. LEANDRO. POA. Creatinine 1.67 > 1.04  -renal dose adjust antibiotic as needed  -volume management   -recheck BMP     4. Uncontrolled diabetes mellitus with hyperglycemic state on admission. 10/18/23 HgbA1c 15. Risk factor for infection  -Glycemic management per primary care team     Antibiotics:  Cefazolin D6 - abx D9     Above impression and plan discussed in detail with patient, RN, and Dr. Gómez Holguin of the primary care team.    Subjective:  Patient has no fever, chills, sweats; no nausea, vomiting, diarrhea; no cough, shortness of breath; no flank pain, no dysuria. No new symptoms.     Objective:  Vitals:  Temp:  [97.7 °F (36.5 °C)-99.8 °F (37.7 °C)] 99.8 °F (37.7 °C)  HR:  [88-97] 88  Resp:  [16-18] 18  BP: (150-153)/(71-75) 150/71  SpO2:  [91 %-95 %] 91 %  Temp (24hrs), Av.7 °F (37.1 °C), Min:97.7 °F (36.5 °C), Max:99.8 °F (37.7 °C)  Current: Temperature: 99.8 °F (37.7 °C)    Physical Exam:   General Appearance:  60year old female propped comfortably in bed, nontoxic appearance, no acute distress. HEENT: Atraumatic normocephalic   Throat: Oropharynx moist. Poor dentition   Pulmonary:   Normal respiratory excursion without accessory muscle use   Cardiac:  RRR   Abdomen:   Soft, non-tender, non-distended   Extremities: No edema   : No luna, no SPT, no right flank/CVAT   Psychiatric: Awake, cooperative   Skin: No new rashes. IV site nontender. Labs, Imaging, & Other studies:   All pertinent labs and imaging studies were personally reviewed  Results from last 7 days   Lab Units 10/25/23  0519 10/24/23  0605 10/23/23  0411   WBC Thousand/uL 20.50* 22.51* 27.82*   HEMOGLOBIN g/dL 10.7* 10.6* 10.8*   PLATELETS Thousands/uL 351 330 324       Results from last 7 days   Lab Units 10/25/23  0519 10/24/23  0605 10/23/23  0411 10/21/23  0516 10/20/23  0521   SODIUM mmol/L 133* 133* 133*   < > 135   POTASSIUM mmol/L 3.8 3.7 3.3*   < > 3.5   CHLORIDE mmol/L 101 104 105   < > 107   CO2 mmol/L 25 23 21   < > 21   BUN mg/dL 9 10 13   < > 38*   CREATININE mg/dL 0.95 1.01 1.04   < > 1.64*   EGFR ml/min/1.73sq m 65 61 58   < > 33   CALCIUM mg/dL 7.5* 7.3* 6.9*   < > 7.3*   AST U/L  --   --   --   --  22   ALT U/L  --   --   --   --  23   ALK PHOS U/L  --   --   --   --  106*    < > = values in this interval not displayed.              Results from last 7 days   Lab Units 10/20/23  0521 10/19/23  0553   PROCALCITONIN ng/ml 2.04* 4.32*

## 2023-10-26 NOTE — UTILIZATION REVIEW
NOTIFICATION OF ADMISSION DISCHARGE   This is a Notification of Discharge from [address] [facility]. Please be advised that this patient has been discharge from our facility. Below you will find the admission and discharge date and time including the patient’s disposition. UTILIZATION REVIEW CONTACT:  Laurel Foster  Utilization   Network Utilization Review Department  Phone: [phone] x carefully listen to the prompts. All voicemails are confidential.  Email: [email or web address]     ADMISSION INFORMATION  PRESENTATION DATE: 10/17/2023 11:10 AM  OBERVATION ADMISSION DATE:   INPATIENT ADMISSION DATE: 10/17/23  4:02 PM   DISCHARGE DATE: 10/25/2023  6:11 PM   DISPOSITION:Home/Self Care    Network Utilization Review Department  ATTENTION: Please call with any questions or concerns to [phone] and carefully listen to the prompts so that you are directed to the right person. All voicemails are confidential.   For Discharge needs, contact Care Management DC Support Team at [phone] opt. 2  Send all requests for admission clinical reviews, approved or denied determinations and any other requests to dedicated fax number below belonging to the campus where the patient is receiving treatment.  List of dedicated fax numbers for the Facilities:  [place] DENIALS (Administrative/Medical Necessity) [phone]   DISCHARGE SUPPORT TEAM (Network) [phone] [address] [facility] (Maternity/NICU/Pediatrics) [phone]   [address] [facility] 1000 [facility] 207 [facility] [address] [phone] [address] [facility] [phone]   [address] [place] [address]  [address]  [phone]

## 2023-12-16 PROBLEM — N12 EMPHYSEMATOUS PYELONEPHRITIS: Status: RESOLVED | Noted: 2023-10-17 | Resolved: 2023-12-16

## 2023-12-22 ENCOUNTER — OFFICE VISIT (OUTPATIENT)
Dept: ENDOCRINOLOGY | Facility: CLINIC | Age: 58
End: 2023-12-22
Payer: COMMERCIAL

## 2023-12-22 VITALS
HEIGHT: 67 IN | BODY MASS INDEX: 36.57 KG/M2 | SYSTOLIC BLOOD PRESSURE: 122 MMHG | WEIGHT: 233 LBS | DIASTOLIC BLOOD PRESSURE: 78 MMHG | HEART RATE: 84 BPM

## 2023-12-22 DIAGNOSIS — E27.8 ADRENAL MASS (HCC): ICD-10-CM

## 2023-12-22 DIAGNOSIS — E78.2 MIXED HYPERLIPIDEMIA: ICD-10-CM

## 2023-12-22 DIAGNOSIS — E11.65 TYPE 2 DIABETES MELLITUS WITH HYPERGLYCEMIA, WITHOUT LONG-TERM CURRENT USE OF INSULIN (HCC): Primary | ICD-10-CM

## 2023-12-22 DIAGNOSIS — N20.0 NEPHROLITHIASIS: ICD-10-CM

## 2023-12-22 DIAGNOSIS — I10 ESSENTIAL HYPERTENSION: ICD-10-CM

## 2023-12-22 PROCEDURE — 99215 OFFICE O/P EST HI 40 MIN: CPT | Performed by: INTERNAL MEDICINE

## 2023-12-22 RX ORDER — DEXAMETHASONE 1 MG
1 TABLET ORAL ONCE
Qty: 1 TABLET | Refills: 0 | Status: SHIPPED | OUTPATIENT
Start: 2023-12-22 | End: 2023-12-22

## 2023-12-22 RX ORDER — INSULIN GLARGINE 100 [IU]/ML
INJECTION, SOLUTION SUBCUTANEOUS
Qty: 15 ML | Refills: 3 | Status: SHIPPED | OUTPATIENT
Start: 2023-12-22

## 2023-12-22 RX ORDER — FLURBIPROFEN SODIUM 0.3 MG/ML
SOLUTION/ DROPS OPHTHALMIC 4 TIMES DAILY
Qty: 400 EACH | Refills: 2 | Status: SHIPPED | OUTPATIENT
Start: 2023-12-22

## 2023-12-22 RX ORDER — AMLODIPINE BESYLATE 5 MG/1
5 TABLET ORAL DAILY
COMMUNITY

## 2023-12-22 RX ORDER — INSULIN LISPRO 100 [IU]/ML
2 INJECTION, SOLUTION INTRAVENOUS; SUBCUTANEOUS
Qty: 15 ML | Refills: 3 | Status: SHIPPED | OUTPATIENT
Start: 2023-12-22

## 2023-12-22 RX ORDER — DIPHENOXYLATE HYDROCHLORIDE AND ATROPINE SULFATE 2.5; .025 MG/1; MG/1
1 TABLET ORAL DAILY
COMMUNITY

## 2023-12-22 RX ORDER — PERINDOPRIL ERBUMINE 8 MG/1
8 TABLET ORAL DAILY
COMMUNITY

## 2023-12-22 NOTE — PROGRESS NOTES
Brenda S Apgar 58 y.o. female MRN: [id number]    Encounter: [id number]      Assessment/Plan     Type 2 diabetes mellitus-on insulin therapy  Last A1c 15%, poorly controlled   Blood sugars are much improved, mostly within the normal range  Has variation in blood sugars which appears to be diet related    Discussed different medications that can be used for glycemic management-oral hypoglycemic agents as well as injectable insulin and non-insulin medications (GLP 1 agonist), Tirzepatide (GLP1 Agonist/GIP) along with risks, benefits, side effect profile.  No h/o pancreatitis/ MEN syndrome/ Medullary thyroid cancer     At this time, patient is comfortable using insulin, would prefer continuing the same till her next follow-up.  At that time, may consider introducing non-insulin medications     Recommend the following at this time  Continue current medications  Labs done as ordered, earliest in 1 month  Recommend diabetic eye exam    2. Hyperlipidemia  - continue statin therapy, zetia    3. Hypertension  Blood pressure at goal   - continue medications including ACE-I/ ARB    4. Adrenal mass , likely myolipoma -get dexamethasone suppression test, check plasma catecholamines, metanephrines, aldosterone, renin, DHEA-S     I have spent a total time of 40 minutes on 23 in caring for this patient.     CC: Diabetes    History of Present Illness     HPI:  Brenda S Apgar is a 58 y.o. female presents for a follow-up visit regarding diabetes management.     Last seen in consultation while she was admitted at the hospital in 10/2023 with emphysematous pyelonephritis, HHS.  Diagnosed with diabetes mellitus approximately 10 years ago, had been following up with primary care physician in [place] however had not followed up for over a year  Was using Actos at home.  Started on basal bolus insulin therapy at the hospital, advised to continue the same on discharge.  No history of CAD/CVA/CKD    Never bene on  metformin   Actos -  discontinued in the hospital  May have been on januvia     Last Eye exam: 1 month ago - new glasses; retinal exam not done     Current regimen:   Lantus 15 units subcutaneously at bedtime  Humalog 2 units with meals 3 times a day    No change in appetite/ weight   Trying to eat better, less carbohydrates  No numbness/tingling   No vision changes  No CP/SOB   No exercise/walking     Statin: pravastatin Zetia  ACE-I/ARB: perindopril     Home glucose monitoring: log will be scanned into chart ranging  mg/dL  Had some lightheadedness, dizziness when blood sugars were in the 70s  Ate a late lunch and was more active when this happened    CT abdomen 10/23 - Bilobed left adrenal mass measuring up to 5.4 x 7.8 cm predominantly composed of fat most consistent with a myelolipoma.   Was previously characterized on MRI abdomen 2006  Not have any symptoms of pheochromocytoma  No acne/hirsutism; Some facial hair   Blood pressure is well-controlled  No muscle cramps      All other systems were reviewed and are negative.    Review of Systems      Historical Information   Past Medical History:   Diagnosis Date    Diabetes mellitus (HCC)     Hypertension      Past Surgical History:   Procedure Laterality Date    CHOLECYSTECTOMY      FL RETROGRADE PYELOGRAM  10/19/2023    IN CYSTO BLADDER W/URETERAL CATHETERIZATION Right 10/19/2023    Procedure: CYSTOSCOPY RETROGRADE PYELOGRAM WITH INSERTION STENT URETERAL;  Surgeon: Chau Hassan MD;  Location: [facility];  Service: Urology     Social History   Social History     Substance and Sexual Activity   Alcohol Use Never     Social History     Substance and Sexual Activity   Drug Use Never     Social History     Tobacco Use   Smoking Status Never   Smokeless Tobacco Never     Family History: History reviewed. No pertinent family history.    Meds/Allergies   Current Outpatient Medications   Medication Sig Dispense Refill    amLODIPine (NORVASC) 5 mg tablet Take 5 mg by mouth daily       "cholecalciferol (VITAMIN D3) 1,000 units tablet Take 2,000 Units by mouth daily      ezetimibe (ZETIA) 10 mg tablet Take 10 mg by mouth daily      insulin glargine (LANTUS) 100 units/mL subcutaneous injection Inject 15 Units under the skin daily at bedtime 10 mL 0    insulin lispro (HumaLOG Allan KwikPen) 100 units/mL injection pen Inject 2 Units under the skin 3 (three) times a day with meals 3 mL 0    multivitamin (THERAGRAN) TABS Take 1 tablet by mouth daily      PARoxetine (PAXIL) 20 mg tablet Take 20 mg by mouth daily      perindopril (ACEON) 8 MG tablet Take 8 mg by mouth daily      pravastatin (PRAVACHOL) 20 mg tablet Take 20 mg by mouth daily      calcium carbonate (OYSTER SHELL,OSCAL) 500 mg Take 1 tablet by mouth daily with breakfast Do not start before October 25, 2023. (Patient not taking: Reported on 12/22/2023) 30 tablet 0    hydrochlorothiazide (HYDRODIURIL) 12.5 mg tablet Take 12.5 mg by mouth daily (Patient not taking: Reported on 12/22/2023)       No current facility-administered medications for this visit.     No Known Allergies    Objective   Vitals: Blood pressure 122/78, pulse 84, height 5' 7\" (1.702 m), weight 106 kg (233 lb).    Physical Exam  Constitutional:       Appearance: She is well-developed.   HENT:      Head: Normocephalic and atraumatic.   Eyes:      Conjunctiva/sclera: Conjunctivae normal.      Pupils: Pupils are equal, round, and reactive to light.   Neck:      Thyroid: No thyromegaly.   Cardiovascular:      Rate and Rhythm: Normal rate and regular rhythm.      Pulses:           Dorsalis pedis pulses are 2+ on the right side and 2+ on the left side.      Heart sounds: Normal heart sounds. No murmur heard.  Pulmonary:      Effort: Pulmonary effort is normal.      Breath sounds: Normal breath sounds. No wheezing.   Abdominal:      General: There is no distension.      Palpations: Abdomen is soft.      Tenderness: There is no abdominal tenderness.   Musculoskeletal:         General: " Normal range of motion.      Cervical back: Normal range of motion and neck supple.   Feet:      Right foot:      Skin integrity: No ulcer, skin breakdown, erythema, warmth, callus or dry skin.      Left foot:      Skin integrity: No ulcer, skin breakdown, erythema, warmth, callus or dry skin.   Skin:     General: Skin is warm and dry.   Neurological:      Mental Status: She is alert and oriented to person, place, and time.   Psychiatric:         Behavior: Behavior normal.     Diabetic Foot Exam    Patient's shoes and socks removed.    Right Foot/Ankle   Right Foot Inspection  Skin Exam: skin normal and skin intact. No dry skin, no warmth, no callus, no erythema, no maceration, no abnormal color, no pre-ulcer, no ulcer and no callus.     Sensory   Vibration: intact  Proprioception: intact  Monofilament testing: intact    Vascular  Capillary refills: < 3 seconds  The right DP pulse is 2+.     Left Foot/Ankle  Left Foot Inspection  Skin Exam: skin normal and skin intact. No dry skin, no warmth, no erythema, no maceration, normal color, no pre-ulcer, no ulcer and no callus.     Sensory   Vibration: intact  Proprioception: intact  Monofilament testing: intact    Vascular  Capillary refills: < 3 seconds  The left DP pulse is 2+.     Assign Risk Category  Risk: 0    The history was obtained from the review of the chart, patient.    Lab Results:   Lab Results   Component Value Date/Time    Hemoglobin A1C 15.0 (H) 10/18/2023 05:58 AM    WBC 20.50 (H) 10/25/2023 05:19 AM    WBC 22.51 (H) 10/24/2023 06:05 AM    WBC 27.82 (H) 10/23/2023 04:11 AM    Hemoglobin 10.7 (L) 10/25/2023 05:19 AM    Hemoglobin 10.6 (L) 10/24/2023 06:05 AM    Hemoglobin 10.8 (L) 10/23/2023 04:11 AM    Hematocrit 32.0 (L) 10/25/2023 05:19 AM    Hematocrit 31.1 (L) 10/24/2023 06:05 AM    Hematocrit 32.0 (L) 10/23/2023 04:11 AM    MCV 86 10/25/2023 05:19 AM    MCV 84 10/24/2023 06:05 AM    MCV 83 10/23/2023 04:11 AM    Platelets 351 10/25/2023 05:19 AM     "Platelets 330 10/24/2023 06:05 AM    Platelets 324 10/23/2023 04:11 AM    BUN 9 10/25/2023 05:19 AM    BUN 10 10/24/2023 06:05 AM    BUN 13 10/23/2023 04:11 AM    Potassium 3.8 10/25/2023 05:19 AM    Potassium 3.7 10/24/2023 06:05 AM    Potassium 3.3 (L) 10/23/2023 04:11 AM    Chloride 101 10/25/2023 05:19 AM    Chloride 104 10/24/2023 06:05 AM    Chloride 105 10/23/2023 04:11 AM    CO2 25 10/25/2023 05:19 AM    CO2 23 10/24/2023 06:05 AM    CO2 21 10/23/2023 04:11 AM    Creatinine 0.95 10/25/2023 05:19 AM    Creatinine 1.01 10/24/2023 06:05 AM    Creatinine 1.04 10/23/2023 04:11 AM    AST 22 10/20/2023 05:21 AM    AST 15 10/17/2023 01:59 PM    ALT 23 10/20/2023 05:21 AM    ALT 14 10/17/2023 01:59 PM    Total Protein 4.8 (L) 10/20/2023 05:21 AM    Total Protein 5.9 (L) 10/17/2023 01:59 PM    Albumin 2.1 (L) 10/20/2023 05:21 AM    Albumin 2.5 (L) 10/17/2023 01:59 PM         Imaging Studies: I have personally reviewed pertinent reports.      Portions of the record may have been created with voice recognition software. Occasional wrong word or \"sound a like\" substitutions may have occurred due to the inherent limitations of voice recognition software. Read the chart carefully and recognize, using context, where substitutions have occurred.    "

## 2023-12-22 NOTE — PATIENT INSTRUCTIONS
Continue current medications  Please have labs done as ordered, earliest in 1 month       a tablet of dexamethasone from the pharmacy      Take the 1 mg dexamethasone tablet at 11 PM.      Obtain bloodwork by 9 AM the next morning, for serum cortisol, the bloodwork must be done early in the morning, otherwise the tablet will wear off.   All other labs should be done on a different day

## 2024-01-22 DIAGNOSIS — N20.0 NEPHROLITHIASIS: ICD-10-CM

## 2024-01-22 DIAGNOSIS — E78.2 MIXED HYPERLIPIDEMIA: ICD-10-CM

## 2024-01-22 DIAGNOSIS — E11.65 TYPE 2 DIABETES MELLITUS WITH HYPERGLYCEMIA, WITHOUT LONG-TERM CURRENT USE OF INSULIN (HCC): ICD-10-CM

## 2024-01-22 DIAGNOSIS — E27.8 ADRENAL MASS (HCC): ICD-10-CM

## 2024-01-22 DIAGNOSIS — I10 ESSENTIAL HYPERTENSION: ICD-10-CM

## 2024-01-22 RX ORDER — INSULIN GLARGINE 100 [IU]/ML
INJECTION, SOLUTION SUBCUTANEOUS
Qty: 15 ML | Refills: 3 | Status: SHIPPED | OUTPATIENT
Start: 2024-01-22

## 2024-02-19 ENCOUNTER — APPOINTMENT (OUTPATIENT)
Dept: LAB | Facility: CLINIC | Age: 59
End: 2024-02-19
Payer: COMMERCIAL

## 2024-02-19 DIAGNOSIS — E11.65 TYPE 2 DIABETES MELLITUS WITH HYPERGLYCEMIA, WITHOUT LONG-TERM CURRENT USE OF INSULIN (HCC): ICD-10-CM

## 2024-02-19 DIAGNOSIS — I10 ESSENTIAL HYPERTENSION: ICD-10-CM

## 2024-02-19 DIAGNOSIS — E27.8 ADRENAL MASS (HCC): ICD-10-CM

## 2024-02-19 DIAGNOSIS — N20.0 NEPHROLITHIASIS: ICD-10-CM

## 2024-02-19 DIAGNOSIS — E78.2 MIXED HYPERLIPIDEMIA: ICD-10-CM

## 2024-02-19 LAB
ALBUMIN SERPL BCP-MCNC: 4.1 G/DL (ref 3.5–5)
ALP SERPL-CCNC: 84 U/L (ref 34–104)
ALT SERPL W P-5'-P-CCNC: 15 U/L (ref 7–52)
ANION GAP SERPL CALCULATED.3IONS-SCNC: 6 MMOL/L
AST SERPL W P-5'-P-CCNC: 16 U/L (ref 13–39)
BILIRUB SERPL-MCNC: 0.28 MG/DL (ref 0.2–1)
BUN SERPL-MCNC: 20 MG/DL (ref 5–25)
CALCIUM SERPL-MCNC: 9.7 MG/DL (ref 8.4–10.2)
CHLORIDE SERPL-SCNC: 103 MMOL/L (ref 96–108)
CHOLEST SERPL-MCNC: 156 MG/DL
CO2 SERPL-SCNC: 26 MMOL/L (ref 21–32)
CORTIS AM PEAK SERPL-MCNC: 0.9 UG/DL (ref 6.7–22.6)
CREAT SERPL-MCNC: 1.17 MG/DL (ref 0.6–1.3)
CREAT UR-MCNC: 60.3 MG/DL
EST. AVERAGE GLUCOSE BLD GHB EST-MCNC: 174 MG/DL
GFR SERPL CREATININE-BSD FRML MDRD: 51 ML/MIN/1.73SQ M
GLUCOSE P FAST SERPL-MCNC: 239 MG/DL (ref 65–99)
HBA1C MFR BLD: 7.7 %
HDLC SERPL-MCNC: 40 MG/DL
LDLC SERPL CALC-MCNC: 93 MG/DL (ref 0–100)
MICROALBUMIN UR-MCNC: 21 MG/L
MICROALBUMIN/CREAT 24H UR: 35 MG/G CREATININE (ref 0–30)
NONHDLC SERPL-MCNC: 116 MG/DL
POTASSIUM SERPL-SCNC: 4.8 MMOL/L (ref 3.5–5.3)
PROT SERPL-MCNC: 7.9 G/DL (ref 6.4–8.4)
SODIUM SERPL-SCNC: 135 MMOL/L (ref 135–147)
TRIGL SERPL-MCNC: 116 MG/DL

## 2024-02-19 PROCEDURE — 82533 TOTAL CORTISOL: CPT

## 2024-02-19 PROCEDURE — 82384 ASSAY THREE CATECHOLAMINES: CPT

## 2024-02-19 PROCEDURE — 84244 ASSAY OF RENIN: CPT

## 2024-02-19 PROCEDURE — 80061 LIPID PANEL: CPT

## 2024-02-19 PROCEDURE — 82088 ASSAY OF ALDOSTERONE: CPT

## 2024-02-19 PROCEDURE — 36415 COLL VENOUS BLD VENIPUNCTURE: CPT

## 2024-02-19 PROCEDURE — 83835 ASSAY OF METANEPHRINES: CPT

## 2024-02-19 PROCEDURE — 80053 COMPREHEN METABOLIC PANEL: CPT

## 2024-02-19 PROCEDURE — 82627 DEHYDROEPIANDROSTERONE: CPT

## 2024-02-19 PROCEDURE — 83036 HEMOGLOBIN GLYCOSYLATED A1C: CPT

## 2024-02-21 LAB — DHEA-S SERPL-MCNC: 42.7 UG/DL (ref 29.4–220.5)

## 2024-02-23 LAB
DOPAMINE 24H UR-MRATE: <30 PG/ML (ref 0–48)
EPINEPH PLAS-MCNC: <15 PG/ML (ref 0–62)
NOREPINEPH PLAS-MCNC: 268 PG/ML (ref 0–874)

## 2024-02-29 LAB
ALDOST SERPL-MCNC: 1.3 NG/DL (ref 0–30)
ALDOST/RENIN PLAS-RTO: 0.5 {RATIO} (ref 0–30)
RENIN PLAS-CCNC: 2.56 NG/ML/HR (ref 0.17–5.38)

## 2024-03-04 RX ORDER — DEXAMETHASONE 1 MG
TABLET ORAL
COMMUNITY
Start: 2023-12-23

## 2024-03-04 RX ORDER — PIOGLITAZONEHYDROCHLORIDE 15 MG/1
TABLET ORAL
COMMUNITY
Start: 2023-12-28

## 2024-03-05 ENCOUNTER — OFFICE VISIT (OUTPATIENT)
Dept: ENDOCRINOLOGY | Facility: CLINIC | Age: 59
End: 2024-03-05
Payer: COMMERCIAL

## 2024-03-05 VITALS
HEIGHT: 66 IN | HEART RATE: 102 BPM | WEIGHT: 223.6 LBS | DIASTOLIC BLOOD PRESSURE: 90 MMHG | OXYGEN SATURATION: 98 % | BODY MASS INDEX: 35.93 KG/M2 | SYSTOLIC BLOOD PRESSURE: 140 MMHG

## 2024-03-05 DIAGNOSIS — Z12.11 COLON CANCER SCREENING: ICD-10-CM

## 2024-03-05 DIAGNOSIS — E27.8 ADRENAL MASS (HCC): ICD-10-CM

## 2024-03-05 DIAGNOSIS — I10 ESSENTIAL HYPERTENSION: Primary | ICD-10-CM

## 2024-03-05 DIAGNOSIS — E11.65 TYPE 2 DIABETES MELLITUS WITH HYPERGLYCEMIA, WITHOUT LONG-TERM CURRENT USE OF INSULIN (HCC): ICD-10-CM

## 2024-03-05 DIAGNOSIS — E78.5 HYPERLIPIDEMIA, UNSPECIFIED HYPERLIPIDEMIA TYPE: ICD-10-CM

## 2024-03-05 PROBLEM — E11.9 TYPE 2 DIABETES MELLITUS (HCC): Status: RESOLVED | Noted: 2023-10-17 | Resolved: 2024-03-05

## 2024-03-05 PROBLEM — E11.00 HYPEROSMOLAR HYPERGLYCEMIC STATE (HHS) (HCC): Status: RESOLVED | Noted: 2023-10-17 | Resolved: 2024-03-05

## 2024-03-05 PROCEDURE — 99214 OFFICE O/P EST MOD 30 MIN: CPT | Performed by: NURSE PRACTITIONER

## 2024-03-05 RX ORDER — PRAVASTATIN SODIUM 40 MG
40 TABLET ORAL DAILY
Qty: 90 TABLET | Refills: 2 | Status: SHIPPED | OUTPATIENT
Start: 2024-03-05

## 2024-03-05 NOTE — ASSESSMENT & PLAN NOTE
Awaiting metanephrines. Catecholamines, dexamethasone suppression test, and aldosterone/renin ratio normal.

## 2024-03-05 NOTE — PROGRESS NOTES
Established Patient Progress Note     CC: Endocrinology follow up visit    Impression & Plan:    Problem List Items Addressed This Visit          Endocrine    Type 2 diabetes mellitus with hyperglycemia, without long-term current use of insulin ([facility])       Lab Results   Component Value Date    HGBA1C 7.7 (H) 02/19/2024     HGA1C above goal but improving. Does not want to transition therapy at this time. Would like to continue on insulin and declines interest in CGM.     Lantus 17 units subcutaneously at bedtime  Humalog 3 units with meals 3 times a day    Call for blood sugar patterns less than 70 or over 250 mg/dL  Send in blood sugars by Friday.     Discussed risks/complications associated with uncontrolled diabetes including organ involvement, heart attack, stroke, death.  Recommended referral to diabetes education.  Declined.     Advised lifestyle modifications including attention to diet including the amount and types of carbohydrates consumed and regular activity.     Call for blood sugars less than 70 mg/dl or patterns over 250 mg/dl.     Discussed symptoms and treatment of hypoglycemia.  Reviewed risks associated with hypoglycemia. Always carry rapid acting carbohydrates and a glucometer (a way to check your blood sugar).    Recommendation for medical identification either bracelet, necklace.    Recommendation for glucagon if on insulin. Declines at this time.     Routine follow up for diabetic eye and foot exams.     Ordered blood work to complete prior to next visit.    Send glucose logs/CGM download in 1-2 weeks for review    Follow up in 3 months.            Relevant Medications    pioglitazone (ACTOS) 15 mg tablet    Other Relevant Orders    Ambulatory referral to Ophthalmology    Hemoglobin A1C    Comprehensive metabolic panel    Albumin / creatinine urine ratio       Cardiovascular and Mediastinum    Essential hypertension - Primary     BP slightly elevated; asymptomatic. If remains elevated  recommend follow up with PCP for adjustment of regimen.   Currently on ACE-inhibitor.             Other    Hyperlipidemia     Increase pravastatin to 40 mg daily.          Relevant Medications    pravastatin (PRAVACHOL) 40 mg tablet    Other Relevant Orders    Lipid panel    Adrenal mass (HCC)     Awaiting metanephrines. Catecholamines, dexamethasone suppression test, and aldosterone/renin ratio normal.           Other Visit Diagnoses       Colon cancer screening        Relevant Orders    Ambulatory Referral to Gastroenterology            History of Present Illness:     Brenda S Apgar is a 58 y.o. female with a history of type 2 diabetes mellitus on insulin therapy since hospitalization in 2023 for [facility]. Has been diagnosed for approximately 10 years. No history of CAD/CVA/CKD. UTD diabetic foot exam, no history of neuropathy. Due for eye exam, no history of diabetic retinopathy. No numbness or tingling in feet/hands or vision changes.      Current regimen:   Lantus 15 units subcutaneously at bedtime  Humalog 2 units with meals 3 times a day    Previously on Actos which was discontinued during hospitalization  Never on metformin.       Has hypertension: ACE-inhibitor, compliant  Has hyperlipidemia: statin, tolerating  No history of thyroid disease  No history of pancreatitis    History of adrenal nodule     Patient Active Problem List   Diagnosis    Leukocytosis    Hyponatremia    Acute kidney injury (HCC)    Morbid obesity (HCC)    Thrombocytopenia (HCC)    Essential hypertension    Hyperlipidemia    Depression    Nephrolithiasis    Type 2 diabetes mellitus with hyperglycemia, without long-term current use of insulin (HCC)    Adrenal mass (HCC)      Past Medical History:   Diagnosis Date    Diabetes mellitus (HCC)     Hypertension       Past Surgical History:   Procedure Laterality Date    CHOLECYSTECTOMY      FL RETROGRADE PYELOGRAM  10/19/2023    DC CYSTO BLADDER W/URETERAL CATHETERIZATION Right 10/19/2023     Procedure: CYSTOSCOPY RETROGRADE PYELOGRAM WITH INSERTION STENT URETERAL;  Surgeon: Chau Hassan MD;  Location: [facility];  Service: Urology      Family History   Problem Relation Age of Onset    Diabetes type II Mother     Hypertension Mother     Brain cancer Sister     Brain cancer Sister     Hypertension Brother     Breast cancer Maternal Aunt     Liver disease Maternal Uncle     No Known Problems Maternal Grandmother     No Known Problems Maternal Grandfather     No Known Problems Paternal Grandmother     No Known Problems Paternal Grandfather      Social History     Tobacco Use    Smoking status: Never    Smokeless tobacco: Never   Substance Use Topics    Alcohol use: Never     No Known Allergies    Current Outpatient Medications:     amLODIPine (NORVASC) 5 mg tablet, Take 5 mg by mouth daily, Disp: , Rfl:     ezetimibe (ZETIA) 10 mg tablet, Take 10 mg by mouth daily, Disp: , Rfl:     Insulin Glargine Solostar (Lantus SoloStar) 100 UNIT/ML SOPN, Inject 15 units at bedtime, Disp: 15 mL, Rfl: 3    insulin lispro (HumaLOG KwikPen) 100 units/mL injection pen, Inject 2 Units under the skin 3 (three) times a day with meals, Disp: 15 mL, Rfl: 3    Insulin Pen Needle (B-D UF III MINI PEN NEEDLES) 31G X 5 MM MISC, Use 4 (four) times a day To inject insulin, Disp: 400 each, Rfl: 2    multivitamin (THERAGRAN) TABS, Take 1 tablet by mouth daily, Disp: , Rfl:     PARoxetine (PAXIL) 20 mg tablet, Take 20 mg by mouth daily, Disp: , Rfl:     perindopril (ACEON) 8 MG tablet, Take 8 mg by mouth daily, Disp: , Rfl:     pravastatin (PRAVACHOL) 40 mg tablet, Take 1 tablet (40 mg total) by mouth daily, Disp: 90 tablet, Rfl: 2    calcium carbonate (OYSTER SHELL,OSCAL) 500 mg, Take 1 tablet by mouth daily with breakfast Do not start before October 25, 2023. (Patient not taking: Reported on 12/22/2023), Disp: 30 tablet, Rfl: 0    cholecalciferol (VITAMIN D3) 1,000 units tablet, Take 2,000 Units by mouth daily (Patient not  "taking: Reported on 3/5/2024), Disp: , Rfl:     dexamethasone (DECADRON) 1 mg tablet, TAKE ONE TABLET BY MOUTH ONCE DAILY FOR 1 DOSE AT 11 P.M. THE NIGHT BEFORE YOU GET CORTISOL CHECKED IN THE MORNING (Patient not taking: Reported on 3/5/2024), Disp: , Rfl:     hydrochlorothiazide (HYDRODIURIL) 12.5 mg tablet, Take 12.5 mg by mouth daily (Patient not taking: Reported on 12/22/2023), Disp: , Rfl:     pioglitazone (ACTOS) 15 mg tablet, , Disp: , Rfl:     Review of Systems  Constitutional: Negative for activity change, appetite change, fatigue and unexpected weight change.   HENT: Negative for ear pain, sore throat, trouble swallowing and voice change.    Eyes: Negative for visual disturbance.   Respiratory: Negative for cough and shortness of breath.    Cardiovascular: Negative for chest pain and palpitations.   Gastrointestinal: Negative for abdominal distention, abdominal pain, constipation, diarrhea and vomiting.   Endocrine: Negative for cold intolerance, heat intolerance, polydipsia and polyuria.   Genitourinary: Negative for dysuria and hematuria.   Musculoskeletal: Negative for arthralgias and back pain.   Skin: Negative for color change and rash.   Neurological: Negative for weakness or tremors. .   All other systems reviewed and are negative.      Physical Exam:  Body mass index is 36.09 kg/m².  /90 (BP Location: Left arm, Patient Position: Sitting, Cuff Size: Large)   Pulse 102   Ht 5' 6\" (1.676 m)   Wt 101 kg (223 lb 9.6 oz)   SpO2 98%   BMI 36.09 kg/m²    Wt Readings from Last 3 Encounters:   03/05/24 101 kg (223 lb 9.6 oz)   12/22/23 106 kg (233 lb)   10/18/23 107 kg (235 lb 7.2 oz)        Physical Exam  Vitals reviewed.   Constitutional:       Appearance: Normal appearance.   Cardiovascular:      Rate and Rhythm: Normal rate and regular rhythm.      Pulses: Normal pulses.      Heart sounds: Normal heart sounds.   Pulmonary:      Effort: Pulmonary effort is normal.      Breath sounds: Normal " breath sounds.   Skin:     General: Skin is warm and dry.      Capillary Refill: Capillary refill takes less than 2 seconds.   Neurological:      General: No focal deficit present.      Mental Status: He is alert and oriented to person, place, and time.   Psychiatric:         Mood and Affect: Mood normal.         Behavior: Behavior normal.       Discussed with the patient and all questioned fully answered. She will call me if any problems arise.    Follow-up appointment in 3 months.     Counseled patient on diagnostic results, prognosis, risk and benefit of treatment options, instruction for management, importance of treatment compliance, Risk  factor reduction and impressions    KWAME Brewer

## 2024-03-05 NOTE — PATIENT INSTRUCTIONS
Lantus 17 units subcutaneously at bedtime  Humalog 3 units with meals 3 times a day    Call for blood sugar patterns less than 70 or over 250 mg/dL  Send in blood sugars by Friday.       Insulin Pens   WHAT YOU NEED TO KNOW:   An insulin pen is a device used to inject insulin.       DISCHARGE INSTRUCTIONS:   Call your doctor or diabetes care team provider if:   You feel or see hard lumps in your skin where you inject your insulin.    You think you gave yourself too much or not enough insulin.    Your injections are very painful.    You see blood or clear fluid on your injection site more than once after you inject insulin.    You have questions about how to give the injection.    You cannot afford to buy your diabetes supplies.    You have questions or concerns about your condition or care.    Types of insulin pens:   Most insulin pens are disposable.  A disposable pen contains a prefilled amount of insulin. When this type of pen is empty, it is thrown away.    A few insulin pens are reusable.  A reusable pen contains an insulin cartridge that can be replaced. When the cartridge is empty, it is thrown away. Then a new, prefilled cartridge is put in. Always use a new needle every time you inject insulin.    How to get the insulin ready to use:   Check the label and color of the insulin.  Check that you have the correct type and strength of insulin. Also check the expiration date on the label. Use a new cartridge or pen if the expiration date has passed. Use a new cartridge or pen if the insulin does not look right. Follow the pen 's instructions for inserting a new cartridge into a reusable pen.    Mix cloudy insulin.  Sometimes cloudy insulin separates. The insulin will remain cloudy after mixing, but it will all be cloudy. Gently roll the pen back and forth between the palms of your hands. Repeat this 10 times. Do not shake the pen.  This can make the insulin clump together. Next, gently tip the pen up  and down 10 times. Do not use the insulin if you see clumps in it after you mix it.    How to get the pen ready to use:  No matter the type of pen you use, the steps for using it are the same:  Remove a new pen from the refrigerator 30 minutes before you use it.  Insulin should be injected at room temperature.    Wash your hands.  Use soap and water or an alcohol-based hand rub. This will help decrease your risk for an infection.         Remove the cap from the pen.  Wipe the needle attachment area with an alcohol swab.    Attach a new needle to the pen.  Remove the tab from the needle. Do not remove the outer cap on the needle. Push the needle straight onto the pen. Turn the needle clockwise until you cannot turn it more. Make sure the needle is straight.    Remove the needle caps.  Remove the outer cap and save it. Remove the inner cap and throw it away.    Remove air from the pen.  Air may cause pain during injection. Turn the dial to 2 units. For most insulin pens, you will hear a click for each unit of insulin that you dial. Hold the pen and point the needle up. Gently tap the pen to move air bubbles to the top of the pen. Press the injection button. You should see a drop of insulin on the tip of the pen. If you do not see a drop, change the needle and repeat this step. If you do not see a drop after you repeat this step 3 times, use a new pen.    Select the correct dose on the pen.  Turn the dial to the number of units you need to inject. The pointer on the side of your pen should line up with your dose. The dial can be turned in either direction to choose the correct dose. You cannot choose a dose larger than the number of units left in the pen. Use another pen if there is not enough insulin. Instead you can inject part of your dose with the insulin that is left. Next, you can use a new pen to inject the rest of your dose.    Where to inject insulin:   You can inject insulin into your abdomen, upper arm,  buttocks, hip, or the front or side of the thigh.  Insulin works fastest when it is injected into the abdomen. Do not inject insulin within 2 inches of your belly button or into any stretch marks.         Do not inject insulin into areas where you have a wound or a bruise.  Insulin injected into wounds or bruises may not get into your body correctly. Do not inject insulin through your clothes. Injecting through clothes can contaminate the needle and may cause an infection.    Use a different area within the site each time you inject insulin.  For example, inject insulin into different areas in your abdomen. Insulin injected into the same area can cause lumps, swelling, or thickened skin.    How to inject insulin with a pen:   Clean the skin where you will inject the insulin.  You can use an alcohol pad or a cotton swab dipped in alcohol. Let the area dry before you inject. This will decrease pain.    Grab a fold of your skin.  Gently pinch the skin and fat between your thumb and first finger.    Insert the needle straight into your skin.  Do not hold the syringe at an angle. Make sure the needle is all the way into the skin. Let go of the pinched tissue.    Push the injection button to inject the insulin.  Continue to press on the injection button. Keep the needle in place for 10 seconds.    Pull the needle out.  Replace the needle cap. Press on your injection site for 5 to 10 seconds. Do not rub. This will keep insulin from leaking out.    Remove the needle from the pen.  Twist the capped needle counter clockwise. Place the needle in a heavy-duty laundry detergent bottle or a metal coffee can. The container should have a cap or lid that fits securely.     Replace the pen cap.  Store the pen as directed.    What to do with used needles:  Ask your local waste authority if you need to follow certain rules for getting rid of your needles. Bring your used needles home with you when you travel. Pack them in a plastic or  metal container with a secure lid.  How to store an insulin pen:  Follow the storage directions that came with the insulin. The following are general directions:  Unopened pens can be stored in the refrigerator until you are ready to use them.    Most insulin pens can be opened and kept at room temperature. Store your pen in a cool, dry place. Throw away pens that have been frozen or exposed to temperatures above 85°F (30°C). If you travel, keep the pen in a cool pack.    Do not keep your pen in direct sunlight or in your car.    Do not store your pen with a needle attached.    Follow up with your doctor or diabetes care team provider as directed:  Write down your questions so you remember to ask them during your visits.  © Copyright [employer] 2023 Information is for End User's use only and may not be sold, redistributed or otherwise used for commercial purposes.  The above information is an  only. It is not intended as medical advice for individual conditions or treatments. Talk to your doctor, nurse or pharmacist before following any medical regimen to see if it is safe and effective for you.

## 2024-03-05 NOTE — ASSESSMENT & PLAN NOTE
Lab Results   Component Value Date    HGBA1C 7.7 (H) 02/19/2024     HGA1C above goal but improving. Does not want to transition therapy at this time. Would like to continue on insulin and declines interest in CGM.     Lantus 17 units subcutaneously at bedtime  Humalog 3 units with meals 3 times a day    Call for blood sugar patterns less than 70 or over 250 mg/dL  Send in blood sugars by Friday.     Discussed risks/complications associated with uncontrolled diabetes including organ involvement, heart attack, stroke, death.  Recommended referral to diabetes education.  Declined.     Advised lifestyle modifications including attention to diet including the amount and types of carbohydrates consumed and regular activity.     Call for blood sugars less than 70 mg/dl or patterns over 250 mg/dl.     Discussed symptoms and treatment of hypoglycemia.  Reviewed risks associated with hypoglycemia. Always carry rapid acting carbohydrates and a glucometer (a way to check your blood sugar).    Recommendation for medical identification either bracelet, necklace.    Recommendation for glucagon if on insulin. Declines at this time.     Routine follow up for diabetic eye and foot exams.     Ordered blood work to complete prior to next visit.    Send glucose logs/CGM download in 1-2 weeks for review    Follow up in 3 months.

## 2024-03-05 NOTE — ASSESSMENT & PLAN NOTE
BP slightly elevated; asymptomatic. If remains elevated recommend follow up with PCP for adjustment of regimen.   Currently on ACE-inhibitor.

## 2024-03-08 LAB
METANEPH FREE SERPL-MCNC: <25 PG/ML (ref 0–88)
NORMETANEPHRINE SERPL-MCNC: 32.7 PG/ML (ref 0–244)

## 2024-06-10 ENCOUNTER — APPOINTMENT (OUTPATIENT)
Dept: LAB | Facility: CLINIC | Age: 59
End: 2024-06-10
Payer: COMMERCIAL

## 2024-06-10 DIAGNOSIS — E78.5 HYPERLIPIDEMIA, UNSPECIFIED HYPERLIPIDEMIA TYPE: ICD-10-CM

## 2024-06-10 DIAGNOSIS — E11.65 TYPE 2 DIABETES MELLITUS WITH HYPERGLYCEMIA, WITHOUT LONG-TERM CURRENT USE OF INSULIN (HCC): ICD-10-CM

## 2024-06-10 LAB
ALBUMIN SERPL BCP-MCNC: 4.1 G/DL (ref 3.5–5)
ALP SERPL-CCNC: 99 U/L (ref 34–104)
ALT SERPL W P-5'-P-CCNC: 21 U/L (ref 7–52)
ANION GAP SERPL CALCULATED.3IONS-SCNC: 10 MMOL/L (ref 4–13)
AST SERPL W P-5'-P-CCNC: 18 U/L (ref 13–39)
BILIRUB SERPL-MCNC: 0.45 MG/DL (ref 0.2–1)
BUN SERPL-MCNC: 24 MG/DL (ref 5–25)
CALCIUM SERPL-MCNC: 9.2 MG/DL (ref 8.4–10.2)
CHLORIDE SERPL-SCNC: 102 MMOL/L (ref 96–108)
CHOLEST SERPL-MCNC: 145 MG/DL
CO2 SERPL-SCNC: 24 MMOL/L (ref 21–32)
CREAT SERPL-MCNC: 1.23 MG/DL (ref 0.6–1.3)
CREAT UR-MCNC: 65.5 MG/DL
GFR SERPL CREATININE-BSD FRML MDRD: 48 ML/MIN/1.73SQ M
GLUCOSE P FAST SERPL-MCNC: 211 MG/DL (ref 65–99)
HDLC SERPL-MCNC: 39 MG/DL
LDLC SERPL CALC-MCNC: 75 MG/DL (ref 0–100)
MICROALBUMIN UR-MCNC: 29 MG/L
MICROALBUMIN/CREAT 24H UR: 44 MG/G CREATININE (ref 0–30)
NONHDLC SERPL-MCNC: 106 MG/DL
POTASSIUM SERPL-SCNC: 4.6 MMOL/L (ref 3.5–5.3)
PROT SERPL-MCNC: 7.3 G/DL (ref 6.4–8.4)
SODIUM SERPL-SCNC: 136 MMOL/L (ref 135–147)
TRIGL SERPL-MCNC: 157 MG/DL

## 2024-06-10 PROCEDURE — 36415 COLL VENOUS BLD VENIPUNCTURE: CPT

## 2024-06-10 PROCEDURE — 83036 HEMOGLOBIN GLYCOSYLATED A1C: CPT

## 2024-06-10 PROCEDURE — 82043 UR ALBUMIN QUANTITATIVE: CPT

## 2024-06-10 PROCEDURE — 82570 ASSAY OF URINE CREATININE: CPT

## 2024-06-10 PROCEDURE — 80061 LIPID PANEL: CPT

## 2024-06-10 PROCEDURE — 80053 COMPREHEN METABOLIC PANEL: CPT

## 2024-06-10 NOTE — ASSESSMENT & PLAN NOTE
Problem: Chronic Conditions and Co-morbidities  Goal: Patient's chronic conditions and co-morbidity symptoms are monitored and maintained or improved  6/9/2024 2049 by Stephanie Green RN  Outcome: Progressing  Flowsheets (Taken 6/9/2024 2000)  Care Plan - Patient's Chronic Conditions and Co-Morbidity Symptoms are Monitored and Maintained or Improved:   Monitor and assess patient's chronic conditions and comorbid symptoms for stability, deterioration, or improvement   Collaborate with multidisciplinary team to address chronic and comorbid conditions and prevent exacerbation or deterioration  6/9/2024 1410 by Rola Gaitan RN  Outcome: Progressing     Problem: Safety - Adult  Goal: Free from fall injury  6/9/2024 2049 by Stephanie Green RN  Outcome: Progressing  Flowsheets (Taken 6/9/2024 2046)  Free From Fall Injury: Instruct family/caregiver on patient safety  6/9/2024 1410 by Rola Gaitan RN  Outcome: Progressing     Problem: Skin/Tissue Integrity  Goal: Absence of new skin breakdown  Description: 1.  Monitor for areas of redness and/or skin breakdown  2.  Assess vascular access sites hourly  3.  Every 4-6 hours minimum:  Change oxygen saturation probe site  4.  Every 4-6 hours:  If on nasal continuous positive airway pressure, respiratory therapy assess nares and determine need for appliance change or resting period.  6/9/2024 2049 by Stephanie Green RN  Outcome: Progressing  6/9/2024 1410 by Rola Gaitan RN  Outcome: Progressing     Problem: ABCDS Injury Assessment  Goal: Absence of physical injury  6/9/2024 2049 by Stephanie Green RN  Outcome: Progressing  Flowsheets (Taken 6/9/2024 2046)  Absence of Physical Injury: Implement safety measures based on patient assessment  6/9/2024 1410 by Rola Gaitan RN  Outcome: Progressing     Problem: Pain  Goal: Verbalizes/displays adequate comfort level or baseline comfort level  6/9/2024 2049 by Stephanie Green RN  Outcome: Progressing  Flowsheets (Taken  Presented with nonspecific abdominal discomfort and 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 coli  No blood cultures were drawn in the ED  Leukocytosis noted, however, remains afebrile -> hemodynamically stable and currently nontoxic-appearing  PRN pain/emesis control  Appreciate urology input -> repeat CT imaging showed improvement (interval decrease in perirenal space soft tissue emphysema volume) -> status post on 10/19 cystoscopy with right-sided stenting  Initial imaging reviewed by IR -> no role for nephrostomy tube placement currently

## 2024-06-11 LAB
EST. AVERAGE GLUCOSE BLD GHB EST-MCNC: 194 MG/DL
HBA1C MFR BLD: 8.4 %

## 2024-06-12 ENCOUNTER — OFFICE VISIT (OUTPATIENT)
Dept: ENDOCRINOLOGY | Facility: CLINIC | Age: 59
End: 2024-06-12
Payer: COMMERCIAL

## 2024-06-12 VITALS
HEART RATE: 75 BPM | BODY MASS INDEX: 38.89 KG/M2 | HEIGHT: 66 IN | DIASTOLIC BLOOD PRESSURE: 80 MMHG | OXYGEN SATURATION: 97 % | WEIGHT: 242 LBS | SYSTOLIC BLOOD PRESSURE: 120 MMHG

## 2024-06-12 DIAGNOSIS — E78.2 MIXED HYPERLIPIDEMIA: ICD-10-CM

## 2024-06-12 DIAGNOSIS — E78.5 HYPERLIPIDEMIA, UNSPECIFIED HYPERLIPIDEMIA TYPE: ICD-10-CM

## 2024-06-12 DIAGNOSIS — N20.0 NEPHROLITHIASIS: ICD-10-CM

## 2024-06-12 DIAGNOSIS — I10 ESSENTIAL HYPERTENSION: Primary | ICD-10-CM

## 2024-06-12 DIAGNOSIS — E11.65 TYPE 2 DIABETES MELLITUS WITH HYPERGLYCEMIA, WITHOUT LONG-TERM CURRENT USE OF INSULIN (HCC): ICD-10-CM

## 2024-06-12 DIAGNOSIS — E27.8 ADRENAL MASS (HCC): ICD-10-CM

## 2024-06-12 PROCEDURE — 99214 OFFICE O/P EST MOD 30 MIN: CPT | Performed by: NURSE PRACTITIONER

## 2024-06-12 RX ORDER — INSULIN GLARGINE 100 [IU]/ML
INJECTION, SOLUTION SUBCUTANEOUS
Qty: 15 ML | Refills: 3 | Status: SHIPPED | OUTPATIENT
Start: 2024-06-12

## 2024-06-12 RX ORDER — INSULIN LISPRO 100 [IU]/ML
5 INJECTION, SOLUTION INTRAVENOUS; SUBCUTANEOUS
Qty: 15 ML | Refills: 2 | Status: SHIPPED | OUTPATIENT
Start: 2024-06-12

## 2024-06-12 RX ORDER — INSULIN GLARGINE 100 [IU]/ML
INJECTION, SOLUTION SUBCUTANEOUS
Qty: 15 ML | Refills: 3 | Status: SHIPPED | OUTPATIENT
Start: 2024-06-12 | End: 2024-06-12

## 2024-06-12 RX ORDER — INSULIN LISPRO 100 [IU]/ML
3 INJECTION, SOLUTION INTRAVENOUS; SUBCUTANEOUS
Qty: 15 ML | Refills: 2 | Status: SHIPPED | OUTPATIENT
Start: 2024-06-12 | End: 2024-06-12

## 2024-06-12 NOTE — ASSESSMENT & PLAN NOTE
Lab Results   Component Value Date    HGBA1C 8.4 (H) 06/10/2024     HGA1C above goal. Patient has had some life stressors over the past several months leading to higher glucose levels. Patient would like to stay on basal-bolus insulin therapy at this time but is aware there are other treatment options available when/if interested. She declines CGM therapy or glucagon at this time. No history of documented or symptomatic hypoglycemia.    Treatment regimen:   Increase Lantus 20 units subcutaneously at bedtime  Increase Humalog 5 units with meals 3 times a day    Discussed risks/complications associated with uncontrolled diabetes including organ involvement, heart attack, stroke, death.    Advised lifestyle modifications including attention to diet including the amount and types of carbohydrates consumed and regular activity.     Call for blood sugars less than 70 mg/dl or patterns over 250 mg/dl.     Monitor blood glucose levels  ideally before all insulin administration times.     Discussed symptoms and treatment of hypoglycemia.  Reviewed risks associated with hypoglycemia. Always carry rapid acting carbohydrates and a glucometer (a way to check your blood sugar).    Recommendation for medical identification either bracelet, necklace.    Routine follow up for diabetic eye and foot exams.     Ordered blood work to complete prior to next visit.    Send glucose logs/CGM download in 1-2 weeks for review    Follow up in 3 months.

## 2024-06-12 NOTE — ASSESSMENT & PLAN NOTE
Clinically asymptomatic.  Functional workup completed in February 2024 was within normal limits.

## 2024-06-12 NOTE — ASSESSMENT & PLAN NOTE
Continues on statin and zetia.   LDL still mildly above goal at 75 but improving.  Recommend working on lifestyle modifications and repeat in 3 months.

## 2024-06-12 NOTE — PATIENT INSTRUCTIONS
Lantus 20 units subcutaneously at bedtime  Humalog 5 units with meals 3 times a day    Call for blood sugars less than 70 or patterns over 250 mg/dL.    Send in blood sugar for review in 1-2 weeks.     Hypoglycemia in a Person with Diabetes   WHAT YOU NEED TO KNOW:   Hypoglycemia is a serious condition that happens when your blood glucose (sugar) level drops too low. The blood sugar level is usually too high in a person with diabetes, but the level can also drop too low. It is important to follow your diabetes management plan to keep your blood sugar level steady.  DISCHARGE INSTRUCTIONS:   Have someone call your local emergency number (911 in the [place]) if:   You have a seizure or pass out.    Your blood sugar is less than 50 mg/dL and does not respond to treatment.    You feel you are going to pass out.    You have trouble thinking clearly.    Call your doctor or diabetes care team provider if:   You have had symptoms of low blood sugar several times.    You have questions about the amount of insulin or diabetes medicine you are taking.    You have questions or concerns about your condition or care.    Medicines:   Insulin or diabetes medicine  help to keep your blood sugar under control.    Glucagon  may be needed if you have severe hypoglycemia.    Take your medicine as directed.  Contact your healthcare provider if you think your medicine is not helping or if you have side effects. Tell your provider if you are allergic to any medicine. Keep a list of the medicines, vitamins, and herbs you take. Include the amounts, and when and why you take them. Bring the list or the pill bottles to follow-up visits. Carry your medicine list with you in case of an emergency.    Manage hypoglycemia:   Check your blood sugar level right away if you have symptoms of hypoglycemia.  Hypoglycemia usually happens when your blood sugar level is 70 mg/dL or below. Ask your diabetes care team provider what blood sugar level is too low for  you.    If your blood sugar level is too low, eat or drink 15 grams of fast-acting carbohydrate.  Examples of this amount of fast-acting carbohydrate are 4 ounces (½ cup) of fruit juice or 4 ounces of regular soda. Other examples are 2 tablespoons of raisins or 1 tube of glucose gel.     Check your blood sugar level 15 minutes later. Sit still as you wait. If the level is still low (less than 100 mg/dL), have another 15 grams of carbohydrate. When the level returns to 100 mg/dL, eat a meal if it is time. If your meal time is more than 1 hour away, eat a snack. The snack should contain carbohydrates, such as the following:    3/4 cup of cereal    1 cup of skim or low fat milk    6 soda crackers    1/2 of a turkey sandwich    15 fat-free chips  This will help prevent another drop in blood sugar. Always carefully follow your provider's instructions on how to treat low blood sugar levels.  Always carry a source of fast-acting carbohydrate.  If you have symptoms of hypoglycemia and you do not have a blood glucose meter, have a source of fast-acting carbohydrate anyway. Avoid carbohydrate foods that are high in fat. The fat content may make the carbohydrate take longer to increase your blood sugar level. Ask your provider if you should carry a glucagon kit. Glucagon is a medicine that is injected when you develop severe hypoglycemia and become unconscious. Check the expiration date every month and replace it before it expires.    Teach others how to help you if you have symptoms of hypoglycemia.  Tell them about the symptoms of hypoglycemia. Ask them to give you a source of fast-acting carbohydrate if you cannot get it yourself. Ask them to give you a glucagon injection if you have signs of hypoglycemia and you become unconscious or have a seizure. Ask them to call the local emergency number (911 in the [place]) . This is an emergency. Tell them never to try to make you swallow anything if you faint or have a seizure.    Wear  medical alert jewelry  or carry a card that says you have diabetes. Ask where to get these items.       Prevent hypoglycemia:   Take diabetes medicine as directed.  Take your medicine at the right time and in the right amount. Do not  double the amount of medicine you take unless instructed by your diabetes care team provider. You may need oral diabetes medicine, insulin, or both to help control your blood sugar levels. Your healthcare provider will teach you how and when to take oral diabetes medicine. You will also be taught about side effects oral diabetes medicine can cause. Insulin may be added if oral diabetes medicine becomes less effective over time. Insulin may be injected, or given through a pump or pen. You and your care team will discuss which method is best for you.    An insulin pump  is an implanted device that gives your insulin 24 hours a day. An insulin pump prevents the need for multiple insulin injections in a day.         An insulin pen  is a device prefilled with the right amount of insulin.       Eat meals and snacks as directed.  Talk to your dietitian or provider about a meal plan that is right for you. Do not skip meals.         Check your blood sugar level as directed.  Ask your provider what your blood sugar levels should be before and after you eat. Ask when and how often to check your blood sugar level. You may need to check a drop of blood in a glucose test machine. You may need to check at least 3 times each day. Record your blood sugar level results and take the record with you when you see your care team. They may use it to make changes to your medicine, food, or exercise schedules. Your care team provider may recommend a continuous glucose monitor (CGM). A CGM is a device that is worn at all times. The CGM checks your blood sugar every 5 minutes. It sends results to an electronic device such as a smart phone.            Check your blood sugar level before you exercise.  Physical  activity, such as exercise, can decrease your blood sugar level. If your blood sugar level is less than 100 mg/dL, have a carbohydrate snack. Examples are 4 to 6 crackers, ½ banana, 8 ounces (1 cup) of nonfat or 1% milk, or 4 ounces (½ cup) of juice. If you will be active for more than 1 hour, you may need to check your blood sugar level every 30 minutes. Your provider may also recommend that you check your blood sugar level after your activity.    Know the risks if you choose to drink alcohol.  Alcohol can cause your blood sugar levels to be low if you use insulin. Alcohol can cause high blood sugar levels and weight gain if you drink too much. Women 21 years or older and men 65 years or older should limit alcohol to 1 drink a day. Men aged 21 to 64 years should limit alcohol to 2 drinks a day. A drink of alcohol is 12 ounces of beer, 5 ounces of wine, or 1½ ounces of liquor.    Follow up with your doctor or diabetes care team provider as directed:  You may need your insulin or diabetes medicine changed if you continue to have hypoglycemia episodes. Write down your questions so you remember to ask them during your visits.  © Copyright [employer] 2023 Information is for End User's use only and may not be sold, redistributed or otherwise used for commercial purposes.  The above information is an  only. It is not intended as medical advice for individual conditions or treatments. Talk to your doctor, nurse or pharmacist before following any medical regimen to see if it is safe and effective for you.

## 2024-06-12 NOTE — PROGRESS NOTES
Established Patient Progress Note    Chief Complaint:  Diabetes follow up visit    Impression & Plan:    Problem List Items Addressed This Visit          Cardiovascular and Mediastinum    Essential hypertension - Primary     BP under good control on current regimen.  Continues on ACE-I.          Relevant Medications    Insulin Glargine Solostar (Lantus SoloStar) 100 UNIT/ML SOPN    insulin lispro (HumaLOG KwikPen) 100 units/mL injection pen       Endocrine    Type 2 diabetes mellitus with hyperglycemia, without long-term current use of insulin ([facility])       Lab Results   Component Value Date    HGBA1C 8.4 (H) 06/10/2024     HGA1C above goal. Patient has had some life stressors over the past several months leading to higher glucose levels. Patient would like to stay on basal-bolus insulin therapy at this time but is aware there are other treatment options available when/if interested. She declines CGM therapy or glucagon at this time. No history of documented or symptomatic hypoglycemia.    Treatment regimen:   Increase Lantus 20 units subcutaneously at bedtime  Increase Humalog 5 units with meals 3 times a day    Discussed risks/complications associated with uncontrolled diabetes including organ involvement, heart attack, stroke, death.    Advised lifestyle modifications including attention to diet including the amount and types of carbohydrates consumed and regular activity.     Call for blood sugars less than 70 mg/dl or patterns over 250 mg/dl.     Monitor blood glucose levels  ideally before all insulin administration times.     Discussed symptoms and treatment of hypoglycemia.  Reviewed risks associated with hypoglycemia. Always carry rapid acting carbohydrates and a glucometer (a way to check your blood sugar).    Recommendation for medical identification either bracelet, necklace.    Routine follow up for diabetic eye and foot exams.     Ordered blood work to complete prior to next visit.    Send glucose  logs/CGM download in 1-2 weeks for review    Follow up in 3 months.            Relevant Medications    Insulin Glargine Solostar (Lantus SoloStar) 100 UNIT/ML SOPN    insulin lispro (HumaLOG KwikPen) 100 units/mL injection pen    Other Relevant Orders    Ambulatory Referral to Ophthalmology    Comprehensive metabolic panel    Hemoglobin A1C    Albumin / creatinine urine ratio       Genitourinary    Nephrolithiasis    Relevant Medications    Insulin Glargine Solostar (Lantus SoloStar) 100 UNIT/ML SOPN    insulin lispro (HumaLOG KwikPen) 100 units/mL injection pen       Other    Hyperlipidemia     Continues on statin and zetia.   LDL still mildly above goal at 75 but improving.  Recommend working on lifestyle modifications and repeat in 3 months.          Relevant Medications    Insulin Glargine Solostar (Lantus SoloStar) 100 UNIT/ML SOPN    insulin lispro (HumaLOG KwikPen) 100 units/mL injection pen    Other Relevant Orders    Lipid panel    Adrenal mass (HCC)     Clinically asymptomatic.  Functional workup completed in 2024 was within normal limits.            Relevant Medications    Insulin Glargine Solostar (Lantus SoloStar) 100 UNIT/ML SOPN    insulin lispro (HumaLOG KwikPen) 100 units/mL injection pen       History of Present Illness:   Brenda S Apgar is a 58 y.o. female with a history of type 2 diabetes mellitus on insulin therapy since hospitalization in 2023 for [facility]. Has been diagnosed for approximately 10 years. No history of CAD/CVA/CKD. UTD diabetic foot exam, no history of neuropathy. Due for eye exam, no history of diabetic retinopathy. No numbness or tingling in feet/hands or vision changes.     Current blood sugars:   Before breakfast: 163-244 mg/dL  Before lunch: 157--258 mg/dL  Before dinner/night: 176-339 mg/dL     Current regimen:   Lantus 17 units subcutaneously at bedtime  Humalog 3 units with meals 3 times a day     Never on metformin.      Has hypertension: ACE-inhibitor,  compliant  Has hyperlipidemia: statin, tolerating  No history of thyroid disease  No history of pancreatitis     History of adrenal nodule noted on imaging in 2006 work up completed February 2024 with normal dexamethasone suppression test, aldosterone/renin ratio, metanephrines, and catecholamines.       Patient Active Problem List   Diagnosis    Leukocytosis    Hyponatremia    Acute kidney injury (HCC)    Morbid obesity (HCC)    Thrombocytopenia (HCC)    Essential hypertension    Hyperlipidemia    Depression    Nephrolithiasis    Type 2 diabetes mellitus with hyperglycemia, without long-term current use of insulin (HCC)    Adrenal mass (HCC)      Past Medical History:   Diagnosis Date    Diabetes mellitus (HCC)     Hypertension       Past Surgical History:   Procedure Laterality Date    CHOLECYSTECTOMY      FL RETROGRADE PYELOGRAM  10/19/2023    NV CYSTO BLADDER W/URETERAL CATHETERIZATION Right 10/19/2023    Procedure: CYSTOSCOPY RETROGRADE PYELOGRAM WITH INSERTION STENT URETERAL;  Surgeon: Chau Hassan MD;  Location: [facility] OR;  Service: Urology      Family History   Problem Relation Age of Onset    Diabetes type II Mother     Hypertension Mother     Brain cancer Sister     Brain cancer Sister     Hypertension Brother     Breast cancer Maternal Aunt     Liver disease Maternal Uncle     No Known Problems Maternal Grandmother     No Known Problems Maternal Grandfather     No Known Problems Paternal Grandmother     No Known Problems Paternal Grandfather      Social History     Tobacco Use    Smoking status: Never    Smokeless tobacco: Never   Substance Use Topics    Alcohol use: Never     No Known Allergies      Current Outpatient Medications:     amLODIPine (NORVASC) 5 mg tablet, Take 5 mg by mouth daily, Disp: , Rfl:     ezetimibe (ZETIA) 10 mg tablet, Take 10 mg by mouth daily, Disp: , Rfl:     Insulin Glargine Solostar (Lantus SoloStar) 100 UNIT/ML SOPN, Inject 20 units at bedtime, Disp: 15 mL, Rfl: 3     "insulin lispro (HumaLOG KwikPen) 100 units/mL injection pen, Inject 5 Units under the skin 3 (three) times a day with meals, Disp: 15 mL, Rfl: 2    Insulin Pen Needle (B-D UF III MINI PEN NEEDLES) 31G X 5 MM MISC, Use 4 (four) times a day To inject insulin, Disp: 400 each, Rfl: 2    multivitamin (THERAGRAN) TABS, Take 1 tablet by mouth daily, Disp: , Rfl:     PARoxetine (PAXIL) 20 mg tablet, Take 20 mg by mouth daily, Disp: , Rfl:     perindopril (ACEON) 8 MG tablet, Take 8 mg by mouth daily, Disp: , Rfl:     pravastatin (PRAVACHOL) 40 mg tablet, Take 1 tablet (40 mg total) by mouth daily, Disp: 90 tablet, Rfl: 2    Review of Systems  Constitutional: Negative for activity change, appetite change, fatigue and unexpected weight change.   HENT: Negative for ear pain, sore throat, trouble swallowing and voice change.    Eyes: Negative for visual disturbance.   Respiratory: Negative for cough and shortness of breath.    Cardiovascular: Negative for chest pain and palpitations.   Gastrointestinal: Negative for abdominal distention, abdominal pain, constipation, diarrhea and vomiting.   Endocrine: Negative for cold intolerance, heat intolerance, polydipsia and polyuria.   Genitourinary: Negative for dysuria and hematuria.   Musculoskeletal: Negative for arthralgias and back pain.   Skin: Negative for color change and rash.   Neurological: Negative for seizures and syncope.   All other systems reviewed and are negative.      Physical Exam:  Body mass index is 39.06 kg/m².  /80 (BP Location: Left arm, Patient Position: Sitting, Cuff Size: Large)   Pulse 75   Ht 5' 6\" (1.676 m)   Wt 110 kg (242 lb)   SpO2 97%   BMI 39.06 kg/m²    Wt Readings from Last 3 Encounters:   06/12/24 110 kg (242 lb)   03/05/24 101 kg (223 lb 9.6 oz)   12/22/23 106 kg (233 lb)        Physical Exam  Vitals reviewed.   Constitutional:       Appearance: Normal appearance.   Cardiovascular:      Rate and Rhythm: Normal rate and regular rhythm. " "     Pulses: Normal pulses.      Heart sounds: Normal heart sounds.   Pulmonary:      Effort: Pulmonary effort is normal.      Breath sounds: Normal breath sounds.   Skin:     General: Skin is warm and dry.      Capillary Refill: Capillary refill takes less than 2 seconds.   Neurological:      General: No focal deficit present.      Mental Status: She is alert and oriented to person, place, and time.   Psychiatric:         Mood and Affect: Mood normal.         Behavior: Behavior normal.       Labs:   Lab Results   Component Value Date    HGBA1C 8.4 (H) 06/10/2024    HGBA1C 7.7 (H) 02/19/2024    HGBA1C 15.0 (H) 10/18/2023     Lab Results   Component Value Date    CREATININE 1.23 06/10/2024    CREATININE 1.17 02/19/2024    CREATININE 0.95 10/25/2023    BUN 24 06/10/2024    K 4.6 06/10/2024     06/10/2024    CO2 24 06/10/2024     eGFR   Date Value Ref Range Status   06/10/2024 48 ml/min/1.73sq m Final     Lab Results   Component Value Date    HDL 39 (L) 06/10/2024    TRIG 157 (H) 06/10/2024     Lab Results   Component Value Date    ALT 21 06/10/2024    AST 18 06/10/2024    ALKPHOS 99 06/10/2024     Lab Results   Component Value Date    [id number] 1.419 10/18/2023     No results found for: \"FREET4\", \"TSI\"    Discussed with the patient and all questioned fully answered. She will call me if any problems arise.    Follow-up appointment in 3 months.     Counseled patient on diagnostic results, prognosis, risk and benefit of treatment options, instruction for management, importance of treatment compliance, Risk  factor reduction and impressions    KWAME Brewer    "

## 2024-07-08 DIAGNOSIS — E78.5 HYPERLIPIDEMIA, UNSPECIFIED HYPERLIPIDEMIA TYPE: ICD-10-CM

## 2024-07-08 NOTE — TELEPHONE ENCOUNTER
Please send Paroxetine to the pharmacy. Thank you   Subjective


Remarks


Follow up for hypertension, rash, hypothyroidism. The patient is seen in the 

day room. She has no specific medical complaints. She states her rash has 

improved with the lotion. She also believes her alcohol withdrawal symptoms are 

getting better. Denies any chest pain or shortness of breath. Discussed her 

HgbA1c results.





Objective


Vitals





Vital Signs








  Date Time  Temp Pulse Resp B/P (MAP) Pulse Ox O2 Delivery O2 Flow Rate FiO2


 


2/9/18 05:42 97.4 75 16 158/90 (112) 99   


 


2/8/18 18:47  92  159/98 (118)    


 


2/8/18 18:11  88  180/100 (126)    


 


2/8/18 17:20 98.2  18  98   


 


2/8/18 17:15  88  162/102 (122)    








Result Diagram:  


2/8/18 0545





Objective Remarks


GENERAL: Well-nourished, well-developed patient in NAD.


SKIN: Warm and dry. Small erythematous papules throughout breasts and upper 

thighs. 


HEENT:  Normocephalic. Atraumatic.Pupils equal and round.   Mucous membranes 

pink and moist.


NECK: Supple. Trachea midline.  


CARDIOVASCULAR: Regular rate and rhythm.  S1, S2 noted. No murmur appreciated. 


RESPIRATORY: No accessory muscle use. Clear to auscultation. Breath sounds 

equal bilaterally.  


GASTROINTESTINAL: Abdomen soft, non-tender, nondistended. Normoactive bowel 

sounds x4.


MUSCULOSKELETAL: No obvious deformities. Extremities without clubbing, cyanosis

, or edema. 


NEUROLOGICAL: Awake and alert. No obvious cranial nerve deficits.  Motor 

grossly within normal limits.  Normal speech.


Medications and IVs





Current Medications








 Medications


  (Trade)  Dose


 Ordered  Sig/Kenroy


 Route  Start Time


 Stop Time Status Last Admin


 


  (Atarax)  50 mg  Q6H  PRN


 PO  2/7/18 17:30


    2/9/18 05:34


 


 


  (Benadryl)  50 mg  Q6H  PRN


 PO  2/7/18 18:00


     


 


 


  (Benadryl Inj)  50 mg  Q6H  PRN


 IM  2/7/18 17:30


     


 


 


  (Benadryl)  50 mg  HS  PRN


 PO  2/7/18 17:30


    2/8/18 21:39


 


 


  (Benadryl Inj)  50 mg  HS  PRN


 IM  2/7/18 17:30


     


 


 


  (Tylenol)  650 mg  Q4H  PRN


 PO  2/7/18 17:30


     


 


 


  (Milk Of


 Magnesia Liq)  30 ml  DAILY  PRN


 PO  2/7/18 17:30


     


 


 


  (Mag-Al Plus


 Susp Liq)  30 ml  Q6H  PRN


 PO  2/7/18 17:30


     


 


 


  (Habitrol 21 Mg


 Patch.24 Hr)  1 patch  DAILY


 T-DERMAL  2/8/18 09:00


     


 


 


 Miscellaneous


 Information  1  HS


 T-DERMAL  2/7/18 21:00


     


 


 


  (Ativan)  1 mg  Q4H  PRN


 PO  2/7/18 18:00


    2/8/18 21:37


 


 


  (Ativan Inj)  1 mg  Q4H  PRN


 IM  2/7/18 18:00


     


 


 


  (Ativan)  2 mg  Q2H  PRN


 PO  2/7/18 18:00


    2/9/18 11:53


 


 


  (Ativan Inj)  2 mg  Q2H  PRN


 IM  2/7/18 18:00


     


 


 


  (Ativan Inj)  2 mg  Q1H  PRN


 IM  2/7/18 18:00


     


 


 


  (Ativan Inj)  2 mg  Q15M  PRN


 IM  2/7/18 18:00


     


 


 


  (Romazicon Inj)  0.2 mg  Q1M  PRN


 IV PUSH  2/7/18 18:00


     


 


 


  (Motrin)  600 mg  Q6H  PRN


 PO  2/7/18 19:00


    2/9/18 15:08


 


 


  (Wellbutrin Sr


 12 Hr)  100 mg  Q12HR


 PO  2/8/18 13:00


    2/9/18 10:00


 


 


  (Synthroid)  112 mcg  DAILY@0600


 PO  2/9/18 06:00


    2/9/18 05:34


 


 


  (Caladryl Lotion)  1 applic  Q12HR


 TOPICAL  2/8/18 21:00


    2/9/18 09:00


 


 


  (Prinivil)  20 mg  DAILY


 PO  2/9/18 09:00


    2/9/18 10:02


 


 


  (Folate)  1 mg  DAILY


 PO  2/8/18 16:00


 2/13/18 15:59  2/9/18 10:02


 


 


  (Vitamin B1)  100 mg  DAILY


 PO  2/8/18 16:00


    2/9/18 10:00


 


 


  (Theragran M Tab)  1 tab  DAILY


 PO  2/8/18 16:00


 2/13/18 15:59  2/9/18 10:00


 


 


  (Lithotabs)  150 mg  BID


 PO  2/8/18 21:00


    2/9/18 10:00


 


 


  (Catapres)  0.1 mg  Q6HR  PRN


 PO  2/8/18 18:00


    2/9/18 05:34


 


 


  (Librium)  50 mg  Q8HR


 PO  2/9/18 14:00


    2/9/18 14:59


 











A/P


Assessment and Plan


47-year-old female with history of chronic back pain, alcohol abuse, 

hypertension, bipolar disorder, anxiety, depression, admitted to inpatient 

psychiatry under [employer] [place] for suicidal ideations. Hospitalists consulted for 

medical management, abnormal labs, possible thyroid issues. 





Bipolar Disorder/Depression/Suicidal Ideations: admitted to psych under [place] 

Act


   -continue management per psychiatry


   -continued patient's lithium and wellbutrin


   -lithium level 1.1





Rash: possible bed bugs as patient has been sleeping in multiple different beds/

couches; no intertriginous lesions to suggest scabies.


   -symptomatic treatment with Caladryl lotion


   -improving





Hypothyroidism: TSH high, T4 low. 


   -started on thyroid replacement with Synthroid 112mcg daily


   -repeat TSH/T4 in 6 weeks as outpatient





Hypertension: BP fairly well controlled


   -continue patient's lisinopril 20mg daily


   -monitor BP, adjust antihypertensives as needed


   -clonidine prn


   -BP still intermittently elevated, likely secondary to withdrawal, consider 

increasing lisinopril if BP still elevated and not in withdrawal





Alcohol Abuse with Withdrawal: acute, patient drinks 4 [place] and 1L Vodka 

daily [id number]


   -high risk for withdrawal


   -CIWA protocol


   -Ativan prn withdrawal


   -continue on thiamine/folate/MV





Polyuria/Polydipsia: patient concerned she has diabetes, family history 

positive for diabetes. UA clear. 


   -blood glucose upon arrival wnl


   -HgbA1c 5.3, no diabetes





All other chronic medical conditions stable.





DVT Prophylaxis: patient is ambulatory











Jaylene Washington PA-C Feb 9, 2018 3:24 pm

## 2024-07-09 RX ORDER — PRAVASTATIN SODIUM 40 MG
40 TABLET ORAL DAILY
Qty: 90 TABLET | Refills: 2 | Status: SHIPPED | OUTPATIENT
Start: 2024-07-09

## 2024-07-24 ENCOUNTER — TELEPHONE (OUTPATIENT)
Dept: ENDOCRINOLOGY | Facility: CLINIC | Age: 59
End: 2024-07-24

## 2024-07-24 NOTE — TELEPHONE ENCOUNTER
Encounter for forms          Please refer to the following information:       Type of Form: [employer]    Date of Visit (if applicable): 6/12/2024    Doctor: Martina Domínguez    Fax number: [phone]    Patient phone number: [phone]      Original on Martina Domínguez desk  to be completed.

## 2024-09-15 ENCOUNTER — HOSPITAL ENCOUNTER (INPATIENT)
Facility: HOSPITAL | Age: 59
LOS: 6 days | Discharge: HOME | DRG: 659 | End: 2024-09-21
Attending: EMERGENCY MEDICINE | Admitting: INTERNAL MEDICINE
Payer: COMMERCIAL

## 2024-09-15 ENCOUNTER — APPOINTMENT (EMERGENCY)
Dept: RADIOLOGY | Facility: HOSPITAL | Age: 59
DRG: 659 | End: 2024-09-15
Payer: COMMERCIAL

## 2024-09-15 DIAGNOSIS — I10 ESSENTIAL HYPERTENSION: ICD-10-CM

## 2024-09-15 DIAGNOSIS — N30.00 ACUTE CYSTITIS WITHOUT HEMATURIA: ICD-10-CM

## 2024-09-15 DIAGNOSIS — R29.818 SUSPECTED SLEEP APNEA: ICD-10-CM

## 2024-09-15 DIAGNOSIS — E27.8 ADRENAL MASS (HCC): ICD-10-CM

## 2024-09-15 DIAGNOSIS — E11.65 TYPE 2 DIABETES MELLITUS WITH HYPERGLYCEMIA, WITHOUT LONG-TERM CURRENT USE OF INSULIN (HCC): ICD-10-CM

## 2024-09-15 DIAGNOSIS — R65.10 SIRS (SYSTEMIC INFLAMMATORY RESPONSE SYNDROME) (HCC): ICD-10-CM

## 2024-09-15 DIAGNOSIS — N20.0 NEPHROLITHIASIS: ICD-10-CM

## 2024-09-15 DIAGNOSIS — N30.00 ACUTE RECURRENT CYSTITIS: ICD-10-CM

## 2024-09-15 DIAGNOSIS — Z96.0 URETERAL STENT PRESENT: ICD-10-CM

## 2024-09-15 DIAGNOSIS — I47.10 SVT (SUPRAVENTRICULAR TACHYCARDIA) (HCC): Primary | ICD-10-CM

## 2024-09-15 DIAGNOSIS — E78.2 MIXED HYPERLIPIDEMIA: ICD-10-CM

## 2024-09-15 DIAGNOSIS — N17.9 AKI (ACUTE KIDNEY INJURY) (HCC): ICD-10-CM

## 2024-09-15 LAB
ALBUMIN SERPL BCG-MCNC: 3.3 G/DL (ref 3.5–5)
ALP SERPL-CCNC: 116 U/L (ref 34–104)
ALT SERPL W P-5'-P-CCNC: 54 U/L (ref 7–52)
ANION GAP SERPL CALCULATED.3IONS-SCNC: 12 MMOL/L (ref 4–13)
AST SERPL W P-5'-P-CCNC: 47 U/L (ref 13–39)
BASOPHILS # BLD AUTO: 0.07 THOUSANDS/ΜL (ref 0–0.1)
BASOPHILS NFR BLD AUTO: 1 % (ref 0–1)
BILIRUB SERPL-MCNC: 0.66 MG/DL (ref 0.2–1)
BUN SERPL-MCNC: 32 MG/DL (ref 5–25)
CALCIUM ALBUM COR SERPL-MCNC: 9 MG/DL (ref 8.3–10.1)
CALCIUM SERPL-MCNC: 8.4 MG/DL (ref 8.4–10.2)
CARDIAC TROPONIN I PNL SERPL HS: 14 NG/L
CHLORIDE SERPL-SCNC: 97 MMOL/L (ref 96–108)
CO2 SERPL-SCNC: 19 MMOL/L (ref 21–32)
CREAT SERPL-MCNC: 1.95 MG/DL (ref 0.6–1.3)
EOSINOPHIL # BLD AUTO: 0.23 THOUSAND/ΜL (ref 0–0.61)
EOSINOPHIL NFR BLD AUTO: 2 % (ref 0–6)
ERYTHROCYTE [DISTWIDTH] IN BLOOD BY AUTOMATED COUNT: 12.7 % (ref 11.6–15.1)
FLUAV RNA RESP QL NAA+PROBE: NEGATIVE
FLUBV RNA RESP QL NAA+PROBE: NEGATIVE
GFR SERPL CREATININE-BSD FRML MDRD: 27 ML/MIN/1.73SQ M
GLUCOSE SERPL-MCNC: 290 MG/DL (ref 65–140)
HCT VFR BLD AUTO: 35.6 % (ref 34.8–46.1)
HGB BLD-MCNC: 12 G/DL (ref 11.5–15.4)
IMM GRANULOCYTES # BLD AUTO: 0.11 THOUSAND/UL (ref 0–0.2)
IMM GRANULOCYTES NFR BLD AUTO: 1 % (ref 0–2)
LYMPHOCYTES # BLD AUTO: 1.48 THOUSANDS/ΜL (ref 0.6–4.47)
LYMPHOCYTES NFR BLD AUTO: 10 % (ref 14–44)
MAGNESIUM SERPL-MCNC: 1.7 MG/DL (ref 1.9–2.7)
MCH RBC QN AUTO: 27.9 PG (ref 26.8–34.3)
MCHC RBC AUTO-ENTMCNC: 33.7 G/DL (ref 31.4–37.4)
MCV RBC AUTO: 83 FL (ref 82–98)
MONOCYTES # BLD AUTO: 1.57 THOUSAND/ΜL (ref 0.17–1.22)
MONOCYTES NFR BLD AUTO: 11 % (ref 4–12)
NEUTROPHILS # BLD AUTO: 11 THOUSANDS/ΜL (ref 1.85–7.62)
NEUTS SEG NFR BLD AUTO: 75 % (ref 43–75)
NRBC BLD AUTO-RTO: 0 /100 WBCS
PLATELET # BLD AUTO: 277 THOUSANDS/UL (ref 149–390)
PMV BLD AUTO: 8.7 FL (ref 8.9–12.7)
POTASSIUM SERPL-SCNC: 3.9 MMOL/L (ref 3.5–5.3)
PROT SERPL-MCNC: 6.9 G/DL (ref 6.4–8.4)
RBC # BLD AUTO: 4.3 MILLION/UL (ref 3.81–5.12)
RSV RNA RESP QL NAA+PROBE: NEGATIVE
SARS-COV-2 RNA RESP QL NAA+PROBE: NEGATIVE
SODIUM SERPL-SCNC: 128 MMOL/L (ref 135–147)
TSH SERPL DL<=0.05 MIU/L-ACNC: 1.99 UIU/ML (ref 0.45–4.5)
WBC # BLD AUTO: 14.46 THOUSAND/UL (ref 4.31–10.16)

## 2024-09-15 PROCEDURE — 84443 ASSAY THYROID STIM HORMONE: CPT | Performed by: EMERGENCY MEDICINE

## 2024-09-15 PROCEDURE — 96375 TX/PRO/DX INJ NEW DRUG ADDON: CPT

## 2024-09-15 PROCEDURE — 36415 COLL VENOUS BLD VENIPUNCTURE: CPT | Performed by: EMERGENCY MEDICINE

## 2024-09-15 PROCEDURE — 93005 ELECTROCARDIOGRAM TRACING: CPT

## 2024-09-15 PROCEDURE — 96376 TX/PRO/DX INJ SAME DRUG ADON: CPT

## 2024-09-15 PROCEDURE — 71045 X-RAY EXAM CHEST 1 VIEW: CPT

## 2024-09-15 PROCEDURE — 99223 1ST HOSP IP/OBS HIGH 75: CPT | Performed by: NURSE PRACTITIONER

## 2024-09-15 PROCEDURE — 99291 CRITICAL CARE FIRST HOUR: CPT | Performed by: EMERGENCY MEDICINE

## 2024-09-15 PROCEDURE — 84484 ASSAY OF TROPONIN QUANT: CPT | Performed by: EMERGENCY MEDICINE

## 2024-09-15 PROCEDURE — 80053 COMPREHEN METABOLIC PANEL: CPT | Performed by: EMERGENCY MEDICINE

## 2024-09-15 PROCEDURE — 84145 PROCALCITONIN (PCT): CPT | Performed by: NURSE PRACTITIONER

## 2024-09-15 PROCEDURE — 0241U HB NFCT DS VIR RESP RNA 4 TRGT: CPT | Performed by: EMERGENCY MEDICINE

## 2024-09-15 PROCEDURE — 99285 EMERGENCY DEPT VISIT HI MDM: CPT

## 2024-09-15 PROCEDURE — 85025 COMPLETE CBC W/AUTO DIFF WBC: CPT | Performed by: EMERGENCY MEDICINE

## 2024-09-15 PROCEDURE — 83735 ASSAY OF MAGNESIUM: CPT | Performed by: EMERGENCY MEDICINE

## 2024-09-15 PROCEDURE — 96365 THER/PROPH/DIAG IV INF INIT: CPT

## 2024-09-15 PROCEDURE — NC001 PR NO CHARGE: Performed by: EMERGENCY MEDICINE

## 2024-09-15 RX ORDER — ADENOSINE 3 MG/ML
INJECTION, SOLUTION INTRAVENOUS
Status: COMPLETED
Start: 2024-09-15 | End: 2024-09-15

## 2024-09-15 RX ORDER — ADENOSINE 3 MG/ML
6 INJECTION INTRAVENOUS ONCE
Status: COMPLETED | OUTPATIENT
Start: 2024-09-15 | End: 2024-09-15

## 2024-09-15 RX ORDER — MAGNESIUM SULFATE HEPTAHYDRATE 40 MG/ML
2 INJECTION, SOLUTION INTRAVENOUS ONCE
Status: COMPLETED | OUTPATIENT
Start: 2024-09-15 | End: 2024-09-16

## 2024-09-15 RX ORDER — METOPROLOL TARTRATE 1 MG/ML
5 INJECTION, SOLUTION INTRAVENOUS ONCE
Status: COMPLETED | OUTPATIENT
Start: 2024-09-15 | End: 2024-09-15

## 2024-09-15 RX ADMIN — ADENOSINE 6 MG: 3 INJECTION, SOLUTION INTRAVENOUS at 22:08

## 2024-09-15 RX ADMIN — ADENOSINE 6 MG: 3 INJECTION INTRAVENOUS at 22:08

## 2024-09-15 RX ADMIN — METOPROLOL TARTRATE 5 MG: 5 INJECTION INTRAVENOUS at 22:52

## 2024-09-15 RX ADMIN — ADENOSINE 6 MG: 3 INJECTION, SOLUTION INTRAVENOUS at 22:46

## 2024-09-15 RX ADMIN — SODIUM CHLORIDE 1000 ML: 0.9 INJECTION, SOLUTION INTRAVENOUS at 23:06

## 2024-09-15 RX ADMIN — MAGNESIUM SULFATE HEPTAHYDRATE 2 G: 40 INJECTION, SOLUTION INTRAVENOUS at 23:06

## 2024-09-15 NOTE — LETTER
Thank you for allowing us to participate in the care of your patient, Brenda S Apgar, who was hospitalized from [unfilled] through 2024 with the admitting diagnosis of UTI , renal stone, SVT, sleep apnea.      Medication Changes:  Start metoprolol 12.5 mg BID  Complete  1 day ciprofloxacin   Start norvasc 5 mg OD   Stop pendapril    Outpatient testing recommended:  CBC , BMP 1 week     If you have any additional questions or would like to discuss further, please feel free to contact me.    Jose Gomez MD  [facility] Internal Medicine, Hospitalist  [phone]

## 2024-09-16 ENCOUNTER — APPOINTMENT (INPATIENT)
Dept: NON INVASIVE DIAGNOSTICS | Facility: HOSPITAL | Age: 59
DRG: 659 | End: 2024-09-16
Payer: COMMERCIAL

## 2024-09-16 ENCOUNTER — APPOINTMENT (INPATIENT)
Dept: RADIOLOGY | Facility: HOSPITAL | Age: 59
DRG: 659 | End: 2024-09-16
Payer: COMMERCIAL

## 2024-09-16 PROBLEM — R79.89 ELEVATED LFTS: Status: ACTIVE | Noted: 2024-09-16

## 2024-09-16 PROBLEM — R65.10 SIRS (SYSTEMIC INFLAMMATORY RESPONSE SYNDROME) (HCC): Status: ACTIVE | Noted: 2024-09-16

## 2024-09-16 PROBLEM — N39.0 UTI (URINARY TRACT INFECTION): Status: ACTIVE | Noted: 2024-09-16

## 2024-09-16 PROBLEM — E66.9 OBESITY (BMI 30-39.9): Status: ACTIVE | Noted: 2024-09-16

## 2024-09-16 PROBLEM — E66.01 MORBID OBESITY (HCC): Status: ACTIVE | Noted: 2024-09-16

## 2024-09-16 PROBLEM — A41.9 SEPSIS (HCC): Status: ACTIVE | Noted: 2024-09-16

## 2024-09-16 PROBLEM — I47.10 SVT (SUPRAVENTRICULAR TACHYCARDIA): Status: ACTIVE | Noted: 2024-09-16

## 2024-09-16 PROBLEM — R93.89 ABNORMAL CHEST X-RAY: Status: ACTIVE | Noted: 2024-09-16

## 2024-09-16 LAB
2HR DELTA HS TROPONIN: 19 NG/L
4HR DELTA HS TROPONIN: 20 NG/L
ALBUMIN SERPL BCG-MCNC: 3.2 G/DL (ref 3.5–5)
ALP SERPL-CCNC: 128 U/L (ref 34–104)
ALT SERPL W P-5'-P-CCNC: 45 U/L (ref 7–52)
ANION GAP SERPL CALCULATED.3IONS-SCNC: 11 MMOL/L (ref 4–13)
AORTIC ROOT: 2.8 CM
APICAL FOUR CHAMBER EJECTION FRACTION: 59 %
AST SERPL W P-5'-P-CCNC: 31 U/L (ref 13–39)
ATRIAL RATE: 127 BPM
ATRIAL RATE: 127 BPM
ATRIAL RATE: 220 BPM
ATRIAL RATE: 220 BPM
AV LVOT PEAK GRADIENT: 5 MMHG
AV PEAK GRADIENT: 13 MMHG
BACTERIA UR QL AUTO: ABNORMAL /HPF
BASOPHILS # BLD AUTO: 0.04 THOUSANDS/ΜL (ref 0–0.1)
BASOPHILS NFR BLD AUTO: 0 % (ref 0–1)
BILIRUB SERPL-MCNC: 0.43 MG/DL (ref 0.2–1)
BILIRUB UR QL STRIP: NEGATIVE
BSA FOR ECHO PROCEDURE: 2.22 M2
BUN SERPL-MCNC: 30 MG/DL (ref 5–25)
CALCIUM ALBUM COR SERPL-MCNC: 8.7 MG/DL (ref 8.3–10.1)
CALCIUM SERPL-MCNC: 8.1 MG/DL (ref 8.4–10.2)
CARDIAC TROPONIN I PNL SERPL HS: 33 NG/L
CARDIAC TROPONIN I PNL SERPL HS: 34 NG/L
CHLORIDE SERPL-SCNC: 101 MMOL/L (ref 96–108)
CLARITY UR: CLEAR
CO2 SERPL-SCNC: 20 MMOL/L (ref 21–32)
COLOR UR: YELLOW
CREAT SERPL-MCNC: 1.85 MG/DL (ref 0.6–1.3)
DOP CALC LVOT AREA: 2.54 CM2
DOP CALC LVOT DIAMETER: 1.8 CM
E WAVE DECELERATION TIME: 187 MS
E/A RATIO: 1.36
EOSINOPHIL # BLD AUTO: 0.13 THOUSAND/ΜL (ref 0–0.61)
EOSINOPHIL NFR BLD AUTO: 1 % (ref 0–6)
ERYTHROCYTE [DISTWIDTH] IN BLOOD BY AUTOMATED COUNT: 12.6 % (ref 11.6–15.1)
FRACTIONAL SHORTENING: 35 (ref 28–44)
GFR SERPL CREATININE-BSD FRML MDRD: 29 ML/MIN/1.73SQ M
GLUCOSE SERPL-MCNC: 107 MG/DL (ref 65–140)
GLUCOSE SERPL-MCNC: 117 MG/DL (ref 65–140)
GLUCOSE SERPL-MCNC: 170 MG/DL (ref 65–140)
GLUCOSE SERPL-MCNC: 187 MG/DL (ref 65–140)
GLUCOSE SERPL-MCNC: 212 MG/DL (ref 65–140)
GLUCOSE SERPL-MCNC: 313 MG/DL (ref 65–140)
GLUCOSE UR STRIP-MCNC: NEGATIVE MG/DL
HCT VFR BLD AUTO: 33.5 % (ref 34.8–46.1)
HGB BLD-MCNC: 11.2 G/DL (ref 11.5–15.4)
HGB UR QL STRIP.AUTO: ABNORMAL
IMM GRANULOCYTES # BLD AUTO: 0.08 THOUSAND/UL (ref 0–0.2)
IMM GRANULOCYTES NFR BLD AUTO: 1 % (ref 0–2)
INTERVENTRICULAR SEPTUM IN DIASTOLE (PARASTERNAL SHORT AXIS VIEW): 0.8 CM
INTERVENTRICULAR SEPTUM: 0.8 CM (ref 0.6–1.1)
KETONES UR STRIP-MCNC: NEGATIVE MG/DL
L PNEUMO1 AG UR QL IA.RAPID: NEGATIVE
LAAS-AP2: 24.5 CM2
LAAS-AP4: 23.6 CM2
LACTATE SERPL-SCNC: 0.7 MMOL/L (ref 0.5–2)
LEFT ATRIUM SIZE: 3.9 CM
LEFT ATRIUM VOLUME (MOD BIPLANE): 87 ML
LEFT ATRIUM VOLUME INDEX (MOD BIPLANE): 39.2 ML/M2
LEFT INTERNAL DIMENSION IN SYSTOLE: 3.2 CM (ref 2.1–4)
LEFT VENTRICULAR INTERNAL DIMENSION IN DIASTOLE: 4.9 CM (ref 3.5–6)
LEFT VENTRICULAR POSTERIOR WALL IN END DIASTOLE: 0.9 CM
LEFT VENTRICULAR STROKE VOLUME: 72 ML
LEUKOCYTE ESTERASE UR QL STRIP: ABNORMAL
LVSV (TEICH): 72 ML
LYMPHOCYTES # BLD AUTO: 1.47 THOUSANDS/ΜL (ref 0.6–4.47)
LYMPHOCYTES NFR BLD AUTO: 13 % (ref 14–44)
MAGNESIUM SERPL-MCNC: 2.3 MG/DL (ref 1.9–2.7)
MCH RBC QN AUTO: 27.9 PG (ref 26.8–34.3)
MCHC RBC AUTO-ENTMCNC: 33.4 G/DL (ref 31.4–37.4)
MCV RBC AUTO: 83 FL (ref 82–98)
MONOCYTES # BLD AUTO: 1.22 THOUSAND/ΜL (ref 0.17–1.22)
MONOCYTES NFR BLD AUTO: 11 % (ref 4–12)
MV E'TISSUE VEL-LAT: 16 CM/S
MV E'TISSUE VEL-SEP: 13 CM/S
MV PEAK A VEL: 0.9 M/S
MV PEAK E VEL: 122 CM/S
MV STENOSIS PRESSURE HALF TIME: 54 MS
MV VALVE AREA P 1/2 METHOD: 4.07
NEUTROPHILS # BLD AUTO: 8.48 THOUSANDS/ΜL (ref 1.85–7.62)
NEUTS SEG NFR BLD AUTO: 74 % (ref 43–75)
NITRITE UR QL STRIP: POSITIVE
NON-SQ EPI CELLS URNS QL MICRO: ABNORMAL /HPF
NRBC BLD AUTO-RTO: 0 /100 WBCS
P AXIS: 46 DEGREES
P AXIS: 55 DEGREES
PH UR STRIP.AUTO: 6.5 [PH]
PLATELET # BLD AUTO: 279 THOUSANDS/UL (ref 149–390)
PMV BLD AUTO: 8.4 FL (ref 8.9–12.7)
POTASSIUM SERPL-SCNC: 3.8 MMOL/L (ref 3.5–5.3)
PR INTERVAL: 126 MS
PR INTERVAL: 138 MS
PROCALCITONIN SERPL-MCNC: 0.33 NG/ML
PROT SERPL-MCNC: 6.3 G/DL (ref 6.4–8.4)
PROT UR STRIP-MCNC: ABNORMAL MG/DL
QRS AXIS: 10 DEGREES
QRS AXIS: 24 DEGREES
QRS AXIS: 33 DEGREES
QRS AXIS: 35 DEGREES
QRSD INTERVAL: 136 MS
QRSD INTERVAL: 162 MS
QRSD INTERVAL: 76 MS
QRSD INTERVAL: 84 MS
QT INTERVAL: 206 MS
QT INTERVAL: 206 MS
QT INTERVAL: 258 MS
QT INTERVAL: 282 MS
QTC INTERVAL: 374 MS
QTC INTERVAL: 396 MS
QTC INTERVAL: 397 MS
QTC INTERVAL: 409 MS
RBC # BLD AUTO: 4.02 MILLION/UL (ref 3.81–5.12)
RBC #/AREA URNS AUTO: ABNORMAL /HPF
RIGHT ATRIUM AREA SYSTOLE A4C: 16.5 CM2
RIGHT VENTRICLE ID DIMENSION: 3.8 CM
S PNEUM AG UR QL: NEGATIVE
SL CV LEFT ATRIUM LENGTH A2C: 5.5 CM
SL CV LV EF: 60
SL CV PED ECHO LEFT VENTRICLE DIASTOLIC VOLUME (MOD BIPLANE) 2D: 113 ML
SL CV PED ECHO LEFT VENTRICLE SYSTOLIC VOLUME (MOD BIPLANE) 2D: 42 ML
SODIUM SERPL-SCNC: 132 MMOL/L (ref 135–147)
SP GR UR STRIP.AUTO: <1.005 (ref 1–1.03)
T WAVE AXIS: -21 DEGREES
T WAVE AXIS: 0 DEGREES
T WAVE AXIS: 24 DEGREES
T WAVE AXIS: 27 DEGREES
TR PEAK VELOCITY: 3.8 M/S
TRICUSPID ANNULAR PLANE SYSTOLIC EXCURSION: 1.7 CM
TRICUSPID VALVE PEAK REGURGITATION VELOCITY: 3.84 M/S
UROBILINOGEN UR STRIP-ACNC: <2 MG/DL
VENTRICULAR RATE: 127 BPM
VENTRICULAR RATE: 127 BPM
VENTRICULAR RATE: 223 BPM
VENTRICULAR RATE: 224 BPM
WBC # BLD AUTO: 11.42 THOUSAND/UL (ref 4.31–10.16)
WBC #/AREA URNS AUTO: ABNORMAL /HPF

## 2024-09-16 PROCEDURE — 81001 URINALYSIS AUTO W/SCOPE: CPT | Performed by: NURSE PRACTITIONER

## 2024-09-16 PROCEDURE — 76775 US EXAM ABDO BACK WALL LIM: CPT

## 2024-09-16 PROCEDURE — 83605 ASSAY OF LACTIC ACID: CPT | Performed by: NURSE PRACTITIONER

## 2024-09-16 PROCEDURE — 93306 TTE W/DOPPLER COMPLETE: CPT

## 2024-09-16 PROCEDURE — 84484 ASSAY OF TROPONIN QUANT: CPT | Performed by: NURSE PRACTITIONER

## 2024-09-16 PROCEDURE — 99233 SBSQ HOSP IP/OBS HIGH 50: CPT | Performed by: INTERNAL MEDICINE

## 2024-09-16 PROCEDURE — 93306 TTE W/DOPPLER COMPLETE: CPT | Performed by: INTERNAL MEDICINE

## 2024-09-16 PROCEDURE — 99223 1ST HOSP IP/OBS HIGH 75: CPT | Performed by: INTERNAL MEDICINE

## 2024-09-16 PROCEDURE — 87040 BLOOD CULTURE FOR BACTERIA: CPT | Performed by: NURSE PRACTITIONER

## 2024-09-16 PROCEDURE — 83735 ASSAY OF MAGNESIUM: CPT | Performed by: NURSE PRACTITIONER

## 2024-09-16 PROCEDURE — 85025 COMPLETE CBC W/AUTO DIFF WBC: CPT | Performed by: NURSE PRACTITIONER

## 2024-09-16 PROCEDURE — 80053 COMPREHEN METABOLIC PANEL: CPT | Performed by: NURSE PRACTITIONER

## 2024-09-16 PROCEDURE — 93010 ELECTROCARDIOGRAM REPORT: CPT | Performed by: INTERNAL MEDICINE

## 2024-09-16 PROCEDURE — 36415 COLL VENOUS BLD VENIPUNCTURE: CPT | Performed by: NURSE PRACTITIONER

## 2024-09-16 PROCEDURE — 82948 REAGENT STRIP/BLOOD GLUCOSE: CPT

## 2024-09-16 PROCEDURE — 87086 URINE CULTURE/COLONY COUNT: CPT | Performed by: NURSE PRACTITIONER

## 2024-09-16 PROCEDURE — 87449 NOS EACH ORGANISM AG IA: CPT | Performed by: NURSE PRACTITIONER

## 2024-09-16 PROCEDURE — 84484 ASSAY OF TROPONIN QUANT: CPT | Performed by: EMERGENCY MEDICINE

## 2024-09-16 RX ORDER — AMLODIPINE BESYLATE 5 MG/1
5 TABLET ORAL DAILY
Status: DISCONTINUED | OUTPATIENT
Start: 2024-09-16 | End: 2024-09-21 | Stop reason: HOSPADM

## 2024-09-16 RX ORDER — ONDANSETRON 2 MG/ML
4 INJECTION INTRAMUSCULAR; INTRAVENOUS EVERY 6 HOURS PRN
Status: DISCONTINUED | OUTPATIENT
Start: 2024-09-16 | End: 2024-09-21 | Stop reason: HOSPADM

## 2024-09-16 RX ORDER — INSULIN LISPRO 100 [IU]/ML
5 INJECTION, SOLUTION INTRAVENOUS; SUBCUTANEOUS
Status: DISCONTINUED | OUTPATIENT
Start: 2024-09-16 | End: 2024-09-21 | Stop reason: HOSPADM

## 2024-09-16 RX ORDER — PRAVASTATIN SODIUM 40 MG
40 TABLET ORAL DAILY
Status: DISCONTINUED | OUTPATIENT
Start: 2024-09-16 | End: 2024-09-21 | Stop reason: HOSPADM

## 2024-09-16 RX ORDER — HEPARIN SODIUM 5000 [USP'U]/ML
5000 INJECTION, SOLUTION INTRAVENOUS; SUBCUTANEOUS EVERY 8 HOURS SCHEDULED
Status: DISCONTINUED | OUTPATIENT
Start: 2024-09-16 | End: 2024-09-19

## 2024-09-16 RX ORDER — EZETIMIBE 10 MG/1
10 TABLET ORAL DAILY
Status: DISCONTINUED | OUTPATIENT
Start: 2024-09-16 | End: 2024-09-21 | Stop reason: HOSPADM

## 2024-09-16 RX ORDER — SODIUM CHLORIDE, SODIUM GLUCONATE, SODIUM ACETATE, POTASSIUM CHLORIDE, MAGNESIUM CHLORIDE, SODIUM PHOSPHATE, DIBASIC, AND POTASSIUM PHOSPHATE .53; .5; .37; .037; .03; .012; .00082 G/100ML; G/100ML; G/100ML; G/100ML; G/100ML; G/100ML; G/100ML
125 INJECTION, SOLUTION INTRAVENOUS CONTINUOUS
Status: DISCONTINUED | OUTPATIENT
Start: 2024-09-16 | End: 2024-09-16

## 2024-09-16 RX ORDER — AZITHROMYCIN 250 MG/1
500 TABLET, FILM COATED ORAL EVERY 24 HOURS
Status: DISCONTINUED | OUTPATIENT
Start: 2024-09-16 | End: 2024-09-18

## 2024-09-16 RX ORDER — SODIUM CHLORIDE, SODIUM GLUCONATE, SODIUM ACETATE, POTASSIUM CHLORIDE, MAGNESIUM CHLORIDE, SODIUM PHOSPHATE, DIBASIC, AND POTASSIUM PHOSPHATE .53; .5; .37; .037; .03; .012; .00082 G/100ML; G/100ML; G/100ML; G/100ML; G/100ML; G/100ML; G/100ML
125 INJECTION, SOLUTION INTRAVENOUS CONTINUOUS
Status: DISCONTINUED | OUTPATIENT
Start: 2024-09-16 | End: 2024-09-17

## 2024-09-16 RX ORDER — INSULIN LISPRO 100 [IU]/ML
1-5 INJECTION, SOLUTION INTRAVENOUS; SUBCUTANEOUS
Status: DISCONTINUED | OUTPATIENT
Start: 2024-09-16 | End: 2024-09-21 | Stop reason: HOSPADM

## 2024-09-16 RX ORDER — CEFTRIAXONE 2 G/50ML
INJECTION, SOLUTION INTRAVENOUS
Status: DISPENSED
Start: 2024-09-16 | End: 2024-09-16

## 2024-09-16 RX ORDER — INSULIN GLARGINE 100 [IU]/ML
20 INJECTION, SOLUTION SUBCUTANEOUS
Status: DISCONTINUED | OUTPATIENT
Start: 2024-09-16 | End: 2024-09-18

## 2024-09-16 RX ORDER — ENOXAPARIN SODIUM 100 MG/ML
40 INJECTION SUBCUTANEOUS DAILY
Status: DISCONTINUED | OUTPATIENT
Start: 2024-09-16 | End: 2024-09-16

## 2024-09-16 RX ORDER — CEFTRIAXONE 2 G/50ML
2000 INJECTION, SOLUTION INTRAVENOUS EVERY 24 HOURS
Status: DISCONTINUED | OUTPATIENT
Start: 2024-09-16 | End: 2024-09-19

## 2024-09-16 RX ORDER — ENOXAPARIN SODIUM 100 MG/ML
30 INJECTION SUBCUTANEOUS DAILY
Status: DISCONTINUED | OUTPATIENT
Start: 2024-09-16 | End: 2024-09-16

## 2024-09-16 RX ORDER — ACETAMINOPHEN 325 MG/1
650 TABLET ORAL EVERY 8 HOURS PRN
Status: DISCONTINUED | OUTPATIENT
Start: 2024-09-16 | End: 2024-09-21 | Stop reason: HOSPADM

## 2024-09-16 RX ORDER — PAROXETINE 20 MG/1
20 TABLET, FILM COATED ORAL DAILY
Status: DISCONTINUED | OUTPATIENT
Start: 2024-09-16 | End: 2024-09-21 | Stop reason: HOSPADM

## 2024-09-16 RX ORDER — GUAIFENESIN 600 MG/1
1200 TABLET, EXTENDED RELEASE ORAL 2 TIMES DAILY
Status: DISCONTINUED | OUTPATIENT
Start: 2024-09-16 | End: 2024-09-20

## 2024-09-16 RX ADMIN — PAROXETINE 20 MG: 20 TABLET, FILM COATED ORAL at 08:12

## 2024-09-16 RX ADMIN — INSULIN LISPRO 1 UNITS: 100 INJECTION, SOLUTION INTRAVENOUS; SUBCUTANEOUS at 08:13

## 2024-09-16 RX ADMIN — INSULIN LISPRO 1 UNITS: 100 INJECTION, SOLUTION INTRAVENOUS; SUBCUTANEOUS at 21:30

## 2024-09-16 RX ADMIN — INSULIN LISPRO 5 UNITS: 100 INJECTION, SOLUTION INTRAVENOUS; SUBCUTANEOUS at 11:56

## 2024-09-16 RX ADMIN — SODIUM CHLORIDE, SODIUM GLUCONATE, SODIUM ACETATE, POTASSIUM CHLORIDE, MAGNESIUM CHLORIDE, SODIUM PHOSPHATE, DIBASIC, AND POTASSIUM PHOSPHATE 125 ML/HR: .53; .5; .37; .037; .03; .012; .00082 INJECTION, SOLUTION INTRAVENOUS at 01:47

## 2024-09-16 RX ADMIN — Medication 12.5 MG: at 21:26

## 2024-09-16 RX ADMIN — EZETIMIBE 10 MG: 10 TABLET ORAL at 08:12

## 2024-09-16 RX ADMIN — INSULIN GLARGINE 20 UNITS: 100 INJECTION, SOLUTION SUBCUTANEOUS at 01:33

## 2024-09-16 RX ADMIN — INSULIN LISPRO 5 UNITS: 100 INJECTION, SOLUTION INTRAVENOUS; SUBCUTANEOUS at 17:26

## 2024-09-16 RX ADMIN — SODIUM CHLORIDE, SODIUM GLUCONATE, SODIUM ACETATE, POTASSIUM CHLORIDE, MAGNESIUM CHLORIDE, SODIUM PHOSPHATE, DIBASIC, AND POTASSIUM PHOSPHATE 125 ML/HR: .53; .5; .37; .037; .03; .012; .00082 INJECTION, SOLUTION INTRAVENOUS at 14:01

## 2024-09-16 RX ADMIN — AMLODIPINE BESYLATE 5 MG: 5 TABLET ORAL at 08:12

## 2024-09-16 RX ADMIN — GUAIFENESIN 1200 MG: 600 TABLET, EXTENDED RELEASE ORAL at 17:26

## 2024-09-16 RX ADMIN — HEPARIN SODIUM 5000 UNITS: 5000 INJECTION, SOLUTION INTRAVENOUS; SUBCUTANEOUS at 14:32

## 2024-09-16 RX ADMIN — HEPARIN SODIUM 5000 UNITS: 5000 INJECTION, SOLUTION INTRAVENOUS; SUBCUTANEOUS at 05:31

## 2024-09-16 RX ADMIN — SODIUM CHLORIDE, SODIUM GLUCONATE, SODIUM ACETATE, POTASSIUM CHLORIDE, MAGNESIUM CHLORIDE, SODIUM PHOSPHATE, DIBASIC, AND POTASSIUM PHOSPHATE 125 ML/HR: .53; .5; .37; .037; .03; .012; .00082 INJECTION, SOLUTION INTRAVENOUS at 22:14

## 2024-09-16 RX ADMIN — GUAIFENESIN 1200 MG: 600 TABLET, EXTENDED RELEASE ORAL at 08:12

## 2024-09-16 RX ADMIN — AZITHROMYCIN DIHYDRATE 500 MG: 250 TABLET ORAL at 01:37

## 2024-09-16 RX ADMIN — SODIUM CHLORIDE, SODIUM GLUCONATE, SODIUM ACETATE, POTASSIUM CHLORIDE, MAGNESIUM CHLORIDE, SODIUM PHOSPHATE, DIBASIC, AND POTASSIUM PHOSPHATE 125 ML/HR: .53; .5; .37; .037; .03; .012; .00082 INJECTION, SOLUTION INTRAVENOUS at 01:34

## 2024-09-16 RX ADMIN — INSULIN GLARGINE 20 UNITS: 100 INJECTION, SOLUTION SUBCUTANEOUS at 21:26

## 2024-09-16 RX ADMIN — CEFTRIAXONE 2000 MG: 2 INJECTION, SOLUTION INTRAVENOUS at 01:44

## 2024-09-16 RX ADMIN — Medication 12.5 MG: at 11:00

## 2024-09-16 RX ADMIN — Medication 12.5 MG: at 01:39

## 2024-09-16 RX ADMIN — PRAVASTATIN SODIUM 40 MG: 40 TABLET ORAL at 08:12

## 2024-09-16 RX ADMIN — INSULIN LISPRO 5 UNITS: 100 INJECTION, SOLUTION INTRAVENOUS; SUBCUTANEOUS at 08:15

## 2024-09-16 RX ADMIN — HEPARIN SODIUM 5000 UNITS: 5000 INJECTION, SOLUTION INTRAVENOUS; SUBCUTANEOUS at 21:26

## 2024-09-16 RX ADMIN — INSULIN LISPRO 3 UNITS: 100 INJECTION, SOLUTION INTRAVENOUS; SUBCUTANEOUS at 01:36

## 2024-09-16 RX ADMIN — B-COMPLEX W/ C & FOLIC ACID TAB 1 TABLET: TAB at 08:12

## 2024-09-16 NOTE — PROGRESS NOTES
Progress Note - Hospitalist   Name: Bonnie SANDHU Apgar 59 y.o. female I MRN: [id number]  Unit/Bed#: [address] [place] [address] I Date of Admission: 9/15/2024   Date of Service: 2024 I Hospital Day: 1    Assessment & Plan  SVT (supraventricular tachycardia)  Patient presents with acute onset palpitation while watching TV, with associated dizziness when getting onto stretcher with EMS.  Denies chest pain SOB.  Reports history of same thing 20 years ago and another episode a few years ago and both episodes resolved when she made herself throw up. Patient reports not feeling well, started with productive cough on Thursday 3 days ago.  Reports has a right ureteral stent since last hospitalization in October last year.  Initial EKG in ED showed SVT, rate 224  Received adenosine x 2 and metoprolol 5 mg IV x 1 in ED  Currently in sinus rhythm  Patient was started on metoprolol 12.5 mg p.o. twice daily which will be continued  Telemetry showed no atrial fibrillation or further episodes of SVT  Cardiology input appreciated  2D echo showed EF of 60%, unable to assess diastolic function without any significant valvular abnormalities, moderately elevated right ventricular systolic pressure at 50 mmHg-night pulse oximetry ordered rule out sleep apnea    Sepsis (HCC)  Sepsis, present on admission, as evidenced by leukocytosis, tachycardia, with suspected source of infection UTI and possible right-sided pneumonia pending final chest x-ray read.  Lactic acid normal  Initial procalcitonin 0.33  UA positive nitrite large leukocytes.  Continue IV ceftriaxone and azithromycin  Blood cultures and urine cultures pending  Urine antigens were negative  IV hydration  Results from last 7 days   Lab Units 24  0013 09/15/24  2212   LACTIC ACID mmol/L 0.7  --    PROCALCITONIN ng/ml  --  0.33*     Results from last 7 days   Lab Units 24  0547 09/15/24  2212   WBC Thousand/uL 11.42* 14.46*        LEANDRO (acute kidney injury) (HCC)  Creatinine today  1.95  Baseline creatinine 0.9-1.2 in past year  Unclear etiology.  Patient is on perindopril at home.  Continue IV hydration  Renal ultrasound: Right ureteral stent in proper position without any evidence of hydronephrosis with moderately atrophic right kidney  Monitor PVR  Continue to hold perindopril  Repeat BMP in the morning  Results from last 7 days   Lab Units 09/16/24  0547 09/15/24  2212   BUN mg/dL 30* 32*   CREATININE mg/dL 1.85* 1.95*          UTI (urinary tract infection)  History of right-sided emphysematous pyelonephritis in 10/2023, required ureteral stent placement.  Urine culture grew pan susceptible E. Coli at the time.  Started on IV Rocephin as above  Pending urine culture  Check PVR  Consult urology for retained right ureteral stent.  Hyponatremia  Corrected sodium 133  Improved with IV hydration  Repeat BMP in the morning  Results from last 7 days   Lab Units 09/16/24  0547 09/15/24  2212   SODIUM mmol/L 132* 128*      Elevated LFTs  Mild LFT elevation.  Denies nausea vomiting abdominal pain.  Improved  Trend LFTs  Essential hypertension  On perindopril at home.  Hold due to LEANDRO  Blood pressure slightly on the high side  Started on metoprolol 12.5 mg p.o. twice daily  Hyperlipidemia  Continue statin  Depression  Continue Paxil  Type 2 diabetes mellitus with hyperglycemia, without long-term current use of insulin (HCC)  Lab Results   Component Value Date    HGBA1C 8.4 (H) 06/10/2024       Recent Labs     09/16/24  0122 09/16/24  0732 09/16/24  1153 09/16/24  1608   POCGLU 313* 170* 117 107       Blood Sugar Average: Last 72 hrs:  (P) 176.75  On Lantus 20 units SQ at bedtime, Humalog 5 units 3 times daily with meals.  Will continue.  Continue Humalog sliding scale with Accu-Cheks before every meal and at bedtime  Diabetic diet  Morbid obesity (HCC)  Diet and lifestyle modification  Abnormal chest x-ray  Chest x-ray showed mild central pulmonary vascular congestion  Currently on IV ceftriaxone and  azithromycin.  Will discontinue azithromycin as urine antigens are negative  Continue Mucinex      VTE Pharmacologic Prophylaxis: VTE Score: 5 High Risk (Score >/= 5) - Pharmacological DVT Prophylaxis Ordered: heparin. Sequential Compression Devices Ordered.    Mobility:   Basic Mobility Inpatient Raw Score: 23  JH-HLM Goal: 7: Walk 25 feet or more  JH-HLM Achieved: 7: Walk 25 feet or more  JH-HLM Goal achieved. Continue to encourage appropriate mobility.    Patient Centered Rounds: I performed bedside rounds with nursing staff today.   Discussions with Specialists or Other Care Team Provider: Cardiology    Education and Discussions with Family / Patient: yes    Current Length of Stay: 1 day(s)  Current Patient Status: Inpatient   Certification Statement: The patient will continue to require additional inpatient hospital stay due to sepsis, UTI, SVT, acute kidney injury  Discharge Plan: Anticipate discharge in 48-72 hrs to home.    Code Status: Level 1 - Full Code    Subjective   Patient is feeling better today.  Still having some cough but unable to bring up any phlegm.  Denies any abdominal pain, nausea or vomiting or palpitations.    Objective     Vitals:   Temp (24hrs), Av.4 °F (36.9 °C), Min:97.7 °F (36.5 °C), Max:99.2 °F (37.3 °C)    Temp:  [97.7 °F (36.5 °C)-99.2 °F (37.3 °C)] 98.1 °F (36.7 °C)  HR:  [] 90  Resp:  [16-27] 18  BP: (121-174)/(65-85) 157/75  SpO2:  [88 %-98 %] 88 %  Body mass index is 41.16 kg/m².     Input and Output Summary (last 24 hours):     Intake/Output Summary (Last 24 hours) at 2024 1730  Last data filed at 2024 0830  Gross per 24 hour   Intake 580 ml   Output 550 ml   Net 30 ml       Physical Exam  Constitutional:       General: She is not in acute distress.  HENT:      Head: Normocephalic and atraumatic.      Nose: Nose normal.   Eyes:      Conjunctiva/sclera: Conjunctivae normal.      Pupils: Pupils are equal, round, and reactive to light.   Cardiovascular:       Rate and Rhythm: Normal rate and regular rhythm.      Heart sounds: Normal heart sounds.   Pulmonary:      Effort: Pulmonary effort is normal. No respiratory distress.      Breath sounds: Normal breath sounds. No wheezing or rales.   Abdominal:      General: Bowel sounds are normal. There is no distension.      Palpations: Abdomen is soft.      Tenderness: There is no abdominal tenderness. There is no guarding or rebound.   Musculoskeletal:      Cervical back: Normal range of motion and neck supple.   Skin:     General: Skin is warm and dry.      Findings: No rash.   Neurological:      Mental Status: She is alert.      Cranial Nerves: No cranial nerve deficit.          Lines/Drains:  Lines/Drains/Airways       Active Status       None                      Telemetry:  Telemetry Orders (From admission, onward)               24 Hour Telemetry Monitoring  (ED Bridging Orders Panel)  Continuous x 24 Hours (Telem)        Question:  Reason for 24 Hour Telemetry  Answer:  Arrhythmias requiring acute medical intervention / PPM or ICD malfunction                     Telemetry Reviewed: Normal Sinus Rhythm  Indication for Continued Telemetry Use: Arrthymias requiring medical therapy               Lab Results: I have reviewed the following results:   Results from last 7 days   Lab Units 09/16/24  0547   WBC Thousand/uL 11.42*   HEMOGLOBIN g/dL 11.2*   HEMATOCRIT % 33.5*   PLATELETS Thousands/uL 279   SEGS PCT % 74   LYMPHO PCT % 13*   MONO PCT % 11   EOS PCT % 1     Results from last 7 days   Lab Units 09/16/24  0547   SODIUM mmol/L 132*   POTASSIUM mmol/L 3.8   CHLORIDE mmol/L 101   CO2 mmol/L 20*   BUN mg/dL 30*   CREATININE mg/dL 1.85*   ANION GAP mmol/L 11   CALCIUM mg/dL 8.1*   ALBUMIN g/dL 3.2*   TOTAL BILIRUBIN mg/dL 0.43   ALK PHOS U/L 128*   ALT U/L 45   AST U/L 31   GLUCOSE RANDOM mg/dL 212*         Results from last 7 days   Lab Units 09/16/24  1608 09/16/24  1153 09/16/24  0732 09/16/24  0122   POC GLUCOSE mg/dl 107  117 170* 313*         Results from last 7 days   Lab Units 09/16/24  0013 09/15/24  2212   LACTIC ACID mmol/L 0.7  --    PROCALCITONIN ng/ml  --  0.33*       Recent Cultures (last 7 days):   Results from last 7 days   Lab Units 09/16/24  0327 09/16/24  0013   BLOOD CULTURE   --  Received in Microbiology Lab. Culture in Progress.  Received in Microbiology Lab. Culture in Progress.   LEGIONELLA URINARY ANTIGEN  Negative  --        Imaging Review: Reviewed radiology reports from this admission including: chest xray and CT abdomen/pelvis.  Other Studies: EKG was reviewed.     Last 24 Hours Medication List:     Current Facility-Administered Medications:     acetaminophen (TYLENOL) tablet 650 mg, Q8H PRN    amLODIPine (NORVASC) tablet 5 mg, Daily    azithromycin (ZITHROMAX) tablet 500 mg, Q24H    cefTRIAXone (ROCEPHIN) IVPB (premix in dextrose) 2,000 mg 50 mL, Q24H, Last Rate: 2,000 mg (09/16/24 0144)    ezetimibe (ZETIA) tablet 10 mg, Daily    guaiFENesin (MUCINEX) 12 hr tablet 1,200 mg, BID    heparin (porcine) subcutaneous injection 5,000 Units, Q8H NAZ    insulin glargine (LANTUS) subcutaneous injection 20 Units 0.2 mL, HS    insulin lispro (HumALOG/ADMELOG) 100 units/mL subcutaneous injection 1-5 Units, TID AC **AND** Fingerstick Glucose (POCT), TID AC    insulin lispro (HumALOG/ADMELOG) 100 units/mL subcutaneous injection 1-5 Units, HS    insulin lispro (HumALOG/ADMELOG) 100 units/mL subcutaneous injection 5 Units, TID With Meals    metoprolol tartrate (LOPRESSOR) partial tablet 12.5 mg, Q12H NAZ    multi-electrolyte (PLASMALYTE-A/ISOLYTE-S PH 7.4) IV solution, Continuous, Last Rate: 125 mL/hr (09/16/24 1401)    multivitamin stress formula tablet 1 tablet, Daily    ondansetron (ZOFRAN) injection 4 mg, Q6H PRN    PARoxetine (PAXIL) tablet 20 mg, Daily    pravastatin (PRAVACHOL) tablet 40 mg, Daily    Administrative Statements   Today, Patient Was Seen By: Sary Sharma MD      **Please Note: This note may have  been constructed using a voice recognition system.**

## 2024-09-16 NOTE — ASSESSMENT & PLAN NOTE
Patient presents with acute onset palpitation while watching TV, with associated dizziness when getting onto stretcher with EMS.  Denies chest pain SOB.  Reports history of same thing 20 years ago and another episode a few years ago and both episodes resolved when she made herself throw up. Patient reports not feeling well, started with productive cough on Thursday 3 days ago.  Reports has a right ureteral stent since last hospitalization in October last year.  Initial EKG in ED showed SVT, rate 224  Received adenosine x 2 and metoprolol 5 mg IV x 1 in ED  Currently in sinus rhythm  Will start low-dose metoprolol  Telemetry  Optimize electrolytes  Consult cardiology

## 2024-09-16 NOTE — ED PROVIDER NOTES
Sign out         2x adenosine resolved episodes of SVT    Lopressor ordered    Remains in sinus for > 1 hr  Admit SD2 tele      Meets sirs, I think this is 2/2 SVT > infection  However does note she's felt under the weather. Has hyponatremia mild transaminitis  R/o covid flu as cause of these  Chest xr    XR chest 1 view portable    (Results Pending)         I don't appreciate pnuemonia  If pursued consider legionella 2/2 hyponatremia.      Pacheco Edmonds MD  09/15/24 [id number]

## 2024-09-16 NOTE — H&P
H&P - Hospitalist   Name: Bonnie SANDHU Apgar 59 y.o. female I MRN: [id number]  Unit/Bed#: [address] [place] [address] Date of Admission: 9/15/2024   Date of Service: 2024 I Hospital Day: 1     Assessment & Plan  SVT (supraventricular tachycardia)  Patient presents with acute onset palpitation while watching TV, with associated dizziness when getting onto stretcher with EMS.  Denies chest pain SOB.  Reports history of same thing 20 years ago and another episode a few years ago and both episodes resolved when she made herself throw up. Patient reports not feeling well, started with productive cough on Thursday 3 days ago.  Reports has a right ureteral stent since last hospitalization in October last year.  Initial EKG in ED showed SVT, rate 224  Received adenosine x 2 and metoprolol 5 mg IV x 1 in ED  Currently in sinus rhythm  Will start low-dose metoprolol  Telemetry  Optimize electrolytes  Consult cardiology    Sepsis (HCC)  Sepsis, present on admission, as evidenced by leukocytosis, tachycardia, with suspected source of infection UTI and possible right-sided pneumonia pending final chest x-ray read.  Lactic acid normal  Initial procalcitonin 0.33  UA positive nitrite large leukocytes.  Start Rocephin Zithromax  Follow-up blood cultures, urine culture  IV hydration  Repeat procalcitonin in the morning    LEANDRO (acute kidney injury) ([facility])  Creatinine today 1.95  Baseline creatinine 0.9-1.2 in past year  Unclear etiology.  Patient is on perindopril at home.  IV hydration  Check PVR  Hold perindopril  Check kidney ultrasound  Intake and output  Repeat BMP in the morning      Hyponatremia  Corrected sodium 133  IV hydration  Repeat BMP in the morning  Elevated LFTs  Mild LFT elevation.  Denies nausea vomiting abdominal pain.  Trend LFTs  Essential hypertension  On perindopril at home.  Hold due to LEANDRO  BP acceptable  Monitor  Hyperlipidemia  Continue statin  Depression  Continue Paxil  Type 2 diabetes mellitus with hyperglycemia,  without long-term current use of insulin ([facility])  Lab Results   Component Value Date    HGBA1C 8.4 (H) 06/10/2024       Recent Labs     24  0122   POCGLU 313*       Blood Sugar Average: Last 72 hrs:  (P) 313  On Lantus 20 units SQ at bedtime, Humalog 5 units 3 times daily with meals.  Will continue.  Add SSI  Diabetic diet  Update A1c  Morbid obesity ([facility])  Diet and lifestyle modification  UTI (urinary tract infection)  History of right-sided emphysematous pyelonephritis in 10/2023, required ureteral stent placement.  Urine culture grew pan susceptible E. Coli at the time.  Started on IV Rocephin as above  Follow-up urine culture  Check PVR  Consult urology for retained right ureteral stent.  Abnormal chest x-ray  Questionable right sided infiltrate, pending final read.  Started on Rocephin Zithromax as above  Start Mucinex  Check urine antigens, sputum Gram stain and culture  Follow-up official chest x-ray read      VTE Pharmacologic Prophylaxis: VTE Score: 5 High Risk (Score >/= 5) - Pharmacological DVT Prophylaxis Ordered: enoxaparin (Lovenox). Sequential Compression Devices Ordered.  Code Status: Level 1 - Full Code   Discussion with family:  No.     Anticipated Length of Stay: Patient will be admitted on an inpatient basis with an anticipated length of stay of greater than 2 midnights secondary to SVT, sepsis, UTI.    History of Present Illness   Chief Complaint: Bryanitation    Brenda S Apgar is a 59 y.o. female with a PMH of SVT, Right-sided emphysematous pyelonephritis, hypertension, hyperlipidemia, depression, type 2 diabetes, morbid obesity who presents with acute onset palpitation while watching TV, with associated dizziness when getting onto stretcher with EMS.  Denies chest pain SOB.  Reports history of same thing 20 years ago and another episode a few years ago and both episodes resolved when she made herself throw up. Patient reports not feeling well, started with productive cough on Thursday 3  "days ago.  Reports has a right ureteral stent since last hospitalization in October last year.  Patient denies UTI symptoms, denies flank pain or bilateral low back pain.  Denies fever or chills.  Patient states she is scheduled to see PCP tomorrow due to not feeling well.          Review of Systems   Constitutional:         Not feeling well since Thursday last week   Respiratory:  Positive for cough.    Cardiovascular:  Positive for palpitations.   Neurological:  Positive for dizziness.   All other systems reviewed and are negative.      I have reviewed the patient's PMH, PSH, Social History, Family History, Meds, and Allergies  Social History:  Marital Status: Single   Occupation: Unclear  Patient Pre-hospital Living Situation: Family  Patient Pre-hospital Level of Mobility: walks  Patient Pre-hospital Diet Restrictions: Diabetic cardiac    Objective     Vitals:   Blood Pressure: 146/76 (09/16/24 0126)  Pulse: 95 (09/16/24 0030)  Temperature: 98.7 °F (37.1 °C) (09/16/24 0126)  Temp Source: Tympanic (09/15/24 2230)  Respirations: 19 (09/16/24 0126)  Height: 5' 6\" (167.6 cm) (09/16/24 0113)  Weight - Scale: 116 kg (255 lb 3.2 oz) (09/16/24 0113)  SpO2: 94 % (09/16/24 0030)    Physical Exam  Vitals and nursing note reviewed.   Constitutional:       Appearance: She is well-developed. She is obese.   HENT:      Head: Normocephalic and atraumatic.   Neck:      Thyroid: No thyromegaly.      Vascular: No JVD.      Trachea: No tracheal deviation.   Cardiovascular:      Rate and Rhythm: Normal rate and regular rhythm.      Heart sounds: Normal heart sounds.   Pulmonary:      Effort: Pulmonary effort is normal. No respiratory distress.      Breath sounds: Normal breath sounds. No wheezing or rales.   Abdominal:      General: Bowel sounds are normal. There is no distension.      Palpations: Abdomen is soft.      Tenderness: There is no abdominal tenderness. There is no guarding.   Musculoskeletal:         General: Normal " range of motion.      Cervical back: Neck supple.      Right lower leg: No edema.      Left lower leg: No edema.   Skin:     General: Skin is warm and dry.   Neurological:      General: No focal deficit present.      Mental Status: She is alert and oriented to person, place, and time.   Psychiatric:         Mood and Affect: Mood and affect and mood normal.         Judgment: Judgment normal.         Lines/Drains:  Lines/Drains/Airways       Active Status       None                        Additional Data:   Lab Results: I have reviewed the following results: CBC/BMP:   .     09/15/24  2212   WBC 14.46*   HGB 12.0   HCT 35.6      SODIUM 128*   K 3.9   CL 97   CO2 19*   BUN 32*   CREATININE 1.95*   GLUC 290*   MG 1.7*    , Lactic Acid:   .     09/16/24  0013   LACTICACID 0.7    , Procalcitonin:   Lab Results   Component Value Date    PROCALCITONI 0.33 (H) 09/15/2024     Results from last 7 days   Lab Units 09/15/24  2212   WBC Thousand/uL 14.46*   HEMOGLOBIN g/dL 12.0   HEMATOCRIT % 35.6   PLATELETS Thousands/uL 277   SEGS PCT % 75   LYMPHO PCT % 10*   MONO PCT % 11   EOS PCT % 2     Results from last 7 days   Lab Units 09/15/24  2212   SODIUM mmol/L 128*   POTASSIUM mmol/L 3.9   CHLORIDE mmol/L 97   CO2 mmol/L 19*   BUN mg/dL 32*   CREATININE mg/dL 1.95*   ANION GAP mmol/L 12   CALCIUM mg/dL 8.4   ALBUMIN g/dL 3.3*   TOTAL BILIRUBIN mg/dL 0.66   ALK PHOS U/L 116*   ALT U/L 54*   AST U/L 47*   GLUCOSE RANDOM mg/dL 290*         Results from last 7 days   Lab Units 09/16/24  0122   POC GLUCOSE mg/dl 313*     Lab Results   Component Value Date    HGBA1C 8.4 (H) 06/10/2024    HGBA1C 7.7 (H) 02/19/2024    HGBA1C 15.0 (H) 10/18/2023     Results from last 7 days   Lab Units 09/16/24  0013 09/15/24  2212   LACTIC ACID mmol/L 0.7  --    PROCALCITONIN ng/ml  --  0.33*       Imaging Review: Reviewed radiology reports from this admission including: chest xray.  Other Studies: EKG was reviewed.     Administrative Statements    I have spent a total time of 60 minutes in caring for this patient on the day of the visit/encounter including Diagnostic results, Instructions for management, Patient and family education, Importance of tx compliance, Counseling / Coordination of care, Obtaining or reviewing history  , and Communicating with other healthcare professionals .    ** Please Note: This note has been constructed using a voice recognition system. **

## 2024-09-16 NOTE — ASSESSMENT & PLAN NOTE
Chest x-ray showed mild central pulmonary vascular congestion  Currently on IV ceftriaxone and azithromycin.  Will discontinue azithromycin as urine antigens are negative  Continue Mucinex

## 2024-09-16 NOTE — CONSULTS
H&P Exam - Urology       Patient: Brenda S Apgar   : 1965 Sex: female   MRN: [id number]     CSN: [id number]      History of Present Illness   HPI:  Brenda S Apgar is a 59 y.o. female who presents to [facility] late last night with acute onset palpitations while watching television with dizziness admitted found to have SVT urologic consult called for patient undergoing cystoscopy stat right stent insertion for emphysematous cystitis hydronephrosis back in 2023 failing to follow-up in my office for reevaluation with cystoscopy stent removal and retrograde patient had no idea stent was still in        Review of Systems:   Constitutional:  Negative for activity change, fever, chills and diaphoresis.   HENT: Negative for hearing loss and trouble swallowing.   Eyes: Negative for itching and visual disturbance.   Respiratory: Negative for chest tightness and shortness of breath.   Cardiovascular: Negative for chest pain, edema.   Gastrointestinal: Negative for abdominal distention, na abdominal pain, constipation, diarrhea, Nausea and vomiting.   Genitourinary: Negative for decreased urine volume, difficulty urinating, dysuria, enuresis, frequency, hematuria and urgency.   Musculoskeletal: Negative for gait problem and myalgias.   Neurological: Negative for dizziness and headaches.   Hematological: Does not bruise/bleed easily.       Historical Information   Past Medical History:   Diagnosis Date    Diabetes mellitus (HCC)     HLD (hyperlipidemia)     Hypertension     SVT (supraventricular tachycardia)      Past Surgical History:   Procedure Laterality Date    CHOLECYSTECTOMY      CYSTOSCOPY W/ URETERAL STENT PLACEMENT Right     FL RETROGRADE PYELOGRAM  10/19/2023    OH CYSTO BLADDER W/URETERAL CATHETERIZATION Right 10/19/2023    Procedure: CYSTOSCOPY RETROGRADE PYELOGRAM WITH INSERTION STENT URETERAL;  Surgeon: Chau Hassan MD;  Location: [facility];  Service: Urology     Social History   Social  "History     Substance and Sexual Activity   Alcohol Use Never     Social History     Substance and Sexual Activity   Drug Use Never     Social History     Tobacco Use   Smoking Status Never   Smokeless Tobacco Never     Family History:   Family History   Problem Relation Age of Onset    Diabetes type II Mother     Hypertension Mother     Brain cancer Sister     Brain cancer Sister     Hypertension Brother     Breast cancer Maternal Aunt     Liver disease Maternal Uncle     No Known Problems Maternal Grandmother     No Known Problems Maternal Grandfather     No Known Problems Paternal Grandmother     No Known Problems Paternal Grandfather        Meds/Allergies     Medications Prior to Admission:     amLODIPine (NORVASC) 5 mg tablet    ezetimibe (ZETIA) 10 mg tablet    Insulin Glargine Solostar (Lantus SoloStar) 100 UNIT/ML SOPN    insulin lispro (HumaLOG KwikPen) 100 units/mL injection pen    multivitamin (THERAGRAN) TABS    PARoxetine (PAXIL) 20 mg tablet    perindopril (ACEON) 8 MG tablet    pravastatin (PRAVACHOL) 40 mg tablet    Insulin Pen Needle (B-D UF III MINI PEN NEEDLES) 31G X 5 MM MISC  Allergies   Allergen Reactions    Other Angioedema     HONEY DEW MELON       Objective   Vitals: /75   Pulse 90   Temp 98.1 °F (36.7 °C)   Resp 18   Ht 5' 6\" (1.676 m)   Wt 116 kg (255 lb)   SpO2 (!) 88%   BMI 41.16 kg/m²     Physical Exam:  General Alert awake   Normocephalic atraumatic PERRLA  Lungs clear bilaterally  Cardiac normal S1 normal S2  Abdomen soft, flank pain  Extremities no edema    I/O last 24 hours:  In: 580 [P.O.:530; IV Piggyback:50]  Out: 550 [Urine:550]    Invasive Devices       Peripheral Intravenous Line  Duration             Peripheral IV 09/15/24 Dorsal (posterior);Left Hand <1 day    Peripheral IV 09/15/24 Right Antecubital <1 day                        Lab Results: CBC:   Lab Results   Component Value Date    WBC 11.42 (H) 09/16/2024    HGB 11.2 (L) 09/16/2024    HCT 33.5 (L) " "09/16/2024    MCV 83 09/16/2024     09/16/2024    RBC 4.02 09/16/2024    MCH 27.9 09/16/2024    MCHC 33.4 09/16/2024    RDW 12.6 09/16/2024    MPV 8.4 (L) 09/16/2024    NRBC 0 09/16/2024     CMP:   Lab Results   Component Value Date     09/16/2024    CO2 20 (L) 09/16/2024    BUN 30 (H) 09/16/2024    CREATININE 1.85 (H) 09/16/2024    CALCIUM 8.1 (L) 09/16/2024    AST 31 09/16/2024    ALT 45 09/16/2024    ALKPHOS 128 (H) 09/16/2024    EGFR 29 09/16/2024     Urinalysis:   Lab Results   Component Value Date    COLORU Yellow 09/16/2024    CLARITYU Clear 09/16/2024    SPECGRAV <1.005 (L) 09/16/2024    PHUR 6.5 09/16/2024    LEUKOCYTESUR Large (A) 09/16/2024    NITRITE Positive (A) 09/16/2024    GLUCOSEU Negative 09/16/2024    KETONESU Negative 09/16/2024    BILIRUBINUR Negative 09/16/2024    BLOODU Small (A) 09/16/2024     Urine Culture:   Lab Results   Component Value Date    URINECX >100,000 cfu/ml Escherichia coli (A) 10/17/2023     PSA: No results found for: \"PSA\"        Assessment/ Plan:  Right hydronephrosis  History of right emphysematous pyelitis pyelonephritis  Status post cystoscopy right stent insertion 11 months ago  Complicated UTI  Urine cultures  Cardiac workup  Will add to the OR schedule for Thursday cystoscopy removal stent exchange pending right retrograde      Chau Hassan MD    "

## 2024-09-16 NOTE — ASSESSMENT & PLAN NOTE
Patient with episodes of elevated BP readings.  Currently on Lopressor 12.5 mg twice daily and amlodipine 5 mg daily.  Continue to monitor BP.

## 2024-09-16 NOTE — CONSULTS
Consultation - Cardiology   [facility]     Bonnie SANDHU Apgar 59 y.o. female MRN: [id number]  : 1965  Unit/Bed#: [address] [place] [address] Encounter: [id number]      Assessment & Plan     Assessment & Plan  SVT (supraventricular tachycardia)  Presented on 9/15/24 for evaluation for palpitations.  On presentation EKG noted SVT.  Patient was given 2 doses of adenosine 6 mg in ED via one-time dose of Lopressor IV 5 mg.  SVT converted to sinus rhythm.  9/15/24 EKG: Supraventricular tachycardia, 224 bpm.  Non-specific intraventricular block.  Noted hypomagnesemia on presentation. 9/15/24 magnesium 1.7. Magnesium has since normalized.   Continue Lopressor 12.5 mg twice daily.  Follow up TTE.   Patient may benefit from outpatient ambulatory extended Holter monitor on hospital discharge.  Monitor electrolytes.  Replete potassium above 4 magnesium above 2.  Continue telemetry.   Essential hypertension  Patient with episodes of elevated BP readings.  Currently on Lopressor 12.5 mg twice daily and amlodipine 5 mg daily.  Continue to monitor BP.   Hyperlipidemia  Currently on pravastatin 40 mg daily and Zetia 10 mg daily.   6/10/24 lipid panel: Cholesterol 147, triglycerides 157, HDL 39, LDL 75.  Sepsis ([facility])  On presentation patient with noted leukocytosis and tachycardia.  Suspected secondary to UTI.  Care per primary team.   UTI (urinary tract infection)  24 UA/urine microscopic consistent with UTI.  Care per primary team.   LEANDRO (acute kidney injury) (Formerly [facility])  Baseline creatinine appears between 0.9-1.2.  9/15/24 (admission) creatinine: 1.95.  24 creatinine: 1.85.   Care per primary team.   Hyponatremia  9/15/24 sodium (admission): 128.   24 sodium: 132.   Care per primary team.   Type 2 diabetes mellitus with hyperglycemia, without long-term current use of insulin ([facility])  Lab Results   Component Value Date    HGBA1C 8.4 (H) 06/10/2024     Recent Labs     24  0122 24  0732   POCGLU  313* 170*     Care per primary team.   Depression  Care per primary team.   Morbid obesity (HCC)  BMI 41.16 kg/m2.     Summary of Recommendations:        Thank you for your consultation.    Physician Requesting Consult: Sary hSarma MD    Reason for Consult / Principal Problem: SVT.     Inpatient consult to Cardiology  Consult performed by: Rosalia Germain PA-C  Consult ordered by: KWAME Recinos          HPI: Brenda S Apgar is a 59 y.o. female with PMHx of remote history of SVT, essential hypertension, hyperlipidemia, DMII, depression, who presented on 9/15/24 for evaluation for palpitations.     Patient reports that she was sitting watching TV when she developed acute onset of palpitations.  She reports palpitations were associated with dizziness upon standing.  She denies experiencing chest pain, shortness of breath, lightheadedness, nausea, vomiting, or diaphoresis.      Patient reports she has a remote history of palpitations approximately 20 years ago.  She states that she was told by cardiologist to attempt vagal maneuvers if she experiences palpitations.  She denies diagnosed with SVT at that time, states that she was just told with palpitations.  She denies following outpatient with a cardiologist.      On presentation EKG noted SVT.  Patient was given 2 doses of adenosine 6 mg in ED via one-time dose of Lopressor IV 5 mg.  SVT converted to sinus rhythm.    Review of Systems   Constitutional:  Negative for activity change, appetite change, chills, diaphoresis, fatigue, fever and unexpected weight change.   Respiratory:  Negative for cough, chest tightness, shortness of breath and wheezing.    Cardiovascular:  Positive for palpitations. Negative for chest pain and leg swelling.   Gastrointestinal:  Negative for abdominal distention, abdominal pain, constipation, diarrhea, nausea and vomiting.   Skin: Negative.    Neurological:  Positive for dizziness. Negative for syncope, weakness, light-headedness,  numbness and headaches.       Historical Information   Past Medical History:   Diagnosis Date    Diabetes mellitus (HCC)     HLD (hyperlipidemia)     Hypertension     SVT (supraventricular tachycardia)      Past Surgical History:   Procedure Laterality Date    CHOLECYSTECTOMY      CYSTOSCOPY W/ URETERAL STENT PLACEMENT Right     FL RETROGRADE PYELOGRAM  10/19/2023    IN CYSTO BLADDER W/URETERAL CATHETERIZATION Right 10/19/2023    Procedure: CYSTOSCOPY RETROGRADE PYELOGRAM WITH INSERTION STENT URETERAL;  Surgeon: Chau Hassan MD;  Location: [facility];  Service: Urology     Social History     Substance and Sexual Activity   Alcohol Use Never     Social History     Substance and Sexual Activity   Drug Use Never     Social History     Tobacco Use   Smoking Status Never   Smokeless Tobacco Never     Family History:   Family History   Problem Relation Age of Onset    Diabetes type II Mother     Hypertension Mother     Brain cancer Sister     Brain cancer Sister     Hypertension Brother     Breast cancer Maternal Aunt     Liver disease Maternal Uncle     No Known Problems Maternal Grandmother     No Known Problems Maternal Grandfather     No Known Problems Paternal Grandmother     No Known Problems Paternal Grandfather        Meds/Allergies    PTA meds:    Medications Prior to Admission:     amLODIPine (NORVASC) 5 mg tablet    ezetimibe (ZETIA) 10 mg tablet    Insulin Glargine Solostar (Lantus SoloStar) 100 UNIT/ML SOPN    insulin lispro (HumaLOG KwikPen) 100 units/mL injection pen    multivitamin (THERAGRAN) TABS    PARoxetine (PAXIL) 20 mg tablet    perindopril (ACEON) 8 MG tablet    pravastatin (PRAVACHOL) 40 mg tablet    Insulin Pen Needle (B-D UF III MINI PEN NEEDLES) 31G X 5 MM MISC   Allergies   Allergen Reactions    Other Angioedema     HONEY DEW MELON       Current Facility-Administered Medications:     acetaminophen (TYLENOL) tablet 650 mg, 650 mg, Oral, Q8H PRN, KWAME Recinos    amLODIPine (NORVASC)  tablet 5 mg, 5 mg, Oral, Daily, aPri Medrano, CRNP, 5 mg at 09/16/24 0812    azithromycin (ZITHROMAX) tablet 500 mg, 500 mg, Oral, Q24H, Cuiyin Adinarik, CRNP, 500 mg at 09/16/24 0137    cefTRIAXone (ROCEPHIN) 40 mg/mL IVPB (premix in dextrose) **ADS Override Pull**, , , ,     cefTRIAXone (ROCEPHIN) IVPB (premix in dextrose) 2,000 mg 50 mL, 2,000 mg, Intravenous, Q24H, ZHOU RecinosNP, Last Rate: 100 mL/hr at 09/16/24 0144, 2,000 mg at 09/16/24 0144    ezetimibe (ZETIA) tablet 10 mg, 10 mg, Oral, Daily, Cuijackyn Adinarik, CRNP, 10 mg at 09/16/24 0812    guaiFENesin (MUCINEX) 12 hr tablet 1,200 mg, 1,200 mg, Oral, BID, Cunivia Holdenricata, CRNP, 1,200 mg at 09/16/24 0812    heparin (porcine) subcutaneous injection 5,000 Units, 5,000 Units, Subcutaneous, Q8H NAZ, ZHOU RecinosNP, 5,000 Units at 09/16/24 0531    insulin glargine (LANTUS) subcutaneous injection 20 Units 0.2 mL, 20 Units, Subcutaneous, HS, KWAME Recinos, 20 Units at 09/16/24 0133    insulin lispro (HumALOG/ADMELOG) 100 units/mL subcutaneous injection 1-5 Units, 1-5 Units, Subcutaneous, TID AC, 1 Units at 09/16/24 0813 **AND** Fingerstick Glucose (POCT), , , TID AC, Pari Goetzk CRNP    insulin lispro (HumALOG/ADMELOG) 100 units/mL subcutaneous injection 1-5 Units, 1-5 Units, Subcutaneous, HS, Pari Medrano CRNP, 3 Units at 09/16/24 0136    insulin lispro (HumALOG/ADMELOG) 100 units/mL subcutaneous injection 5 Units, 5 Units, Subcutaneous, TID With Meals, KWAME Recinos, 5 Units at 09/16/24 0815    metoprolol tartrate (LOPRESSOR) partial tablet 12.5 mg, 12.5 mg, Oral, Q12H NAZ, KWAME Recinos, 12.5 mg at 09/16/24 0139    multi-electrolyte (PLASMALYTE-A/ISOLYTE-S PH 7.4) IV solution, 125 mL/hr, Intravenous, Continuous, KWAME Recinos, Last Rate: 125 mL/hr at 09/16/24 0147, 125 mL/hr at 09/16/24 0147    multivitamin stress formula tablet 1 tablet, 1 tablet, Oral, Daily, KWAME Recinos, 1 tablet at 09/16/24 0812    ondansetron (ZOFRAN) injection  "4 mg, 4 mg, Intravenous, Q6H PRN, Jordanamritakarina Medrano, ZHOUNP    PARoxetine (PAXIL) tablet 20 mg, 20 mg, Oral, Daily, Pooljess Holdenrik, CRNP, 20 mg at 09/16/24 0812    pravastatin (PRAVACHOL) tablet 40 mg, 40 mg, Oral, Daily, Jordanijackyjess Holdenrik, CRNP, 40 mg at 09/16/24 0812    VTE Pharmacologic Prophylaxis:   Heparin    Objective:   Vitals: Blood pressure (!) 174/71, pulse 95, temperature 98.4 °F (36.9 °C), resp. rate 16, height 5' 6\" (1.676 m), weight 116 kg (255 lb), SpO2 92%.  Body mass index is 41.16 kg/m².  Wt Readings from Last 3 Encounters:   09/16/24 116 kg (255 lb)   06/12/24 110 kg (242 lb)   03/05/24 101 kg (223 lb 9.6 oz)     BP Readings from Last 3 Encounters:   09/16/24 (!) 174/71   06/12/24 120/80   03/05/24 140/90     Orthostatic Blood Pressures      Flowsheet Row Most Recent Value   Blood Pressure 174/71 filed at 09/16/2024 1005   Patient Position - Orthostatic VS Lying filed at 09/16/2024 0030            Intake/Output Summary (Last 24 hours) at 9/16/2024 1052  Last data filed at 9/16/2024 0830  Gross per 24 hour   Intake 580 ml   Output 550 ml   Net 30 ml       Invasive Devices       Peripheral Intravenous Line  Duration             Peripheral IV 09/15/24 Dorsal (posterior);Left Hand <1 day    Peripheral IV 09/15/24 Right Antecubital <1 day                      Physical Exam:   Physical Exam  Vitals reviewed.   Constitutional:       General: She is not in acute distress.     Appearance: She is obese.   Cardiovascular:      Rate and Rhythm: Normal rate and regular rhythm.      Pulses: Normal pulses.      Heart sounds: Normal heart sounds.   Pulmonary:      Effort: Pulmonary effort is normal. No respiratory distress.      Breath sounds: Normal breath sounds. No wheezing.   Abdominal:      General: Abdomen is flat. There is no distension.      Palpations: Abdomen is soft.      Tenderness: There is no abdominal tenderness.   Musculoskeletal:      Right lower leg: No edema.      Left lower leg: No edema.   Skin:     " General: Skin is warm and dry.   Neurological:      Mental Status: She is alert and oriented to person, place, and time.         Labs:   Troponins:  Results from last 7 days   Lab Units 09/16/24  0547 09/16/24  0013   HSTNI D2 ng/L  --  19   HSTNI D4 ng/L 20*  --        CBC with diff:   Results from last 7 days   Lab Units 09/16/24  0547 09/15/24  2212   WBC Thousand/uL 11.42* 14.46*   HEMOGLOBIN g/dL 11.2* 12.0   HEMATOCRIT % 33.5* 35.6   MCV fL 83 83   PLATELETS Thousands/uL 279 277   RBC Million/uL 4.02 4.30   MCH pg 27.9 27.9   MCHC g/dL 33.4 33.7   RDW % 12.6 12.7   MPV fL 8.4* 8.7*   NRBC AUTO /100 WBCs 0 0       CMP:   Results from last 7 days   Lab Units 09/16/24  0547 09/15/24  2212   SODIUM mmol/L 132* 128*   POTASSIUM mmol/L 3.8 3.9   CHLORIDE mmol/L 101 97   CO2 mmol/L 20* 19*   ANION GAP mmol/L 11 12   BUN mg/dL 30* 32*   CREATININE mg/dL 1.85* 1.95*   CALCIUM mg/dL 8.1* 8.4   AST U/L 31 47*   ALT U/L 45 54*   ALK PHOS U/L 128* 116*   TOTAL PROTEIN g/dL 6.3* 6.9   ALBUMIN g/dL 3.2* 3.3*   TOTAL BILIRUBIN mg/dL 0.43 0.66   EGFR ml/min/1.73sq m 29 27   GLUCOSE RANDOM mg/dL 212* 290*       Magnesium:   Results from last 7 days   Lab Units 09/16/24  0547 09/15/24  2212   MAGNESIUM mg/dL 2.3 1.7*     TSH:    Results from last 7 days   Lab Units 09/15/24  2212   TSH 3RD GENERATON uIU/mL 1.992     Lipid Profile:   Lab Results   Component Value Date    CHOLESTEROL 145 06/10/2024    HDL 39 (L) 06/10/2024    LDLCALC 75 06/10/2024    TRIG 157 (H) 06/10/2024       Imaging & Testing     Cardiac testing:   No results found for this or any previous visit.    Imaging: No pertinent imaging studies reviewed.  No results found.    EKG/ Monitor: Personally reviewed.     9/15/24 EKG: Supraventricular tachycardia, 224 bpm.  Non-specific intraventricular block.     Code Status: Level 1 - Full Code  Advance Directive and Living Will:      POLST:        Rosalia Germain PA-C

## 2024-09-16 NOTE — ASSESSMENT & PLAN NOTE
History of right-sided emphysematous pyelonephritis in 10/2023, required ureteral stent placement.  Urine culture grew pan susceptible E. Coli at the time.  Started on IV Rocephin as above  Follow-up urine culture  Check PVR  Consult urology for retained right ureteral stent.

## 2024-09-16 NOTE — CASE MANAGEMENT
Case Management Assessment & Discharge Planning Note    Patient name Brenda S Apgar  Location [address] [place] [address] [place] [address] MRN [id number]  : 1965 Date 2024       Current Admission Date: 9/15/2024  Current Admission Diagnosis:SVT (supraventricular tachycardia)   Patient Active Problem List    Diagnosis Date Noted Date Diagnosed    Sepsis (HCC) 2024     Morbid obesity ([facility]) 2024     SVT (supraventricular tachycardia) 2024     Elevated LFTs 2024     UTI (urinary tract infection) 2024     Abnormal chest x-ray 2024     Adrenal mass ([facility]) 2023     Type 2 diabetes mellitus with hyperglycemia, without long-term current use of insulin ([facility]) 10/24/2023     Nephrolithiasis 10/19/2023     Leukocytosis 10/17/2023     Hyponatremia 10/17/2023     LEANDRO (acute kidney injury) ([facility]) 10/17/2023     Thrombocytopenia ([facility]) 10/17/2023     Essential hypertension 10/17/2023     Hyperlipidemia 10/17/2023     Depression 10/17/2023       LOS (days): 1  Geometric Mean LOS (GMLOS) (days):   Days to GMLOS:     OBJECTIVE:    Risk of Unplanned Readmission Score: 17.51      Current admission status: Inpatient     Preferred Pharmacy:   [id number] [place], [place] - [address]  [facility]  [facility] [address]  Phone: [phone] Fax: [phone]    [id number] [place], [place] - [address] [facility]  [address] [facility] [address]  Phone: [phone] Fax: [phone]    Primary Care Provider: Venessa Downs DO    Primary Insurance: [employer] [facility]  Secondary Insurance:     ASSESSMENT:  Active Health Care Proxies    There are no active Health Care Proxies on file.  Readmission Root Cause  30 Day Readmission: No    Patient Information  Admitted from:: Home  Mental Status: Alert  During Assessment patient was accompanied by: Not accompanied during assessment  Assessment information provided by:: Patient  Primary Caregiver:  Self  Support Systems: Self, Parent, Family members  [employer] of Residence: [place]  [facility] do you live in?: [place], [place]  Home entry access options. Select all that apply.: Stairs  Number of steps to enter home.: 2  Type of Current Residence: 2 story home  Upon entering residence, is there a bedroom on the main floor (no further steps)?: Yes  Upon entering residence, is there a bathroom on the main floor (no further steps)?: Yes  Living Arrangements: Lives w/ Parent(s), Lives w/ Extended Family (Pt currently staying with mother/aunt at [address] [place] [address])    Activities of Daily Living Prior to Admission  Functional Status: Independent  Completes ADLs independently?: Yes  Ambulates independently?: Yes  Does patient use assisted devices?: No  Does patient currently own DME?: No  Does patient currently have HHC?: No     Patient Information Continued  Income Source: Employed  Does patient have prescription coverage?: Yes  Does patient receive dialysis treatments?: No     Means of Transportation  Means of Transport to Appts:: Drives Self    Social Determinants of Health (SDOH)      Flowsheet Row Most Recent Value   Housing Stability    In the last 12 months, was there a time when you were not able to pay the mortgage or rent on time? N   In the past 12 months, how many times have you moved where you were living? 1   At any time in the past 12 months, were you homeless or living in a shelter (including now)? N   Transportation Needs    In the past 12 months, has lack of transportation kept you from medical appointments or from getting medications? no   Food Insecurity    Within the past 12 months, you worried that your food would run out before you got the money to buy more. Never true   Within the past 12 months, the food you bought just didn't last and you didn't have money to get more. Never true   Utilities    In the past 12 months has the electric, gas, oil, or water company threatened to shut  off services in your home? No          DISCHARGE DETAILS:    Discharge planning discussed with:: Patient  Freedom of Choice: Yes  Comments - Freedom of Choice: SW met bedside with patient to introduce role, complete assessment, and discuss discharge planning needs. Patient states prior to admission she was fully independent with mobility and all ADLs including driving. Patient states her choice is to return home on discharge and denies having any questions, concerns, or anticipated discharge needs that SW can assist with at this time.  CM contacted family/caregiver?: No- see comments (Patient declined call to contact.)  Were Treatment Team discharge recommendations reviewed with patient/caregiver?: Yes  Did patient/caregiver verbalize understanding of patient care needs?: Yes  Were patient/caregiver advised of the risks associated with not following Treatment Team discharge recommendations?: Yes    Contacts  Patient Contacts: Halle Maxwell (aunt)  Relationship to Patient:: Family  Contact Method: Phone  Phone Number: [phone]  Reason/Outcome: Emergency Contact    Requested Home Health Care         Is the patient interested in HHC at discharge?: No    DME Referral Provided  Referral made for DME?: No    Other Referral/Resources/Interventions Provided:  Interventions: None Indicated     Treatment Team Recommendation: Home  Discharge Destination Plan:: Home  Transport at Discharge : Family

## 2024-09-16 NOTE — ASSESSMENT & PLAN NOTE
On perindopril at home.  Hold due to LEANDRO  Blood pressure slightly on the high side  Started on metoprolol 12.5 mg p.o. twice daily

## 2024-09-16 NOTE — ASSESSMENT & PLAN NOTE
Baseline creatinine appears between 0.9-1.2.  9/15/24 (admission) creatinine: 1.95.  9/16/24 creatinine: 1.85.   Care per primary team.

## 2024-09-16 NOTE — ASSESSMENT & PLAN NOTE
Sepsis, present on admission, as evidenced by leukocytosis, tachycardia, with suspected source of infection UTI and possible right-sided pneumonia pending final chest x-ray read.  Lactic acid normal  Initial procalcitonin 0.33  UA positive nitrite large leukocytes.  Start Rocephin Zithromax  Follow-up blood cultures, urine culture  IV hydration  Repeat procalcitonin in the morning

## 2024-09-16 NOTE — ASSESSMENT & PLAN NOTE
Lab Results   Component Value Date    HGBA1C 8.4 (H) 06/10/2024     Recent Labs     09/16/24  0122 09/16/24  0732   POCGLU 313* 170*     Care per primary team.

## 2024-09-16 NOTE — ED PROVIDER NOTES
No diagnosis found.  ED Disposition       None          Assessment & Plan       Medical Decision Making  Prior records reviewed.  Pt had normal EKG here October 2023.  Pt. Given Adenosine 6 mg with return to sinus tach at 120s.  2245 - pt. Had recurrent SVT at 225, again broke with Adenosine 6 mg.  Will add lopressor.  Will admit.  Pt. Has had some cold symptoms so will add covid screen.  WBC 14, Na 128, pt. With LEANDRO.  Getting IVF.  Signed out to night doctor.    Amount and/or Complexity of Data Reviewed  Labs: ordered.  Radiology: ordered.    Risk  Prescription drug management.                       Medications   magnesium sulfate 2 g/50 mL IVPB (premix) 2 g (has no administration in time range)   sodium chloride 0.9 % bolus 1,000 mL (has no administration in time range)   adenosine (ADENOCARD) injection 6 mg (6 mg Intravenous Given 9/15/24 2208)   metoprolol (LOPRESSOR) injection 5 mg (5 mg Intravenous Given 9/15/24 2252)   adenosine (ADENOCARD) injection 6 mg (6 mg Intravenous Given 9/15/24 2246)       History of Present Illness       58 yo female c/o sudden onset feeling of heart racing about an hour ago.  She tried to drink something to make herself throw up.  She says she history of same thing 20 years ago, treated at [facility].  She thinks she had another episode a few years ago which resolved when she made herself throw up.  She feels a little dizzy.  No syncope, no chest pain, no SOB.  No recent illness.      History provided by:  Patient   used: No    Palpitations  Associated symptoms: dizziness    Associated symptoms: no back pain, no chest pain, no cough, no nausea, no shortness of breath and no vomiting            Review of Systems   Constitutional: Negative.  Negative for fever.   HENT: Negative.     Eyes: Negative.    Respiratory: Negative.  Negative for cough and shortness of breath.    Cardiovascular:  Positive for palpitations. Negative for chest pain.    Gastrointestinal: Negative.  Negative for abdominal pain, diarrhea, nausea and vomiting.   Genitourinary: Negative.  Negative for dysuria and flank pain.   Musculoskeletal: Negative.  Negative for back pain and myalgias.   Skin: Negative.  Negative for rash.   Neurological:  Positive for dizziness. Negative for headaches.   Hematological:  Does not bruise/bleed easily.   Psychiatric/Behavioral: Negative.     All other systems reviewed and are negative.          Objective     ED Triage Vitals   Temperature Pulse Blood Pressure Respirations SpO2 Patient Position - Orthostatic VS   09/15/24 2230 09/15/24 2155 09/15/24 2210 09/15/24 2155 09/15/24 2155 09/15/24 2210   97.7 °F (36.5 °C) (!) 227 139/73 18 98 % Lying      Temp Source Heart Rate Source BP Location FiO2 (%) Pain Score    09/15/24 2230 09/15/24 2155 09/15/24 2210 -- --    Tympanic Monitor Left arm          Physical Exam  Vitals and nursing note reviewed.   Constitutional:       General: She is not in acute distress.     Appearance: She is well-developed. She is ill-appearing. She is not diaphoretic.   HENT:      Head: Normocephalic and atraumatic.   Eyes:      General: No scleral icterus.     Conjunctiva/sclera: Conjunctivae normal.   Cardiovascular:      Rate and Rhythm: Regular rhythm. Tachycardia present.      Heart sounds: Normal heart sounds. No murmur heard.  Pulmonary:      Effort: Pulmonary effort is normal. No respiratory distress.      Breath sounds: Normal breath sounds.   Abdominal:      General: Bowel sounds are normal. There is no distension.      Palpations: Abdomen is soft.      Tenderness: There is no abdominal tenderness.   Musculoskeletal:         General: No deformity. Normal range of motion.      Cervical back: Normal range of motion and neck supple.      Right lower leg: No edema.      Left lower leg: No edema.   Skin:     General: Skin is warm and dry.      Coloration: Skin is pale.      Findings: No rash.   Neurological:      General:  No focal deficit present.      Mental Status: She is alert and oriented to person, place, and time.      Cranial Nerves: No cranial nerve deficit.   Psychiatric:         Behavior: Behavior normal.         Labs Reviewed   COMPREHENSIVE METABOLIC PANEL - Abnormal       Result Value    Sodium 128 (*)     Potassium 3.9      Chloride 97      CO2 19 (*)     ANION GAP 12      BUN 32 (*)     Creatinine 1.95 (*)     Glucose 290 (*)     Calcium 8.4      Corrected Calcium 9.0      AST 47 (*)     ALT 54 (*)     Alkaline Phosphatase 116 (*)     Total Protein 6.9      Albumin 3.3 (*)     Total Bilirubin 0.66      eGFR 27      Narrative:     National Kidney Disease Foundation guidelines for Chronic Kidney Disease (CKD):     Stage 1 with normal or high GFR (GFR > 90 mL/min/1.73 square meters)    Stage 2 Mild CKD (GFR = 60-89 mL/min/1.73 square meters)    Stage 3A Moderate CKD (GFR = 45-59 mL/min/1.73 square meters)    Stage 3B Moderate CKD (GFR = 30-44 mL/min/1.73 square meters)    Stage 4 Severe CKD (GFR = 15-29 mL/min/1.73 square meters)    Stage 5 End Stage CKD (GFR <15 mL/min/1.73 square meters)  Note: GFR calculation is accurate only with a steady state creatinine   CBC AND DIFFERENTIAL - Abnormal    WBC 14.46 (*)     RBC 4.30      Hemoglobin 12.0      Hematocrit 35.6      MCV 83      MCH 27.9      MCHC 33.7      RDW 12.7      MPV 8.7 (*)     Platelets 277      nRBC 0      Segmented % 75      Immature Grans % 1      Lymphocytes % 10 (*)     Monocytes % 11      Eosinophils Relative 2      Basophils Relative 1      Absolute Neutrophils 11.00 (*)     Absolute Immature Grans 0.11      Absolute Lymphocytes 1.48      Absolute Monocytes 1.57 (*)     Eosinophils Absolute 0.23      Basophils Absolute 0.07     MAGNESIUM - Abnormal    Magnesium 1.7 (*)    HS TROPONIN I 0HR - Normal    hs TnI 0hr 14     TSH, 3RD GENERATION WITH FREE T4 REFLEX - Normal    TSH 3RD GENERATON 1.992     COVID19, INFLUENZA A/B, RSV PCR, [facility]   HS TROPONIN I  2HR     XR chest 1 view portable    (Results Pending)       ECG 12 Lead Documentation Only    Date/Time: 9/15/2024 9:54 PM    Performed by: Venessa Mar MD  Authorized by: Venessa Mar MD    Indications / Diagnosis:  Palpitations  ECG reviewed by me, the ED Provider: yes    Patient location:  ED  Previous ECG:     Previous ECG:  Compared to current    Comparison ECG info:  October 2023    Similarity:  Changes noted  Interpretation:     Interpretation: abnormal    Rate:     ECG rate:  224  Rhythm:     Rhythm: SVT    Ectopy:     Ectopy: none    QRS:     QRS axis:  Normal  Conduction:     Conduction: abnormal      Abnormal conduction: non-specific intraventricular conduction delay    ST segments:     ST segments:  Non-specific  T waves:     T waves: non-specific    CriticalCare Time    Date/Time: 9/15/2024 10:55 PM    Performed by: Venessa Mar MD  Authorized by: Venessa Mar MD    Critical care provider statement:     Critical care time (minutes):  35    Critical care time was exclusive of:  Separately billable procedures and treating other patients    Critical care was necessary to treat or prevent imminent or life-threatening deterioration of the following conditions:  Cardiac failure and circulatory failure    Critical care was time spent personally by me on the following activities:  Obtaining history from patient or surrogate, development of treatment plan with patient or surrogate, evaluation of patient's response to treatment, examination of patient, interpretation of cardiac output measurements, ordering and performing treatments and interventions, ordering and review of laboratory studies, re-evaluation of patient's condition and review of old charts    I assumed direction of critical care for this patient from another provider in my specialty: no           Venessa Mar MD  09/15/24 2254       Venessa Mar MD  09/15/24 2253

## 2024-09-16 NOTE — ASSESSMENT & PLAN NOTE
Lab Results   Component Value Date    HGBA1C 8.4 (H) 06/10/2024       Recent Labs     09/16/24  0122 09/16/24  0732 09/16/24  1153 09/16/24  1608   POCGLU 313* 170* 117 107       Blood Sugar Average: Last 72 hrs:  (P) 176.75  On Lantus 20 units SQ at bedtime, Humalog 5 units 3 times daily with meals.  Will continue.  Continue Humalog sliding scale with Accu-Cheks before every meal and at bedtime  Diabetic diet

## 2024-09-16 NOTE — ASSESSMENT & PLAN NOTE
Patient presents with acute onset palpitation while watching TV, with associated dizziness when getting onto stretcher with EMS.  Denies chest pain SOB.  Reports history of same thing 20 years ago and another episode a few years ago and both episodes resolved when she made herself throw up. Patient reports not feeling well, started with productive cough on Thursday 3 days ago.  Reports has a right ureteral stent since last hospitalization in October last year.  Initial EKG in ED showed SVT, rate 224  Received adenosine x 2 and metoprolol 5 mg IV x 1 in ED  Currently in sinus rhythm  Patient was started on metoprolol 12.5 mg p.o. twice daily which will be continued  Telemetry showed no atrial fibrillation or further episodes of SVT  Cardiology input appreciated  2D echo showed EF of 60%, unable to assess diastolic function without any significant valvular abnormalities, moderately elevated right ventricular systolic pressure at 50 mmHg-night pulse oximetry ordered rule out sleep apnea

## 2024-09-16 NOTE — ASSESSMENT & PLAN NOTE
History of right-sided emphysematous pyelonephritis in 10/2023, required ureteral stent placement.  Urine culture grew pan susceptible E. Coli at the time.  Started on IV Rocephin as above  Pending urine culture  Check PVR  Consult urology for retained right ureteral stent.

## 2024-09-16 NOTE — ASSESSMENT & PLAN NOTE
Presented on 9/15/24 for evaluation for palpitations.  On presentation EKG noted SVT.  Patient was given 2 doses of adenosine 6 mg in ED via one-time dose of Lopressor IV 5 mg.  SVT converted to sinus rhythm.  9/15/24 EKG: Supraventricular tachycardia, 224 bpm.  Non-specific intraventricular block.  Noted hypomagnesemia on presentation. 9/15/24 magnesium 1.7. Magnesium has since normalized.   Continue Lopressor 12.5 mg twice daily.  Follow up TTE.   Patient may benefit from outpatient ambulatory extended Holter monitor on hospital discharge.  Monitor electrolytes.  Replete potassium above 4 magnesium above 2.  Continue telemetry.

## 2024-09-16 NOTE — ASSESSMENT & PLAN NOTE
Creatinine today 1.95  Baseline creatinine 0.9-1.2 in past year  Unclear etiology.  Patient is on perindopril at home.  Continue IV hydration  Renal ultrasound: Right ureteral stent in proper position without any evidence of hydronephrosis with moderately atrophic right kidney  Monitor PVR  Continue to hold perindopril  Repeat BMP in the morning  Results from last 7 days   Lab Units 09/16/24  0547 09/15/24  2212   BUN mg/dL 30* 32*   CREATININE mg/dL 1.85* 1.95*

## 2024-09-16 NOTE — ASSESSMENT & PLAN NOTE
Questionable right sided infiltrate, pending final read.  Started on Rocephin Zithromax as above  Start Mucinex  Check urine antigens, sputum Gram stain and culture  Follow-up official chest x-ray read

## 2024-09-16 NOTE — ASSESSMENT & PLAN NOTE
Currently on pravastatin 40 mg daily and Zetia 10 mg daily.   6/10/24 lipid panel: Cholesterol 147, triglycerides 157, HDL 39, LDL 75.

## 2024-09-16 NOTE — PLAN OF CARE
Problem: PAIN - ADULT  Goal: Verbalizes/displays adequate comfort level or baseline comfort level  Description: Interventions:  - Encourage patient to monitor pain and request assistance  - Assess pain using appropriate pain scale  - Administer analgesics based on type and severity of pain and evaluate response  - Implement non-pharmacological measures as appropriate and evaluate response  - Consider cultural and social influences on pain and pain management  - Notify physician/advanced practitioner if interventions unsuccessful or patient reports new pain  Outcome: Progressing     Problem: INFECTION - ADULT  Goal: Absence or prevention of progression during hospitalization  Description: INTERVENTIONS:  - Assess and monitor for signs and symptoms of infection  - Monitor lab/diagnostic results  - Monitor all insertion sites, i.e. indwelling lines, tubes, and drains  - Monitor endotracheal if appropriate and nasal secretions for changes in amount and color  - [place] appropriate cooling/warming therapies per order  - Administer medications as ordered  - Instruct and encourage patient and family to use good hand hygiene technique  - Identify and instruct in appropriate isolation precautions for identified infection/condition  Outcome: Progressing     Problem: SAFETY ADULT  Goal: Patient will remain free of falls  Description: INTERVENTIONS:  - Educate patient/family on patient safety including physical limitations  - Instruct patient to call for assistance with activity   - Consult OT/PT to assist with strengthening/mobility   - Keep Call bell within reach  - Keep bed low and locked with side rails adjusted as appropriate  - Keep care items and personal belongings within reach  - Initiate and maintain comfort rounds  - Make Fall Risk Sign visible to staff  - Offer Toileting every 2 Hours, in advance of need  - Initiate/Maintain bed alarm  - Obtain necessary fall risk management equipment: yellow socks  - Apply  yellow socks and bracelet for high fall risk patients  - Consider moving patient to room near nurses station  Outcome: Progressing  Goal: Maintain or return to baseline ADL function  Description: INTERVENTIONS:  -  Assess patient's ability to carry out ADLs; assess patient's baseline for ADL function and identify physical deficits which impact ability to perform ADLs (bathing, care of mouth/teeth, toileting, grooming, dressing, etc.)  - Assess/evaluate cause of self-care deficits   - Assess range of motion  - Assess patient's mobility; develop plan if impaired  - Assess patient's need for assistive devices and provide as appropriate  - Encourage maximum independence but intervene and supervise when necessary  - Involve family in performance of ADLs  - Assess for home care needs following discharge   - Consider OT consult to assist with ADL evaluation and planning for discharge  - Provide patient education as appropriate  Outcome: Progressing  Goal: Maintains/Returns to pre admission functional level  Description: INTERVENTIONS:  - Perform AM-PAC 6 Click Basic Mobility/ Daily Activity assessment daily.  - Set and communicate daily mobility goal to care team and patient/family/caregiver.   - Collaborate with rehabilitation services on mobility goals if consulted  - Perform Range of Motion 4 times a day.  - Reposition patient every 2 hours.  - Dangle patient 3 times a day  - Stand patient 3 times a day  - Ambulate patient 3 times a day  - Out of bed to chair 3 times a day   - Out of bed for meals 3 times a day  - Out of bed for toileting  - Record patient progress and toleration of activity level   Outcome: Progressing     Problem: DISCHARGE PLANNING  Goal: Discharge to home or other facility with appropriate resources  Description: INTERVENTIONS:  - Identify barriers to discharge w/patient and caregiver  - Arrange for needed discharge resources and transportation as appropriate  - Identify discharge learning needs  (meds, wound care, etc.)  - Arrange for interpretive services to assist at discharge as needed  - Refer to Case Management Department for coordinating discharge planning if the patient needs post-hospital services based on physician/advanced practitioner order or complex needs related to functional status, cognitive ability, or social support system  Outcome: Progressing     Problem: Knowledge Deficit  Goal: Patient/family/caregiver demonstrates understanding of disease process, treatment plan, medications, and discharge instructions  Description: Complete learning assessment and assess knowledge base.  Interventions:  - Provide teaching at level of understanding  - Provide teaching via preferred learning methods  Outcome: Progressing     Problem: CARDIOVASCULAR - ADULT  Goal: Maintains optimal cardiac output and hemodynamic stability  Description: INTERVENTIONS:  - Monitor I/O, vital signs and rhythm  - Monitor for S/S and trends of decreased cardiac output  - Administer and titrate ordered vasoactive medications to optimize hemodynamic stability  - Assess quality of pulses, skin color and temperature  - Assess for signs of decreased coronary artery perfusion  - Instruct patient to report change in severity of symptoms  Outcome: Progressing  Goal: Absence of cardiac dysrhythmias or at baseline rhythm  Description: INTERVENTIONS:  - Continuous cardiac monitoring, vital signs, obtain 12 lead EKG if ordered  - Administer antiarrhythmic and heart rate control medications as ordered  - Monitor electrolytes and administer replacement therapy as ordered  Outcome: Progressing

## 2024-09-16 NOTE — ASSESSMENT & PLAN NOTE
Corrected sodium 133  Improved with IV hydration  Repeat BMP in the morning  Results from last 7 days   Lab Units 09/16/24  0547 09/15/24  3002   SODIUM mmol/L 132* 128*

## 2024-09-16 NOTE — PLAN OF CARE
Problem: PAIN - ADULT  Goal: Verbalizes/displays adequate comfort level or baseline comfort level  Description: Interventions:  - Encourage patient to monitor pain and request assistance  - Assess pain using appropriate pain scale  - Administer analgesics based on type and severity of pain and evaluate response  - Implement non-pharmacological measures as appropriate and evaluate response  - Consider cultural and social influences on pain and pain management  - Notify physician/advanced practitioner if interventions unsuccessful or patient reports new pain  Outcome: Progressing     Problem: INFECTION - ADULT  Goal: Absence or prevention of progression during hospitalization  Description: INTERVENTIONS:  - Assess and monitor for signs and symptoms of infection  - Monitor lab/diagnostic results  - Monitor all insertion sites, i.e. indwelling lines, tubes, and drains  - Monitor endotracheal if appropriate and nasal secretions for changes in amount and color  - [place] appropriate cooling/warming therapies per order  - Administer medications as ordered  - Instruct and encourage patient and family to use good hand hygiene technique  - Identify and instruct in appropriate isolation precautions for identified infection/condition  Outcome: Progressing     Problem: SAFETY ADULT  Goal: Patient will remain free of falls  Description: INTERVENTIONS:  - Educate patient/family on patient safety including physical limitations  - Instruct patient to call for assistance with activity   - Consult OT/PT to assist with strengthening/mobility   - Keep Call bell within reach  - Keep bed low and locked with side rails adjusted as appropriate  - Keep care items and personal belongings within reach  - Initiate and maintain comfort rounds  - Make Fall Risk Sign visible to staff  - Offer Toileting every 2 Hours, in advance of need  - Initiate/Maintain bed alarm  - Obtain necessary fall risk management equipment: fall risk sign on door  -  Apply yellow socks and bracelet for high fall risk patients  - Consider moving patient to room near nurses station  Outcome: Progressing  Goal: Maintain or return to baseline ADL function  Description: INTERVENTIONS:  -  Assess patient's ability to carry out ADLs; assess patient's baseline for ADL function and identify physical deficits which impact ability to perform ADLs (bathing, care of mouth/teeth, toileting, grooming, dressing, etc.)  - Assess/evaluate cause of self-care deficits   - Assess range of motion  - Assess patient's mobility; develop plan if impaired  - Assess patient's need for assistive devices and provide as appropriate  - Encourage maximum independence but intervene and supervise when necessary  - Involve family in performance of ADLs  - Assess for home care needs following discharge   - Consider OT consult to assist with ADL evaluation and planning for discharge  - Provide patient education as appropriate  Outcome: Progressing  Goal: Maintains/Returns to pre admission functional level  Description: INTERVENTIONS:  - Perform AM-PAC 6 Click Basic Mobility/ Daily Activity assessment daily.  - Set and communicate daily mobility goal to care team and patient/family/caregiver.   - Collaborate with rehabilitation services on mobility goals if consulted  - Perform Range of Motion 2 times a day.  - Reposition patient every 2 hours.  - Dangle patient 2 times a day  - Stand patient 2 times a day  - Ambulate patient 3 times a day  - Out of bed to chair 3 times a day   - Out of bed for meals 3 times a day  - Out of bed for toileting  - Record patient progress and toleration of activity level   Outcome: Progressing     Problem: DISCHARGE PLANNING  Goal: Discharge to home or other facility with appropriate resources  Description: INTERVENTIONS:  - Identify barriers to discharge w/patient and caregiver  - Arrange for needed discharge resources and transportation as appropriate  - Identify discharge learning  needs (meds, wound care, etc.)  - Arrange for interpretive services to assist at discharge as needed  - Refer to Case Management Department for coordinating discharge planning if the patient needs post-hospital services based on physician/advanced practitioner order or complex needs related to functional status, cognitive ability, or social support system  Outcome: Progressing     Problem: Knowledge Deficit  Goal: Patient/family/caregiver demonstrates understanding of disease process, treatment plan, medications, and discharge instructions  Description: Complete learning assessment and assess knowledge base.  Interventions:  - Provide teaching at level of understanding  - Provide teaching via preferred learning methods  Outcome: Progressing     Problem: CARDIOVASCULAR - ADULT  Goal: Maintains optimal cardiac output and hemodynamic stability  Description: INTERVENTIONS:  - Monitor I/O, vital signs and rhythm  - Monitor for S/S and trends of decreased cardiac output  - Administer and titrate ordered vasoactive medications to optimize hemodynamic stability  - Assess quality of pulses, skin color and temperature  - Assess for signs of decreased coronary artery perfusion  - Instruct patient to report change in severity of symptoms  Outcome: Progressing  Goal: Absence of cardiac dysrhythmias or at baseline rhythm  Description: INTERVENTIONS:  - Continuous cardiac monitoring, vital signs, obtain 12 lead EKG if ordered  - Administer antiarrhythmic and heart rate control medications as ordered  - Monitor electrolytes and administer replacement therapy as ordered  Outcome: Progressing

## 2024-09-16 NOTE — ASSESSMENT & PLAN NOTE
Lab Results   Component Value Date    HGBA1C 8.4 (H) 06/10/2024       Recent Labs     09/16/24  0122   POCGLU 313*       Blood Sugar Average: Last 72 hrs:  (P) 313  On Lantus 20 units SQ at bedtime, Humalog 5 units 3 times daily with meals.  Will continue.  Add SSI  Diabetic diet  Update A1c

## 2024-09-16 NOTE — ASSESSMENT & PLAN NOTE
On presentation patient with noted leukocytosis and tachycardia.  Suspected secondary to UTI.  Care per primary team.

## 2024-09-16 NOTE — ASSESSMENT & PLAN NOTE
Sepsis, present on admission, as evidenced by leukocytosis, tachycardia, with suspected source of infection UTI and possible right-sided pneumonia pending final chest x-ray read.  Lactic acid normal  Initial procalcitonin 0.33  UA positive nitrite large leukocytes.  Continue IV ceftriaxone and azithromycin  Blood cultures and urine cultures pending  Urine antigens were negative  IV hydration  Results from last 7 days   Lab Units 09/16/24  0013 09/15/24  2212   LACTIC ACID mmol/L 0.7  --    PROCALCITONIN ng/ml  --  0.33*     Results from last 7 days   Lab Units 09/16/24  0547 09/15/24  2212   WBC [place]/uL 11.42* 14.46*

## 2024-09-16 NOTE — ASSESSMENT & PLAN NOTE
Creatinine today 1.95  Baseline creatinine 0.9-1.2 in past year  Unclear etiology.  Patient is on perindopril at home.  IV hydration  Check PVR  Hold perindopril  Check kidney ultrasound  Intake and output  Repeat BMP in the morning

## 2024-09-16 NOTE — UTILIZATION REVIEW
Initial Clinical Review    Admission: Date/Time/Statement:   Admission Orders (From admission, onward)       Ordered        09/15/24 2357  INPATIENT ADMISSION  Once                          Orders Placed This Encounter   Procedures    INPATIENT ADMISSION     Standing Status:   Standing     Number of Occurrences:   1     Order Specific Question:   Level of Care     Answer:   Level 2 Stepdown / HOT [14]     Order Specific Question:   Estimated length of stay     Answer:   More than 2 Midnights     Order Specific Question:   Certification     Answer:   I certify that inpatient services are medically necessary for this patient for a duration of greater than two midnights. See H&P and MD Progress Notes for additional information about the patient's course of treatment.     ED Arrival Information       Expected   -    Arrival   9/15/2024 21:44    Acuity   Emergent              Means of arrival   Ambulance    [facility]    Admission type   Emergency              Arrival complaint   palpitations             Chief Complaint   Patient presents with    Palpitations     Pt reports heart racing starting a couple hours ago. Drank concoction to attempt to throw up which did not alleviate. States feeling palpitations still       Initial Presentation:   59 yof to ER from home via EMS for sudden onset feeling of heart racing about an hour ago. Hx remote history of SVT, essential hypertension, hyperlipidemia, DMII, depression. She tried to drink something to make herself throw up. She says she history of same thing 20 years ago, thinks she had another episode a few years ago which resolved when she made herself throw up. Presents tachycardic>200, dizzy, palpitations. Admission labs: 14.46, Na 128, CO2 19, BUN 32, Cr 1.95, Mg 1.7, ast 47, alt 54, alk phos 116, alb 3.3, gluc 290, procalcitonin 0.33, U/aspec grav: <1.005, +blood, prot, nitrite, leuk, WBC, bacteria.  Admitted to inpatient status  for SVT. Converted to SR in ER after Adenosine & Lopressor.     Anticipated Length of Stay/Certification Statement:   Patient will be admitted on an inpatient basis with an anticipated length of stay of greater than 2 midnights secondary to SVT, sepsis, UTI.     Date: 9/16/24   Day 2:   SVT POA, mgt per cardio. Scheduled for echo today.  Per cardio: SVT  Continue Lopressor, follow up TTE. Monitor lytes, Keep K>4, Mg>2.       ED Triage Vitals   Temperature Pulse Respirations Blood Pressure SpO2 Pain Score   09/15/24 2230 09/15/24 2155 09/15/24 2155 09/15/24 2210 09/15/24 2155 09/16/24 0115   97.7 °F (36.5 °C) (!) 227 18 139/73 98 % No Pain     Weight (last 2 days)       Date/Time Weight    09/16/24 1005 116 (255)    09/16/24 0113 116 (255.2)            Vital Signs (last 3 days)       Date/Time Temp Pulse Resp BP MAP (mmHg) SpO2 Calculated FIO2 (%) - Nasal Cannula Nasal Cannula O2 Flow Rate (L/min) O2 Device Patient Position - Orthostatic VS Preston Coma Scale Score Pain    09/16/24 10:58:55 -- 96 -- 164/85 111 88 % -- -- -- -- -- --    09/16/24 1005 -- 95 -- 174/71 -- -- -- -- -- -- -- --    09/16/24 08:11:16 98.4 °F (36.9 °C) 86 16 147/71 96 92 % -- -- -- -- -- --    09/16/24 0800 -- -- -- -- -- -- -- -- -- -- 15 No Pain    09/16/24 05:44:37 99.2 °F (37.3 °C) 79 -- 121/69 86 91 % -- -- -- -- -- --    09/16/24 0307 -- -- -- -- -- -- -- -- -- -- 15 No Pain    09/16/24 01:26:33 98.7 °F (37.1 °C) -- 19 146/76 99 -- -- -- -- -- -- --    09/16/24 0115 -- -- -- -- -- -- -- -- -- -- -- No Pain    09/16/24 0030 -- 95 22 145/76 104 94 % 28 2 L/min Nasal cannula Lying -- --    09/16/24 0015 -- 96 20 144/73 100 94 % -- -- None (Room air) Lying -- --    09/16/24 0000 -- 97 20 144/76 102 96 % 28 2 L/min Nasal cannula Lying -- --    09/15/24 2345 -- 96 20 143/68 97 95 % 28 2 L/min Nasal cannula Lying -- --    09/15/24 2330 -- 97 19 146/65 97 94 % -- -- None (Room air) Lying -- --    09/15/24 2315 -- 101 19 152/72 103 95 % -- --  None (Room air) Lying -- --    09/15/24 2300 -- 97 20 150/82 108 93 % -- -- -- -- -- --    09/15/24 2250 -- 118 20 144/68 95 94 % 28 2 L/min Nasal cannula Lying -- --    09/15/24 2245 -- 225 27 127/67 90 96 % -- -- None (Room air) -- -- --    09/15/24 2239 -- -- -- -- -- -- -- -- -- -- 15 --    09/15/24 2230 97.7 °F (36.5 °C) 117 18 147/73 105 95 % -- -- None (Room air) Lying -- --    09/15/24 2215 -- 117 18 145/78 106 96 % -- -- None (Room air) Lying -- --    09/15/24 2210 -- 120 18 139/73 102 96 % -- -- None (Room air) Lying -- --    09/15/24 2155 -- 227 18 -- -- 98 % -- -- -- -- -- --              Pertinent Labs/Diagnostic Test Results:   Radiology:  XR chest 1 view portable    (Results Pending)   US kidney and bladder    (Results Pending)     Cardiology:  Echo complete w/ contrast if indicated   Final Result by Zach Holm MD (09/16 0715)        Left Ventricle: Left ventricular cavity size is normal. Wall thickness    is normal. The left ventricular ejection fraction is 60% by visual    estimation. Systolic function is normal. Wall motion is normal. Unable to    assess diastolic function.     Right Ventricle: Right ventricular cavity size is normal. Systolic    function is normal.     Left Atrium: The atrium is mildly dilated.     Tricuspid Valve: There is mild regurgitation. The right ventricular    systolic pressure is moderately elevated at 50mmHg.         ECG 12 lead   Final Result by Zach Holm MD (09/16 0939)   Sinus tachycardia   Nonspecific ST and T wave abnormality   Abnormal ECG   When compared with ECG of 15-SEP-2024 22:47, (unconfirmed)   Sinus rhythm has replaced Wide QRS tachycardia   Vent. rate has decreased BY  96 BPM   Confirmed by Zach Holm ([id number]) on 9/16/2024 9:39:45 AM      ECG 12 lead   Final Result by Zach Holm MD (09/16 0939)   Supraventricular tachycardia   Non-specific intra-ventricular conduction block   Nonspecific T wave abnormality   Abnormal ECG   When compared with ECG of  15-SEP-2024 22:09, (unconfirmed)   Vent. rate has increased BY  96 BPM   Confirmed by Zach Holm ([id number]) on 9/16/2024 9:39:36 AM      ECG 12 lead   Final Result by Zach Holm MD (09/16 0939)   Sinus tachycardia   Nonspecific ST abnormality   Abnormal ECG   When compared with ECG of 15-SEP-2024 21:54, (unconfirmed)   Sinus rhythm has replaced Supraventricular tachycardia   Vent. rate has decreased BY  97 BPM   Confirmed by Zach Holm ([id number]) on 9/16/2024 9:39:20 AM      ECG 12 lead   Final Result by Zach Holm MD (09/16 0939)   Supraventricular tachycardia   Non-specific intra-ventricular conduction block   Abnormal ECG   When compared with ECG of 17-OCT-2023 11:22,   Vent. rate has increased  BPM   Confirmed by Zach Holm ([id number]) on 9/16/2024 9:39:06 AM        Results from last 7 days   Lab Units 09/15/24  2252   SARS-COV-2  Negative     Results from last 7 days   Lab Units 09/16/24  0547 09/15/24  2212   WBC Thousand/uL 11.42* 14.46*   HEMOGLOBIN g/dL 11.2* 12.0   HEMATOCRIT % 33.5* 35.6   PLATELETS Thousands/uL 279 277   TOTAL NEUT ABS Thousands/µL 8.48* 11.00*         Results from last 7 days   Lab Units 09/16/24  0547 09/15/24  2212   SODIUM mmol/L 132* 128*   POTASSIUM mmol/L 3.8 3.9   CHLORIDE mmol/L 101 97   CO2 mmol/L 20* 19*   ANION GAP mmol/L 11 12   BUN mg/dL 30* 32*   CREATININE mg/dL 1.85* 1.95*   EGFR ml/min/1.73sq m 29 27   CALCIUM mg/dL 8.1* 8.4   MAGNESIUM mg/dL 2.3 1.7*     Results from last 7 days   Lab Units 09/16/24  0547 09/15/24  2212   AST U/L 31 47*   ALT U/L 45 54*   ALK PHOS U/L 128* 116*   TOTAL PROTEIN g/dL 6.3* 6.9   ALBUMIN g/dL 3.2* 3.3*   TOTAL BILIRUBIN mg/dL 0.43 0.66     Results from last 7 days   Lab Units 09/16/24  1153 09/16/24  0732 09/16/24  0122   POC GLUCOSE mg/dl 117 170* 313*     Results from last 7 days   Lab Units 09/16/24  0547 09/15/24  2212   GLUCOSE RANDOM mg/dL 212* 290*             BETA-HYDROXYBUTYRATE   Date Value Ref Range Status   10/17/2023 0.5  <0.6 mmol/L Final                      Results from last 7 days   Lab Units 09/16/24  0547 09/16/24  0013 09/15/24  2212   HS TNI 0HR ng/L  --   --  14   HS TNI 2HR ng/L  --  33  --    HSTNI D2 ng/L  --  19  --    HS TNI 4HR ng/L 34  --   --    HSTNI D4 ng/L 20*  --   --              Results from last 7 days   Lab Units 09/15/24  2212   TSH 3RD GENERATON uIU/mL 1.992     Results from last 7 days   Lab Units 09/15/24  2212   PROCALCITONIN ng/ml 0.33*     Results from last 7 days   Lab Units 09/16/24  0013   LACTIC ACID mmol/L 0.7                                                 Results from last 7 days   Lab Units 09/16/24  0331   CLARITY UA  Clear   COLOR UA  Yellow   SPEC GRAV UA  <1.005*   PH UA  6.5   GLUCOSE UA mg/dl Negative   KETONES UA mg/dl Negative   BLOOD UA  Small*   PROTEIN UA mg/dl Trace*   NITRITE UA  Positive*   BILIRUBIN UA  Negative   UROBILINOGEN UA (BE) mg/dl <2.0   LEUKOCYTES UA  Large*   WBC UA /hpf 10-20*   RBC UA /hpf None Seen   BACTERIA UA /hpf Innumerable*   EPITHELIAL CELLS WET PREP /hpf None Seen     Results from last 7 days   Lab Units 09/16/24  0400 09/16/24  0327 09/15/24  2252   STREP PNEUMONIAE ANTIGEN, URINE  Negative  --   --    LEGIONELLA URINARY ANTIGEN   --  Negative  --    INFLUENZA A PCR   --   --  Negative   INFLUENZA B PCR   --   --  Negative   RSV PCR   --   --  Negative                             Results from last 7 days   Lab Units 09/16/24  0013   BLOOD CULTURE  Received in Microbiology Lab. Culture in Progress.  Received in Microbiology Lab. Culture in Progress.                   ED Treatment-Medication Administration from 09/15/2024 2144 to 09/16/2024 0111         Date/Time Order Dose Route Action     09/15/2024 2208 adenosine (ADENOCARD) injection 6 mg 6 mg Intravenous Given     09/15/2024 2252 metoprolol (LOPRESSOR) injection 5 mg 5 mg Intravenous Given     09/15/2024 2246 adenosine (ADENOCARD) injection 6 mg 6 mg Intravenous Given     09/15/2024 2306 magnesium  sulfate 2 g/50 mL IVPB (premix) 2 g 2 g Intravenous New Bag     09/15/2024 2306 sodium chloride 0.9 % bolus 1,000 mL 1,000 mL Intravenous New Bag            Past Medical History:   Diagnosis Date    Diabetes mellitus (HCC)     HLD (hyperlipidemia)     Hypertension     SVT (supraventricular tachycardia)      Present on Admission:   LEANDRO (acute kidney injury) (HCC)   Hyponatremia   Essential hypertension   Hyperlipidemia   Depression   Type 2 diabetes mellitus with hyperglycemia, without long-term current use of insulin ([facility])      Admitting Diagnosis: Palpitations [R00.2]  SVT (supraventricular tachycardia) [I47.10]  Age/Sex: 59 y.o. female  Admission Orders:  Telemetry  Consult cardio  Aspiration precautions  Accuchecks  Scd   Consult urology    Scheduled Medications:  Medications 09/07 09/08 09/09 09/10 09/11 09/12 09/13 09/14 09/15 09/16   adenosine (ADENOCARD) injection 6 mg  Dose: 6 mg  Freq: Once Route: IV  Start: 09/15/24 2300 End: 09/15/24 2246            2246         adenosine (ADENOCARD) injection 6 mg  Dose: 6 mg  Freq: Once Route: IV  Start: 09/15/24 2300 End: 09/15/24 2208            2208         amLODIPine (NORVASC) tablet 5 mg  Dose: 5 mg  Freq: Daily Route: PO  Start: 09/16/24 0900   Admin Instructions:      Order specific questions:                0812 [C]        azithromycin (ZITHROMAX) tablet 500 mg  Dose: 500 mg  Freq: Every 24 hours Route: PO  Start: 09/16/24 0115   Order specific questions:                0137        cefTRIAXone (ROCEPHIN) 40 mg/mL IVPB (premix in dextrose) **ADS Override Pull**  Start: 09/16/24 0138 End: 09/16/24 1344   Admin Instructions:                0145     1344-D/C'd      cefTRIAXone (ROCEPHIN) IVPB (premix in dextrose) 2,000 mg 50 mL  Dose: 2,000 mg  Freq: Every 24 hours Route: IV  Indications of Use: PNEUMONIA  Last Dose: 2,000 mg (09/16/24 0144)  Start: 09/16/24 0115   Admin Instructions:      Order specific questions:                0144        enoxaparin (LOVENOX)  subcutaneous injection 30 mg  Dose: 30 mg  Freq: Daily Route: SC  Start: 09/16/24 0900 End: 09/16/24 0410   Admin Instructions:                0410-D/C'd      enoxaparin (LOVENOX) subcutaneous injection 40 mg  Dose: 40 mg  Freq: Daily Route: SC  Start: 09/16/24 0900 End: 09/16/24 0358   Admin Instructions:                0358-D/C'd      ezetimibe (ZETIA) tablet 10 mg  Dose: 10 mg  Freq: Daily Route: PO  Start: 09/16/24 0900   Admin Instructions:                0812        guaiFENesin (MUCINEX) 12 hr tablet 1,200 mg  Dose: 1,200 mg  Freq: 2 times daily Route: PO  Start: 09/16/24 0900   Admin Instructions:                0812     1800        heparin (porcine) subcutaneous injection 5,000 Units  Dose: 5,000 Units  Freq: Every 8 hours scheduled Route: SC  Start: 09/16/24 0600   Admin Instructions:                0531     1400     2200        insulin glargine (LANTUS) subcutaneous injection 20 Units 0.2 mL  Dose: 20 Units  Freq: Daily at bedtime Route: SC  Start: 09/16/24 0115   Admin Instructions:                0133     2200        insulin lispro (HumALOG/ADMELOG) 100 units/mL subcutaneous injection 1-5 Units  Dose: 1-5 Units  Freq: Daily at bedtime Route: SC  Start: 09/16/24 0115   Admin Instructions:                0136     2200         insulin lispro (HumALOG/ADMELOG) 100 units/mL subcutaneous injection 1-5 Units  Dose: 1-5 Units  Freq: 3 times daily before meals Route: SC  Start: 09/16/24 0700   Admin Instructions:                0813     1130     1600        insulin lispro (HumALOG/ADMELOG) 100 units/mL subcutaneous injection 5 Units  Dose: 5 Units  Freq: 3 times daily with meals Route: SC  Start: 09/16/24 0730   Admin Instructions:                0815     1200     1630        magnesium sulfate 2 g/50 mL IVPB (premix) 2 g  Dose: 2 g  Freq: Once Route: IV  Last Dose: Stopped (09/16/24 0012)  Start: 09/15/24 2300 End: 09/16/24 0012   Admin Instructions:               2306 0012        metoprolol (LOPRESSOR)  injection 5 mg  Dose: 5 mg  Freq: Once Route: IV  Start: 09/15/24 2300 End: 09/15/24 2252   Admin Instructions:               2252 [C]         metoprolol tartrate (LOPRESSOR) partial tablet 12.5 mg  Dose: 12.5 mg  Freq: Every 12 hours scheduled Route: PO  Start: 24   Admin Instructions:      Order specific questions:                139        multivitamin stress formula tablet 1 tablet  Dose: 1 tablet  Freq: Daily Route: PO  Start: 24   Admin Instructions:                812        PARoxetine (PAXIL) tablet 20 mg  Dose: 20 mg  Freq: Daily Route: PO  Start: 24   Admin Instructions:                812        pravastatin (PRAVACHOL) tablet 40 mg  Dose: 40 mg  Freq: Daily Route: PO  Start: 24        sodium chloride 0.9 % bolus 1,000 mL  Dose: 1,000 mL  Freq: Once Route: IV  Last Dose: 1,000 mL (09/15/24 2306)  Start: 09/15/24 2300 End: 24                     Continuous Meds Sorted by Name  for Apgar, Brenda S as of 24 through 24  Legend:       Medications 09/07 09/08 09/09 09/10 09/11 09/12 09/13 09/14 09/15 09/16   multi-electrolyte (PLASMALYTE-A/ISOLYTE-S PH 7.4) IV solution  Rate: 125 mL/hr Dose: 125 mL/hr  Freq: Continuous Route: IV  Indications of Use: IV Hydration  Last Dose: 125 mL/hr (24)  Start: 24        multi-electrolyte (PLASMALYTE-A/ISOLYTE-S PH 7.4) IV solution  Rate: 125 mL/hr Dose: 125 mL/hr  Freq: Continuous Route: IV  Indications of Use: IV Hydration  Last Dose: 125 mL/hr (24)  Start: 24 End: 24     0141-D/C'd      Legend:       [id number]/0709/0809/0909/1009/1109/1209/1309/1409/1509/16        PRN Meds Sorted by Name  for Apgar, Brenda S as of 24 through 24  Legend:       Medications 09/07 09/08 09/09 09/10 09/11 09/12 09/13 09/14 09/15 09/16   acetaminophen (TYLENOL) tablet 650  mg  Dose: 650 mg  Freq: Every 8 hours PRN Route: PO  PRN Reasons: fever,headaches,mild pain  Indications of Use: FEVER  Start: 09/16/24 0112                cefTRIAXone (ROCEPHIN) 40 mg/mL IVPB (premix in dextrose) **ADS Override Pull**  Start: 09/16/24 0138 End: 09/16/24 1344   Admin Instructions:                0145     1344-D/C'd      ondansetron (ZOFRAN) injection 4 mg  Dose: 4 mg  Freq: Every 6 hours PRN Route: IV  PRN Reasons: nausea,vomiting  Start: 09/16/24 0112   Admin Instructions:                                 Network Utilization Review Department  ATTENTION: Please call with any questions or concerns to [phone] and carefully listen to the prompts so that you are directed to the right person. All voicemails are confidential.   For Discharge needs, contact Care Management DC Support Team at [phone] opt. 2  Send all requests for admission clinical reviews, approved or denied determinations and any other requests to dedicated fax number below belonging to the [facility] where the patient is receiving treatment. List of dedicated fax numbers for the Facilities:  FACILITY NAME UR FAX NUMBER   ADMISSION DENIALS (Administrative/Medical Necessity) [phone]   DISCHARGE SUPPORT TEAM (NETWORK) [phone]   PARENT CHILD HEALTH (Maternity/NICU/Pediatrics) [phone]   [facility] [phone]   [facility] [phone]   [facility] [phone]   [facility] [phone]   [facility] [phone]   [facility] [phone]   [facility] [phone]   [facility] [phone]   [facility] [phone]   [facility] [phone]   [facility] [phone]   [facility]  [phone]

## 2024-09-17 ENCOUNTER — APPOINTMENT (INPATIENT)
Dept: RADIOLOGY | Facility: HOSPITAL | Age: 59
DRG: 659 | End: 2024-09-17
Payer: COMMERCIAL

## 2024-09-17 PROBLEM — I27.20 PULMONARY HYPERTENSION (HCC): Status: ACTIVE | Noted: 2024-09-17

## 2024-09-17 PROBLEM — R09.89 PULMONARY VASCULAR CONGESTION: Status: ACTIVE | Noted: 2024-09-17

## 2024-09-17 PROBLEM — E87.1 HYPONATREMIA: Status: RESOLVED | Noted: 2023-10-17 | Resolved: 2024-09-17

## 2024-09-17 PROBLEM — R29.818 SUSPECTED SLEEP APNEA: Status: ACTIVE | Noted: 2024-09-17

## 2024-09-17 PROBLEM — R79.89 ELEVATED LFTS: Status: RESOLVED | Noted: 2024-09-16 | Resolved: 2024-09-17

## 2024-09-17 LAB
ALBUMIN SERPL BCG-MCNC: 3.4 G/DL (ref 3.5–5)
ALP SERPL-CCNC: 158 U/L (ref 34–104)
ALT SERPL W P-5'-P-CCNC: 45 U/L (ref 7–52)
ANION GAP SERPL CALCULATED.3IONS-SCNC: 12 MMOL/L (ref 4–13)
AST SERPL W P-5'-P-CCNC: 33 U/L (ref 13–39)
BACTERIA UR CULT: NORMAL
BASOPHILS # BLD AUTO: 0.07 THOUSANDS/ΜL (ref 0–0.1)
BASOPHILS NFR BLD AUTO: 1 % (ref 0–1)
BILIRUB SERPL-MCNC: 0.49 MG/DL (ref 0.2–1)
BUN SERPL-MCNC: 31 MG/DL (ref 5–25)
CALCIUM ALBUM COR SERPL-MCNC: 8.7 MG/DL (ref 8.3–10.1)
CALCIUM SERPL-MCNC: 8.2 MG/DL (ref 8.4–10.2)
CHLORIDE SERPL-SCNC: 104 MMOL/L (ref 96–108)
CO2 SERPL-SCNC: 19 MMOL/L (ref 21–32)
CREAT SERPL-MCNC: 1.69 MG/DL (ref 0.6–1.3)
EOSINOPHIL # BLD AUTO: 0.21 THOUSAND/ΜL (ref 0–0.61)
EOSINOPHIL NFR BLD AUTO: 2 % (ref 0–6)
ERYTHROCYTE [DISTWIDTH] IN BLOOD BY AUTOMATED COUNT: 13 % (ref 11.6–15.1)
GFR SERPL CREATININE-BSD FRML MDRD: 32 ML/MIN/1.73SQ M
GLUCOSE SERPL-MCNC: 146 MG/DL (ref 65–140)
GLUCOSE SERPL-MCNC: 155 MG/DL (ref 65–140)
GLUCOSE SERPL-MCNC: 171 MG/DL (ref 65–140)
GLUCOSE SERPL-MCNC: 174 MG/DL (ref 65–140)
GLUCOSE SERPL-MCNC: 97 MG/DL (ref 65–140)
HCT VFR BLD AUTO: 38.1 % (ref 34.8–46.1)
HGB BLD-MCNC: 12.3 G/DL (ref 11.5–15.4)
IMM GRANULOCYTES # BLD AUTO: 0.14 THOUSAND/UL (ref 0–0.2)
IMM GRANULOCYTES NFR BLD AUTO: 1 % (ref 0–2)
LYMPHOCYTES # BLD AUTO: 1.35 THOUSANDS/ΜL (ref 0.6–4.47)
LYMPHOCYTES NFR BLD AUTO: 10 % (ref 14–44)
MCH RBC QN AUTO: 27.8 PG (ref 26.8–34.3)
MCHC RBC AUTO-ENTMCNC: 32.3 G/DL (ref 31.4–37.4)
MCV RBC AUTO: 86 FL (ref 82–98)
MONOCYTES # BLD AUTO: 1.12 THOUSAND/ΜL (ref 0.17–1.22)
MONOCYTES NFR BLD AUTO: 8 % (ref 4–12)
NEUTROPHILS # BLD AUTO: 11.26 THOUSANDS/ΜL (ref 1.85–7.62)
NEUTS SEG NFR BLD AUTO: 78 % (ref 43–75)
NRBC BLD AUTO-RTO: 0 /100 WBCS
PLATELET # BLD AUTO: 306 THOUSANDS/UL (ref 149–390)
PMV BLD AUTO: 9 FL (ref 8.9–12.7)
POTASSIUM SERPL-SCNC: 3.6 MMOL/L (ref 3.5–5.3)
PROCALCITONIN SERPL-MCNC: 0.26 NG/ML
PROT SERPL-MCNC: 6.8 G/DL (ref 6.4–8.4)
RBC # BLD AUTO: 4.43 MILLION/UL (ref 3.81–5.12)
SODIUM SERPL-SCNC: 135 MMOL/L (ref 135–147)
WBC # BLD AUTO: 14.15 THOUSAND/UL (ref 4.31–10.16)

## 2024-09-17 PROCEDURE — 82948 REAGENT STRIP/BLOOD GLUCOSE: CPT

## 2024-09-17 PROCEDURE — 99232 SBSQ HOSP IP/OBS MODERATE 35: CPT | Performed by: INTERNAL MEDICINE

## 2024-09-17 PROCEDURE — 85025 COMPLETE CBC W/AUTO DIFF WBC: CPT | Performed by: INTERNAL MEDICINE

## 2024-09-17 PROCEDURE — 71045 X-RAY EXAM CHEST 1 VIEW: CPT

## 2024-09-17 PROCEDURE — 99232 SBSQ HOSP IP/OBS MODERATE 35: CPT

## 2024-09-17 PROCEDURE — 84145 PROCALCITONIN (PCT): CPT | Performed by: NURSE PRACTITIONER

## 2024-09-17 PROCEDURE — 80053 COMPREHEN METABOLIC PANEL: CPT | Performed by: INTERNAL MEDICINE

## 2024-09-17 PROCEDURE — 94762 N-INVAS EAR/PLS OXIMTRY CONT: CPT

## 2024-09-17 RX ORDER — FUROSEMIDE 10 MG/ML
20 INJECTION INTRAMUSCULAR; INTRAVENOUS ONCE
Status: COMPLETED | OUTPATIENT
Start: 2024-09-17 | End: 2024-09-17

## 2024-09-17 RX ORDER — METOPROLOL TARTRATE 25 MG/1
25 TABLET, FILM COATED ORAL EVERY 12 HOURS SCHEDULED
Status: DISCONTINUED | OUTPATIENT
Start: 2024-09-17 | End: 2024-09-18

## 2024-09-17 RX ADMIN — GUAIFENESIN 1200 MG: 600 TABLET, EXTENDED RELEASE ORAL at 17:47

## 2024-09-17 RX ADMIN — AMLODIPINE BESYLATE 5 MG: 5 TABLET ORAL at 09:59

## 2024-09-17 RX ADMIN — INSULIN LISPRO 5 UNITS: 100 INJECTION, SOLUTION INTRAVENOUS; SUBCUTANEOUS at 12:52

## 2024-09-17 RX ADMIN — INSULIN LISPRO 1 UNITS: 100 INJECTION, SOLUTION INTRAVENOUS; SUBCUTANEOUS at 12:52

## 2024-09-17 RX ADMIN — INSULIN LISPRO 1 UNITS: 100 INJECTION, SOLUTION INTRAVENOUS; SUBCUTANEOUS at 21:35

## 2024-09-17 RX ADMIN — HEPARIN SODIUM 5000 UNITS: 5000 INJECTION, SOLUTION INTRAVENOUS; SUBCUTANEOUS at 21:35

## 2024-09-17 RX ADMIN — INSULIN LISPRO 5 UNITS: 100 INJECTION, SOLUTION INTRAVENOUS; SUBCUTANEOUS at 10:02

## 2024-09-17 RX ADMIN — PRAVASTATIN SODIUM 40 MG: 40 TABLET ORAL at 09:59

## 2024-09-17 RX ADMIN — HEPARIN SODIUM 5000 UNITS: 5000 INJECTION, SOLUTION INTRAVENOUS; SUBCUTANEOUS at 05:31

## 2024-09-17 RX ADMIN — GUAIFENESIN 1200 MG: 600 TABLET, EXTENDED RELEASE ORAL at 09:59

## 2024-09-17 RX ADMIN — B-COMPLEX W/ C & FOLIC ACID TAB 1 TABLET: TAB at 09:59

## 2024-09-17 RX ADMIN — INSULIN LISPRO 1 UNITS: 100 INJECTION, SOLUTION INTRAVENOUS; SUBCUTANEOUS at 09:59

## 2024-09-17 RX ADMIN — HEPARIN SODIUM 5000 UNITS: 5000 INJECTION, SOLUTION INTRAVENOUS; SUBCUTANEOUS at 13:00

## 2024-09-17 RX ADMIN — METOPROLOL TARTRATE 25 MG: 25 TABLET, FILM COATED ORAL at 09:59

## 2024-09-17 RX ADMIN — FUROSEMIDE 20 MG: 10 INJECTION, SOLUTION INTRAMUSCULAR; INTRAVENOUS at 12:52

## 2024-09-17 RX ADMIN — INSULIN GLARGINE 20 UNITS: 100 INJECTION, SOLUTION SUBCUTANEOUS at 21:35

## 2024-09-17 RX ADMIN — CEFTRIAXONE 2000 MG: 2 INJECTION, SOLUTION INTRAVENOUS at 01:18

## 2024-09-17 RX ADMIN — EZETIMIBE 10 MG: 10 TABLET ORAL at 09:59

## 2024-09-17 RX ADMIN — PAROXETINE 20 MG: 20 TABLET, FILM COATED ORAL at 09:59

## 2024-09-17 RX ADMIN — AZITHROMYCIN DIHYDRATE 500 MG: 250 TABLET ORAL at 01:17

## 2024-09-17 RX ADMIN — METOPROLOL TARTRATE 25 MG: 25 TABLET, FILM COATED ORAL at 21:35

## 2024-09-17 NOTE — COVID-19 HEALTH CARE FACILITY TRANSFER FORM
"Progress Note - Urology      Patient: Bonnie SANDHU Apgar   : 1965 Sex: female   MRN: [id number]     CSN: [id number]  Unit/Bed#: [address] [place] [address]     SUBJECTIVE:   Patient seen on morning rounds  Still noting cardiac issues  Urine culture pending complicated UTI on antibiotics      Objective   Vitals: /73   Pulse 84   Temp 98.1 °F (36.7 °C)   Resp 20   Ht 5' 6\" (1.676 m)   Wt 116 kg (255 lb)   SpO2 (!) 89%   BMI 41.16 kg/m²     I/O last 24 hours:  In: 3522.3 [P.O.:956; I.V.:2566.3]  Out: 550 [Urine:550]      Physical Exam:   General Alert awake   Normocephalic atraumatic PERRLA  Lungs clear bilaterally  Cardiac normal S1 normal S2  Abdomen soft, flank pain  Extremities no edema      Lab Results: CBC:   Lab Results   Component Value Date    WBC 14.15 (H) 2024    HGB 12.3 2024    HCT 38.1 2024    MCV 86 2024     2024    RBC 4.43 2024    MCH 27.8 2024    MCHC 32.3 2024    RDW 13.0 2024    MPV 9.0 2024    NRBC 0 2024     CMP:   Lab Results   Component Value Date     2024    CO2 19 (L) 2024    BUN 31 (H) 2024    CREATININE 1.69 (H) 2024    CALCIUM 8.2 (L) 2024    AST 33 2024    ALT 45 2024    ALKPHOS 158 (H) 2024    EGFR 32 2024     Urinalysis:   Lab Results   Component Value Date    COLORU Yellow 2024    CLARITYU Clear 2024    SPECGRAV <1.005 (L) 2024    PHUR 6.5 2024    LEUKOCYTESUR Large (A) 2024    NITRITE Positive (A) 2024    GLUCOSEU Negative 2024    KETONESU Negative 2024    BILIRUBINUR Negative 2024    BLOODU Small (A) 2024     Urine Culture:   Lab Results   Component Value Date    URINECX 10,000-19,000 cfu/ml 2024     PSA: No results found for: \"PSA\"      Assessment/ Plan:  History of right emphysematous pyelitis  Status post cystoscopy right stent 11 months ago  Admitted for S VT  Complicated " UTI  Continue antibiotics  Cardiac evaluation for anesthesia  Will add to the OR schedule for Thursday 919 cystoscopy left stent removal retrograde possible ureteroscopy stent replacement          Chau Hassan MD

## 2024-09-17 NOTE — ASSESSMENT & PLAN NOTE
Patient with episodes of elevated BP readings.  Currently on Lopressor 12.5 mg twice daily and amlodipine 5 mg daily.  Will increase Lopressor 12.5 mg twice daily to Lopressor 25 mg twice daily.  Continue to monitor BP.

## 2024-09-17 NOTE — ASSESSMENT & PLAN NOTE
Lab Results   Component Value Date    HGBA1C 8.4 (H) 06/10/2024     Recent Labs     09/16/24  1153 09/16/24  1608 09/16/24 2011 09/17/24  0724   POCGLU 117 107 187* 155*     Care per primary team.

## 2024-09-17 NOTE — ASSESSMENT & PLAN NOTE
Creatinine today 1.95  Baseline creatinine 0.9-1.2 in past year  Unclear etiology.  Patient is on perindopril at home.  Renal ultrasound: Right ureteral stent in proper position without any evidence of hydronephrosis with moderately atrophic right kidney  Monitor PVR  Continue to hold perindopril  Repeat BMP in the morning  Urology  team consulted; plan for cystoscopy on 9/19  Results from last 7 days   Lab Units 09/17/24  0531 09/16/24  0547 09/15/24  2212   BUN mg/dL 31* 30* 32*   CREATININE mg/dL 1.69* 1.85* 1.95*

## 2024-09-17 NOTE — PLAN OF CARE
Problem: PAIN - ADULT  Goal: Verbalizes/displays adequate comfort level or baseline comfort level  Description: Interventions:  - Encourage patient to monitor pain and request assistance  - Assess pain using appropriate pain scale  - Administer analgesics based on type and severity of pain and evaluate response  - Implement non-pharmacological measures as appropriate and evaluate response  - Consider cultural and social influences on pain and pain management  - Notify physician/advanced practitioner if interventions unsuccessful or patient reports new pain  Outcome: Progressing     Problem: INFECTION - ADULT  Goal: Absence or prevention of progression during hospitalization  Description: INTERVENTIONS:  - Assess and monitor for signs and symptoms of infection  - Monitor lab/diagnostic results  - Monitor all insertion sites, i.e. indwelling lines, tubes, and drains  - Monitor endotracheal if appropriate and nasal secretions for changes in amount and color  - Lawrence appropriate cooling/warming therapies per order  - Administer medications as ordered  - Instruct and encourage patient and family to use good hand hygiene technique  - Identify and instruct in appropriate isolation precautions for identified infection/condition  Outcome: Progressing     Problem: SAFETY ADULT  Goal: Patient will remain free of falls  Description: INTERVENTIONS:  - Educate patient/family on patient safety including physical limitations  - Instruct patient to call for assistance with activity   - Consult OT/PT to assist with strengthening/mobility   - Keep Call bell within reach  - Keep bed low and locked with side rails adjusted as appropriate  - Keep care items and personal belongings within reach  - Initiate and maintain comfort rounds  - Make Fall Risk Sign visible to staff  - Offer Toileting every 2  Hours, in advance of need  - Initiate/Maintain bed alarm  - Obtain necessary fall risk management equipment: yes   - Apply yellow  socks and bracelet for high fall risk patients  - Consider moving patient to room near nurses station  Outcome: Progressing  Goal: Maintain or return to baseline ADL function  Description: INTERVENTIONS:  -  Assess patient's ability to carry out ADLs; assess patient's baseline for ADL function and identify physical deficits which impact ability to perform ADLs (bathing, care of mouth/teeth, toileting, grooming, dressing, etc.)  - Assess/evaluate cause of self-care deficits   - Assess range of motion  - Assess patient's mobility; develop plan if impaired  - Assess patient's need for assistive devices and provide as appropriate  - Encourage maximum independence but intervene and supervise when necessary  - Involve family in performance of ADLs  - Assess for home care needs following discharge   - Consider OT consult to assist with ADL evaluation and planning for discharge  - Provide patient education as appropriate  Outcome: Progressing  Goal: Maintains/Returns to pre admission functional level  Description: INTERVENTIONS:  - Perform AM-PAC 6 Click Basic Mobility/ Daily Activity assessment daily.  - Set and communicate daily mobility goal to care team and patient/family/caregiver.   - Collaborate with rehabilitation services on mobility goals if consulted  - Perform Range of Motion 3 times a day.  - Reposition patient every 2 hours.  - Dangle patient 3 times a day  - Stand patient 3 times a day  - Ambulate patient 3 times a day  - Out of bed to chair 3 times a day   - Out of bed for meals 3 times a day  - Out of bed for toileting  - Record patient progress and toleration of activity level   Outcome: Progressing     Problem: DISCHARGE PLANNING  Goal: Discharge to home or other facility with appropriate resources  Description: INTERVENTIONS:  - Identify barriers to discharge w/patient and caregiver  - Arrange for needed discharge resources and transportation as appropriate  - Identify discharge learning needs (meds,  wound care, etc.)  - Arrange for interpretive services to assist at discharge as needed  - Refer to Case Management Department for coordinating discharge planning if the patient needs post-hospital services based on physician/advanced practitioner order or complex needs related to functional status, cognitive ability, or social support system  Outcome: Progressing     Problem: Knowledge Deficit  Goal: Patient/family/caregiver demonstrates understanding of disease process, treatment plan, medications, and discharge instructions  Description: Complete learning assessment and assess knowledge base.  Interventions:  - Provide teaching at level of understanding  - Provide teaching via preferred learning methods  Outcome: Progressing     Problem: CARDIOVASCULAR - ADULT  Goal: Maintains optimal cardiac output and hemodynamic stability  Description: INTERVENTIONS:  - Monitor I/O, vital signs and rhythm  - Monitor for S/S and trends of decreased cardiac output  - Administer and titrate ordered vasoactive medications to optimize hemodynamic stability  - Assess quality of pulses, skin color and temperature  - Assess for signs of decreased coronary artery perfusion  - Instruct patient to report change in severity of symptoms  Outcome: Progressing  Goal: Absence of cardiac dysrhythmias or at baseline rhythm  Description: INTERVENTIONS:  - Continuous cardiac monitoring, vital signs, obtain 12 lead EKG if ordered  - Administer antiarrhythmic and heart rate control medications as ordered  - Monitor electrolytes and administer replacement therapy as ordered  Outcome: Progressing

## 2024-09-17 NOTE — ASSESSMENT & PLAN NOTE
9/15/24 sodium (admission): 128.   9/16/24 sodium: 132.   9/17/24 sodium: 135.   Care per primary team.

## 2024-09-17 NOTE — PROGRESS NOTES
Progress Note - Hospitalist   Name: Bonnie SANDHU Apgar 59 y.o. female I MRN: [id number]  Unit/Bed#: [address] [place] [address] Date of Admission: 9/15/2024   Date of Service: 2024 I Hospital Day: 2    Assessment & Plan  SVT (supraventricular tachycardia)  Patient presents with acute onset palpitation while watching TV, with associated dizziness when getting onto stretcher with EMS.  Denies chest pain SOB.  Reports history of same thing 20 years ago and another episode a few years ago and both episodes resolved when she made herself throw up. Patient reports not feeling well, started with productive cough on Thursday 3 days ago.  Reports has a right ureteral stent since last hospitalization in October last year.  Initial EKG in ED showed SVT, rate 224  Received adenosine x 2 and metoprolol 5 mg IV x 1 in ED  Currently in sinus rhythm  Telemetry showed no atrial fibrillation or further episodes of SVT  2D echo showed EF of 60%, unable to assess diastolic function without any significant valvular abnormalities, moderately elevated right ventricular systolic pressure at 50 mmHg-night pulse oximetry ordered rule out sleep apnea    Cardiology team following  Patient started on metoprolol 25 mg twice daily  Outpatient follow-up with cardiology    Sepsis ([facility])  Sepsis, present on admission, as evidenced by leukocytosis, tachycardia, with suspected source of infection UTI   Lactic acid normal  procalcitonin 0.33 >0.26  UA positive nitrite large leukocytes.  Continue IV ceftriaxone and azithromycin  Blood cultures and urine cultures no growth at 24 hours  Urine antigens were negative    Results from last 7 days   Lab Units 24  0531 24  0013 09/15/24  2212   LACTIC ACID mmol/L  --  0.7  --    PROCALCITONIN ng/ml 0.26*  --  0.33*     Results from last 7 days   Lab Units 24  0531 24  0547 09/15/24  2212   WBC Thousand/uL 14.15* 11.42* 14.46*        LEANDRO (acute kidney injury) ([facility])  Creatinine today 1.95  Baseline  creatinine 0.9-1.2 in past year  Unclear etiology.  Patient is on perindopril at home.  Renal ultrasound: Right ureteral stent in proper position without any evidence of hydronephrosis with moderately atrophic right kidney  Monitor PVR  Continue to hold perindopril  Repeat BMP in the morning  Urology  team consulted; plan for cystoscopy on 9/19  Results from last 7 days   Lab Units 09/17/24  0531 09/16/24  0547 09/15/24  2212   BUN mg/dL 31* 30* 32*   CREATININE mg/dL 1.69* 1.85* 1.95*          UTI (urinary tract infection)  History of right-sided emphysematous pyelonephritis in 10/2023, required ureteral stent placement.  Urine culture grew pan susceptible E. Coli at the time.  Day 2 ceftriaxone  Check PVR  Consult urology for retained right ureteral stent; plan for cystoscopy on 9/19  Most probably UTI secondary to nephrolithiasis   Elevated LFTs (Resolved: 9/17/2024)  Mild LFT elevation.  Denies nausea vomiting abdominal pain.  Improved  Trend LFTs  Essential hypertension  On perindopril at home.  Hold due to LEANDRO  Blood pressure slightly on the high side  Started on metoprolol 12.5 mg p.o. twice daily  Hyperlipidemia  Continue statin  Depression  Continue Paxil  Type 2 diabetes mellitus with hyperglycemia, without long-term current use of insulin ([facility])  Lab Results   Component Value Date    HGBA1C 8.4 (H) 06/10/2024       Recent Labs     09/16/24  1153 09/16/24  1608 09/16/24 2011 09/17/24  0724   POCGLU 117 107 187* 155*       Blood Sugar Average: Last 72 hrs:  (P) [id number]  On Lantus 20 units SQ at bedtime, Humalog 5 units 3 times daily with meals.  Will continue.  Continue Humalog sliding scale with Accu-Cheks before every meal and at bedtime  Diabetic diet  Morbid obesity (HCC)  Diet and lifestyle modification  Abnormal chest x-ray  Chest x-ray showed mild central pulmonary vascular congestion  Currently on IV ceftriaxone and azithromycin.  Day 2  Continue Mucinex    Suspected sleep  apnea  Outpatient follow-up with sleep medicine  Pulmonary hypertension (HCC)  Ambulatory referral to cardiology and sleep medicine  Pulmonary vascular congestion  24 CXR: Mild central pulmonary vascular congestion.   Patient with episodes of hypoxia.    Will give one-time dose of Lasix 20 mg IV  Follow up 24 CXR.   Cardiology team following   Case discussed with cardiology and they agree with the plan above   VTE Pharmacologic Prophylaxis: VTE Score: 5 Moderate Risk (Score 3-4) - Pharmacological DVT Prophylaxis Ordered: heparin.    Mobility:   Basic Mobility Inpatient Raw Score: 23  JH-HLM Goal: 7: Walk 25 feet or more  JH-HLM Achieved: 7: Walk 25 feet or more  JH-HLM Goal achieved. Continue to encourage appropriate mobility.    Patient Centered Rounds: I performed bedside rounds with nursing staff today.   Discussions with Specialists or Other Care Team Provider: cardiology, urology     Education and Discussions with Family / Patient:  patient .     Current Length of Stay: 2 day(s)  Current Patient Status: Inpatient   Certification Statement: The patient will continue to require additional inpatient hospital stay due to SVT, pulmonary edema, acute respiratory failure , JENNIFER, PH , UTI   Discharge Plan: Anticipate discharge in 24-48 hrs to home.    Code Status: Level 1 - Full Code    Subjective   Patient was seen today at bedside. Reports no active complaints.   No chest pain or tightness, SOB or cough, dizziness or light headedness, N/V, Diarrhea of constipation.   No active urinary symptoms  Tolerating oral diet.       Objective     Vitals:   Temp (24hrs), Av.1 °F (36.7 °C), Min:98 °F (36.7 °C), Max:98.2 °F (36.8 °C)    Temp:  [98 °F (36.7 °C)-98.2 °F (36.8 °C)] 98 °F (36.7 °C)  HR:  [77-91] 91  Resp:  [18-20] 20  BP: (128-158)/(75-85) 146/81  SpO2:  [88 %-93 %] 88 %  Body mass index is 41.16 kg/m².     Input and Output Summary (last 24 hours):     Intake/Output Summary (Last 24 hours) at 2024  1152  Last data filed at 9/16/2024 2214  Gross per 24 hour   Intake 2556.25 ml   Output --   Net 2556.25 ml       Physical Exam  Vitals and nursing note reviewed.   Constitutional:       General: She is not in acute distress.     Appearance: Normal appearance. She is not ill-appearing or diaphoretic.   HENT:      Head: Normocephalic and atraumatic.      Mouth/Throat:      Mouth: Mucous membranes are moist.   Eyes:      Conjunctiva/sclera: Conjunctivae normal.   Cardiovascular:      Rate and Rhythm: Normal rate and regular rhythm.      Heart sounds: Normal heart sounds. No murmur heard.  Pulmonary:      Effort: Pulmonary effort is normal. No respiratory distress.      Breath sounds: Normal breath sounds. No wheezing, rhonchi or rales.   Abdominal:      General: There is no distension.      Palpations: Abdomen is soft. There is no mass.      Tenderness: There is no abdominal tenderness. There is no guarding or rebound.   Musculoskeletal:      Cervical back: Normal range of motion. No rigidity.      Right lower leg: No edema.      Left lower leg: No edema.   Lymphadenopathy:      Cervical: No cervical adenopathy.   Neurological:      Mental Status: She is alert and oriented to person, place, and time.   Psychiatric:         Mood and Affect: Mood normal.         Behavior: Behavior normal.         Thought Content: Thought content normal.         Judgment: Judgment normal.          Lines/Drains:  Lines/Drains/Airways       Active Status       None                      Telemetry:  Telemetry Orders (From admission, onward)               24 Hour Telemetry Monitoring  Continuous x 24 Hours (Telem)        Question:  Reason for 24 Hour Telemetry  Answer:  Arrhythmias requiring acute medical intervention / PPM or ICD malfunction                     Telemetry Reviewed: Normal Sinus Rhythm  Indication for Continued Telemetry Use: per cardiology                Lab Results: I have reviewed the following results: CBC/BMP:   .      09/17/24  0531   WBC 14.15*   HGB 12.3   HCT 38.1      SODIUM 135   K 3.6      CO2 19*   BUN 31*   CREATININE 1.69*   GLUC 146*       Results from last 7 days   Lab Units 09/17/24  0531   WBC Thousand/uL 14.15*   HEMOGLOBIN g/dL 12.3   HEMATOCRIT % 38.1   PLATELETS Thousands/uL 306   SEGS PCT % 78*   LYMPHO PCT % 10*   MONO PCT % 8   EOS PCT % 2     Results from last 7 days   Lab Units 09/17/24  0531   SODIUM mmol/L 135   POTASSIUM mmol/L 3.6   CHLORIDE mmol/L 104   CO2 mmol/L 19*   BUN mg/dL 31*   CREATININE mg/dL 1.69*   ANION GAP mmol/L 12   CALCIUM mg/dL 8.2*   ALBUMIN g/dL 3.4*   TOTAL BILIRUBIN mg/dL 0.49   ALK PHOS U/L 158*   ALT U/L 45   AST U/L 33   GLUCOSE RANDOM mg/dL 146*         Results from last 7 days   Lab Units 09/17/24  0724 09/16/24 2011 09/16/24  1608 09/16/24  1153 09/16/24  0732 09/16/24  0122   POC GLUCOSE mg/dl 155* 187* 107 117 170* 313*         Results from last 7 days   Lab Units 09/17/24  0531 09/16/24  0013 09/15/24  2212   LACTIC ACID mmol/L  --  0.7  --    PROCALCITONIN ng/ml 0.26*  --  0.33*       Recent Cultures (last 7 days):   Results from last 7 days   Lab Units 09/16/24  0331 09/16/24  0327 09/16/24  0013   BLOOD CULTURE   --   --  No Growth at 24 hrs.  No Growth at 24 hrs.   URINE CULTURE  10,000-19,000 cfu/ml  --   --    LEGIONELLA URINARY ANTIGEN   --  Negative  --        Imaging Review: Reviewed radiology reports from this admission including: chest xray.  Other Studies: EKG was reviewed.     Last 24 Hours Medication List:     Current Facility-Administered Medications:     acetaminophen (TYLENOL) tablet 650 mg, Q8H PRN    amLODIPine (NORVASC) tablet 5 mg, Daily    azithromycin (ZITHROMAX) tablet 500 mg, Q24H    cefTRIAXone (ROCEPHIN) IVPB (premix in dextrose) 2,000 mg 50 mL, Q24H, Last Rate: 2,000 mg (09/17/24 0118)    ezetimibe (ZETIA) tablet 10 mg, Daily    furosemide (LASIX) injection 20 mg, Once    guaiFENesin (MUCINEX) 12 hr tablet 1,200 mg, BID    heparin  (porcine) subcutaneous injection 5,000 Units, Q8H NAZ    insulin glargine (LANTUS) subcutaneous injection 20 Units 0.2 mL, HS    insulin lispro (HumALOG/ADMELOG) 100 units/mL subcutaneous injection 1-5 Units, TID AC **AND** Fingerstick Glucose (POCT), TID AC    insulin lispro (HumALOG/ADMELOG) 100 units/mL subcutaneous injection 1-5 Units, HS    insulin lispro (HumALOG/ADMELOG) 100 units/mL subcutaneous injection 5 Units, TID With Meals    metoprolol tartrate (LOPRESSOR) tablet 25 mg, Q12H NAZ    multivitamin stress formula tablet 1 tablet, Daily    ondansetron (ZOFRAN) injection 4 mg, Q6H PRN    PARoxetine (PAXIL) tablet 20 mg, Daily    pravastatin (PRAVACHOL) tablet 40 mg, Daily    Administrative Statements   Today, Patient Was Seen By: Jose Gomez MD  I have spent a total time of 30 minutes in caring for this patient on the day of the visit/encounter including Diagnostic results, Prognosis, and Risks and benefits of tx options.    **Please Note: This note may have been constructed using a voice recognition system.**

## 2024-09-17 NOTE — ASSESSMENT & PLAN NOTE
Presented on 9/15/24 for evaluation for palpitations.  On presentation EKG noted SVT.  Patient was given 2 doses of adenosine 6 mg in ED via one-time dose of Lopressor IV 5 mg.  SVT converted to sinus rhythm.  9/15/24 EKG: Supraventricular tachycardia, 224 bpm.  Non-specific intraventricular block.  Noted hypomagnesemia on presentation. 9/15/24 magnesium 1.7. Magnesium has since normalized.   Lopressor 12.5 mg twice daily.  Will increase to Lopressor 25 mg twice daily due to episodes of elevated BP.  9/16/24 TTE: LVEF 60%.  Unable to assess diastolic function.  Left atrium mildly dilated.  Mitral valve with trace regurgitation.  Mild tricuspid valve regurgitation.  Right ventricle systolic pressure is moderately elevated at 50 mmHg.  Patient may benefit from outpatient ambulatory extended Holter monitor on hospital discharge.  Monitor electrolytes.  Replete potassium above 4 magnesium above 2.  9/16-9/17 overnight pulse oximetry: AHI score 14, suspected pathological breathing disorder.  Recommend outpatient referral to sleep medicine.  Continue telemetry.

## 2024-09-17 NOTE — ASSESSMENT & PLAN NOTE
Sepsis, present on admission, as evidenced by leukocytosis, tachycardia, with suspected source of infection UTI   Lactic acid normal  procalcitonin 0.33 >0.26  UA positive nitrite large leukocytes.  Continue IV ceftriaxone and azithromycin  Blood cultures and urine cultures no growth at 24 hours  Urine antigens were negative    Results from last 7 days   Lab Units 09/17/24  0531 09/16/24  0013 09/15/24  2212   LACTIC ACID mmol/L  --  0.7  --    PROCALCITONIN ng/ml 0.26*  --  0.33*     Results from last 7 days   Lab Units 09/17/24  0531 09/16/24  0547 09/15/24  2212   WBC Thousand/uL 14.15* 11.42* 14.46*

## 2024-09-17 NOTE — PLAN OF CARE
Problem: PAIN - ADULT  Goal: Verbalizes/displays adequate comfort level or baseline comfort level  Description: Interventions:  - Encourage patient to monitor pain and request assistance  - Assess pain using appropriate pain scale  - Administer analgesics based on type and severity of pain and evaluate response  - Implement non-pharmacological measures as appropriate and evaluate response  - Consider cultural and social influences on pain and pain management  - Notify physician/advanced practitioner if interventions unsuccessful or patient reports new pain  Outcome: Progressing     Problem: INFECTION - ADULT  Goal: Absence or prevention of progression during hospitalization  Description: INTERVENTIONS:  - Assess and monitor for signs and symptoms of infection  - Monitor lab/diagnostic results  - Monitor all insertion sites, i.e. indwelling lines, tubes, and drains  - Monitor endotracheal if appropriate and nasal secretions for changes in amount and color  - Arnold appropriate cooling/warming therapies per order  - Administer medications as ordered  - Instruct and encourage patient and family to use good hand hygiene technique  - Identify and instruct in appropriate isolation precautions for identified infection/condition  Outcome: Progressing     Problem: SAFETY ADULT  Goal: Patient will remain free of falls  Description: INTERVENTIONS:  - Educate patient/family on patient safety including physical limitations  - Instruct patient to call for assistance with activity   - Consult OT/PT to assist with strengthening/mobility   - Keep Call bell within reach  - Keep bed low and locked with side rails adjusted as appropriate  - Keep care items and personal belongings within reach  - Initiate and maintain comfort rounds  - Make Fall Risk Sign visible to staff  - Offer Toileting every 2 Hours, in advance of need  - Initiate/Maintain bed alarm  -Apply yellow socks and bracelet for high fall risk patients  - Consider  moving patient to room near nurses station  Outcome: Progressing  Goal: Maintain or return to baseline ADL function  Description: INTERVENTIONS:  -  Assess patient's ability to carry out ADLs; assess patient's baseline for ADL function and identify physical deficits which impact ability to perform ADLs (bathing, care of mouth/teeth, toileting, grooming, dressing, etc.)  - Assess/evaluate cause of self-care deficits   - Assess range of motion  - Assess patient's mobility; develop plan if impaired  - Assess patient's need for assistive devices and provide as appropriate  - Encourage maximum independence but intervene and supervise when necessary  - Involve family in performance of ADLs  - Assess for home care needs following discharge   - Consider OT consult to assist with ADL evaluation and planning for discharge  - Provide patient education as appropriate  Outcome: Progressing  Goal: Maintains/Returns to pre admission functional level  Description: INTERVENTIONS:  - Perform AM-PAC 6 Click Basic Mobility/ Daily Activity assessment daily.  - Set and communicate daily mobility goal to care team and patient/family/caregiver.   - Collaborate with rehabilitation services on mobility goals if consulted  - Perform Range of Motion 3 times a day.  - Reposition patient every 2 hours.  - Dangle patient 3 times a day  - Stand patient 3 times a day  - Ambulate patient 3 times a day  - Out of bed to chair 3 times a day   - Out of bed for meals 3 times a day  - Out of bed for toileting  - Record patient progress and toleration of activity level   Outcome: Progressing     Problem: DISCHARGE PLANNING  Goal: Discharge to home or other facility with appropriate resources  Description: INTERVENTIONS:  - Identify barriers to discharge w/patient and caregiver  - Arrange for needed discharge resources and transportation as appropriate  - Identify discharge learning needs (meds, wound care, etc.)  - Arrange for interpretive services to  assist at discharge as needed  - Refer to Case Management Department for coordinating discharge planning if the patient needs post-hospital services based on physician/advanced practitioner order or complex needs related to functional status, cognitive ability, or social support system  Outcome: Progressing     Problem: Knowledge Deficit  Goal: Patient/family/caregiver demonstrates understanding of disease process, treatment plan, medications, and discharge instructions  Description: Complete learning assessment and assess knowledge base.  Interventions:  - Provide teaching at level of understanding  - Provide teaching via preferred learning methods  Outcome: Progressing     Problem: CARDIOVASCULAR - ADULT  Goal: Maintains optimal cardiac output and hemodynamic stability  Description: INTERVENTIONS:  - Monitor I/O, vital signs and rhythm  - Monitor for S/S and trends of decreased cardiac output  - Administer and titrate ordered vasoactive medications to optimize hemodynamic stability  - Assess quality of pulses, skin color and temperature  - Assess for signs of decreased coronary artery perfusion  - Instruct patient to report change in severity of symptoms  Outcome: Progressing  Goal: Absence of cardiac dysrhythmias or at baseline rhythm  Description: INTERVENTIONS:  - Continuous cardiac monitoring, vital signs, obtain 12 lead EKG if ordered  - Administer antiarrhythmic and heart rate control medications as ordered  - Monitor electrolytes and administer replacement therapy as ordered  Outcome: Progressing

## 2024-09-17 NOTE — ASSESSMENT & PLAN NOTE
Baseline creatinine appears between 0.9-1.2.  9/15/24 (admission) creatinine: 1.95.  9/16/24 creatinine: 1.85.   9/17/24 creatinine: 1.69.   Care per primary team.

## 2024-09-17 NOTE — ASSESSMENT & PLAN NOTE
9/16/24 CXR: Mild central pulmonary vascular congestion.   Patient with episodes of hypoxia.  Will give one-time dose of Lasix 20 mg IV, confirmed ok give by primary team.   Follow up 9/17/24 CXR.   Stop IV fluids.

## 2024-09-17 NOTE — ASSESSMENT & PLAN NOTE
9/16-9/17 overnight pulse oximetry: AHI score 14, suspected pathological breathing disorder.  Recommend outpatient referral to sleep medicine.

## 2024-09-17 NOTE — PROGRESS NOTES
Progress Note - Cardiology   [facility]     Bonnie SANDHU Apgar 59 y.o. female MRN: [id number]  : 1965  Unit/Bed#: [address] [place] [address] Encounter: [id number]    Assessment and Plan:     Assessment & Plan  SVT (supraventricular tachycardia)  Presented on 9/15/24 for evaluation for palpitations.  On presentation EKG noted SVT.  Patient was given 2 doses of adenosine 6 mg in ED via one-time dose of Lopressor IV 5 mg.  SVT converted to sinus rhythm.  9/15/24 EKG: Supraventricular tachycardia, 224 bpm.  Non-specific intraventricular block.  Noted hypomagnesemia on presentation. 9/15/24 magnesium 1.7. Magnesium has since normalized.   Lopressor 12.5 mg twice daily.  Will increase to Lopressor 25 mg twice daily due to episodes of elevated BP.  24 TTE: LVEF 60%.  Unable to assess diastolic function.  Left atrium mildly dilated.  Mitral valve with trace regurgitation.  Mild tricuspid valve regurgitation.  Right ventricle systolic pressure is moderately elevated at 50 mmHg.  Patient may benefit from outpatient ambulatory extended Holter monitor on hospital discharge.  Monitor electrolytes.  Replete potassium above 4 magnesium above 2.  - overnight pulse oximetry: AHI score 14, suspected pathological breathing disorder.  Recommend outpatient referral to sleep medicine.  Continue telemetry.   Essential hypertension  Patient with episodes of elevated BP readings.  Currently on Lopressor 12.5 mg twice daily and amlodipine 5 mg daily.  Will increase Lopressor 12.5 mg twice daily to Lopressor 25 mg twice daily.  Continue to monitor BP.   Pulmonary hypertension (HCC)  24 TTE: Right ventricle systolic pressure is moderately elevated at 50 mmHg.  - overnight pulse oximetry: AHI score 14, suspected pathological breathing disorder.  Recommend outpatient referral to sleep medicine.  Hyperlipidemia  Currently on pravastatin 40 mg daily and Zetia 10 mg daily.   6/10/24 lipid panel: Cholesterol  "147, triglycerides 157, HDL 39, LDL 75.  Sepsis ([facility])  On presentation patient with noted leukocytosis and tachycardia.  Suspected secondary to UTI.  Care per primary team.   UTI (urinary tract infection)  9/16/24 UA/urine microscopic consistent with UTI.  Care per primary team.   LEANDRO (acute kidney injury) ([facility])  Baseline creatinine appears between 0.9-1.2.  9/15/24 (admission) creatinine: 1.95.  9/16/24 creatinine: 1.85.   9/17/24 creatinine: 1.69.   Care per primary team.   Abnormal chest x-ray  9/16/24 CXR: Mild central pulmonary vascular congestion.   Patient with episodes of hypoxia.  Will give one-time dose of Lasix 20 mg IV, confirmed ok give by primary team.   Follow up 9/17/24 CXR.   Stop IV fluids.  Hyponatremia (Resolved: 9/17/2024)  9/15/24 sodium (admission): 128.   9/16/24 sodium: 132.   9/17/24 sodium: 135.   Care per primary team.   Type 2 diabetes mellitus with hyperglycemia, without long-term current use of insulin ([facility])  Lab Results   Component Value Date    HGBA1C 8.4 (H) 06/10/2024     Recent Labs     09/16/24  1153 09/16/24  1608 09/16/24 2011 09/17/24  0724   POCGLU 117 107 187* 155*     Care per primary team.   Depression  Care per primary team.   Morbid obesity ([facility])  BMI 41.16 kg/m2.   Suspected sleep apnea  9/16-9/17 overnight pulse oximetry: AHI score 14, suspected pathological breathing disorder.  Recommend outpatient referral to sleep medicine.    Subjective / Objective:         Overnight pulse oximetry performed, AHI score 14, suspected pathological breathing disorder.  Patient states that she overall feels well today, offers no complaints.  She denies experiencing chest pain, palpitations, shortness of breath, lightheadedness, dizziness, headache, nausea, vomiting.    Vitals: Blood pressure 146/81, pulse 91, temperature 98 °F (36.7 °C), temperature source Oral, resp. rate 20, height 5' 6\" (1.676 m), weight 116 kg (255 lb), SpO2 (!) 88%.  Vitals:    09/16/24 0113 09/16/24 1005 "   Weight: 116 kg (255 lb 3.2 oz) 116 kg (255 lb)     Body mass index is 41.16 kg/m².  BP Readings from Last 3 Encounters:   09/17/24 146/81   06/12/24 120/80   03/05/24 140/90     Orthostatic Blood Pressures      Flowsheet Row Most Recent Value   Blood Pressure 146/81 filed at 09/17/2024 1001   Patient Position - Orthostatic VS Lying filed at 09/17/2024 0750          I/O         09/15 0701  09/16 0700 09/16 0701  09/17 0700 09/17 0701  09/18 0700    P.O. 530      I.V. (mL/kg)  2556.3 (22)     IV Piggyback 50      Total Intake(mL/kg) 580 (5) 2556.3 (22)     Urine (mL/kg/hr)  550 (0.2)     Total Output  550     Net +580 +2006.3                  Invasive Devices       Peripheral Intravenous Line  Duration             Peripheral IV 09/15/24 Dorsal (posterior);Left Hand 1 day    Peripheral IV 09/15/24 Right Antecubital 1 day                      Intake/Output Summary (Last 24 hours) at 9/17/2024 1036  Last data filed at 9/16/2024 2214  Gross per 24 hour   Intake 2556.25 ml   Output --   Net 2556.25 ml         Physical Exam:   Physical Exam  Vitals reviewed.   Constitutional:       General: She is not in acute distress.     Appearance: She is obese.   Cardiovascular:      Rate and Rhythm: Normal rate and regular rhythm.      Pulses: Normal pulses.      Heart sounds: Normal heart sounds. No murmur heard.  Pulmonary:      Effort: Pulmonary effort is normal. No respiratory distress.      Breath sounds: Normal breath sounds.   Abdominal:      General: Abdomen is flat. There is no distension.      Palpations: Abdomen is soft.      Tenderness: There is no abdominal tenderness.   Musculoskeletal:      Right lower leg: No edema.      Left lower leg: No edema.   Skin:     General: Skin is warm and dry.   Neurological:      Mental Status: She is alert and oriented to person, place, and time.       Medications/ Allergies:     Current Facility-Administered Medications   Medication Dose Route Frequency Provider Last Rate     acetaminophen  650 mg Oral Q8H PRN Cuikarina Holdenrik, CRNP      amLODIPine  5 mg Oral Daily Cuikarina Holdenrik, CRNP      azithromycin  500 mg Oral Q24H Cuikarina Holdenrik, CRNP      cefTRIAXone  2,000 mg Intravenous Q24H Cuijackyn Adinarik, CRNP 2,000 mg (09/17/24 0118)    ezetimibe  10 mg Oral Daily Cuikarina Holdenrik, CRNP      guaiFENesin  1,200 mg Oral BID Cuikarina Holdenricata, CRNP      heparin (porcine)  5,000 Units Subcutaneous Q8H NAZ Cuijackyn Adinarik, CRNP      insulin glargine  20 Units Subcutaneous HS Cuijackyn Adinarik, CRNP      insulin lispro  1-5 Units Subcutaneous TID AC Cunivia Holdenrik, CRNP      insulin lispro  1-5 Units Subcutaneous HS Cuikarina Adinarik, CRNP      insulin lispro  5 Units Subcutaneous TID With Meals Pari Medrano, CRMARC      metoprolol tartrate  25 mg Oral Q12H [facility] Rosalia Germain PA-C      multi-electrolyte  125 mL/hr Intravenous Continuous Cuikarina Goetzk, CRNP 125 mL/hr (09/16/24 2214)    multivitamin stress formula  1 tablet Oral Daily Pari Medrano, CRMARC      ondansetron  4 mg Intravenous Q6H PRN Pari Medrano, CRNP      PARoxetine  20 mg Oral Daily Cuikarina Holdenrik, CRNP      pravastatin  40 mg Oral Daily Cuikarina Goetzk, CRNP       acetaminophen, 650 mg, Q8H PRN  ondansetron, 4 mg, Q6H PRN      Allergies   Allergen Reactions    Other Angioedema     HONEY DEW MELON       VTE Pharmacologic Prophylaxis:   Heparin    Labs:   Troponins:  Results from last 7 days   Lab Units 09/16/24  0547 09/16/24  0013   HSTNI D2 ng/L  --  19   HSTNI D4 ng/L 20*  --          CBC with diff:  Results from last 7 days   Lab Units 09/17/24  0531 09/16/24  0547 09/15/24  2212   WBC Thousand/uL 14.15* 11.42* 14.46*   HEMOGLOBIN g/dL 12.3 11.2* 12.0   HEMATOCRIT % 38.1 33.5* 35.6   MCV fL 86 83 83   PLATELETS Thousands/uL 306 279 277   RBC Million/uL 4.43 4.02 4.30   MCH pg 27.8 27.9 27.9   MCHC g/dL 32.3 33.4 33.7   RDW % 13.0 12.6 12.7   MPV fL 9.0 8.4* 8.7*   NRBC AUTO /100 WBCs 0 0 0       CMP:  Results from last 7 days   Lab Units 09/17/24  0531 09/16/24  0547  09/15/24  2212   SODIUM mmol/L 135 132* 128*   POTASSIUM mmol/L 3.6 3.8 3.9   CHLORIDE mmol/L 104 101 97   CO2 mmol/L 19* 20* 19*   ANION GAP mmol/L 12 11 12   BUN mg/dL 31* 30* 32*   CREATININE mg/dL 1.69* 1.85* 1.95*   CALCIUM mg/dL 8.2* 8.1* 8.4   AST U/L 33 31 47*   ALT U/L 45 45 54*   ALK PHOS U/L 158* 128* 116*   TOTAL PROTEIN g/dL 6.8 6.3* 6.9   ALBUMIN g/dL 3.4* 3.2* 3.3*   TOTAL BILIRUBIN mg/dL 0.49 0.43 0.66   EGFR ml/min/1.73sq m 32 29 27       Magnesium:  Results from last 7 days   Lab Units 09/16/24  0547 09/15/24  2212   MAGNESIUM mg/dL 2.3 1.7*     TSH:  Results from last 7 days   Lab Units 09/15/24  2212   TSH 3RD GENERATON uIU/mL 1.992     Imaging & Testing   I have personally reviewed pertinent reports.    US kidney and bladder    Result Date: 9/16/2024    Impression: Right ureteral stent in proper position. No evidence of hydronephrosis. Moderately atrophic right kidney.    XR chest 1 view portable    Result Date: 9/16/2024    Impression: Mild central pulmonary vascular congestion.      EKG / Monitor: Personally reviewed.      Telemetry reviewed: Currently sinus rhythm, 90 bpm.  No evidence of tachyarrhythmia, SVT, or bradycardia arrhythmia noted.    Cardiac testing:   Echo complete w/ contrast if indicated    Result Date: 9/16/2024  Narrative:   Left Ventricle: Left ventricular cavity size is normal. Wall thickness is normal. The left ventricular ejection fraction is 60% by visual estimation. Systolic function is normal. Wall motion is normal. Unable to assess diastolic function.   Right Ventricle: Right ventricular cavity size is normal. Systolic function is normal.   Left Atrium: The atrium is mildly dilated.   Tricuspid Valve: There is mild regurgitation. The right ventricular systolic pressure is moderately elevated at 50mmHg.        Rosalia Germain PA-C

## 2024-09-17 NOTE — ASSESSMENT & PLAN NOTE
9/16/24 CXR: Mild central pulmonary vascular congestion.   Patient with episodes of hypoxia.    Will give one-time dose of Lasix 20 mg IV  Follow up 9/17/24 CXR.   Cardiology team following   Case discussed with cardiology and they agree with the plan above

## 2024-09-17 NOTE — ASSESSMENT & PLAN NOTE
Chest x-ray showed mild central pulmonary vascular congestion  Currently on IV ceftriaxone and azithromycin.  Day 2  Continue Mucinex

## 2024-09-17 NOTE — ASSESSMENT & PLAN NOTE
Lab Results   Component Value Date    HGBA1C 8.4 (H) 06/10/2024       Recent Labs     09/16/24  1153 09/16/24  1608 09/16/24 2011 09/17/24  0724   POCGLU 117 107 187* 155*       Blood Sugar Average: Last 72 hrs:  (P) [id number]  On Lantus 20 units SQ at bedtime, Humalog 5 units 3 times daily with meals.  Will continue.  Continue Humalog sliding scale with Accu-Cheks before every meal and at bedtime  Diabetic diet

## 2024-09-17 NOTE — ASSESSMENT & PLAN NOTE
Patient presents with acute onset palpitation while watching TV, with associated dizziness when getting onto stretcher with EMS.  Denies chest pain SOB.  Reports history of same thing 20 years ago and another episode a few years ago and both episodes resolved when she made herself throw up. Patient reports not feeling well, started with productive cough on Thursday 3 days ago.  Reports has a right ureteral stent since last hospitalization in October last year.  Initial EKG in ED showed SVT, rate 224  Received adenosine x 2 and metoprolol 5 mg IV x 1 in ED  Currently in sinus rhythm  Telemetry showed no atrial fibrillation or further episodes of SVT  2D echo showed EF of 60%, unable to assess diastolic function without any significant valvular abnormalities, moderately elevated right ventricular systolic pressure at 50 mmHg-night pulse oximetry ordered rule out sleep apnea    Cardiology team following  Patient started on metoprolol 25 mg twice daily  Outpatient follow-up with cardiology

## 2024-09-17 NOTE — ASSESSMENT & PLAN NOTE
History of right-sided emphysematous pyelonephritis in 10/2023, required ureteral stent placement.  Urine culture grew pan susceptible E. Coli at the time.  Day 2 ceftriaxone  Check PVR  Consult urology for retained right ureteral stent; plan for cystoscopy on 9/19  Most probably UTI secondary to nephrolithiasis

## 2024-09-17 NOTE — ASSESSMENT & PLAN NOTE
9/16/24 TTE: Right ventricle systolic pressure is moderately elevated at 50 mmHg.  9/16-9/17 overnight pulse oximetry: AHI score 14, suspected pathological breathing disorder.  Recommend outpatient referral to sleep medicine.

## 2024-09-18 PROBLEM — Z01.810 PREOP CARDIOVASCULAR EXAM: Status: ACTIVE | Noted: 2024-09-18

## 2024-09-18 LAB
ANION GAP SERPL CALCULATED.3IONS-SCNC: 10 MMOL/L (ref 4–13)
BASOPHILS # BLD AUTO: 0.11 THOUSANDS/ΜL (ref 0–0.1)
BASOPHILS NFR BLD AUTO: 1 % (ref 0–1)
BUN SERPL-MCNC: 35 MG/DL (ref 5–25)
CALCIUM SERPL-MCNC: 8.5 MG/DL (ref 8.4–10.2)
CHLORIDE SERPL-SCNC: 104 MMOL/L (ref 96–108)
CO2 SERPL-SCNC: 24 MMOL/L (ref 21–32)
CREAT SERPL-MCNC: 1.79 MG/DL (ref 0.6–1.3)
EOSINOPHIL # BLD AUTO: 0.58 THOUSAND/ΜL (ref 0–0.61)
EOSINOPHIL NFR BLD AUTO: 4 % (ref 0–6)
ERYTHROCYTE [DISTWIDTH] IN BLOOD BY AUTOMATED COUNT: 12.9 % (ref 11.6–15.1)
GFR SERPL CREATININE-BSD FRML MDRD: 30 ML/MIN/1.73SQ M
GLUCOSE SERPL-MCNC: 118 MG/DL (ref 65–140)
GLUCOSE SERPL-MCNC: 121 MG/DL (ref 65–140)
GLUCOSE SERPL-MCNC: 123 MG/DL (ref 65–140)
GLUCOSE SERPL-MCNC: 152 MG/DL (ref 65–140)
GLUCOSE SERPL-MCNC: 182 MG/DL (ref 65–140)
GLUCOSE SERPL-MCNC: 79 MG/DL (ref 65–140)
HCT VFR BLD AUTO: 34.4 % (ref 34.8–46.1)
HGB BLD-MCNC: 11.5 G/DL (ref 11.5–15.4)
IMM GRANULOCYTES # BLD AUTO: 0.35 THOUSAND/UL (ref 0–0.2)
IMM GRANULOCYTES NFR BLD AUTO: 2 % (ref 0–2)
LYMPHOCYTES # BLD AUTO: 2.14 THOUSANDS/ΜL (ref 0.6–4.47)
LYMPHOCYTES NFR BLD AUTO: 14 % (ref 14–44)
MAGNESIUM SERPL-MCNC: 1.8 MG/DL (ref 1.9–2.7)
MCH RBC QN AUTO: 27.8 PG (ref 26.8–34.3)
MCHC RBC AUTO-ENTMCNC: 33.4 G/DL (ref 31.4–37.4)
MCV RBC AUTO: 83 FL (ref 82–98)
MONOCYTES # BLD AUTO: 1.14 THOUSAND/ΜL (ref 0.17–1.22)
MONOCYTES NFR BLD AUTO: 7 % (ref 4–12)
NEUTROPHILS # BLD AUTO: 11.56 THOUSANDS/ΜL (ref 1.85–7.62)
NEUTS SEG NFR BLD AUTO: 72 % (ref 43–75)
NRBC BLD AUTO-RTO: 0 /100 WBCS
PLATELET # BLD AUTO: 412 THOUSANDS/UL (ref 149–390)
PMV BLD AUTO: 8.5 FL (ref 8.9–12.7)
POTASSIUM SERPL-SCNC: 3.8 MMOL/L (ref 3.5–5.3)
RBC # BLD AUTO: 4.13 MILLION/UL (ref 3.81–5.12)
SODIUM SERPL-SCNC: 138 MMOL/L (ref 135–147)
WBC # BLD AUTO: 15.88 THOUSAND/UL (ref 4.31–10.16)

## 2024-09-18 PROCEDURE — 99232 SBSQ HOSP IP/OBS MODERATE 35: CPT | Performed by: INTERNAL MEDICINE

## 2024-09-18 PROCEDURE — 82948 REAGENT STRIP/BLOOD GLUCOSE: CPT

## 2024-09-18 PROCEDURE — 99232 SBSQ HOSP IP/OBS MODERATE 35: CPT

## 2024-09-18 PROCEDURE — 83735 ASSAY OF MAGNESIUM: CPT

## 2024-09-18 PROCEDURE — 85025 COMPLETE CBC W/AUTO DIFF WBC: CPT

## 2024-09-18 PROCEDURE — 80048 BASIC METABOLIC PNL TOTAL CA: CPT

## 2024-09-18 RX ORDER — INSULIN GLARGINE 100 [IU]/ML
10 INJECTION, SOLUTION SUBCUTANEOUS
Status: DISCONTINUED | OUTPATIENT
Start: 2024-09-18 | End: 2024-09-21 | Stop reason: HOSPADM

## 2024-09-18 RX ORDER — METOPROLOL TARTRATE 50 MG
50 TABLET ORAL EVERY 12 HOURS SCHEDULED
Status: DISCONTINUED | OUTPATIENT
Start: 2024-09-18 | End: 2024-09-21 | Stop reason: HOSPADM

## 2024-09-18 RX ADMIN — METOPROLOL TARTRATE 25 MG: 25 TABLET, FILM COATED ORAL at 08:06

## 2024-09-18 RX ADMIN — INSULIN LISPRO 5 UNITS: 100 INJECTION, SOLUTION INTRAVENOUS; SUBCUTANEOUS at 12:33

## 2024-09-18 RX ADMIN — INSULIN LISPRO 1 UNITS: 100 INJECTION, SOLUTION INTRAVENOUS; SUBCUTANEOUS at 17:12

## 2024-09-18 RX ADMIN — HEPARIN SODIUM 5000 UNITS: 5000 INJECTION, SOLUTION INTRAVENOUS; SUBCUTANEOUS at 15:16

## 2024-09-18 RX ADMIN — HEPARIN SODIUM 5000 UNITS: 5000 INJECTION, SOLUTION INTRAVENOUS; SUBCUTANEOUS at 05:31

## 2024-09-18 RX ADMIN — GUAIFENESIN 1200 MG: 600 TABLET, EXTENDED RELEASE ORAL at 17:13

## 2024-09-18 RX ADMIN — METOPROLOL TARTRATE 50 MG: 50 TABLET, FILM COATED ORAL at 21:56

## 2024-09-18 RX ADMIN — AMLODIPINE BESYLATE 5 MG: 5 TABLET ORAL at 08:06

## 2024-09-18 RX ADMIN — PAROXETINE 20 MG: 20 TABLET, FILM COATED ORAL at 08:06

## 2024-09-18 RX ADMIN — AZITHROMYCIN DIHYDRATE 500 MG: 250 TABLET ORAL at 01:01

## 2024-09-18 RX ADMIN — CEFTRIAXONE 2000 MG: 2 INJECTION, SOLUTION INTRAVENOUS at 01:01

## 2024-09-18 RX ADMIN — EZETIMIBE 10 MG: 10 TABLET ORAL at 08:06

## 2024-09-18 RX ADMIN — PRAVASTATIN SODIUM 40 MG: 40 TABLET ORAL at 08:06

## 2024-09-18 RX ADMIN — INSULIN GLARGINE 10 UNITS: 100 INJECTION, SOLUTION SUBCUTANEOUS at 21:54

## 2024-09-18 RX ADMIN — HEPARIN SODIUM 5000 UNITS: 5000 INJECTION, SOLUTION INTRAVENOUS; SUBCUTANEOUS at 21:54

## 2024-09-18 RX ADMIN — B-COMPLEX W/ C & FOLIC ACID TAB 1 TABLET: TAB at 08:06

## 2024-09-18 RX ADMIN — GUAIFENESIN 1200 MG: 600 TABLET, EXTENDED RELEASE ORAL at 08:06

## 2024-09-18 RX ADMIN — INSULIN LISPRO 5 UNITS: 100 INJECTION, SOLUTION INTRAVENOUS; SUBCUTANEOUS at 17:12

## 2024-09-18 RX ADMIN — INSULIN LISPRO 1 UNITS: 100 INJECTION, SOLUTION INTRAVENOUS; SUBCUTANEOUS at 12:32

## 2024-09-18 NOTE — ASSESSMENT & PLAN NOTE
History of right-sided emphysematous pyelonephritis in 10/2023, required ureteral stent placement.  Urine culture grew pan susceptible E. Coli at the time.  ceftriaxone  Check PVR  Consult urology for retained right ureteral stent; plan for cystoscopy on 9/19  Most probably UTI secondary to nephrolithiasis

## 2024-09-18 NOTE — PROGRESS NOTES
Progress Note - Cardiology   [facility]     Bonnie SANDHU Apgar 59 y.o. female MRN: [id number]  : 1965  Unit/Bed#: [address] [place] [address] Encounter: [id number]    Assessment and Plan:     Assessment & Plan  SVT (supraventricular tachycardia)  Presented on 9/15/24 for evaluation for palpitations.  On presentation EKG noted SVT.  Patient was given 2 doses of adenosine 6 mg in ED via one-time dose of Lopressor IV 5 mg.  SVT converted to sinus rhythm.  9/15/24 EKG: Supraventricular tachycardia, 224 bpm.  Non-specific intraventricular block.  Noted hypomagnesemia on presentation. 9/15/24 magnesium 1.7. Magnesium has since normalized.   Currently on lopressor 25 mg twice daily.  Will increase to Lopressor 50 mg twice daily due to episodes of elevated BP and episode of atrial tachycardia noted on telemetry on 24.   24 TTE: LVEF 60%.  Unable to assess diastolic function.  Left atrium mildly dilated.  Mitral valve with trace regurgitation.  Mild tricuspid valve regurgitation.  Right ventricle systolic pressure is moderately elevated at 50 mmHg.  Patient may benefit from outpatient ambulatory extended Holter monitor on hospital discharge.  Monitor electrolytes.  Replete potassium above 4 magnesium above 2.  - overnight pulse oximetry: AHI score 14, suspected pathological breathing disorder.  Recommend outpatient referral to sleep medicine.  Continue telemetry.   Preop cardiovascular exam  Patient is scheduled with urology for cystoscopy with left stent removal, possible ureteroscopy and stent replacement on 24.  Patient with history of right emphysematous pyelitis with stent placement.   Patient is intermediate risk for planned urological procedure.   Essential hypertension  Patient with episodes of elevated BP readings.  Currently on Lopressor 25 mg twice daily and amlodipine 5 mg daily.  Increase Lopressor 25 mg twice daily to Lopressor 50 mg twice daily.  Continue to monitor BP.    Pulmonary hypertension ([facility])  9/16/24 TTE: Right ventricle systolic pressure is moderately elevated at 50 mmHg.  9/16-9/17 overnight pulse oximetry: AHI score 14, suspected pathological breathing disorder.  Recommend outpatient referral to sleep medicine.  Hyperlipidemia  Currently on pravastatin 40 mg daily and Zetia 10 mg daily.   6/10/24 lipid panel: Cholesterol 147, triglycerides 157, HDL 39, LDL 75.  Sepsis ([facility])  On presentation patient with noted leukocytosis and tachycardia.  Suspected secondary to UTI.  Care per primary team.   UTI (urinary tract infection)  9/16/24 UA/urine microscopic consistent with UTI.  Care per primary team.   LEANDRO (acute kidney injury) ([facility])  Baseline creatinine appears between 0.9-1.2.  9/15/24 (admission) creatinine: 1.95.  9/16/24 creatinine: 1.85.   9/17/24 creatinine: 1.69.   9/18/24 creatinine: 1.79.   Care per primary team.   Pulmonary vascular congestion  9/16/24 CXR: Mild central pulmonary vascular congestion.   9/17/24 CXR: Pulmonary vascular congestion.   Received one-time dose of Lasix 20 mg IV on 9/17/24.   Type 2 diabetes mellitus with hyperglycemia, without long-term current use of insulin ([facility])  Lab Results   Component Value Date    HGBA1C 8.4 (H) 06/10/2024     Recent Labs     09/17/24  1152 09/17/24  1611 09/17/24 2028 09/18/24  0707   POCGLU 171* 97 174* 121     Care per primary team.   Depression  Care per primary team.   Morbid obesity ([facility])  BMI 41.16 kg/m2.   Suspected sleep apnea  9/16-9/17 overnight pulse oximetry: AHI score 14, suspected pathological breathing disorder.  Recommend outpatient referral to sleep medicine.    Subjective / Objective:         Patient reports she is overall doing well.  States that she is ambulating to and from the bathroom without experiencing shortness of breath.  9/17/24 CXR noted pulmonary vascular congestion.  Patient received one-time dose of Lasix 20 mg IV.        Patient denies experiencing chest pain, palpitations,  "shortness of breath, lightheadedness, dizziness, headache, nausea, vomiting.    Vitals: Blood pressure 154/77, pulse 81, temperature 98 °F (36.7 °C), temperature source Oral, resp. rate 20, height 5' 6\" (1.676 m), weight 116 kg (255 lb), SpO2 91%.  Vitals:    09/16/24 0113 09/16/24 1005   Weight: 116 kg (255 lb 3.2 oz) 116 kg (255 lb)     Body mass index is 41.16 kg/m².  BP Readings from Last 3 Encounters:   09/18/24 154/77   06/12/24 120/80   03/05/24 140/90     Orthostatic Blood Pressures      Flowsheet Row Most Recent Value   Blood Pressure 154/77 filed at 09/18/2024 0727   Patient Position - Orthostatic VS Lying filed at 09/18/2024 0727          I/O         09/15 0701  09/16 0700 09/16 0701  09/17 0700 09/17 0701  09/18 0700    P.O. 530      I.V. (mL/kg)  2556.3 (22)     IV Piggyback 50      Total Intake(mL/kg) 580 (5) 2556.3 (22)     Urine (mL/kg/hr)  550 (0.2)     Total Output  550     Net +580 +2006.3                  Invasive Devices       Peripheral Intravenous Line  Duration             Peripheral IV 09/15/24 Dorsal (posterior);Left Hand 2 days    Peripheral IV 09/15/24 Right Antecubital 2 days                      Intake/Output Summary (Last 24 hours) at 9/18/2024 0943  Last data filed at 9/18/2024 0901  Gross per 24 hour   Intake 956 ml   Output --   Net 956 ml         Physical Exam:   Physical Exam  Vitals reviewed.   Constitutional:       General: She is not in acute distress.     Appearance: She is obese.   Cardiovascular:      Rate and Rhythm: Normal rate and regular rhythm.      Pulses: Normal pulses.      Heart sounds: Normal heart sounds. No murmur heard.  Pulmonary:      Effort: Pulmonary effort is normal. No respiratory distress.      Breath sounds: Normal breath sounds.   Abdominal:      General: Abdomen is flat. There is no distension.      Palpations: Abdomen is soft.      Tenderness: There is no abdominal tenderness.   Musculoskeletal:      Right lower leg: No edema.      Left lower leg: No " Continue Regimen: Duobrii and Calcipotriene Action 3: Continue edema.   Skin:     General: Skin is warm and dry.   Neurological:      Mental Status: She is alert and oriented to person, place, and time.       Medications/ Allergies:     Current Facility-Administered Medications   Medication Dose Route Frequency Provider Last Rate    acetaminophen  650 mg Oral Q8H PRN Cuiyin Yurik, CRNP      amLODIPine  5 mg Oral Daily Cuijackyn Adinarik, CRNP      azithromycin  500 mg Oral Q24H Cuiyin Yurik, CRNP      cefTRIAXone  2,000 mg Intravenous Q24H Cuiyin Yurik, CRNP 2,000 mg (09/18/24 0101)    ezetimibe  10 mg Oral Daily Cuiyin Yurik, CRNP      guaiFENesin  1,200 mg Oral BID Cuikarina Holdenrik, CRNP      heparin (porcine)  5,000 Units Subcutaneous Q8H [facility] Cuijackyn Adinarik, CRNP      insulin glargine  20 Units Subcutaneous HS Cuiyin Yurik, CRNP      insulin lispro  1-5 Units Subcutaneous TID AC Cuijackyn Adinarik, CRNP      insulin lispro  1-5 Units Subcutaneous HS Cuiyin Yurik, CRNP      insulin lispro  5 Units Subcutaneous TID With Meals Cuikarina Holdenrik, CRNP      metoprolol tartrate  50 mg Oral Q12H [facility] Rosalia Germain PA-C      multivitamin stress formula  1 tablet Oral Daily Cuikarina Holdenrik, CRNP      ondansetron  4 mg Intravenous Q6H PRN Cuiyin Yurik, CRNP      PARoxetine  20 mg Oral Daily Cuiyin Yurik, CRNP      pravastatin  40 mg Oral Daily Cuiyijess Holdenrik, CRNP       acetaminophen, 650 mg, Q8H PRN  ondansetron, 4 mg, Q6H PRN      Allergies   Allergen Reactions    Other Angioedema     HONEY DEW MELON       VTE Pharmacologic Prophylaxis:   Heparin    Labs:   Troponins:  Results from last 7 days   Lab Units 09/16/24  0547 09/16/24  0013   HSTNI D2 ng/L  --  19   HSTNI D4 ng/L 20*  --          CBC with diff:  Results from last 7 days   Lab Units 09/18/24  0531 09/17/24  0531 09/16/24  0547 09/15/24  2212   WBC Thousand/uL 15.88* 14.15* 11.42* 14.46*   HEMOGLOBIN g/dL 11.5 12.3 11.2* 12.0   HEMATOCRIT % 34.4* 38.1 33.5* 35.6   MCV fL 83 86 83 83   PLATELETS Thousands/uL 412* 306 279 277   RBC Million/uL 4.13 4.43  4.02 4.30   MCH pg 27.8 27.8 27.9 27.9   MCHC g/dL 33.4 32.3 33.4 33.7   RDW % 12.9 13.0 12.6 12.7   MPV fL 8.5* 9.0 8.4* 8.7*   NRBC AUTO /100 WBCs 0 0 0 0       CMP:  Results from last 7 days   Lab Units 09/18/24  0531 09/17/24  0531 09/16/24  0547 09/15/24  2212   SODIUM mmol/L 138 135 132* 128*   POTASSIUM mmol/L 3.8 3.6 3.8 3.9   CHLORIDE mmol/L 104 104 101 97   CO2 mmol/L 24 19* 20* 19*   ANION GAP mmol/L 10 12 11 12   BUN mg/dL 35* 31* 30* 32*   CREATININE mg/dL 1.79* 1.69* 1.85* 1.95*   CALCIUM mg/dL 8.5 8.2* 8.1* 8.4   AST U/L  --  33 31 47*   ALT U/L  --  45 45 54*   ALK PHOS U/L  --  158* 128* 116*   TOTAL PROTEIN g/dL  --  6.8 6.3* 6.9   ALBUMIN g/dL  --  3.4* 3.2* 3.3*   TOTAL BILIRUBIN mg/dL  --  0.49 0.43 0.66   EGFR ml/min/1.73sq m 30 32 29 27       Magnesium:  Results from last 7 days   Lab Units 09/18/24  0531 09/16/24  0547 09/15/24  2212   MAGNESIUM mg/dL 1.8* 2.3 1.7*     TSH:  Results from last 7 days   Lab Units 09/15/24  2212   TSH 3RD GENERATON uIU/mL 1.992     Imaging & Testing   I have personally reviewed pertinent reports.    XR chest 1 view portable    Result Date: 9/17/2024    Impression: Pulmonary vascular congestion.     US kidney and bladder    Result Date: 9/16/2024    Impression: Right ureteral stent in proper position. No evidence of hydronephrosis. Moderately atrophic right kidney.    XR chest 1 view portable    Result Date: 9/16/2024    Impression: Mild central pulmonary vascular congestion.      EKG / Monitor: Personally reviewed.      Telemetry reviewed: Currently sinus rhythm, 76 bpm.  11 beats of atrial tachycardia at 0716 hrs.  Cardiac testing:   Echo complete w/ contrast if indicated    Result Date: 9/16/2024  Narrative:   Left Ventricle: Left ventricular cavity size is normal. Wall thickness is normal. The left ventricular ejection fraction is 60% by visual estimation. Systolic function is normal. Wall motion is normal. Unable to assess diastolic function.   Right  Detail Level: Zone Ventricle: Right ventricular cavity size is normal. Systolic function is normal.   Left Atrium: The atrium is mildly dilated.   Tricuspid Valve: There is mild regurgitation. The right ventricular systolic pressure is moderately elevated at 50mmHg.        Rosalia Germain PA-C

## 2024-09-18 NOTE — PLAN OF CARE
Problem: PAIN - ADULT  Goal: Verbalizes/displays adequate comfort level or baseline comfort level  Description: Interventions:  - Encourage patient to monitor pain and request assistance  - Assess pain using appropriate pain scale  - Administer analgesics based on type and severity of pain and evaluate response  - Implement non-pharmacological measures as appropriate and evaluate response  - Consider cultural and social influences on pain and pain management  - Notify physician/advanced practitioner if interventions unsuccessful or patient reports new pain  Outcome: Progressing     Problem: INFECTION - ADULT  Goal: Absence or prevention of progression during hospitalization  Description: INTERVENTIONS:  - Assess and monitor for signs and symptoms of infection  - Monitor lab/diagnostic results  - Monitor all insertion sites, i.e. indwelling lines, tubes, and drains  - Monitor endotracheal if appropriate and nasal secretions for changes in amount and color  - [place] appropriate cooling/warming therapies per order  - Administer medications as ordered  - Instruct and encourage patient and family to use good hand hygiene technique  - Identify and instruct in appropriate isolation precautions for identified infection/condition  Outcome: Progressing     Problem: SAFETY ADULT  Goal: Patient will remain free of falls  Description: INTERVENTIONS:  - Educate patient/family on patient safety including physical limitations  - Instruct patient to call for assistance with activity   - Consult OT/PT to assist with strengthening/mobility   - Keep Call bell within reach  - Keep bed low and locked with side rails adjusted as appropriate  - Keep care items and personal belongings within reach  - Initiate and maintain comfort rounds  - Make Fall Risk Sign visible to staff  - Offer Toileting every 2 Hours, in advance of need  - Initiate/Maintain bed alarm  - Apply yellow socks and bracelet for high fall risk patients  - Consider  moving patient to room near nurses station  Outcome: Progressing  Goal: Maintain or return to baseline ADL function  Description: INTERVENTIONS:  -  Assess patient's ability to carry out ADLs; assess patient's baseline for ADL function and identify physical deficits which impact ability to perform ADLs (bathing, care of mouth/teeth, toileting, grooming, dressing, etc.)  - Assess/evaluate cause of self-care deficits   - Assess range of motion  - Assess patient's mobility; develop plan if impaired  - Assess patient's need for assistive devices and provide as appropriate  - Encourage maximum independence but intervene and supervise when necessary  - Involve family in performance of ADLs  - Assess for home care needs following discharge   - Consider OT consult to assist with ADL evaluation and planning for discharge  - Provide patient education as appropriate  Outcome: Progressing  Goal: Maintains/Returns to pre admission functional level  Description: INTERVENTIONS:  - Perform AM-PAC 6 Click Basic Mobility/ Daily Activity assessment daily.  - Set and communicate daily mobility goal to care team and patient/family/caregiver.   - Collaborate with rehabilitation services on mobility goals if consulted  - Perform Range of Motion 2 times a day.  - Reposition patient every 2 hours.  - Dangle patient 2 times a day  - Stand patient 2 times a day  - Ambulate patient 2 times a day  - Out of bed to chair 2 times a day   - Out of bed for meals 2 times a day  - Out of bed for toileting  - Record patient progress and toleration of activity level   Outcome: Progressing     Problem: DISCHARGE PLANNING  Goal: Discharge to home or other facility with appropriate resources  Description: INTERVENTIONS:  - Identify barriers to discharge w/patient and caregiver  - Arrange for needed discharge resources and transportation as appropriate  - Identify discharge learning needs (meds, wound care, etc.)  - Arrange for interpretive services to  assist at discharge as needed  - Refer to Case Management Department for coordinating discharge planning if the patient needs post-hospital services based on physician/advanced practitioner order or complex needs related to functional status, cognitive ability, or social support system  Outcome: Progressing     Problem: Knowledge Deficit  Goal: Patient/family/caregiver demonstrates understanding of disease process, treatment plan, medications, and discharge instructions  Description: Complete learning assessment and assess knowledge base.  Interventions:  - Provide teaching at level of understanding  - Provide teaching via preferred learning methods  Outcome: Progressing     Problem: CARDIOVASCULAR - ADULT  Goal: Maintains optimal cardiac output and hemodynamic stability  Description: INTERVENTIONS:  - Monitor I/O, vital signs and rhythm  - Monitor for S/S and trends of decreased cardiac output  - Administer and titrate ordered vasoactive medications to optimize hemodynamic stability  - Assess quality of pulses, skin color and temperature  - Assess for signs of decreased coronary artery perfusion  - Instruct patient to report change in severity of symptoms  Outcome: Progressing  Goal: Absence of cardiac dysrhythmias or at baseline rhythm  Description: INTERVENTIONS:  - Continuous cardiac monitoring, vital signs, obtain 12 lead EKG if ordered  - Administer antiarrhythmic and heart rate control medications as ordered  - Monitor electrolytes and administer replacement therapy as ordered  Outcome: Progressing

## 2024-09-18 NOTE — ASSESSMENT & PLAN NOTE
Presented on 9/15/24 for evaluation for palpitations.  On presentation EKG noted SVT.  Patient was given 2 doses of adenosine 6 mg in ED via one-time dose of Lopressor IV 5 mg.  SVT converted to sinus rhythm.  9/15/24 EKG: Supraventricular tachycardia, 224 bpm.  Non-specific intraventricular block.  Noted hypomagnesemia on presentation. 9/15/24 magnesium 1.7. Magnesium has since normalized.   Currently on lopressor 25 mg twice daily.  Will increase to Lopressor 50 mg twice daily due to episodes of elevated BP and episode of atrial tachycardia noted on telemetry on 9/18/24.   9/16/24 TTE: LVEF 60%.  Unable to assess diastolic function.  Left atrium mildly dilated.  Mitral valve with trace regurgitation.  Mild tricuspid valve regurgitation.  Right ventricle systolic pressure is moderately elevated at 50 mmHg.  Patient may benefit from outpatient ambulatory extended Holter monitor on hospital discharge.  Monitor electrolytes.  Replete potassium above 4 magnesium above 2.  9/16-9/17 overnight pulse oximetry: AHI score 14, suspected pathological breathing disorder.  Recommend outpatient referral to sleep medicine.  Continue telemetry.

## 2024-09-18 NOTE — ASSESSMENT & PLAN NOTE
Baseline creatinine appears between 0.9-1.2.  9/15/24 (admission) creatinine: 1.95.  9/16/24 creatinine: 1.85.   9/17/24 creatinine: 1.69.   9/18/24 creatinine: 1.79.   Care per primary team.

## 2024-09-18 NOTE — ASSESSMENT & PLAN NOTE
9/16/24 CXR: Mild central pulmonary vascular congestion.   9/17/24 CXR: Pulmonary vascular congestion.   Received one-time dose of Lasix 20 mg IV on 9/17/24.

## 2024-09-18 NOTE — ASSESSMENT & PLAN NOTE
Lab Results   Component Value Date    HGBA1C 8.4 (H) 06/10/2024     Recent Labs     09/17/24  1152 09/17/24  1611 09/17/24 2028 09/18/24  0707   POCGLU 171* 97 174* 121     Care per primary team.

## 2024-09-18 NOTE — PLAN OF CARE
Problem: PAIN - ADULT  Goal: Verbalizes/displays adequate comfort level or baseline comfort level  Description: Interventions:  - Encourage patient to monitor pain and request assistance  - Assess pain using appropriate pain scale  - Administer analgesics based on type and severity of pain and evaluate response  - Implement non-pharmacological measures as appropriate and evaluate response  - Consider cultural and social influences on pain and pain management  - Notify physician/advanced practitioner if interventions unsuccessful or patient reports new pain  Outcome: Progressing     Problem: INFECTION - ADULT  Goal: Absence or prevention of progression during hospitalization  Description: INTERVENTIONS:  - Assess and monitor for signs and symptoms of infection  - Monitor lab/diagnostic results  - Monitor all insertion sites, i.e. indwelling lines, tubes, and drains  - Monitor endotracheal if appropriate and nasal secretions for changes in amount and color  - [place] appropriate cooling/warming therapies per order  - Administer medications as ordered  - Instruct and encourage patient and family to use good hand hygiene technique  - Identify and instruct in appropriate isolation precautions for identified infection/condition  Outcome: Progressing     Problem: SAFETY ADULT  Goal: Patient will remain free of falls  Description: INTERVENTIONS:  - Educate patient/family on patient safety including physical limitations  - Instruct patient to call for assistance with activity   - Consult OT/PT to assist with strengthening/mobility   - Keep Call bell within reach  - Keep bed low and locked with side rails adjusted as appropriate  - Keep care items and personal belongings within reach  - Initiate and maintain comfort rounds  - Make Fall Risk Sign visible to staff  - Offer Toileting every 2 Hours, in advance of need  - Initiate/Maintain bed/chair alarm  - Obtain necessary fall risk management equipment: bracelet/socks   -  Apply yellow socks and bracelet for high fall risk patients  - Consider moving patient to room near nurses station  Outcome: Progressing  Goal: Maintain or return to baseline ADL function  Description: INTERVENTIONS:  -  Assess patient's ability to carry out ADLs; assess patient's baseline for ADL function and identify physical deficits which impact ability to perform ADLs (bathing, care of mouth/teeth, toileting, grooming, dressing, etc.)  - Assess/evaluate cause of self-care deficits   - Assess range of motion  - Assess patient's mobility; develop plan if impaired  - Assess patient's need for assistive devices and provide as appropriate  - Encourage maximum independence but intervene and supervise when necessary  - Involve family in performance of ADLs  - Assess for home care needs following discharge   - Consider OT consult to assist with ADL evaluation and planning for discharge  - Provide patient education as appropriate  Outcome: Progressing  Goal: Maintains/Returns to pre admission functional level  Description: INTERVENTIONS:  - Perform AM-PAC 6 Click Basic Mobility/ Daily Activity assessment daily.  - Set and communicate daily mobility goal to care team and patient/family/caregiver.   - Collaborate with rehabilitation services on mobility goals if consulted  - Perform Range of Motion 12 times a day.  - Reposition patient every 2 hours.  - Dangle patient 3 times a day  - Stand patient 3 times a day  - Ambulate patient 3 times a day  - Out of bed to chair 3 times a day   - Out of bed for meals 3 times a day  - Out of bed for toileting  - Record patient progress and toleration of activity level   Outcome: Progressing     Problem: DISCHARGE PLANNING  Goal: Discharge to home or other facility with appropriate resources  Description: INTERVENTIONS:  - Identify barriers to discharge w/patient and caregiver  - Arrange for needed discharge resources and transportation as appropriate  - Identify discharge learning  needs (meds, wound care, etc.)  - Arrange for interpretive services to assist at discharge as needed  - Refer to Case Management Department for coordinating discharge planning if the patient needs post-hospital services based on physician/advanced practitioner order or complex needs related to functional status, cognitive ability, or social support system  Outcome: Progressing     Problem: Knowledge Deficit  Goal: Patient/family/caregiver demonstrates understanding of disease process, treatment plan, medications, and discharge instructions  Description: Complete learning assessment and assess knowledge base.  Interventions:  - Provide teaching at level of understanding  - Provide teaching via preferred learning methods  Outcome: Progressing     Problem: CARDIOVASCULAR - ADULT  Goal: Maintains optimal cardiac output and hemodynamic stability  Description: INTERVENTIONS:  - Monitor I/O, vital signs and rhythm  - Monitor for S/S and trends of decreased cardiac output  - Administer and titrate ordered vasoactive medications to optimize hemodynamic stability  - Assess quality of pulses, skin color and temperature  - Assess for signs of decreased coronary artery perfusion  - Instruct patient to report change in severity of symptoms  Outcome: Progressing  Goal: Absence of cardiac dysrhythmias or at baseline rhythm  Description: INTERVENTIONS:  - Continuous cardiac monitoring, vital signs, obtain 12 lead EKG if ordered  - Administer antiarrhythmic and heart rate control medications as ordered  - Monitor electrolytes and administer replacement therapy as ordered  Outcome: Progressing

## 2024-09-18 NOTE — ASSESSMENT & PLAN NOTE
Lab Results   Component Value Date    HGBA1C 8.4 (H) 06/10/2024       Recent Labs     09/17/24  1152 09/17/24  1611 09/17/24 2028 09/18/24  0707   POCGLU 171* 97 174* 121       Blood Sugar Average: Last 72 hrs:  (P) 161.2  On Lantus 20 units SQ at bedtime, Humalog 5 units 3 times daily with meals.  Will continue.  Continue Humalog sliding scale with Accu-Cheks before every meal and at bedtime  Diabetic diet

## 2024-09-18 NOTE — PROGRESS NOTES
Progress Note - Hospitalist   Name: Bonnie SANDHU Apgar 59 y.o. female I MRN: [id number]  Unit/Bed#: [address] [place] [address] Date of Admission: 9/15/2024   Date of Service: 2024 I Hospital Day: 3    Assessment & Plan  SVT (supraventricular tachycardia)  Patient presents with acute onset palpitation while watching TV, with associated dizziness when getting onto stretcher with EMS.  Denies chest pain SOB.  Reports history of same thing 20 years ago and another episode a few years ago and both episodes resolved when she made herself throw up. Patient reports not feeling well, started with productive cough on Thursday 3 days ago.  Reports has a right ureteral stent since last hospitalization in October last year.  Initial EKG in ED showed SVT, rate 224  Received adenosine x 2 and metoprolol 5 mg IV x 1 in ED  Currently in sinus rhythm  Telemetry showed no atrial fibrillation or further episodes of SVT  2D echo showed EF of 60%, unable to assess diastolic function without any significant valvular abnormalities, moderately elevated right ventricular systolic pressure at 50 mmHg-night pulse oximetry ordered rule out sleep apnea    Cardiology team following  Patient started on metoprolol 25 mg twice daily  Outpatient follow-up with cardiology    Sepsis ([facility])  Sepsis, present on admission, as evidenced by leukocytosis, tachycardia, with suspected source of infection UTI   Lactic acid normal  Initial procalcitonin 0.33 >0.26  UA positive nitrite large leukocytes.  Continue IV ceftriaxone and azithromycin  Blood cultures and urine cultures no growth at 24 hours  Most probably UTI secondary to nephrolithiasis   Urine antigens were negative    Results from last 7 days   Lab Units 24  0531 24  0013 09/15/24  2212   LACTIC ACID mmol/L  --  0.7  --    PROCALCITONIN ng/ml 0.26*  --  0.33*     Results from last 7 days   Lab Units 24  0531 24  0531 24  0547 09/15/24  2212   WBC Thousand/uL 15.88* 14.15* 11.42*  14.46*        LEANDRO (acute kidney injury) ([facility])  Baseline creatinine 0.9-1.2 in past year    Unclear etiology.  Patient is on perindopril at home.  Renal ultrasound: Right ureteral stent in proper position without any evidence of hydronephrosis with moderately atrophic right kidney  Monitor PVR  Continue to hold perindopril  Repeat BMP in the morning  Urology  team consulted; plan for cystoscopy on 9/19  Most probably UTI secondary to nephrolithiasis   Results from last 7 days   Lab Units 09/18/24  0531 09/17/24  0531 09/16/24  0547 09/15/24  2212   BUN mg/dL 35* 31* 30* 32*   CREATININE mg/dL 1.79* 1.69* 1.85* 1.95*          UTI (urinary tract infection)  History of right-sided emphysematous pyelonephritis in 10/2023, required ureteral stent placement.  Urine culture grew pan susceptible E. Coli at the time.  ceftriaxone  Check PVR  Consult urology for retained right ureteral stent; plan for cystoscopy on 9/19  Most probably UTI secondary to nephrolithiasis   Essential hypertension  On perindopril at home.  Hold due to LEANDRO  Blood pressure slightly on the high side  Started on metoprolol 12.5 mg p.o. twice daily  Hyperlipidemia  Continue statin  Depression  Continue Paxil  Type 2 diabetes mellitus with hyperglycemia, without long-term current use of insulin ([facility])  Lab Results   Component Value Date    HGBA1C 8.4 (H) 06/10/2024       Recent Labs     09/17/24  1152 09/17/24  1611 09/17/24 2028 09/18/24  0707   POCGLU 171* 97 174* 121       Blood Sugar Average: Last 72 hrs:  (P) 161.2  On Lantus 20 units SQ at bedtime, Humalog 5 units 3 times daily with meals.  Will continue.  Continue Humalog sliding scale with Accu-Cheks before every meal and at bedtime  Diabetic diet  Morbid obesity ([facility])  Diet and lifestyle modification  Abnormal chest x-ray  Chest x-ray showed mild central pulmonary vascular congestion  Currently on IV ceftriaxone and azithromycin.  Day 3  Continue Mucinex    Suspected sleep apnea  Outpatient  follow-up with sleep medicine  Pulmonary hypertension ([facility])  Ambulatory referral to cardiology and sleep medicine  Pulmonary vascular congestion  24 CXR: Mild central pulmonary vascular congestion.   Patient with episodes of hypoxia.    Will give one-time dose of Lasix 20 mg IV  Follow up 24 CXR.   Cardiology team following   Case discussed with cardiology and they agree with the plan above     VTE Pharmacologic Prophylaxis: VTE Score: 5 Moderate Risk (Score 3-4) - Pharmacological DVT Prophylaxis Ordered: heparin.    Mobility:   Basic Mobility Inpatient Raw Score: 23  JH-HLM Goal: 7: Walk 25 feet or more  JH-HLM Achieved: 7: Walk 25 feet or more  JH-HLM Goal achieved. Continue to encourage appropriate mobility.    Patient Centered Rounds: I performed bedside rounds with nursing staff today.   Discussions with Specialists or Other Care Team Provider: Cardiology, urology    Education and Discussions with Family / Patient:  patient.     Current Length of Stay: 3 day(s)  Current Patient Status: Inpatient   Certification Statement: The patient will continue to require additional inpatient hospital stay due to sepsis secondary to UTI, hypertension, pending cystoscopy  Discharge Plan: Anticipate discharge in 24-48 hrs to home.    Code Status: Level 1 - Full Code    Subjective   Patient was seen today at bedside.  Does not have any active complaints.  Reports remarkable improvement in breathing.  No chest pain or tightness, SOB or cough, dizziness or light headedness, N/V, Diarrhea of constipation.   No active urinary symptoms  Tolerating oral diet.       Objective     Vitals:   Temp (24hrs), Av.1 °F (36.7 °C), Min:98 °F (36.7 °C), Max:98.3 °F (36.8 °C)    Temp:  [98 °F (36.7 °C)-98.3 °F (36.8 °C)] 98 °F (36.7 °C)  HR:  [69-91] 81  Resp:  [17-20] 20  BP: (134-164)/(73-86) 154/77  SpO2:  [88 %-92 %] 91 %  Body mass index is 41.16 kg/m².     Input and Output Summary (last 24 hours):     Intake/Output Summary  (Last 24 hours) at 9/18/2024 0945  Last data filed at 9/18/2024 0901  Gross per 24 hour   Intake 956 ml   Output --   Net 956 ml       Physical Exam     Lines/Drains:  Lines/Drains/Airways       Active Status       None                      Telemetry:  Telemetry Orders (From admission, onward)               24 Hour Telemetry Monitoring  Continuous x 24 Hours (Telem)        Question:  Reason for 24 Hour Telemetry  Answer:  Arrhythmias requiring acute medical intervention / PPM or ICD malfunction                     Telemetry Reviewed: Normal Sinus Rhythm  Indication for Continued Telemetry Use: per cardiology                Lab Results: I have reviewed the following results: CBC/BMP:   .     09/18/24  0531   WBC 15.88*   HGB 11.5   HCT 34.4*   *   SODIUM 138   K 3.8      CO2 24   BUN 35*   CREATININE 1.79*   GLUC 118   MG 1.8*       Results from last 7 days   Lab Units 09/18/24  0531   WBC Thousand/uL 15.88*   HEMOGLOBIN g/dL 11.5   HEMATOCRIT % 34.4*   PLATELETS Thousands/uL 412*   SEGS PCT % 72   LYMPHO PCT % 14   MONO PCT % 7   EOS PCT % 4     Results from last 7 days   Lab Units 09/18/24  0531 09/17/24  0531   SODIUM mmol/L 138 135   POTASSIUM mmol/L 3.8 3.6   CHLORIDE mmol/L 104 104   CO2 mmol/L 24 19*   BUN mg/dL 35* 31*   CREATININE mg/dL 1.79* 1.69*   ANION GAP mmol/L 10 12   CALCIUM mg/dL 8.5 8.2*   ALBUMIN g/dL  --  3.4*   TOTAL BILIRUBIN mg/dL  --  0.49   ALK PHOS U/L  --  158*   ALT U/L  --  45   AST U/L  --  33   GLUCOSE RANDOM mg/dL 118 146*         Results from last 7 days   Lab Units 09/18/24  0707 09/17/24 2028 09/17/24  1611 09/17/24  1152 09/17/24  0724 09/16/24 2011 09/16/24  1608 09/16/24  1153 09/16/24  0732 09/16/24  0122   POC GLUCOSE mg/dl 121 174* 97 171* 155* 187* 107 117 170* 313*         Results from last 7 days   Lab Units 09/17/24  0531 09/16/24  0013 09/15/24  2212   LACTIC ACID mmol/L  --  0.7  --    PROCALCITONIN ng/ml 0.26*  --  0.33*       Recent Cultures (last 7  days):   Results from last 7 days   Lab Units 09/16/24  0331 09/16/24  0327 09/16/24  0013   BLOOD CULTURE   --   --  No Growth at 24 hrs.  No Growth at 24 hrs.   URINE CULTURE  10,000-19,000 cfu/ml  --   --    LEGIONELLA URINARY ANTIGEN   --  Negative  --        Imaging Review: Reviewed radiology reports from this admission including: chest xray.  Other Studies: EKG was reviewed.     Last 24 Hours Medication List:     Current Facility-Administered Medications:     acetaminophen (TYLENOL) tablet 650 mg, Q8H PRN    amLODIPine (NORVASC) tablet 5 mg, Daily    azithromycin (ZITHROMAX) tablet 500 mg, Q24H    cefTRIAXone (ROCEPHIN) IVPB (premix in dextrose) 2,000 mg 50 mL, Q24H, Last Rate: 2,000 mg (09/18/24 0101)    ezetimibe (ZETIA) tablet 10 mg, Daily    guaiFENesin (MUCINEX) 12 hr tablet 1,200 mg, BID    heparin (porcine) subcutaneous injection 5,000 Units, Q8H NAZ    insulin glargine (LANTUS) subcutaneous injection 20 Units 0.2 mL, HS    insulin lispro (HumALOG/ADMELOG) 100 units/mL subcutaneous injection 1-5 Units, TID AC **AND** Fingerstick Glucose (POCT), TID AC    insulin lispro (HumALOG/ADMELOG) 100 units/mL subcutaneous injection 1-5 Units, HS    insulin lispro (HumALOG/ADMELOG) 100 units/mL subcutaneous injection 5 Units, TID With Meals    metoprolol tartrate (LOPRESSOR) tablet 50 mg, Q12H [facility]    multivitamin stress formula tablet 1 tablet, Daily    ondansetron (ZOFRAN) injection 4 mg, Q6H PRN    PARoxetine (PAXIL) tablet 20 mg, Daily    pravastatin (PRAVACHOL) tablet 40 mg, Daily    Administrative Statements   Today, Patient Was Seen By: Jose Gomez MD  I have spent a total time of 30 minutes in caring for this patient on the day of the visit/encounter including Diagnostic results, Prognosis, Risks and benefits of tx options, and Instructions for management.    **Please Note: This note may have been constructed using a voice recognition system.**

## 2024-09-18 NOTE — ASSESSMENT & PLAN NOTE
Chest x-ray showed mild central pulmonary vascular congestion  Currently on IV ceftriaxone and azithromycin.  Day 3  Continue Mucinex

## 2024-09-18 NOTE — ASSESSMENT & PLAN NOTE
Sepsis, present on admission, as evidenced by leukocytosis, tachycardia, with suspected source of infection UTI   Lactic acid normal  Initial procalcitonin 0.33 >0.26  UA positive nitrite large leukocytes.  Continue IV ceftriaxone and azithromycin  Blood cultures and urine cultures no growth at 24 hours  Most probably UTI secondary to nephrolithiasis   Urine antigens were negative    Results from last 7 days   Lab Units 09/17/24  0531 09/16/24  0013 09/15/24  2212   LACTIC ACID mmol/L  --  0.7  --    PROCALCITONIN ng/ml 0.26*  --  0.33*     Results from last 7 days   Lab Units 09/18/24  0531 09/17/24  0531 09/16/24  0547 09/15/24  2212   WBC Thousand/uL 15.88* 14.15* 11.42* 14.46*

## 2024-09-18 NOTE — ASSESSMENT & PLAN NOTE
Patient with episodes of elevated BP readings.  Currently on Lopressor 25 mg twice daily and amlodipine 5 mg daily.  Increase Lopressor 25 mg twice daily to Lopressor 50 mg twice daily.  Continue to monitor BP.

## 2024-09-18 NOTE — PROGRESS NOTES
"Progress Note - Urology      Patient: Bonnie SANDHU Apgar   : 1965 Sex: female   MRN: [id number]     CSN: [id number]  Unit/Bed#: [address] [place] [address]     SUBJECTIVE:   Vs stable sinus rhythm  Rt stent in 11 months  Need remove change tonio      Objective   Vitals: /77 (BP Location: Left arm)   Pulse 81   Temp 98 °F (36.7 °C) (Oral)   Resp 20   Ht 5' 6\" (1.676 m)   Wt 116 kg (255 lb)   SpO2 91%   BMI 41.16 kg/m²     I/O last 24 hours:  In: 1686 [P.O.:1676; I.V.:10]  Out: -       Physical Exam:   General Alert awake   Normocephalic atraumatic PERRLA  Lungs clear bilaterally  Cardiac normal S1 normal S2  Abdomen soft, flank pain  Extremities no edema      Lab Results: CBC:   Lab Results   Component Value Date    WBC 15.88 (H) 2024    HGB 11.5 2024    HCT 34.4 (L) 2024    MCV 83 2024     (H) 2024    RBC 4.13 2024    MCH 27.8 2024    MCHC 33.4 2024    RDW 12.9 2024    MPV 8.5 (L) 2024    NRBC 0 2024     CMP:   Lab Results   Component Value Date     2024    CO2 24 2024    BUN 35 (H) 2024    CREATININE 1.79 (H) 2024    CALCIUM 8.5 2024    AST 33 2024    ALT 45 2024    ALKPHOS 158 (H) 2024    EGFR 30 2024     Urinalysis:   Lab Results   Component Value Date    COLORU Yellow 2024    CLARITYU Clear 2024    SPECGRAV <1.005 (L) 2024    PHUR 6.5 2024    LEUKOCYTESUR Large (A) 2024    NITRITE Positive (A) 2024    GLUCOSEU Negative 2024    KETONESU Negative 2024    BILIRUBINUR Negative 2024    BLOODU Small (A) 2024     Urine Culture:   Lab Results   Component Value Date    URINECX 10,000-19,000 cfu/ml 2024     PSA: No results found for: \"PSA\"      Assessment/ Plan:  Rt chronic stent  Cardiac clearance  Cysto/rt stent exchange tonio Hassan MD  "

## 2024-09-18 NOTE — ASSESSMENT & PLAN NOTE
Patient is scheduled with urology for cystoscopy with left stent removal, possible ureteroscopy and stent replacement on 9/19/24.  Patient with history of right emphysematous pyelitis with stent placement.   Patient is intermediate risk for planned urological procedure.

## 2024-09-18 NOTE — ASSESSMENT & PLAN NOTE
Baseline creatinine 0.9-1.2 in past year    Unclear etiology.  Patient is on perindopril at home.  Renal ultrasound: Right ureteral stent in proper position without any evidence of hydronephrosis with moderately atrophic right kidney  Monitor PVR  Continue to hold perindopril  Repeat BMP in the morning  Urology  team consulted; plan for cystoscopy on 9/19  Most probably UTI secondary to nephrolithiasis   Results from last 7 days   Lab Units 09/18/24  0531 09/17/24  0531 09/16/24  0547 09/15/24  2212   BUN mg/dL 35* 31* 30* 32*   CREATININE mg/dL 1.79* 1.69* 1.85* 1.95*

## 2024-09-19 ENCOUNTER — ANESTHESIA (INPATIENT)
Dept: PERIOP | Facility: HOSPITAL | Age: 59
DRG: 659 | End: 2024-09-19
Payer: COMMERCIAL

## 2024-09-19 ENCOUNTER — APPOINTMENT (INPATIENT)
Dept: RADIOLOGY | Facility: HOSPITAL | Age: 59
DRG: 659 | End: 2024-09-19
Payer: COMMERCIAL

## 2024-09-19 ENCOUNTER — ANESTHESIA EVENT (INPATIENT)
Dept: PERIOP | Facility: HOSPITAL | Age: 59
DRG: 659 | End: 2024-09-19
Payer: COMMERCIAL

## 2024-09-19 PROBLEM — R09.89 PULMONARY VASCULAR CONGESTION: Status: RESOLVED | Noted: 2024-09-17 | Resolved: 2024-09-19

## 2024-09-19 PROBLEM — Z91.89 AT RISK FOR ACID-BASE IMBALANCE: Status: ACTIVE | Noted: 2024-09-19

## 2024-09-19 PROBLEM — Z91.89 ELECTROLYTE IMBALANCE RISK: Status: ACTIVE | Noted: 2024-09-19

## 2024-09-19 LAB
ALBUMIN SERPL BCG-MCNC: 3.1 G/DL (ref 3.5–5)
ALP SERPL-CCNC: 145 U/L (ref 34–104)
ALT SERPL W P-5'-P-CCNC: 37 U/L (ref 7–52)
ANION GAP SERPL CALCULATED.3IONS-SCNC: 10 MMOL/L (ref 4–13)
AST SERPL W P-5'-P-CCNC: 18 U/L (ref 13–39)
BASOPHILS # BLD MANUAL: 0 THOUSAND/UL (ref 0–0.1)
BASOPHILS NFR MAR MANUAL: 0 % (ref 0–1)
BILIRUB SERPL-MCNC: 0.35 MG/DL (ref 0.2–1)
BUN SERPL-MCNC: 38 MG/DL (ref 5–25)
CALCIUM ALBUM COR SERPL-MCNC: 9.4 MG/DL (ref 8.3–10.1)
CALCIUM SERPL-MCNC: 8.7 MG/DL (ref 8.4–10.2)
CHLORIDE SERPL-SCNC: 106 MMOL/L (ref 96–108)
CO2 SERPL-SCNC: 25 MMOL/L (ref 21–32)
CREAT SERPL-MCNC: 1.81 MG/DL (ref 0.6–1.3)
EOSINOPHIL # BLD MANUAL: 0.97 THOUSAND/UL (ref 0–0.4)
EOSINOPHIL NFR BLD MANUAL: 6 % (ref 0–6)
ERYTHROCYTE [DISTWIDTH] IN BLOOD BY AUTOMATED COUNT: 13 % (ref 11.6–15.1)
GFR SERPL CREATININE-BSD FRML MDRD: 29 ML/MIN/1.73SQ M
GLUCOSE SERPL-MCNC: 132 MG/DL (ref 65–140)
GLUCOSE SERPL-MCNC: 137 MG/DL (ref 65–140)
GLUCOSE SERPL-MCNC: 154 MG/DL (ref 65–140)
GLUCOSE SERPL-MCNC: 160 MG/DL (ref 65–140)
GLUCOSE SERPL-MCNC: 277 MG/DL (ref 65–140)
GLUCOSE SERPL-MCNC: 283 MG/DL (ref 65–140)
HCT VFR BLD AUTO: 35 % (ref 34.8–46.1)
HGB BLD-MCNC: 11.6 G/DL (ref 11.5–15.4)
LYMPHOCYTES # BLD AUTO: 1.62 THOUSAND/UL (ref 0.6–4.47)
LYMPHOCYTES # BLD AUTO: 10 % (ref 14–44)
MAGNESIUM SERPL-MCNC: 2.1 MG/DL (ref 1.9–2.7)
MCH RBC QN AUTO: 27.6 PG (ref 26.8–34.3)
MCHC RBC AUTO-ENTMCNC: 33.1 G/DL (ref 31.4–37.4)
MCV RBC AUTO: 83 FL (ref 82–98)
MONOCYTES # BLD AUTO: 0.81 THOUSAND/UL (ref 0–1.22)
MONOCYTES NFR BLD: 5 % (ref 4–12)
MYELOCYTE ABSOLUTE CT: 0.16 THOUSAND/UL (ref 0–0.1)
MYELOCYTES NFR BLD MANUAL: 1 % (ref 0–1)
NEUTROPHILS # BLD MANUAL: 12.6 THOUSAND/UL (ref 1.85–7.62)
NEUTS SEG NFR BLD AUTO: 78 % (ref 43–75)
PLATELET # BLD AUTO: 521 THOUSANDS/UL (ref 149–390)
PLATELET BLD QL SMEAR: ABNORMAL
PMV BLD AUTO: 8.4 FL (ref 8.9–12.7)
POTASSIUM SERPL-SCNC: 3.8 MMOL/L (ref 3.5–5.3)
PROT SERPL-MCNC: 6.8 G/DL (ref 6.4–8.4)
RBC # BLD AUTO: 4.2 MILLION/UL (ref 3.81–5.12)
RBC MORPH BLD: NORMAL
SODIUM SERPL-SCNC: 141 MMOL/L (ref 135–147)
WBC # BLD AUTO: 16.16 THOUSAND/UL (ref 4.31–10.16)

## 2024-09-19 PROCEDURE — 0TJB8ZZ INSPECTION OF BLADDER, VIA NATURAL OR ARTIFICIAL OPENING ENDOSCOPIC: ICD-10-PCS | Performed by: SPECIALIST

## 2024-09-19 PROCEDURE — BT1D1ZZ FLUOROSCOPY OF RIGHT KIDNEY, URETER AND BLADDER USING LOW OSMOLAR CONTRAST: ICD-10-PCS | Performed by: SPECIALIST

## 2024-09-19 PROCEDURE — 99232 SBSQ HOSP IP/OBS MODERATE 35: CPT

## 2024-09-19 PROCEDURE — 85027 COMPLETE CBC AUTOMATED: CPT

## 2024-09-19 PROCEDURE — 82948 REAGENT STRIP/BLOOD GLUCOSE: CPT

## 2024-09-19 PROCEDURE — C2617 STENT, NON-COR, TEM W/O DEL: HCPCS | Performed by: SPECIALIST

## 2024-09-19 PROCEDURE — 0T768DZ DILATION OF RIGHT URETER WITH INTRALUMINAL DEVICE, VIA NATURAL OR ARTIFICIAL OPENING ENDOSCOPIC: ICD-10-PCS | Performed by: SPECIALIST

## 2024-09-19 PROCEDURE — 0TCB8ZZ EXTIRPATION OF MATTER FROM BLADDER, VIA NATURAL OR ARTIFICIAL OPENING ENDOSCOPIC: ICD-10-PCS | Performed by: SPECIALIST

## 2024-09-19 PROCEDURE — 99223 1ST HOSP IP/OBS HIGH 75: CPT | Performed by: INTERNAL MEDICINE

## 2024-09-19 PROCEDURE — 74420 UROGRAPHY RTRGR +-KUB: CPT

## 2024-09-19 PROCEDURE — 83735 ASSAY OF MAGNESIUM: CPT

## 2024-09-19 PROCEDURE — C1769 GUIDE WIRE: HCPCS | Performed by: SPECIALIST

## 2024-09-19 PROCEDURE — 0TP98DZ REMOVAL OF INTRALUMINAL DEVICE FROM URETER, VIA NATURAL OR ARTIFICIAL OPENING ENDOSCOPIC: ICD-10-PCS | Performed by: SPECIALIST

## 2024-09-19 PROCEDURE — 85007 BL SMEAR W/DIFF WBC COUNT: CPT

## 2024-09-19 PROCEDURE — 80053 COMPREHEN METABOLIC PANEL: CPT

## 2024-09-19 PROCEDURE — 0TC68ZZ EXTIRPATION OF MATTER FROM RIGHT URETER, VIA NATURAL OR ARTIFICIAL OPENING ENDOSCOPIC: ICD-10-PCS | Performed by: SPECIALIST

## 2024-09-19 DEVICE — INLAY URETERAL STENT W/O GUIDEWIRE
Type: IMPLANTABLE DEVICE | Site: URINARY BLADDER | Status: FUNCTIONAL
Brand: BARD® INLAY® URETERAL STENT

## 2024-09-19 RX ORDER — HEPARIN SODIUM 5000 [USP'U]/ML
5000 INJECTION, SOLUTION INTRAVENOUS; SUBCUTANEOUS EVERY 8 HOURS SCHEDULED
Status: DISCONTINUED | OUTPATIENT
Start: 2024-09-20 | End: 2024-09-21 | Stop reason: HOSPADM

## 2024-09-19 RX ORDER — DEXAMETHASONE SODIUM PHOSPHATE 10 MG/ML
INJECTION, SOLUTION INTRAMUSCULAR; INTRAVENOUS AS NEEDED
Status: DISCONTINUED | OUTPATIENT
Start: 2024-09-19 | End: 2024-09-19

## 2024-09-19 RX ORDER — PHENYLEPHRINE HCL IN 0.9% NACL 1 MG/10 ML
SYRINGE (ML) INTRAVENOUS AS NEEDED
Status: DISCONTINUED | OUTPATIENT
Start: 2024-09-19 | End: 2024-09-19

## 2024-09-19 RX ORDER — MAGNESIUM HYDROXIDE/ALUMINUM HYDROXICE/SIMETHICONE 120; 1200; 1200 MG/30ML; MG/30ML; MG/30ML
30 SUSPENSION ORAL EVERY 6 HOURS PRN
Status: DISCONTINUED | OUTPATIENT
Start: 2024-09-19 | End: 2024-09-21 | Stop reason: HOSPADM

## 2024-09-19 RX ORDER — EPHEDRINE SULFATE 50 MG/ML
INJECTION INTRAVENOUS AS NEEDED
Status: DISCONTINUED | OUTPATIENT
Start: 2024-09-19 | End: 2024-09-19

## 2024-09-19 RX ORDER — PROPOFOL 10 MG/ML
INJECTION, EMULSION INTRAVENOUS AS NEEDED
Status: DISCONTINUED | OUTPATIENT
Start: 2024-09-19 | End: 2024-09-19

## 2024-09-19 RX ORDER — FENTANYL CITRATE 50 UG/ML
INJECTION, SOLUTION INTRAMUSCULAR; INTRAVENOUS AS NEEDED
Status: DISCONTINUED | OUTPATIENT
Start: 2024-09-19 | End: 2024-09-19

## 2024-09-19 RX ORDER — ONDANSETRON 2 MG/ML
4 INJECTION INTRAMUSCULAR; INTRAVENOUS ONCE AS NEEDED
Status: DISCONTINUED | OUTPATIENT
Start: 2024-09-19 | End: 2024-09-19 | Stop reason: HOSPADM

## 2024-09-19 RX ORDER — SODIUM CHLORIDE, SODIUM LACTATE, POTASSIUM CHLORIDE, CALCIUM CHLORIDE 600; 310; 30; 20 MG/100ML; MG/100ML; MG/100ML; MG/100ML
100 INJECTION, SOLUTION INTRAVENOUS CONTINUOUS
Status: DISPENSED | OUTPATIENT
Start: 2024-09-19 | End: 2024-09-19

## 2024-09-19 RX ORDER — ONDANSETRON 2 MG/ML
INJECTION INTRAMUSCULAR; INTRAVENOUS AS NEEDED
Status: DISCONTINUED | OUTPATIENT
Start: 2024-09-19 | End: 2024-09-19

## 2024-09-19 RX ORDER — SODIUM CHLORIDE, SODIUM LACTATE, POTASSIUM CHLORIDE, CALCIUM CHLORIDE 600; 310; 30; 20 MG/100ML; MG/100ML; MG/100ML; MG/100ML
INJECTION, SOLUTION INTRAVENOUS CONTINUOUS PRN
Status: DISCONTINUED | OUTPATIENT
Start: 2024-09-19 | End: 2024-09-19

## 2024-09-19 RX ORDER — CEFAZOLIN SODIUM 2 G/50ML
2000 SOLUTION INTRAVENOUS ONCE
Status: CANCELLED | OUTPATIENT
Start: 2024-09-19

## 2024-09-19 RX ORDER — LIDOCAINE HYDROCHLORIDE 10 MG/ML
INJECTION, SOLUTION EPIDURAL; INFILTRATION; INTRACAUDAL; PERINEURAL AS NEEDED
Status: DISCONTINUED | OUTPATIENT
Start: 2024-09-19 | End: 2024-09-19

## 2024-09-19 RX ORDER — FENTANYL CITRATE/PF 50 MCG/ML
50 SYRINGE (ML) INJECTION
Status: DISCONTINUED | OUTPATIENT
Start: 2024-09-19 | End: 2024-09-19 | Stop reason: HOSPADM

## 2024-09-19 RX ORDER — MAGNESIUM HYDROXIDE 1200 MG/15ML
LIQUID ORAL AS NEEDED
Status: DISCONTINUED | OUTPATIENT
Start: 2024-09-19 | End: 2024-09-19 | Stop reason: HOSPADM

## 2024-09-19 RX ORDER — LEVOFLOXACIN 5 MG/ML
500 INJECTION, SOLUTION INTRAVENOUS EVERY 24 HOURS
Status: COMPLETED | OUTPATIENT
Start: 2024-09-19 | End: 2024-09-19

## 2024-09-19 RX ORDER — CEFAZOLIN SODIUM 2 G/50ML
SOLUTION INTRAVENOUS AS NEEDED
Status: DISCONTINUED | OUTPATIENT
Start: 2024-09-19 | End: 2024-09-19

## 2024-09-19 RX ADMIN — SODIUM CHLORIDE, SODIUM LACTATE, POTASSIUM CHLORIDE, AND CALCIUM CHLORIDE: .6; .31; .03; .02 INJECTION, SOLUTION INTRAVENOUS at 12:09

## 2024-09-19 RX ADMIN — LIDOCAINE HYDROCHLORIDE 50 MG: 10 INJECTION, SOLUTION EPIDURAL; INFILTRATION; INTRACAUDAL; PERINEURAL at 12:17

## 2024-09-19 RX ADMIN — LEVOFLOXACIN 500 MG: 500 INJECTION, SOLUTION INTRAVENOUS at 14:46

## 2024-09-19 RX ADMIN — DEXAMETHASONE SODIUM PHOSPHATE 10 MG: 10 INJECTION, SOLUTION INTRAMUSCULAR; INTRAVENOUS at 12:21

## 2024-09-19 RX ADMIN — GUAIFENESIN 1200 MG: 600 TABLET, EXTENDED RELEASE ORAL at 08:28

## 2024-09-19 RX ADMIN — AMLODIPINE BESYLATE 5 MG: 5 TABLET ORAL at 08:28

## 2024-09-19 RX ADMIN — EZETIMIBE 10 MG: 10 TABLET ORAL at 08:28

## 2024-09-19 RX ADMIN — INSULIN LISPRO 2 UNITS: 100 INJECTION, SOLUTION INTRAVENOUS; SUBCUTANEOUS at 21:15

## 2024-09-19 RX ADMIN — INSULIN LISPRO 5 UNITS: 100 INJECTION, SOLUTION INTRAVENOUS; SUBCUTANEOUS at 17:09

## 2024-09-19 RX ADMIN — GUAIFENESIN 1200 MG: 600 TABLET, EXTENDED RELEASE ORAL at 17:09

## 2024-09-19 RX ADMIN — B-COMPLEX W/ C & FOLIC ACID TAB 1 TABLET: TAB at 08:28

## 2024-09-19 RX ADMIN — CEFAZOLIN SODIUM 2000 MG: 2 SOLUTION INTRAVENOUS at 12:10

## 2024-09-19 RX ADMIN — EPHEDRINE SULFATE 10 MG: 50 INJECTION, SOLUTION INTRAVENOUS at 12:32

## 2024-09-19 RX ADMIN — EPHEDRINE SULFATE 10 MG: 50 INJECTION, SOLUTION INTRAVENOUS at 12:31

## 2024-09-19 RX ADMIN — PAROXETINE 20 MG: 20 TABLET, FILM COATED ORAL at 08:28

## 2024-09-19 RX ADMIN — PROPOFOL 200 MG: 10 INJECTION, EMULSION INTRAVENOUS at 12:17

## 2024-09-19 RX ADMIN — METOPROLOL TARTRATE 50 MG: 50 TABLET, FILM COATED ORAL at 08:28

## 2024-09-19 RX ADMIN — INSULIN GLARGINE 10 UNITS: 100 INJECTION, SOLUTION SUBCUTANEOUS at 21:15

## 2024-09-19 RX ADMIN — SODIUM CHLORIDE, SODIUM LACTATE, POTASSIUM CHLORIDE, AND CALCIUM CHLORIDE 100 ML/HR: .6; .31; .03; .02 INJECTION, SOLUTION INTRAVENOUS at 14:16

## 2024-09-19 RX ADMIN — Medication 100 MCG: at 12:37

## 2024-09-19 RX ADMIN — FENTANYL CITRATE 50 MCG: 50 INJECTION, SOLUTION INTRAMUSCULAR; INTRAVENOUS at 12:23

## 2024-09-19 RX ADMIN — CEFTRIAXONE 2000 MG: 2 INJECTION, SOLUTION INTRAVENOUS at 00:17

## 2024-09-19 RX ADMIN — EPHEDRINE SULFATE 10 MG: 50 INJECTION, SOLUTION INTRAVENOUS at 12:30

## 2024-09-19 RX ADMIN — PRAVASTATIN SODIUM 40 MG: 40 TABLET ORAL at 08:28

## 2024-09-19 RX ADMIN — INSULIN LISPRO 2 UNITS: 100 INJECTION, SOLUTION INTRAVENOUS; SUBCUTANEOUS at 17:09

## 2024-09-19 RX ADMIN — EPHEDRINE SULFATE 10 MG: 50 INJECTION, SOLUTION INTRAVENOUS at 12:27

## 2024-09-19 RX ADMIN — HEPARIN SODIUM 5000 UNITS: 5000 INJECTION, SOLUTION INTRAVENOUS; SUBCUTANEOUS at 05:46

## 2024-09-19 RX ADMIN — EPHEDRINE SULFATE 10 MG: 50 INJECTION, SOLUTION INTRAVENOUS at 12:29

## 2024-09-19 RX ADMIN — FENTANYL CITRATE 50 MCG: 50 INJECTION, SOLUTION INTRAMUSCULAR; INTRAVENOUS at 12:54

## 2024-09-19 RX ADMIN — METOPROLOL TARTRATE 50 MG: 50 TABLET, FILM COATED ORAL at 21:15

## 2024-09-19 RX ADMIN — ONDANSETRON 4 MG: 2 INJECTION INTRAMUSCULAR; INTRAVENOUS at 12:32

## 2024-09-19 NOTE — DISCHARGE INSTR - AVS FIRST PAGE
#1 no heavy straining or lifting above 10 pounds for 2 weeks    #2 call office fevers, chills, or worsening blood in the urine.    # Patient has follow-up Tuesday, September 24 at 245PM      Chau Hassan M.D. [facility] office  [address] [place] [address].  [place] [address]  [phone]  8:30 AM to 4:30 PM  Monday through Friday    [facility]  [address]  [address]  [place] [id number] [phone]  1:00 to 5:00 PM  Wednesday

## 2024-09-19 NOTE — ANESTHESIA POSTPROCEDURE EVALUATION
Post-Op Assessment Note    CV Status:  Stable  Pain Score: 0    Pain management: adequate    Multimodal analgesia used between 6 hours prior to anesthesia start to PACU discharge    Mental Status:  Alert   Hydration Status:  Stable   PONV Controlled:  None   Airway Patency:  Patent  Two or more mitigation strategies used for obstructive sleep apnea   Post Op Vitals Reviewed: Yes    No anethesia notable event occurred.    Staff: CRNA               BP   141/71   Temp 97.8   Pulse 75   Resp 16   SpO2 99

## 2024-09-19 NOTE — CONSULTS
NEPHROLOGY HOSPITAL CONSULTATION   Bonnie SANDHU Apgar 59 y.o. female MRN: [id number]  Unit/Bed#: [address] [place] [address] Encounter: [id number]      Assessment & Plan  LEANDRO (acute kidney injury) (HCC)  -Present on admission  -Likely has underlying CKD.  Has moderate atrophic right kidney.  8.8 cm.  Left kidney 12.4 cm.  -Patient has a history of LEANDRO in 2023 due to ATN/prerenal/emphysematous pyelonephritis  - Baseline creatinine-1.2 mg/dL  - Admission creatinine-1.9 mg/dL  - Urinalysis with nitrite positive, 10-20 WBC, bacteria  - Renal imaging-right ureteral stent in proper position.  No hydronephrosis.  Moderately atrophic right kidney.  - To note patient was on antibiotics this admission.  Also received one-time Lasix IV .  And did receive initial intravenous fluids.  - To note patient is on perindopril at home    Overall, patient presents with LEANDRO on admission with creatinine 1.9 mg/dL on 9/15 and has remained essentially unchanged at 1.8 mg/dL on  prompting nephrology consultation.  Creatinine was 1.2 mg/dL in 2024.  Initially had presented with SVT with rapid heart rate but I do not note significant hypotensive episodes.  Was with leukocytosis initially.  There was initial concern for possible urosepsis but urine culture has returned with mixed contaminants.  Blood cultures without any growth.  Patient may have had LEANDRO in the setting of ATN in the setting of hemodynamic perturbations while on ARB at home..  With patient does have atrophic right kidney and we will likely need to obtain a renal Doppler study.  Patient appears to have been adequately volume expanded initially and chest x-ray was with pulmonary edema.  Therefore I am not suspecting a large prerenal component at this juncture.  To note, patient has had a ureteral stent since October and did not follow-up with urology.  Had basket extraction of large bladder stone and right stent exchange.  Overall, I am concerned that there is likely ATN but  "patient also may have had progression of kidney disease given right renal atrophy.    Plan  - Agree with intravenous fluids postoperatively  - Agree with holding ACE inhibitor  - Lab work again in a.m.  - Consider renal Doppler ultrasound inpatient versus outpatient  - Agree with fluids but short course tonight  At risk for acid-base imbalance  -Monitoring closely  Electrolyte imbalance risk  -Initially with hyponatremia which has resolved.  With volume expansion.  Essential hypertension  -Home regimen includes amlodipine 5 mg daily, perindopril 8 mg daily  - Currently on amlodipine with hold parameters.  - Now on metoprolol due to SVT  - Holding ACE inhibitor due to LEANDRO  SVT (supraventricular tachycardia)  -Initially received adenosine and Lopressor in the ER  Hyperlipidemia  -Per primary team  UTI (urinary tract infection)  -Unclear if true infection  - Patient had stent placed in October 2023 but did not have it removed or follow-up as outpatient with urologist  - Urology consulted and took the patient to the OR on 9/19 for cystoscopy.  Abnormal chest x-ray  -There was question of right-sided infiltrate initially and patient was started on broad-spectrum antibiotics.  X-ray read with mild pulmonary vascular congestion.  - Repeat chest x-ray moderate vascular congestion  Pulmonary hypertension (HCC)  -Echocardiogram 9/16 with EF 60%, unable to assess diastolic dysfunction, RV cavity size normal systolic function is normal.  PASP 50.    I have reviewed the nephrology recommendations including monitoring with lab work in a.m., with primary team attending, and we are in agreement with renal plan including the information outlined above.    Portions of the record may have been created with voice recognition software. Occasional wrong word or \"sound a like\" substitutions may have occurred due to the inherent limitations of voice recognition software. Read the chart carefully and recognize, using context, where " substitutions have occurred.If you have any questions, please contact the dictating provider.    HISTORY OF PRESENT ILLNESS:  Requesting Physician: Jose Gomez MD  Reason for Consult: Acute kidney injury    Brenda S Apgar is a 59 y.o. female with a past medical history of LEANDRO 2023, hyperlipidemia, hypertension, depression, emphysematous pyelonephritis in 2023 who was admitted to [facility] after presenting with palpitations. A renal consultation is requested today for assistance in the management of acute kidney injury..  Patient reports she was in her usual state of health until the day of admission when she started noticing sudden onset of palpitations while watching TV.  She denies any fevers, chills, nausea, vomiting, diarrhea, NSAID use.    Initially patient was noted to have SVT with heart rate 200s.  Received adenosine and metoprolol.  There was also initial concern for possible sepsis with possible urinary source with pyuria.  Was started on broad-spectrum antibiotics.    PAST MEDICAL HISTORY:  Past Medical History:   Diagnosis Date    Diabetes mellitus (HCC)     Elevated LFTs 2024    HLD (hyperlipidemia)     Hypertension     Pulmonary vascular congestion 2024    SVT (supraventricular tachycardia)        PAST SURGICAL HISTORY:  Past Surgical History:   Procedure Laterality Date    CHOLECYSTECTOMY      CYSTOSCOPY W/ URETERAL STENT PLACEMENT Right     FL RETROGRADE PYELOGRAM  10/19/2023    FL RETROGRADE PYELOGRAM  2024    PA CYSTO BLADDER W/URETERAL CATHETERIZATION Right 10/19/2023    Procedure: CYSTOSCOPY RETROGRADE PYELOGRAM WITH INSERTION STENT URETERAL;  Surgeon: Chau Hassan MD;  Location: [facility];  Service: Urology       ALLERGIES:  Allergies   Allergen Reactions    Other Angioedema     HONEY DEW MELON       SOCIAL HISTORY:  Social History     Substance and Sexual Activity   Alcohol Use Never     Social History     Substance and Sexual Activity   Drug Use Never      Social History     Tobacco Use   Smoking Status Never   Smokeless Tobacco Never       FAMILY HISTORY:  Family History   Problem Relation Age of Onset    Diabetes type II Mother     Hypertension Mother     Brain cancer Sister     Brain cancer Sister     Hypertension Brother     Breast cancer Maternal Aunt     Liver disease Maternal Uncle     No Known Problems Maternal Grandmother     No Known Problems Maternal Grandfather     No Known Problems Paternal Grandmother     No Known Problems Paternal Grandfather        MEDICATIONS:    Current Facility-Administered Medications:     acetaminophen (TYLENOL) tablet 650 mg, 650 mg, Oral, Q8H PRN, Chau Hassan MD    aluminum-magnesium hydroxide-simethicone (MAALOX) oral suspension 30 mL, 30 mL, Oral, Q6H PRN, Cahu Hassan MD    amLODIPine (NORVASC) tablet 5 mg, 5 mg, Oral, Daily, Chau Hassan MD, 5 mg at 09/19/24 0828    ezetimibe (ZETIA) tablet 10 mg, 10 mg, Oral, Daily, Chau Hassan MD, 10 mg at 09/19/24 0828    guaiFENesin (MUCINEX) 12 hr tablet 1,200 mg, 1,200 mg, Oral, BID, Chau Hassan MD, 1,200 mg at 09/19/24 0828    [START ON 9/20/2024] heparin (porcine) subcutaneous injection 5,000 Units, 5,000 Units, Subcutaneous, Q8H NAZ, Chau Hassan MD    insulin glargine (LANTUS) subcutaneous injection 10 Units 0.1 mL, 10 Units, Subcutaneous, HS, Chau Hassan MD, 10 Units at 09/18/24 2154    insulin lispro (HumALOG/ADMELOG) 100 units/mL subcutaneous injection 1-5 Units, 1-5 Units, Subcutaneous, TID AC, 1 Units at 09/18/24 1712 **AND** Fingerstick Glucose (POCT), , , TID AC, Chau Hassan MD    insulin lispro (HumALOG/ADMELOG) 100 units/mL subcutaneous injection 1-5 Units, 1-5 Units, Subcutaneous, HS, Chau Hassan MD, 1 Units at 09/17/24 2135    insulin lispro (HumALOG/ADMELOG) 100 units/mL subcutaneous injection 5 Units, 5 Units, Subcutaneous, TID With Meals, Chau Hassan MD, 5 Units at 09/18/24 1712    lactated ringers bolus 1,000 mL, 1,000 mL,  Intravenous, Once PRN **AND** lactated ringers bolus 1,000 mL, 1,000 mL, Intravenous, Once PRN, Chau Hassan MD    lactated ringers infusion, 100 mL/hr, Intravenous, Continuous, Briana Ryan MD, Last Rate: 100 mL/hr at 09/19/24 1416, 100 mL/hr at 09/19/24 1416    levofloxacin (LEVAQUIN) IVPB (premix in dextrose) 500 mg 100 mL, 500 mg, Intravenous, Q24H, Chau Hassan MD, Last Rate: 100 mL/hr at 09/19/24 1446, 500 mg at 09/19/24 1446    metoprolol tartrate (LOPRESSOR) tablet 50 mg, 50 mg, Oral, Q12H NAZ, Chau Hassan MD, 50 mg at 09/19/24 0828    multivitamin stress formula tablet 1 tablet, 1 tablet, Oral, Daily, Chau Hassan MD, 1 tablet at 09/19/24 0828    ondansetron (ZOFRAN) injection 4 mg, 4 mg, Intravenous, Q6H PRN, Chau Hassan MD    PARoxetine (PAXIL) tablet 20 mg, 20 mg, Oral, Daily, Chau Hassan MD, 20 mg at 09/19/24 0828    pravastatin (PRAVACHOL) tablet 40 mg, 40 mg, Oral, Daily, Chau Hassan MD, 40 mg at 09/19/24 0828    sodium chloride 0.9 % bolus 1,000 mL, 1,000 mL, Intravenous, Once PRN **AND** sodium chloride 0.9 % bolus 1,000 mL, 1,000 mL, Intravenous, Once PRN, Chau aHssan MD    REVIEW OF SYSTEMS:    All the systems were reviewed and were negative except as documented on the HPI.    PHYSICAL EXAM:  Current Weight: Weight - Scale: 116 kg (255 lb)  First Weight: Weight - Scale: 116 kg (255 lb 3.2 oz)  Vitals:    09/19/24 1316 09/19/24 1329 09/19/24 1333 09/19/24 1358   BP: 139/70 125/66 122/66 129/68   BP Location:       Pulse: 74 72 67 70   Resp: 22 16 16    Temp:    (!) 97.3 °F (36.3 °C)   TempSrc:       SpO2: 96% 97% 97% 91%   Weight:       Height:           Intake/Output Summary (Last 24 hours) at 9/19/2024 1544  Last data filed at 9/19/2024 1252  Gross per 24 hour   Intake 528 ml   Output 2800 ml   Net -2272 ml     Physical Exam  General: NAD  Skin: no rash  Eyes: anicteric sclera  ENT: moist mucous membrane  Neck: supple  Chest: CTA b/l, no ronchii, no wheeze, no rubs,  no rales  CVS: s1s2, no murmur, no gallop, no rub  Abdomen: soft, nontender, nl sounds  Extremities: no edema LE b/l  : no luna  Neuro: AAOX3  Psych: normal affect      Invasive Devices:      Lab Results:   Results from last 7 days   Lab Units 09/19/24  0550 09/18/24  0531 09/17/24  0531 09/16/24  0547   WBC Thousand/uL 16.16* 15.88* 14.15* 11.42*   HEMOGLOBIN g/dL 11.6 11.5 12.3 11.2*   HEMATOCRIT % 35.0 34.4* 38.1 33.5*   PLATELETS Thousands/uL 521* 412* 306 279   POTASSIUM mmol/L 3.8 3.8 3.6 3.8   CHLORIDE mmol/L 106 104 104 101   CO2 mmol/L 25 24 19* 20*   BUN mg/dL 38* 35* 31* 30*   CREATININE mg/dL 1.81* 1.79* 1.69* 1.85*   CALCIUM mg/dL 8.7 8.5 8.2* 8.1*   MAGNESIUM mg/dL 2.1 1.8*  --  2.3   ALK PHOS U/L 145*  --  158* 128*   ALT U/L 37  --  45 45   AST U/L 18  --  33 31

## 2024-09-19 NOTE — ASSESSMENT & PLAN NOTE
Chest x-ray showed mild central pulmonary vascular congestion  S/p lasix x 1 with improvement in breathing  On room air with oxygen needs at night.   Mostly secondary to undiagnosed JENNIFER

## 2024-09-19 NOTE — PROGRESS NOTES
Progress Note - Hospitalist   Name: Bonnie SANDHU Apgar 59 y.o. female I MRN: [id number]  Unit/Bed#: [address] [place] [address] Date of Admission: 9/15/2024   Date of Service: 2024 I Hospital Day: 4    Assessment & Plan  SVT (supraventricular tachycardia)  Patient presents with acute onset palpitation while watching TV, with associated dizziness when getting onto stretcher with EMS.  Denies chest pain SOB.  Reports history of same thing 20 years ago and another episode a few years ago and both episodes resolved when she made herself throw up. Patient reports not feeling well, started with productive cough on Thursday 3 days ago.  Reports has a right ureteral stent since last hospitalization in October last year.  Initial EKG in ED showed SVT, rate 224  Received adenosine x 2 and metoprolol 5 mg IV x 1 in ED  Currently in sinus rhythm  Telemetry showed no atrial fibrillation or further episodes of SVT  2D echo showed EF of 60%, unable to assess diastolic function without any significant valvular abnormalities, moderately elevated right ventricular systolic pressure at 50 mmHg-night pulse oximetry ordered rule out sleep apnea    Cardiology team following  Patient started on metoprolol 25 mg twice daily  Outpatient follow-up with cardiology    Sepsis ([facility])  Sepsis, present on admission, as evidenced by leukocytosis, tachycardia, with suspected source of infection UTI  Lactic acid normal  Initial procalcitonin 0.33 >0.26  UA positive nitrite large leukocytes.  Most probably UTI secondary to nephrolithiasis   Completed 3 days of azithromycin. D/c  Currently on ceftriaxone   Urine antigens negative.  Chest x-ray with no consolidation noted.  Blood cultures and urine cultures negative to date  Most probably sepsis secondary to UTI  Most probably UTI secondary to nephrolithiasis       Results from last 7 days   Lab Units 24  0531 24  0013 09/15/24  2212   LACTIC ACID mmol/L  --  0.7  --    PROCALCITONIN ng/ml 0.26*  --   0.33*     Results from last 7 days   Lab Units 09/19/24  0550 09/18/24  0531 09/17/24  0531 09/16/24  0547 09/15/24  2212   WBC Thousand/uL 16.16* 15.88* 14.15* 11.42* 14.46*   TOTAL NEUT ABS Thousand/uL 12.60*  --   --   --   --         LEANDRO (acute kidney injury) (Former[facility])  Baseline creatinine 0.9-1.2 in past year    Unclear etiology.  Patient is on perindopril at home.  Renal ultrasound: Right ureteral stent in proper position without any evidence of hydronephrosis with moderately atrophic right kidney  Monitor PVR  Continue to hold perindopril  Repeat BMP in the morning  Urology  team consulted; plan for cystoscopy on 9/19  Consult nephrology.  Appreciate recommendations   check renal ultrasound    Results from last 7 days   Lab Units 09/19/24  0550 09/18/24  0531 09/17/24  0531 09/16/24  0547 09/15/24  2212   BUN mg/dL 38* 35* 31* 30* 32*   CREATININE mg/dL 1.81* 1.79* 1.69* 1.85* 1.95*          UTI (urinary tract infection)  History of right-sided emphysematous pyelonephritis in 10/2023, required ureteral stent placement.  Urine culture grew pan susceptible E. Coli at the time.  Day 4 ceftriaxone  Plan to modify antibiotics based on cystoscopy results and urology recommendations   Consult urology for retained right ureteral stent; plan for cystoscopy on 9/19  Most probably UTI secondary to nephrolithiasis   Essential hypertension  On perindopril at home.  Hold due to LEANDRO  Blood pressure slightly on the high side  Started on metoprolol 12.5 mg p.o. twice daily  Hyperlipidemia  Continue statin  Depression  Continue Paxil  Type 2 diabetes mellitus with hyperglycemia, without long-term current use of insulin (Formerly [facility])  Lab Results   Component Value Date    HGBA1C 8.4 (H) 06/10/2024       Recent Labs     09/18/24 2043 09/18/24 2129 09/19/24  0717 09/19/24  1128   POCGLU 79 123 137 154*       Blood Sugar Average: Last 72 hrs:  (P) 152.4375  On Lantus 20 units SQ at bedtime, Humalog 5 units 3 times daily with meals.  Will  continue.  Continue Humalog sliding scale with Accu-Cheks before every meal and at bedtime  Diabetic diet  Morbid obesity (HCC)  Diet and lifestyle modification  Abnormal chest x-ray  Chest x-ray showed mild central pulmonary vascular congestion  S/p lasix x 1 with improvement in breathing  On room air with oxygen needs at night.   Mostly secondary to undiagnosed JENNIFER     Suspected sleep apnea  Outpatient follow-up with sleep medicine  Pulmonary hypertension (HCC)  Ambulatory referral to cardiology and sleep medicine  Pulmonary vascular congestion (Resolved: 9/19/2024)  9/16/24 CXR: Mild central pulmonary vascular congestion.   Patient with episodes of hypoxia.    Will give one-time dose of Lasix 20 mg IV  Follow up 9/17/24 CXR.   Cardiology team following   Case discussed with cardiology and they agree with the plan above   Preop cardiovascular exam      VTE Pharmacologic Prophylaxis: VTE Score: 5 Moderate Risk (Score 3-4) - Pharmacological DVT Prophylaxis Ordered: heparin.    Mobility:   Basic Mobility Inpatient Raw Score: 23  JH-HLM Goal: 7: Walk 25 feet or more  JH-HLM Achieved: 7: Walk 25 feet or more  JH-HLM Goal achieved. Continue to encourage appropriate mobility.    Patient Centered Rounds: I performed bedside rounds with nursing staff today.   Discussions with Specialists or Other Care Team Provider: urology, cardiology     Education and Discussions with Family / Patient:  patient .     Current Length of Stay: 4 day(s)  Current Patient Status: Inpatient   Certification Statement: The patient will continue to require additional inpatient hospital stay due to UTI , sepsis   Discharge Plan: Anticipate discharge in 24-48 hrs to home.    Code Status: Level 1 - Full Code    Subjective   Patient was seen today at bedside. Reports no active complaints.   No chest pain or tightness, SOB or cough, dizziness or light headedness, N/V, Diarrhea of constipation.   No active urinary symptoms  Tolerating oral diet.        Objective     Vitals:   Temp (24hrs), Av.8 °F (36.6 °C), Min:97.4 °F (36.3 °C), Max:98.1 °F (36.7 °C)    Temp:  [97.4 °F (36.3 °C)-98.1 °F (36.7 °C)] 97.8 °F (36.6 °C)  HR:  [65-83] 72  Resp:  [16-19] 19  BP: (141-155)/(71-78) 148/76  SpO2:  [91 %-93 %] 91 %  Body mass index is 41.16 kg/m².     Input and Output Summary (last 24 hours):     Intake/Output Summary (Last 24 hours) at 2024 1143  Last data filed at 2024 0844  Gross per 24 hour   Intake 418 ml   Output 3850 ml   Net -3432 ml       Physical Exam  Vitals and nursing note reviewed.   Constitutional:       General: She is not in acute distress.     Appearance: Normal appearance. She is not ill-appearing or diaphoretic.   HENT:      Head: Normocephalic and atraumatic.   Eyes:      Conjunctiva/sclera: Conjunctivae normal.   Cardiovascular:      Rate and Rhythm: Normal rate and regular rhythm.      Heart sounds: Normal heart sounds. No murmur heard.  Pulmonary:      Effort: Pulmonary effort is normal. No respiratory distress.      Breath sounds: Normal breath sounds. No wheezing, rhonchi or rales.   Abdominal:      General: There is no distension.      Palpations: Abdomen is soft. There is no mass.      Tenderness: There is no abdominal tenderness. There is no guarding or rebound.   Musculoskeletal:      Cervical back: Normal range of motion and neck supple. No rigidity.      Right lower leg: No edema.      Left lower leg: No edema.   Lymphadenopathy:      Cervical: No cervical adenopathy.   Neurological:      Mental Status: She is alert and oriented to person, place, and time.   Psychiatric:         Mood and Affect: Mood normal.         Behavior: Behavior normal.         Thought Content: Thought content normal.         Judgment: Judgment normal.          Lines/Drains:  Lines/Drains/Airways       Active Status       None                      Telemetry:  Telemetry Orders (From admission, onward)               24 Hour Telemetry Monitoring   Continuous x 24 Hours (Telem)        Question:  Reason for 24 Hour Telemetry  Answer:  Arrhythmias requiring acute medical intervention / PPM or ICD malfunction                     Telemetry Reviewed: Normal Sinus Rhythm  Indication for Continued Telemetry Use: per cardiology                Lab Results: I have reviewed the following results: CBC/BMP:   .     09/19/24  0550   WBC 16.16*   HGB 11.6   HCT 35.0   *   SODIUM 141   K 3.8      CO2 25   BUN 38*   CREATININE 1.81*   GLUC 132    , Creatinine Clearance: Estimated Creatinine Clearance: 42.8 mL/min (A) (by C-G formula based on SCr of 1.81 mg/dL (H))., LFTs:   .     09/19/24  0550   AST 18   ALT 37   ALB 3.1*   TBILI 0.35   ALKPHOS 145*       Results from last 7 days   Lab Units 09/19/24  0550 09/18/24  0531   WBC Thousand/uL 16.16* 15.88*   HEMOGLOBIN g/dL 11.6 11.5   HEMATOCRIT % 35.0 34.4*   PLATELETS Thousands/uL 521* 412*   SEGS PCT %  --  72   LYMPHO PCT % 10* 14   MONO PCT % 5 7   EOS PCT % 6 4     Results from last 7 days   Lab Units 09/19/24  0550   SODIUM mmol/L 141   POTASSIUM mmol/L 3.8   CHLORIDE mmol/L 106   CO2 mmol/L 25   BUN mg/dL 38*   CREATININE mg/dL 1.81*   ANION GAP mmol/L 10   CALCIUM mg/dL 8.7   ALBUMIN g/dL 3.1*   TOTAL BILIRUBIN mg/dL 0.35   ALK PHOS U/L 145*   ALT U/L 37   AST U/L 18   GLUCOSE RANDOM mg/dL 132         Results from last 7 days   Lab Units 09/19/24  1128 09/19/24  0717 09/18/24  2129 09/18/24  2043 09/18/24  1551 09/18/24  1104 09/18/24  0707 09/17/24  2028 09/17/24  1611 09/17/24  1152 09/17/24  0724 09/16/24 2011   POC GLUCOSE mg/dl 154* 137 123 79 152* 182* 121 174* 97 171* 155* 187*         Results from last 7 days   Lab Units 09/17/24  0531 09/16/24  0013 09/15/24  2212   LACTIC ACID mmol/L  --  0.7  --    PROCALCITONIN ng/ml 0.26*  --  0.33*       Recent Cultures (last 7 days):   Results from last 7 days   Lab Units 09/16/24  0331 09/16/24  0327 09/16/24  0013   BLOOD CULTURE   --   --  No Growth at 72  hrs.  No Growth at 72 hrs.   URINE CULTURE  10,000-19,000 cfu/ml  --   --    LEGIONELLA URINARY ANTIGEN   --  Negative  --        Imaging Review: No pertinent imaging studies reviewed.  Other Studies: EKG was reviewed.     Last 24 Hours Medication List:     Current Facility-Administered Medications:     acetaminophen (TYLENOL) tablet 650 mg, Q8H PRN    amLODIPine (NORVASC) tablet 5 mg, Daily    cefTRIAXone (ROCEPHIN) IVPB (premix in dextrose) 2,000 mg 50 mL, Q24H, Last Rate: 2,000 mg (09/19/24 0017)    ezetimibe (ZETIA) tablet 10 mg, Daily    guaiFENesin (MUCINEX) 12 hr tablet 1,200 mg, BID    heparin (porcine) subcutaneous injection 5,000 Units, Q8H NAZ    insulin glargine (LANTUS) subcutaneous injection 10 Units 0.1 mL, HS    insulin lispro (HumALOG/ADMELOG) 100 units/mL subcutaneous injection 1-5 Units, TID AC **AND** Fingerstick Glucose (POCT), TID AC    insulin lispro (HumALOG/ADMELOG) 100 units/mL subcutaneous injection 1-5 Units, HS    insulin lispro (HumALOG/ADMELOG) 100 units/mL subcutaneous injection 5 Units, TID With Meals    metoprolol tartrate (LOPRESSOR) tablet 50 mg, Q12H [facility]    multivitamin stress formula tablet 1 tablet, Daily    ondansetron (ZOFRAN) injection 4 mg, Q6H PRN    PARoxetine (PAXIL) tablet 20 mg, Daily    pravastatin (PRAVACHOL) tablet 40 mg, Daily    Administrative Statements   Today, Patient Was Seen By: Jose Gomez MD  I have spent a total time of 30 minutes in caring for this patient on the day of the visit/encounter including Diagnostic results, Prognosis, Risks and benefits of tx options, and Instructions for management.    **Please Note: This note may have been constructed using a voice recognition system.**

## 2024-09-19 NOTE — PLAN OF CARE
Problem: PAIN - ADULT  Goal: Verbalizes/displays adequate comfort level or baseline comfort level  Description: Interventions:  - Encourage patient to monitor pain and request assistance  - Assess pain using appropriate pain scale  - Administer analgesics based on type and severity of pain and evaluate response  - Implement non-pharmacological measures as appropriate and evaluate response  - Consider cultural and social influences on pain and pain management  - Notify physician/advanced practitioner if interventions unsuccessful or patient reports new pain  Outcome: Progressing     Problem: INFECTION - ADULT  Goal: Absence or prevention of progression during hospitalization  Description: INTERVENTIONS:  - Assess and monitor for signs and symptoms of infection  - Monitor lab/diagnostic results  - Monitor all insertion sites, i.e. indwelling lines, tubes, and drains  - Monitor endotracheal if appropriate and nasal secretions for changes in amount and color  - [place] appropriate cooling/warming therapies per order  - Administer medications as ordered  - Instruct and encourage patient and family to use good hand hygiene technique  - Identify and instruct in appropriate isolation precautions for identified infection/condition  Outcome: Progressing     Problem: SAFETY ADULT  Goal: Patient will remain free of falls  Description: INTERVENTIONS:  - Educate patient/family on patient safety including physical limitations  - Instruct patient to call for assistance with activity   - Consult OT/PT to assist with strengthening/mobility   - Keep Call bell within reach  - Keep bed low and locked with side rails adjusted as appropriate  - Keep care items and personal belongings within reach  - Initiate and maintain comfort rounds  - Make Fall Risk Sign visible to staff  - Offer Toileting every 2 Hours, in advance of need  - Initiate/Maintain bed/chair alarm  - Obtain necessary fall risk management equipment: bracelet/socks   -  Apply yellow socks and bracelet for high fall risk patients  - Consider moving patient to room near nurses station  Outcome: Progressing  Goal: Maintain or return to baseline ADL function  Description: INTERVENTIONS:  -  Assess patient's ability to carry out ADLs; assess patient's baseline for ADL function and identify physical deficits which impact ability to perform ADLs (bathing, care of mouth/teeth, toileting, grooming, dressing, etc.)  - Assess/evaluate cause of self-care deficits   - Assess range of motion  - Assess patient's mobility; develop plan if impaired  - Assess patient's need for assistive devices and provide as appropriate  - Encourage maximum independence but intervene and supervise when necessary  - Involve family in performance of ADLs  - Assess for home care needs following discharge   - Consider OT consult to assist with ADL evaluation and planning for discharge  - Provide patient education as appropriate  Outcome: Progressing  Goal: Maintains/Returns to pre admission functional level  Description: INTERVENTIONS:  - Perform AM-PAC 6 Click Basic Mobility/ Daily Activity assessment daily.  - Set and communicate daily mobility goal to care team and patient/family/caregiver.   - Collaborate with rehabilitation services on mobility goals if consulted  - Perform Range of Motion 12 times a day.  - Reposition patient every 2 hours.  - Dangle patient 3 times a day  - Stand patient 3 times a day  - Ambulate patient 3 times a day  - Out of bed to chair 3 times a day   - Out of bed for meals 3 times a day  - Out of bed for toileting  - Record patient progress and toleration of activity level   Outcome: Progressing     Problem: DISCHARGE PLANNING  Goal: Discharge to home or other facility with appropriate resources  Description: INTERVENTIONS:  - Identify barriers to discharge w/patient and caregiver  - Arrange for needed discharge resources and transportation as appropriate  - Identify discharge learning  needs (meds, wound care, etc.)  - Arrange for interpretive services to assist at discharge as needed  - Refer to Case Management Department for coordinating discharge planning if the patient needs post-hospital services based on physician/advanced practitioner order or complex needs related to functional status, cognitive ability, or social support system  Outcome: Progressing     Problem: Knowledge Deficit  Goal: Patient/family/caregiver demonstrates understanding of disease process, treatment plan, medications, and discharge instructions  Description: Complete learning assessment and assess knowledge base.  Interventions:  - Provide teaching at level of understanding  - Provide teaching via preferred learning methods  Outcome: Progressing     Problem: CARDIOVASCULAR - ADULT  Goal: Maintains optimal cardiac output and hemodynamic stability  Description: INTERVENTIONS:  - Monitor I/O, vital signs and rhythm  - Monitor for S/S and trends of decreased cardiac output  - Administer and titrate ordered vasoactive medications to optimize hemodynamic stability  - Assess quality of pulses, skin color and temperature  - Assess for signs of decreased coronary artery perfusion  - Instruct patient to report change in severity of symptoms  Outcome: Progressing  Goal: Absence of cardiac dysrhythmias or at baseline rhythm  Description: INTERVENTIONS:  - Continuous cardiac monitoring, vital signs, obtain 12 lead EKG if ordered  - Administer antiarrhythmic and heart rate control medications as ordered  - Monitor electrolytes and administer replacement therapy as ordered  Outcome: Progressing

## 2024-09-19 NOTE — ASSESSMENT & PLAN NOTE
History of right-sided emphysematous pyelonephritis in 10/2023, required ureteral stent placement.  Urine culture grew pan susceptible E. Coli at the time.  Day 4 ceftriaxone  Plan to modify antibiotics based on cystoscopy results and urology recommendations   Consult urology for retained right ureteral stent; plan for cystoscopy on 9/19  Most probably UTI secondary to nephrolithiasis

## 2024-09-19 NOTE — ASSESSMENT & PLAN NOTE
Baseline creatinine 0.9-1.2 in past year    Unclear etiology.  Patient is on perindopril at home.  Renal ultrasound: Right ureteral stent in proper position without any evidence of hydronephrosis with moderately atrophic right kidney  Monitor PVR  Continue to hold perindopril  Repeat BMP in the morning  Urology  team consulted; plan for cystoscopy on 9/19  Consult nephrology.  Appreciate recommendations   check renal ultrasound    Results from last 7 days   Lab Units 09/19/24  0550 09/18/24  0531 09/17/24  0531 09/16/24  0547 09/15/24  2212   BUN mg/dL 38* 35* 31* 30* 32*   CREATININE mg/dL 1.81* 1.79* 1.69* 1.85* 1.95*

## 2024-09-19 NOTE — ASSESSMENT & PLAN NOTE
-Unclear if true infection  - Patient had stent placed in October 2023 but did not have it removed or follow-up as outpatient with urologist  - Urology consulted and took the patient to the OR on 9/19 for cystoscopy.

## 2024-09-19 NOTE — ASSESSMENT & PLAN NOTE
Sepsis, present on admission, as evidenced by leukocytosis, tachycardia, with suspected source of infection UTI  Lactic acid normal  Initial procalcitonin 0.33 >0.26  UA positive nitrite large leukocytes.  Most probably UTI secondary to nephrolithiasis   Completed 3 days of azithromycin. D/c  Currently on ceftriaxone   Urine antigens negative.  Chest x-ray with no consolidation noted.  Blood cultures and urine cultures negative to date  Most probably sepsis secondary to UTI  Most probably UTI secondary to nephrolithiasis       Results from last 7 days   Lab Units 09/17/24  0531 09/16/24  0013 09/15/24  2212   LACTIC ACID mmol/L  --  0.7  --    PROCALCITONIN ng/ml 0.26*  --  0.33*     Results from last 7 days   Lab Units 09/19/24  0550 09/18/24  0531 09/17/24  0531 09/16/24  0547 09/15/24  2212   WBC Thousand/uL 16.16* 15.88* 14.15* 11.42* 14.46*   TOTAL NEUT ABS Thousand/uL 12.60*  --   --   --   --

## 2024-09-19 NOTE — ASSESSMENT & PLAN NOTE
-There was question of right-sided infiltrate initially and patient was started on broad-spectrum antibiotics.  X-ray read with mild pulmonary vascular congestion.  - Repeat chest x-ray moderate vascular congestion

## 2024-09-19 NOTE — ASSESSMENT & PLAN NOTE
-Home regimen includes amlodipine 5 mg daily, perindopril 8 mg daily  - Currently on amlodipine with hold parameters.  - Now on metoprolol due to SVT  - Holding ACE inhibitor due to LEANDRO

## 2024-09-19 NOTE — ASSESSMENT & PLAN NOTE
-Echocardiogram 9/16 with EF 60%, unable to assess diastolic dysfunction, RV cavity size normal systolic function is normal.  PASP 50.

## 2024-09-19 NOTE — ASSESSMENT & PLAN NOTE
-Present on admission  -Likely has underlying CKD.  Has moderate atrophic right kidney.  8.8 cm.  Left kidney 12.4 cm.  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  No hydronephrosis.  Moderately atrophic right kidney.  - To note patient was on antibiotics this admission.  Also received one-time Lasix IV 9/17.  And did receive initial intravenous fluids.  - To note patient is on perindopril at home    Overall, patient presents with LEANDRO on admission with creatinine 1.9 mg/dL on 9/15 and has remained essentially unchanged at 1.8 mg/dL on 9/19 prompting nephrology consultation.  Creatinine was 1.2 mg/dL in June 2024.  Initially had presented with SVT with rapid heart rate but I do not note significant hypotensive episodes.  Was with leukocytosis initially.  There was initial concern for possible urosepsis but urine culture has returned with mixed contaminants.  Blood cultures without any growth.  Patient may have had LEANDRO in the setting of ATN in the setting of hemodynamic perturbations while on ARB at home..  With patient does have atrophic right kidney and we will likely need to obtain a renal Doppler study.  Patient appears to have been adequately volume expanded initially and chest x-ray was with pulmonary edema.  Therefore I am not suspecting a large prerenal component at this juncture.  To note, patient has had a ureteral stent since October and did not follow-up with urology.  Had basket extraction of large bladder stone and right stent exchange.  Overall, I am concerned that there is likely ATN but patient also may have had progression of kidney disease given right renal atrophy.    Plan  - Agree with intravenous fluids postoperatively  - Agree with holding ACE inhibitor  - Lab work  again in a.m.  - Consider renal Doppler ultrasound inpatient versus outpatient  - Agree with fluids but short course tonight

## 2024-09-19 NOTE — ASSESSMENT & PLAN NOTE
Lab Results   Component Value Date    HGBA1C 8.4 (H) 06/10/2024       Recent Labs     09/18/24  2043 09/18/24  2129 09/19/24  0717 09/19/24  1128   POCGLU 79 123 137 154*       Blood Sugar Average: Last 72 hrs:  (P) 152.4375  On Lantus 20 units SQ at bedtime, Humalog 5 units 3 times daily with meals.  Will continue.  Continue Humalog sliding scale with Accu-Cheks before every meal and at bedtime  Diabetic diet

## 2024-09-19 NOTE — OP NOTE
OPERATIVE REPORT  PATIENT NAME: Brenda S Apgar    :  1965  MRN: [id number]  Pt Location: [facility] OR ROOM 04    SURGERY DATE: 2024    Surgeons and Role:     * Chau Hassan MD - Primary    Preop Diagnosis:  Ureteral stent present [Z96.0]  Nephrolithiasis [N20.0]  Acute cystitis without hematuria [N30.00]    Post-Op Diagnosis Codes:     * Ureteral stent present [Z96.0]     * Nephrolithiasis [N20.0]     * Acute cystitis without hematuria [N30.00]    Procedure(s):  Right - CYSTOSCOPY RETROGRADE PYELOGRAM.rIGHT URETEROSCOPE. BASKET EXTRACTION. RIGHT STENT EXCHANGE.  LASER LARGE BLADDER STONE 4.0 CM    Specimen(s):  ID Type Source Tests Collected by Time Destination   1 : right kidney stone [facility] Kid[facility], Right STONE ANALYSIS, TISSUE EXAM Chau Hassan MD 2024 1242        Estimated Blood Loss:   Minimal    Drains:  Ureteral Internal Stent Right ureter 6 Fr. (Active)   Number of days: 0       Anesthesia Type:   General/LMA    Operative Indications:  Ureteral stent present [Z96.0]  Nephrolithiasis [N20.0]  Acute cystitis without hematuria [N30.00]  59-year-old female known to me on previous urologic consultation for right hydronephrosis emphysematous pyelitis undergoing urgent cystoscopy right stent placement last 2023 failing to follow-up patient recently admitted for cardiac arrhythmia found on CAT scan to still have right stent patient seen at the bedside stating that she totally forgot to present to office for evaluation post stent procedure now in normal sinus rhythm to undergo cystoscopy possible stent exchange removal retrograde ureteroscopy laser pending IntraOp evaluation    Operative Findings:  #1 encrusted distal loop of stent forming 4 to 5 cm bladder stone requiring laser lithopexy to remove stone  Number two 4 mm distal stone noted on retrograde requiring ureteroscopy basket extraction  24 cm 6 Kiny[place] stent passed  Patient fully aware to present to the office next week for cystoscopy  stent removal      Complications:   None    Procedure and Technique:  Patient identified in the surgical preop unit right side marked wish to proceed with procedure placed in the OR suite after anesthesia induced placed in lithotomy position draped and prepped in standard fashion timeout formed 22 [place] cystoscope with 30 lens passed through to the bladder urethra showed maladies bladder confirmed large bladder stone encompassing 5 cm and crusting over the entire distal end of the stent attempts at removing encrustation with the alligator were unsuccessful the laser was passed and the stone was fragmented into multiple small pieces then removed with the Maria Ines once completion of bladder stone lithopexy a 0.3 wire was passed up the right orifice into the right renal pelvis a safety the alligator was placed and under fluoroscopic control the stent was removed slowly with the proximal loop opening.  Once the stent was removed 5 [place]ian catheter was placed into the orifice and retrograde pyelogram confirmed filling defect in the distal ureter the ureteroscope was passed and a 4 mm stone was noted the basket was passed and the stone was removed the ureteroscope was then repassed upward and retrograde pyelogram performed in the mid ureter confirming filling of the entire pelvis and ureter with no remaining stones the ureteroscope was removed cystoscope replaced and over the wire a 24 cm 6 [place] stent was passed the wire removed scope removed patient had procedure awakened taken recovery room stable condition patient fully aware that stent will need to be removed next week   I was present for the entire procedure.    Patient Disposition:  PACU              SIGNATURE: Chau Hassan MD  DATE: September 19, 2024  TIME: 1:05 PM

## 2024-09-19 NOTE — PLAN OF CARE
Problem: PAIN - ADULT  Goal: Verbalizes/displays adequate comfort level or baseline comfort level  Description: Interventions:  - Encourage patient to monitor pain and request assistance  - Assess pain using appropriate pain scale  - Administer analgesics based on type and severity of pain and evaluate response  - Implement non-pharmacological measures as appropriate and evaluate response  - Consider cultural and social influences on pain and pain management  - Notify physician/advanced practitioner if interventions unsuccessful or patient reports new pain  Outcome: Progressing     Problem: INFECTION - ADULT  Goal: Absence or prevention of progression during hospitalization  Description: INTERVENTIONS:  - Assess and monitor for signs and symptoms of infection  - Monitor lab/diagnostic results  - Monitor all insertion sites, i.e. indwelling lines, tubes, and drains  - Monitor endotracheal if appropriate and nasal secretions for changes in amount and color  - [place] appropriate cooling/warming therapies per order  - Administer medications as ordered  - Instruct and encourage patient and family to use good hand hygiene technique  - Identify and instruct in appropriate isolation precautions for identified infection/condition  Outcome: Progressing     Problem: SAFETY ADULT  Goal: Patient will remain free of falls  Description: INTERVENTIONS:  - Educate patient/family on patient safety including physical limitations  - Instruct patient to call for assistance with activity   - Consult OT/PT to assist with strengthening/mobility   - Keep Call bell within reach  - Keep bed low and locked with side rails adjusted as appropriate  - Keep care items and personal belongings within reach  - Initiate and maintain comfort rounds  - Make Fall Risk Sign visible to staff  - Offer Toileting every 2 Hours, in advance of need  - Initiate/Maintain bed alarm  - Apply yellow socks and bracelet for high fall risk patients  - Consider  moving patient to room near nurses station  Outcome: Progressing  Goal: Maintain or return to baseline ADL function  Description: INTERVENTIONS:  -  Assess patient's ability to carry out ADLs; assess patient's baseline for ADL function and identify physical deficits which impact ability to perform ADLs (bathing, care of mouth/teeth, toileting, grooming, dressing, etc.)  - Assess/evaluate cause of self-care deficits   - Assess range of motion  - Assess patient's mobility; develop plan if impaired  - Assess patient's need for assistive devices and provide as appropriate  - Encourage maximum independence but intervene and supervise when necessary  - Involve family in performance of ADLs  - Assess for home care needs following discharge   - Consider OT consult to assist with ADL evaluation and planning for discharge  - Provide patient education as appropriate  Outcome: Progressing  Goal: Maintains/Returns to pre admission functional level  Description: INTERVENTIONS:  - Perform AM-PAC 6 Click Basic Mobility/ Daily Activity assessment daily.  - Set and communicate daily mobility goal to care team and patient/family/caregiver.   - Collaborate with rehabilitation services on mobility goals if consulted  - Perform Range of Motion 2 times a day.  - Reposition patient every 2 hours.  - Dangle patient 2 times a day  - Stand patient 2 times a day  - Ambulate patient 2 times a day  - Out of bed to chair 2 times a day   - Out of bed for meals 2 times a day  - Out of bed for toileting  - Record patient progress and toleration of activity level   Outcome: Progressing     Problem: DISCHARGE PLANNING  Goal: Discharge to home or other facility with appropriate resources  Description: INTERVENTIONS:  - Identify barriers to discharge w/patient and caregiver  - Arrange for needed discharge resources and transportation as appropriate  - Identify discharge learning needs (meds, wound care, etc.)  - Arrange for interpretive services to  assist at discharge as needed  - Refer to Case Management Department for coordinating discharge planning if the patient needs post-hospital services based on physician/advanced practitioner order or complex needs related to functional status, cognitive ability, or social support system  Outcome: Progressing     Problem: Knowledge Deficit  Goal: Patient/family/caregiver demonstrates understanding of disease process, treatment plan, medications, and discharge instructions  Description: Complete learning assessment and assess knowledge base.  Interventions:  - Provide teaching at level of understanding  - Provide teaching via preferred learning methods  Outcome: Progressing     Problem: CARDIOVASCULAR - ADULT  Goal: Maintains optimal cardiac output and hemodynamic stability  Description: INTERVENTIONS:  - Monitor I/O, vital signs and rhythm  - Monitor for S/S and trends of decreased cardiac output  - Administer and titrate ordered vasoactive medications to optimize hemodynamic stability  - Assess quality of pulses, skin color and temperature  - Assess for signs of decreased coronary artery perfusion  - Instruct patient to report change in severity of symptoms  Outcome: Progressing  Goal: Absence of cardiac dysrhythmias or at baseline rhythm  Description: INTERVENTIONS:  - Continuous cardiac monitoring, vital signs, obtain 12 lead EKG if ordered  - Administer antiarrhythmic and heart rate control medications as ordered  - Monitor electrolytes and administer replacement therapy as ordered  Outcome: Progressing

## 2024-09-19 NOTE — ANESTHESIA PREPROCEDURE EVALUATION
Procedure:  CYSTOSCOPY RETROGRADE PYELOGRAM WITH INSERTION STENT URETERAL stent exchange possible ureteroscopy (Right: Bladder)    Relevant Problems   ANESTHESIA (within normal limits)      CARDIO   (+) Essential hypertension   (+) Hyperlipidemia   (+) SVT (supraventricular tachycardia)      ENDO   (+) Type 2 diabetes mellitus with hyperglycemia, without long-term current use of insulin (HCC)      /RENAL   (+) LEANDRO (acute kidney injury) ([facility])   (+) Nephrolithiasis      HEMATOLOGY   (+) Thrombocytopenia (HCC)      NEURO/PSYCH   (+) Depression      PULMONARY (within normal limits)        Physical Exam    Airway    Mallampati score: II  TM Distance: >3 FB  Neck ROM: full     Dental   No notable dental hx     Cardiovascular  Rhythm: regular, Rate: normal    Pulmonary   Breath sounds clear to auscultation    Other Findings  post-pubertal.      Anesthesia Plan  ASA Score- 3 Emergent    Anesthesia Type- general with ASA Monitors.         Additional Monitors:     Airway Plan:            Plan Factors-Exercise tolerance (METS): >4 METS.    Chart reviewed. EKG reviewed.  Existing labs reviewed. Patient summary reviewed.    Patient is not a current smoker.              Induction- intravenous.    Postoperative Plan-     Perioperative Resuscitation Plan - Level 1 - Full Code.       Informed Consent- Anesthetic plan and risks discussed with patient.  I personally reviewed this patient with the CRNA. Discussed and agreed on the Anesthesia Plan with the CRNA..

## 2024-09-20 LAB
ALBUMIN SERPL BCG-MCNC: 3.2 G/DL (ref 3.5–5)
ALP SERPL-CCNC: 150 U/L (ref 34–104)
ALT SERPL W P-5'-P-CCNC: 28 U/L (ref 7–52)
ANION GAP SERPL CALCULATED.3IONS-SCNC: 14 MMOL/L (ref 4–13)
AST SERPL W P-5'-P-CCNC: 15 U/L (ref 13–39)
BILIRUB SERPL-MCNC: 0.3 MG/DL (ref 0.2–1)
BUN SERPL-MCNC: 37 MG/DL (ref 5–25)
CALCIUM ALBUM COR SERPL-MCNC: 9.3 MG/DL (ref 8.3–10.1)
CALCIUM SERPL-MCNC: 8.7 MG/DL (ref 8.4–10.2)
CHLORIDE SERPL-SCNC: 105 MMOL/L (ref 96–108)
CO2 SERPL-SCNC: 17 MMOL/L (ref 21–32)
CREAT SERPL-MCNC: 1.61 MG/DL (ref 0.6–1.3)
ERYTHROCYTE [DISTWIDTH] IN BLOOD BY AUTOMATED COUNT: 13 % (ref 11.6–15.1)
GFR SERPL CREATININE-BSD FRML MDRD: 34 ML/MIN/1.73SQ M
GLUCOSE SERPL-MCNC: 129 MG/DL (ref 65–140)
GLUCOSE SERPL-MCNC: 147 MG/DL (ref 65–140)
GLUCOSE SERPL-MCNC: 155 MG/DL (ref 65–140)
GLUCOSE SERPL-MCNC: 177 MG/DL (ref 65–140)
GLUCOSE SERPL-MCNC: 188 MG/DL (ref 65–140)
HCT VFR BLD AUTO: 34.4 % (ref 34.8–46.1)
HGB BLD-MCNC: 11.4 G/DL (ref 11.5–15.4)
MAGNESIUM SERPL-MCNC: 1.9 MG/DL (ref 1.9–2.7)
MCH RBC QN AUTO: 27.9 PG (ref 26.8–34.3)
MCHC RBC AUTO-ENTMCNC: 33.1 G/DL (ref 31.4–37.4)
MCV RBC AUTO: 84 FL (ref 82–98)
NRBC BLD AUTO-RTO: 0 /100 WBCS
PHOSPHATE SERPL-MCNC: 4 MG/DL (ref 2.3–4.1)
PLATELET # BLD AUTO: 578 THOUSANDS/UL (ref 149–390)
PMV BLD AUTO: 8.3 FL (ref 8.9–12.7)
POTASSIUM SERPL-SCNC: 4.3 MMOL/L (ref 3.5–5.3)
PROT SERPL-MCNC: 6.8 G/DL (ref 6.4–8.4)
RBC # BLD AUTO: 4.08 MILLION/UL (ref 3.81–5.12)
SODIUM SERPL-SCNC: 136 MMOL/L (ref 135–147)
WBC # BLD AUTO: 19.46 THOUSAND/UL (ref 4.31–10.16)

## 2024-09-20 PROCEDURE — 80053 COMPREHEN METABOLIC PANEL: CPT | Performed by: SPECIALIST

## 2024-09-20 PROCEDURE — 82948 REAGENT STRIP/BLOOD GLUCOSE: CPT

## 2024-09-20 PROCEDURE — 99232 SBSQ HOSP IP/OBS MODERATE 35: CPT | Performed by: INTERNAL MEDICINE

## 2024-09-20 PROCEDURE — 99232 SBSQ HOSP IP/OBS MODERATE 35: CPT

## 2024-09-20 PROCEDURE — 83735 ASSAY OF MAGNESIUM: CPT | Performed by: SPECIALIST

## 2024-09-20 PROCEDURE — 84100 ASSAY OF PHOSPHORUS: CPT | Performed by: SPECIALIST

## 2024-09-20 PROCEDURE — 85027 COMPLETE CBC AUTOMATED: CPT | Performed by: SPECIALIST

## 2024-09-20 RX ORDER — SODIUM BICARBONATE 650 MG/1
650 TABLET ORAL
Status: DISCONTINUED | OUTPATIENT
Start: 2024-09-20 | End: 2024-09-21

## 2024-09-20 RX ORDER — CEFTRIAXONE 1 G/50ML
1000 INJECTION, SOLUTION INTRAVENOUS EVERY 24 HOURS
Status: DISCONTINUED | OUTPATIENT
Start: 2024-09-20 | End: 2024-09-21 | Stop reason: HOSPADM

## 2024-09-20 RX ADMIN — INSULIN GLARGINE 10 UNITS: 100 INJECTION, SOLUTION SUBCUTANEOUS at 21:37

## 2024-09-20 RX ADMIN — HEPARIN SODIUM 5000 UNITS: 5000 INJECTION, SOLUTION INTRAVENOUS; SUBCUTANEOUS at 14:48

## 2024-09-20 RX ADMIN — INSULIN LISPRO 5 UNITS: 100 INJECTION, SOLUTION INTRAVENOUS; SUBCUTANEOUS at 17:35

## 2024-09-20 RX ADMIN — METOPROLOL TARTRATE 50 MG: 50 TABLET, FILM COATED ORAL at 09:02

## 2024-09-20 RX ADMIN — INSULIN LISPRO 5 UNITS: 100 INJECTION, SOLUTION INTRAVENOUS; SUBCUTANEOUS at 11:53

## 2024-09-20 RX ADMIN — EZETIMIBE 10 MG: 10 TABLET ORAL at 09:02

## 2024-09-20 RX ADMIN — INSULIN LISPRO 1 UNITS: 100 INJECTION, SOLUTION INTRAVENOUS; SUBCUTANEOUS at 11:53

## 2024-09-20 RX ADMIN — CEFTRIAXONE 1000 MG: 1 INJECTION, SOLUTION INTRAVENOUS at 10:49

## 2024-09-20 RX ADMIN — PAROXETINE 20 MG: 20 TABLET, FILM COATED ORAL at 09:04

## 2024-09-20 RX ADMIN — B-COMPLEX W/ C & FOLIC ACID TAB 1 TABLET: TAB at 09:03

## 2024-09-20 RX ADMIN — METOPROLOL TARTRATE 50 MG: 50 TABLET, FILM COATED ORAL at 21:38

## 2024-09-20 RX ADMIN — GUAIFENESIN 1200 MG: 600 TABLET, EXTENDED RELEASE ORAL at 09:02

## 2024-09-20 RX ADMIN — INSULIN LISPRO 1 UNITS: 100 INJECTION, SOLUTION INTRAVENOUS; SUBCUTANEOUS at 08:55

## 2024-09-20 RX ADMIN — PRAVASTATIN SODIUM 40 MG: 40 TABLET ORAL at 09:03

## 2024-09-20 RX ADMIN — HEPARIN SODIUM 5000 UNITS: 5000 INJECTION, SOLUTION INTRAVENOUS; SUBCUTANEOUS at 05:25

## 2024-09-20 RX ADMIN — INSULIN LISPRO 5 UNITS: 100 INJECTION, SOLUTION INTRAVENOUS; SUBCUTANEOUS at 08:56

## 2024-09-20 RX ADMIN — AMLODIPINE BESYLATE 5 MG: 5 TABLET ORAL at 09:03

## 2024-09-20 RX ADMIN — SODIUM BICARBONATE 650 MG TABLET 650 MG: at 17:35

## 2024-09-20 RX ADMIN — HEPARIN SODIUM 5000 UNITS: 5000 INJECTION, SOLUTION INTRAVENOUS; SUBCUTANEOUS at 21:37

## 2024-09-20 NOTE — ASSESSMENT & PLAN NOTE
-Monitoring closely-has developed mild anion gap elevation and serum bicarbonate lower on 9/20.  Unclear if due to intravenous fluids with regards to decreased bicarbonate but anion gap rise is unclear and we will monitor with repeat labs in the morning.  Patient clinically appears well and no sign of active infection or compromise on my current exam.  Will give bicarbonate tablets for now as patient may require this chronically given underlying CKD

## 2024-09-20 NOTE — ASSESSMENT & PLAN NOTE
History of right-sided emphysematous pyelonephritis in 10/2023, required ureteral stent placement.  Urine culture grew pan susceptible E. Coli at the time.  completed 4 days of ceftriaxone  Status post cystoscopy with stone extraction on 9/19  Most probably UTI secondary to nephrolithiasis   Urine cultures with mixed contaminants  Given increase in white blood cell count will give one-time dose of ceftriaxone  Monitor CBC

## 2024-09-20 NOTE — PROGRESS NOTES
Progress Note - Hospitalist   Name: Bonnie SANDHU Apgar 59 y.o. female I MRN: [id number]  Unit/Bed#: [address] [place] [address] Date of Admission: 9/15/2024   Date of Service: 2024 I Hospital Day: 5    Assessment & Plan  SVT (supraventricular tachycardia)  Patient presents with acute onset palpitation while watching TV, with associated dizziness when getting onto stretcher with EMS.  Denies chest pain SOB.  Reports history of same thing 20 years ago and another episode a few years ago and both episodes resolved when she made herself throw up. Patient reports not feeling well, started with productive cough on Thursday 3 days ago.  Reports has a right ureteral stent since last hospitalization in October last year.  Initial EKG in ED showed SVT, rate 224  Received adenosine x 2 and metoprolol 5 mg IV x 1 in ED  Currently in sinus rhythm  Telemetry showed no atrial fibrillation or further episodes of SVT  2D echo showed EF of 60%, unable to assess diastolic function without any significant valvular abnormalities, moderately elevated right ventricular systolic pressure at 50 mmHg-night pulse oximetry ordered rule out sleep apnea    Cardiology team following  Patient started on metoprolol 25 mg twice daily  Outpatient follow-up with cardiology    Sepsis ([facility])  Sepsis, present on admission, as evidenced by leukocytosis, tachycardia, with suspected source of infection UTI  Lactic acid normal  Initial procalcitonin 0.33 >0.26  UA positive nitrite large leukocytes.  Completed 3 days of azithromycin.d/ceed  Urine antigens negative.  Chest x-ray with no consolidation noted.  Blood cultures and urine cultures negative to date  Most probably sepsis secondary to UTI.  Most probably UTI secondary to nephrolithiasis   Currently on ceftriaxone day 5  Given increase and white blood cell count status post cystoscopy on  will continue ceftriaxone and reevaluate next 24 hours.  urology team following; appreciate recommendations      Results  from last 7 days   Lab Units 09/17/24  0531 09/16/24  0013 09/15/24  2212   LACTIC ACID mmol/L  --  0.7  --    PROCALCITONIN ng/ml 0.26*  --  0.33*     Results from last 7 days   Lab Units 09/20/24  0427 09/19/24  0550 09/18/24  0531 09/17/24  0531 09/16/24  0547 09/15/24  2212   WBC Thousand/uL 19.46* 16.16* 15.88* 14.15* 11.42* 14.46*   TOTAL NEUT ABS Thousand/uL  --  12.60*  --   --   --   --         LEANDRO (acute kidney injury) ([facility])  Baseline creatinine 0.9-1.2 in past year    Unclear etiology.  Patient is on perindopril at home.  Renal ultrasound: Right ureteral stent in proper position without any evidence of hydronephrosis with moderately atrophic right kidney  S/p cystoscopy with stone removal on 9/19   Rpeat BMP 9/20 with improvement in kidney functions     Continue to hold perindopril  Repeat BMP in the morning  Consult nephrology.  Appreciate recommendations       Results from last 7 days   Lab Units 09/20/24  0427 09/19/24  0550 09/18/24  0531 09/17/24  0531 09/16/24  0547 09/15/24  2212   BUN mg/dL 37* 38* 35* 31* 30* 32*   CREATININE mg/dL 1.61* 1.81* 1.79* 1.69* 1.85* 1.95*          UTI (urinary tract infection)  History of right-sided emphysematous pyelonephritis in 10/2023, required ureteral stent placement.  Urine culture grew pan susceptible E. Coli at the time.  completed 4 days of ceftriaxone  Status post cystoscopy with stone extraction on 9/19  Most probably UTI secondary to nephrolithiasis   Urine cultures with mixed contaminants  Given increase in white blood cell count will give one-time dose of ceftriaxone  Monitor CBC  Essential hypertension  On perindopril at home.  Hold due to LEANDRO  Blood pressure slightly on the high side  Started on metoprolol 12.5 mg p.o. twice daily  Hyperlipidemia  Continue statin  Depression  Continue Paxil  Type 2 diabetes mellitus with hyperglycemia, without long-term current use of insulin ([facility])  Lab Results   Component Value Date    HGBA1C 8.4 (H) 06/10/2024        Recent Labs     24  1320 24  1708 24  2030 24  0727   POCGLU 160* 277* 283* 177*       Blood Sugar Average: Last 72 hrs:  (P) 162.8  On Lantus 20 units SQ at bedtime, Humalog 5 units 3 times daily with meals.  Will continue.  Continue Humalog sliding scale with Accu-Cheks before every meal and at bedtime  Diabetic diet  Morbid obesity (HCC)  Diet and lifestyle modification  Abnormal chest x-ray  Chest x-ray showed mild central pulmonary vascular congestion  S/p lasix x 1 with improvement in breathing  On room air with oxygen needs at night.   Mostly secondary to undiagnosed JENNIFER     Suspected sleep apnea  Outpatient follow-up with sleep medicine  Pulmonary hypertension (HCC)  Ambulatory referral to cardiology and sleep medicine  Preop cardiovascular exam    At risk for acid-base imbalance    Electrolyte imbalance risk      VTE Pharmacologic Prophylaxis: VTE Score: 5 Moderate Risk (Score 3-4) - Pharmacological DVT Prophylaxis Ordered: heparin.    Mobility:   Basic Mobility Inpatient Raw Score: 23  JH-HLM Goal: 7: Walk 25 feet or more  JH-HLM Achieved: 7: Walk 25 feet or more  JH-HLM Goal achieved. Continue to encourage appropriate mobility.    Patient Centered Rounds: I performed bedside rounds with nursing staff today.   Discussions with Specialists or Other Care Team Provider: urology , nephrology     Education and Discussions with Family / Patient:  patient .     Current Length of Stay: 5 day(s)  Current Patient Status: Inpatient   Certification Statement: The patient will continue to require additional inpatient hospital stay due to sepsis, UTI , LEANDRO   Discharge Plan: Anticipate discharge in 24-48 hrs to home.    Code Status: Level 1 - Full Code    Subjective   Patient was seen today at bedside. Reports no active complaints.   Reports initially straw colored urine yesterday. Clearing up now.       Objective     Vitals:   Temp (24hrs), Av.4 °F (36.3 °C), Min:97.2 °F (36.2 °C),  Max:97.7 °F (36.5 °C)    Temp:  [97.2 °F (36.2 °C)-97.7 °F (36.5 °C)] 97.7 °F (36.5 °C)  HR:  [67-81] 80  Resp:  [16-22] 18  BP: (122-152)/() 143/109  SpO2:  [91 %-99 %] 96 %  Body mass index is 41.16 kg/m².     Input and Output Summary (last 24 hours):     Intake/Output Summary (Last 24 hours) at 9/20/2024 1026  Last data filed at 9/20/2024 0529  Gross per 24 hour   Intake 948 ml   Output 1000 ml   Net -52 ml       Physical Exam  Vitals and nursing note reviewed.   Constitutional:       General: She is not in acute distress.     Appearance: Normal appearance. She is not ill-appearing or diaphoretic.   HENT:      Head: Normocephalic and atraumatic.   Eyes:      Conjunctiva/sclera: Conjunctivae normal.   Cardiovascular:      Rate and Rhythm: Normal rate and regular rhythm.      Heart sounds: Normal heart sounds. No murmur heard.  Pulmonary:      Effort: Pulmonary effort is normal. No respiratory distress.      Breath sounds: Normal breath sounds. No wheezing, rhonchi or rales.   Abdominal:      General: There is no distension.      Palpations: Abdomen is soft. There is no mass.      Tenderness: There is no abdominal tenderness. There is no guarding or rebound.   Musculoskeletal:      Cervical back: Normal range of motion and neck supple. No rigidity.      Right lower leg: No edema.      Left lower leg: No edema.   Lymphadenopathy:      Cervical: No cervical adenopathy.   Neurological:      Mental Status: She is alert and oriented to person, place, and time.   Psychiatric:         Mood and Affect: Mood normal.         Behavior: Behavior normal.         Thought Content: Thought content normal.         Judgment: Judgment normal.          Lines/Drains:  Lines/Drains/Airways       Active Status       Name Placement date Placement time Site Days    Ureteral Internal Stent Right ureter 6 Fr. 09/19/24  1249  Right ureter  less than 1                            Lab Results: I have reviewed the following results:  CBC/BMP:   .     09/20/24  0427   WBC 19.46*   HGB 11.4*   HCT 34.4*   *   SODIUM 136   K 4.3      CO2 17*   BUN 37*   CREATININE 1.61*   GLUC 188*   MG 1.9   PHOS 4.0    , Creatinine Clearance: Estimated Creatinine Clearance: 48.1 mL/min (A) (by C-G formula based on SCr of 1.61 mg/dL (H)).   Results from last 7 days   Lab Units 09/20/24  0427 09/19/24  0550 09/18/24  0531   WBC Thousand/uL 19.46* 16.16* 15.88*   HEMOGLOBIN g/dL 11.4* 11.6 11.5   HEMATOCRIT % 34.4* 35.0 34.4*   PLATELETS Thousands/uL 578* 521* 412*   SEGS PCT %  --   --  72   LYMPHO PCT %  --  10* 14   MONO PCT %  --  5 7   EOS PCT %  --  6 4     Results from last 7 days   Lab Units 09/20/24 0427   SODIUM mmol/L 136   POTASSIUM mmol/L 4.3   CHLORIDE mmol/L 105   CO2 mmol/L 17*   BUN mg/dL 37*   CREATININE mg/dL 1.61*   ANION GAP mmol/L 14*   CALCIUM mg/dL 8.7   ALBUMIN g/dL 3.2*   TOTAL BILIRUBIN mg/dL 0.30   ALK PHOS U/L 150*   ALT U/L 28   AST U/L 15   GLUCOSE RANDOM mg/dL 188*         Results from last 7 days   Lab Units 09/20/24  0727 09/19/24  2030 09/19/24  1708 09/19/24  1320 09/19/24  1128 09/19/24  0717 09/18/24  2129 09/18/24  2043 09/18/24  1551 09/18/24  1104 09/18/24  0707 09/17/24  2028   POC GLUCOSE mg/dl 177* 283* 277* 160* 154* 137 123 79 152* 182* 121 174*         Results from last 7 days   Lab Units 09/17/24  0531 09/16/24  0013 09/15/24  2212   LACTIC ACID mmol/L  --  0.7  --    PROCALCITONIN ng/ml 0.26*  --  0.33*       Recent Cultures (last 7 days):   Results from last 7 days   Lab Units 09/16/24  0331 09/16/24  0327 09/16/24  0013   BLOOD CULTURE   --   --  No Growth After 4 Days.  No Growth After 4 Days.   URINE CULTURE  10,000-19,000 cfu/ml  --   --    LEGIONELLA URINARY ANTIGEN   --  Negative  --        Imaging Review: No pertinent imaging studies reviewed.  Other Studies: EKG was reviewed.     Last 24 Hours Medication List:     Current Facility-Administered Medications:     acetaminophen (TYLENOL) tablet 650  mg, Q8H PRN    aluminum-magnesium hydroxide-simethicone (MAALOX) oral suspension 30 mL, Q6H PRN    amLODIPine (NORVASC) tablet 5 mg, Daily    cefTRIAXone (ROCEPHIN) IVPB (premix in dextrose) 1,000 mg 50 mL, Q24H    ezetimibe (ZETIA) tablet 10 mg, Daily    guaiFENesin (MUCINEX) 12 hr tablet 1,200 mg, BID    heparin (porcine) subcutaneous injection 5,000 Units, Q8H NAZ    insulin glargine (LANTUS) subcutaneous injection 10 Units 0.1 mL, HS    insulin lispro (HumALOG/ADMELOG) 100 units/mL subcutaneous injection 1-5 Units, TID AC **AND** Fingerstick Glucose (POCT), TID AC    insulin lispro (HumALOG/ADMELOG) 100 units/mL subcutaneous injection 1-5 Units, HS    insulin lispro (HumALOG/ADMELOG) 100 units/mL subcutaneous injection 5 Units, TID With Meals    metoprolol tartrate (LOPRESSOR) tablet 50 mg, Q12H NAZ    multivitamin stress formula tablet 1 tablet, Daily    ondansetron (ZOFRAN) injection 4 mg, Q6H PRN    PARoxetine (PAXIL) tablet 20 mg, Daily    pravastatin (PRAVACHOL) tablet 40 mg, Daily    Administrative Statements   Today, Patient Was Seen By: Jose Gomez MD  I have spent a total time of 30 minutes in caring for this patient on the day of the visit/encounter including Diagnostic results, Prognosis, Risks and benefits of tx options, and Instructions for management.    **Please Note: This note may have been constructed using a voice recognition system.**

## 2024-09-20 NOTE — PROGRESS NOTES
"Progress Note - Urology      Patient: Bonnie SANDHU Apgar   : 1965 Sex: female   MRN: [id number]     CSN: [id number]  Unit/Bed#: [address] [place] [address]     SUBJECTIVE:   Patient seen on evening rounds  No issues with new stent  Significant right renal atrophy as per nephrology note relating to worsening ATN  Creatinine trending down to 1.6 with obstructed stent now removed    Objective   Vitals: BP (!) 143/109   Pulse 80   Temp 97.7 °F (36.5 °C)   Resp 18   Ht 5' 6\" (1.676 m)   Wt 116 kg (255 lb)   SpO2 96%   BMI 41.16 kg/m²     I/O last 24 hours:  In: 240 [P.O.:240]  Out: 1750 [Urine:1750]      Physical Exam:   General Alert awake   Normocephalic atraumatic PERRLA  Lungs clear bilaterally  Cardiac normal S1 normal S2  Abdomen soft, flank pain  Extremities no edema      Lab Results: CBC:   Lab Results   Component Value Date    WBC 19.46 (H) 2024    HGB 11.4 (L) 2024    HCT 34.4 (L) 2024    MCV 84 2024     (H) 2024    RBC 4.08 2024    MCH 27.9 2024    MCHC 33.1 2024    RDW 13.0 2024    MPV 8.3 (L) 2024    NRBC 0 2024     CMP:   Lab Results   Component Value Date     2024    CO2 17 (L) 2024    BUN 37 (H) 2024    CREATININE 1.61 (H) 2024    CALCIUM 8.7 2024    AST 15 2024    ALT 28 2024    ALKPHOS 150 (H) 2024    EGFR 34 2024     Urinalysis:   Lab Results   Component Value Date    COLORU Yellow 2024    CLARITYU Clear 2024    SPECGRAV <1.005 (L) 2024    PHUR 6.5 2024    LEUKOCYTESUR Large (A) 2024    NITRITE Positive (A) 2024    GLUCOSEU Negative 2024    KETONESU Negative 2024    BILIRUBINUR Negative 2024    BLOODU Small (A) 2024     Urine Culture:   Lab Results   Component Value Date    URINECX 10,000-19,000 cfu/ml 2024     PSA: No results found for: \"PSA\"      Assessment/ Plan:  Status post cystoscopy laser lithotripsy " bladder stone right ureteroscopy basket extraction stent placement day #1  To be discharged home as per medical team to undergo cystoscopy stent removal next week          Chau Hassan MD

## 2024-09-20 NOTE — ASSESSMENT & PLAN NOTE
Baseline creatinine 0.9-1.2 in past year    Unclear etiology.  Patient is on perindopril at home.  Renal ultrasound: Right ureteral stent in proper position without any evidence of hydronephrosis with moderately atrophic right kidney  S/p cystoscopy with stone removal on 9/19   Rpeat BMP 9/20 with improvement in kidney functions     Continue to hold perindopril  Repeat BMP in the morning  Consult nephrology.  Appreciate recommendations       Results from last 7 days   Lab Units 09/20/24  0427 09/19/24  0550 09/18/24  0531 09/17/24  0531 09/16/24  0547 09/15/24  2212   BUN mg/dL 37* 38* 35* 31* 30* 32*   CREATININE mg/dL 1.61* 1.81* 1.79* 1.69* 1.85* 1.95*

## 2024-09-20 NOTE — PROGRESS NOTES
Progress Note - Nephrology   Name: Bonnie SANDHU Apgar 59 y.o. female I MRN: [id number]  Unit/Bed#: [address] [place] [address] I Date of Admission: 9/15/2024   Date of Service: 2024 I Hospital Day: 5     Assessment & Plan  LEANDOR (acute kidney injury) (HCC)  -Present on admission  -Likely has underlying CKD.  Has moderate atrophic right kidney.  8.8 cm.  Left kidney 12.4 cm.  -Patient has a history of LEANDRO in 2023 due to ATN/prerenal/emphysematous pyelonephritis  - Baseline creatinine-1.2 mg/dL  - Admission creatinine-1.9 mg/dL  - Urinalysis with nitrite positive, 10-20 WBC, bacteria  - Renal imaging-right ureteral stent in proper position.  No hydronephrosis.  Moderately atrophic right kidney.  - To note patient was on antibiotics this admission.  Also received one-time Lasix IV .  And did receive initial intravenous fluids.  - To note patient is on perindopril at home    Overall, patient presents with LEANDRO on admission with creatinine 1.9 mg/dL on 9/15 and has remained essentially unchanged at 1.8 mg/dL on  prompting nephrology consultation.  Creatinine was 1.2 mg/dL in 2024.  Initially had presented with SVT with rapid heart rate but I do not note significant hypotensive episodes.  Was with leukocytosis initially.  There was initial concern for possible urosepsis but urine culture has returned with mixed contaminants.  Blood cultures without any growth.  Patient may have had LEANDRO in the setting of ATN in the setting of hemodynamic perturbations while on ARB at home..  With patient does have atrophic right kidney and we will likely need to obtain a renal Doppler study.  Patient appears to have been adequately volume expanded initially and chest x-ray was with pulmonary edema.  Therefore I am not suspecting a large prerenal component at this juncture.  To note, patient has had a ureteral stent since October and did not follow-up with urology.  Had basket extraction of large bladder stone and right stent exchange.   Overall, I am concerned that there is likely ATN but patient also may have had progression of kidney disease given right renal atrophy.  And this may be patient's baseline.  On 9/20, creatinine is slightly improved to 1.6 mg/dL.  Patient is mildly acidotic and slightly elevated anion gap.  Unclear if related to chloride repletion and LR yesterday but the cause of the anion gap is unclear and we will repeat labs in AM.  Will give low-dose bicarbonate tablets.  Also her white count is rising but clinically patient appears well and no sign of active infection.  She is completing antibiotics.  Patient feels well overall and states she is ready to go home.    Plan    - Agree with holding ACE inhibitor  - Lab work again in a.m.  - Consider renal Doppler ultrasound inpatient versus outpatient given atrophic kidney  - Give bicarbonate tablets.  Hold bicarbonate once serum bicarbonate levels are above 25  - Follow-up anion gap in a.m.  At risk for acid-base imbalance  -Monitoring closely-has developed mild anion gap elevation and serum bicarbonate lower on 9/20.  Unclear if due to intravenous fluids with regards to decreased bicarbonate but anion gap rise is unclear and we will monitor with repeat labs in the morning.  Patient clinically appears well and no sign of active infection or compromise on my current exam.  Will give bicarbonate tablets for now as patient may require this chronically given underlying CKD  Electrolyte imbalance risk  -Initially with hyponatremia which has resolved.  With volume expansion.  Essential hypertension  -Home regimen includes amlodipine 5 mg daily, perindopril 8 mg daily  - Currently on amlodipine with hold parameters.  - Now on metoprolol due to SVT  - Holding ACE inhibitor due to LEANDRO  SVT (supraventricular tachycardia)  -Initially received adenosine and Lopressor in the ER  Hyperlipidemia  -Per primary team  UTI (urinary tract infection)  -Unclear if true infection  - Patient had stent  placed in October 2023 but did not have it removed or follow-up as outpatient with urologist  - Urology consulted and took the patient to the OR on 9/19 for cystoscopy.  Abnormal chest x-ray  -There was question of right-sided infiltrate initially and patient was started on broad-spectrum antibiotics.  X-ray read with mild pulmonary vascular congestion.  - Repeat chest x-ray moderate vascular congestion  Pulmonary hypertension (HCC)  -Echocardiogram 9/16 with EF 60%, unable to assess diastolic dysfunction, RV cavity size normal systolic function is normal.  PASP 50.    I have reviewed the nephrology recommendations including bicarbonate tablets, with primary team attending, and we are in agreement with renal plan including the information outlined above.     History of Present Illness   Brief History of Admission - 59 y.o. 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 A renal consultation is requested today for assistance in the management of acute kidney injury..  Patient reports she was in her usual state of health until the day of admission when she started noticing sudden onset of palpitations while watching TV.  She denies any fevers, chills, nausea, vomiting, diarrhea, NSAID use.     Patient denies complaints.  Blood pressure is 1 20-1 50 systolic.  Afebrile.  On room air.    Objective      Temp:  [97.2 °F (36.2 °C)-97.7 °F (36.5 °C)] 97.7 °F (36.5 °C)  HR:  [77-81] 80  Resp:  [18-19] 18  BP: (129-152)/() 143/109  O2 Device: None (Room air)         Vitals:    09/16/24 0113 09/16/24 1005   Weight: 116 kg (255 lb 3.2 oz) 116 kg (255 lb)     I/O last 24 hours:  In: 1248 [P.O.:888; I.V.:310; IV Piggyback:50]  Out: 2350 [Urine:2350]  Lines/Drains/Airways       Active Status       Name Placement date Placement time Site Days    Ureteral Internal Stent  Right ureter 6 Fr. 09/19/24  1249  Right ureter  1                  Physical Exam General: NAD  Skin: no rash  Eyes: anicteric sclera  ENT: moist mucous membrane  Neck: supple  Chest: CTA b/l, no ronchii, no wheeze, no rubs, no rales  CVS: s1s2, no murmur, no gallop, no rub  Abdomen: soft, nontender, nl sounds  Extremities: no edema LE b/l  : no luna  Neuro: AAOX3  Psych: normal affect    Medications:    Current Facility-Administered Medications:     acetaminophen (TYLENOL) tablet 650 mg, 650 mg, Oral, Q8H PRN, Chau Hassan MD    aluminum-magnesium hydroxide-simethicone (MAALOX) oral suspension 30 mL, 30 mL, Oral, Q6H PRN, Chau Hassan MD    amLODIPine (NORVASC) tablet 5 mg, 5 mg, Oral, Daily, Chau Hassan MD, 5 mg at 09/20/24 0903    cefTRIAXone (ROCEPHIN) IVPB (premix in dextrose) 1,000 mg 50 mL, 1,000 mg, Intravenous, Q24H, Jose Gomez MD, Last Rate: 100 mL/hr at 09/20/24 1049, 1,000 mg at 09/20/24 1049    ezetimibe (ZETIA) tablet 10 mg, 10 mg, Oral, Daily, Chau Hassan MD, 10 mg at 09/20/24 0902    heparin (porcine) subcutaneous injection 5,000 Units, 5,000 Units, Subcutaneous, Q8H NAZ, Chau Hassan MD, 5,000 Units at 09/20/24 1448    insulin glargine (LANTUS) subcutaneous injection 10 Units 0.1 mL, 10 Units, Subcutaneous, HS, Chau Hassan MD, 10 Units at 09/19/24 2115    insulin lispro (HumALOG/ADMELOG) 100 units/mL subcutaneous injection 1-5 Units, 1-5 Units, Subcutaneous, TID AC, 1 Units at 09/20/24 1153 **AND** Fingerstick Glucose (POCT), , , TID AC, Chau Hassan MD    insulin lispro (HumALOG/ADMELOG) 100 units/mL subcutaneous injection 1-5 Units, 1-5 Units, Subcutaneous, HS, Chau Hassan MD, 2 Units at 09/19/24 2115    insulin lispro (HumALOG/ADMELOG) 100 units/mL subcutaneous injection 5 Units, 5 Units, Subcutaneous, TID With Meals, Chau Hassan MD, 5 Units at 09/20/24 1153    metoprolol tartrate (LOPRESSOR) tablet 50 mg, 50 mg, Oral, Q12H NAZ, Chau Hassan MD, 50 mg at  "09/20/24 0902    multivitamin stress formula tablet 1 tablet, 1 tablet, Oral, Daily, Chau Hassan MD, 1 tablet at 09/20/24 0903    ondansetron (ZOFRAN) injection 4 mg, 4 mg, Intravenous, Q6H PRN, Chau Hassan MD    PARoxetine (PAXIL) tablet 20 mg, 20 mg, Oral, Daily, Chau Hassan MD, 20 mg at 09/20/24 0904    pravastatin (PRAVACHOL) tablet 40 mg, 40 mg, Oral, Daily, Chau Hassan MD, 40 mg at 09/20/24 0903      Lab Results: I have reviewed the following results:   Results from last 7 days   Lab Units 09/20/24  0427 09/19/24  0550 09/18/24  0531 09/17/24  0531 09/16/24  0547 09/15/24  2212   WBC Thousand/uL 19.46* 16.16* 15.88* 14.15* 11.42* 14.46*   HEMOGLOBIN g/dL 11.4* 11.6 11.5 12.3 11.2* 12.0   HEMATOCRIT % 34.4* 35.0 34.4* 38.1 33.5* 35.6   PLATELETS Thousands/uL 578* 521* 412* 306 279 277   POTASSIUM mmol/L 4.3 3.8 3.8 3.6 3.8 3.9   CHLORIDE mmol/L 105 106 104 104 101 97   CO2 mmol/L 17* 25 24 19* 20* 19*   BUN mg/dL 37* 38* 35* 31* 30* 32*   CREATININE mg/dL 1.61* 1.81* 1.79* 1.69* 1.85* 1.95*   CALCIUM mg/dL 8.7 8.7 8.5 8.2* 8.1* 8.4   MAGNESIUM mg/dL 1.9 2.1 1.8*  --  2.3 1.7*   PHOSPHORUS mg/dL 4.0  --   --   --   --   --    ALBUMIN g/dL 3.2* 3.1*  --  3.4* 3.2* 3.3*       Administrative Statements     Portions of the record may have been created with voice recognition software. Occasional wrong word or \"sound a like\" substitutions may have occurred due to the inherent limitations of voice recognition software. Read the chart carefully and recognize, using context, where substitutions have occurred.If you have any questions, please contact the dictating provider.  "

## 2024-09-20 NOTE — ASSESSMENT & PLAN NOTE
-There was question of right-sided infiltrate initially and patient was started on broad-spectrum antibiotics.  X-ray read with mild pulmonary vascular congestion.  - Repeat chest x-ray moderate vascular congestion   Pt has no new c/o today. Pt reports no falls, med changes, or insurance changes since the previous visit.

## 2024-09-20 NOTE — ASSESSMENT & PLAN NOTE
Sepsis, present on admission, as evidenced by leukocytosis, tachycardia, with suspected source of infection UTI  Lactic acid normal  Initial procalcitonin 0.33 >0.26  UA positive nitrite large leukocytes.  Completed 3 days of azithromycin.d/ceed  Urine antigens negative.  Chest x-ray with no consolidation noted.  Blood cultures and urine cultures negative to date  Most probably sepsis secondary to UTI.  Most probably UTI secondary to nephrolithiasis   Currently on ceftriaxone day 5  Given increase and white blood cell count status post cystoscopy on 9/19 will continue ceftriaxone and reevaluate next 24 hours.  urology team following; appreciate recommendations      Results from last 7 days   Lab Units 09/17/24  0531 09/16/24  0013 09/15/24  2212   LACTIC ACID mmol/L  --  0.7  --    PROCALCITONIN ng/ml 0.26*  --  0.33*     Results from last 7 days   Lab Units 09/20/24  0427 09/19/24  0550 09/18/24  0531 09/17/24  0531 09/16/24  0547 09/15/24  2212   WBC Thousand/uL 19.46* 16.16* 15.88* 14.15* 11.42* 14.46*   TOTAL NEUT ABS Thousand/uL  --  12.60*  --   --   --   --

## 2024-09-20 NOTE — QUICK NOTE
Subjective: Patient states she is feeling much better today. Patient was able to shower and tolerated breakfast well this morning. She experiences mild dysuria after stent placement yesterday, but it has been improving with time. Patient has been able to urinate regularly after the procedure without frequency, urgency or hematuria. She experiences a mild cough, but it does not differ from her cough at baseline. She has not had a bowel movement since the stent placement yesterday. Patient was hopeful that she was going to go home today, but remains in good spirits despite no discharge today. Patient denies chest pain, shortness of breath, palpitations, diarrhea, abdominal or flank pain, fever, chills, weakness and dizziness.    Objective:    General: Patient is alert and cooperative. She is well-developed and in no acute distress.  Vitals: BP: 143/109, HR: 80 bpm, RR: 18, T: 97.7, pulse ox: 96%  HEENT: PERRLA. EOMs intact.   CV: RRR no murmurs, rubs or gallops. S1 and S2 identified. No S3 or S4.  Pulm: CTA B/L with no wheezes, rales or rhonchi. No accessory muscle use and in no acute distress.  Abdomen: Soft and no palpable masses. No tenderness to palpation x 4. Hypoactive BS x 4. No guarding or rebound.   PV: 2+ radial and posterior tibialis pulses. Capillary refill < 2 seconds. No LE swelling, erythema or pain.   Neuro: A+O x 3. Patient is at baseline.   Psych: Mood and behavior is normal.    Assessment and Plan:  SVT  Patient being followed by cardiology  - Continue telemetry  - Metoprolol 25 mg PO BID   - Outpatient F/U with cardiology upon discharge     Sepsis  Normal lactic acid, procalcitonin trending downward, blood and urine cultures negative   9/20 CBC showed WBC of 19.46  - Continue IV ceftriaxone  - Monitor vitals to detect fever or tachycardia  - Repeat AM CBC to evaluate for potential discharge     LEANDRO  Cytoscopy performed on 9/19  Baseline Cr in past year: 0.9-1.2; 9/20 Cr was 1.61  Patient followed  by nephrology   - Continue to hold perindopril per nephro  - Monitor PVR  - Repeat AM BMP to evaluate kidney function   - Minimize fluids to prevent volume overload per nephro  - Recommend renal Doppler US    UTI  Cystoscopy performed on 9/19 for removal of retained ureteral stent   Patient being followed by urology  Urine cultures showed mixed contaminants  - Continue IV ceftriaxone   - Repeat AM CBC     Essential HTN  Most recent BP on 9/20 - 143/109  Patient on perindopril at home, but held due to LEANDRO  - Continue metoprolol and amlodipine  - Monitor vitals regularly     Hyperlipidemia  - Continue statin    Depression  - Continue Paxil    T2D  Most recent HbA1C 8.4  Most recent blood glucose 155, with values from 154 to 283 over 24 hs  - Continue Lantus and Humalog regimen  - Diabetic diet  - Accu-Cheks before every meal and at bedtime    Morbid obesity  BMI of 41.16   - Patient education on diet and lifestyle changes    Abnormal CXR  9/16 - Mild central pulmonary vascular congestion likely secondary to undiagnosed JENNIFER  Overnight pulse oximetry revealed dip into 88-92% at night  - Continue incentive spirometry  - Respiratory protocol  - F/U with sleep medicine upon discharge     Suspected JENNIFER  Overnight pulse oximetry revealed dip into 88-92% at night  Patient requires oxygen at night   - Respiratory protocol   - F/U with sleep medicine upon discharge     Pulm HTN  Overnight pulse oximetry revealed dip into 88-92% at night  - F/U with cardiology and sleep medicine upon discharge  - Respiratory protocol    Pulmonary vascular congestion (resolved)

## 2024-09-20 NOTE — PLAN OF CARE
Problem: PAIN - ADULT  Goal: Verbalizes/displays adequate comfort level or baseline comfort level  Description: Interventions:  - Encourage patient to monitor pain and request assistance  - Assess pain using appropriate pain scale  - Administer analgesics based on type and severity of pain and evaluate response  - Implement non-pharmacological measures as appropriate and evaluate response  - Consider cultural and social influences on pain and pain management  - Notify physician/advanced practitioner if interventions unsuccessful or patient reports new pain  Outcome: Progressing     Problem: INFECTION - ADULT  Goal: Absence or prevention of progression during hospitalization  Description: INTERVENTIONS:  - Assess and monitor for signs and symptoms of infection  - Monitor lab/diagnostic results  - Monitor all insertion sites, i.e. indwelling lines, tubes, and drains  - Monitor endotracheal if appropriate and nasal secretions for changes in amount and color  - [place] appropriate cooling/warming therapies per order  - Administer medications as ordered  - Instruct and encourage patient and family to use good hand hygiene technique  - Identify and instruct in appropriate isolation precautions for identified infection/condition  Outcome: Progressing     Problem: SAFETY ADULT  Goal: Patient will remain free of falls  Description: INTERVENTIONS:  - Educate patient/family on patient safety including physical limitations  - Instruct patient to call for assistance with activity   - Consult OT/PT to assist with strengthening/mobility   - Keep Call bell within reach  - Keep bed low and locked with side rails adjusted as appropriate  - Keep care items and personal belongings within reach  - Initiate and maintain comfort rounds  - Make Fall Risk Sign visible to staff  - Offer Toileting every 2 Hours, in advance of need  - Initiate/Maintain bed alarm  - Obtain necessary fall risk management equipment: slipper socks  - Apply  yellow socks and bracelet for high fall risk patients  - Consider moving patient to room near nurses station  Outcome: Progressing  Goal: Maintain or return to baseline ADL function  Description: INTERVENTIONS:  -  Assess patient's ability to carry out ADLs; assess patient's baseline for ADL function and identify physical deficits which impact ability to perform ADLs (bathing, care of mouth/teeth, toileting, grooming, dressing, etc.)  - Assess/evaluate cause of self-care deficits   - Assess range of motion  - Assess patient's mobility; develop plan if impaired  - Assess patient's need for assistive devices and provide as appropriate  - Encourage maximum independence but intervene and supervise when necessary  - Involve family in performance of ADLs  - Assess for home care needs following discharge   - Consider OT consult to assist with ADL evaluation and planning for discharge  - Provide patient education as appropriate  Outcome: Progressing  Goal: Maintains/Returns to pre admission functional level  Description: INTERVENTIONS:  - Perform AM-PAC 6 Click Basic Mobility/ Daily Activity assessment daily.  - Set and communicate daily mobility goal to care team and patient/family/caregiver.   - Collaborate with rehabilitation services on mobility goals if consulted  - Perform Range of Motion 3 times a day.  - Reposition patient every 2 hours.  - Dangle patient 3 times a day  - Stand patient 3 times a day  - Ambulate patient 3 times a day  - Out of bed to chair 3 times a day   - Out of bed for meals 3 times a day  - Out of bed for toileting  - Record patient progress and toleration of activity level   Outcome: Progressing     Problem: DISCHARGE PLANNING  Goal: Discharge to home or other facility with appropriate resources  Description: INTERVENTIONS:  - Identify barriers to discharge w/patient and caregiver  - Arrange for needed discharge resources and transportation as appropriate  - Identify discharge learning needs  (meds, wound care, etc.)  - Arrange for interpretive services to assist at discharge as needed  - Refer to Case Management Department for coordinating discharge planning if the patient needs post-hospital services based on physician/advanced practitioner order or complex needs related to functional status, cognitive ability, or social support system  Outcome: Progressing     Problem: Knowledge Deficit  Goal: Patient/family/caregiver demonstrates understanding of disease process, treatment plan, medications, and discharge instructions  Description: Complete learning assessment and assess knowledge base.  Interventions:  - Provide teaching at level of understanding  - Provide teaching via preferred learning methods  Outcome: Progressing     Problem: CARDIOVASCULAR - ADULT  Goal: Maintains optimal cardiac output and hemodynamic stability  Description: INTERVENTIONS:  - Monitor I/O, vital signs and rhythm  - Monitor for S/S and trends of decreased cardiac output  - Administer and titrate ordered vasoactive medications to optimize hemodynamic stability  - Assess quality of pulses, skin color and temperature  - Assess for signs of decreased coronary artery perfusion  - Instruct patient to report change in severity of symptoms  Outcome: Progressing  Goal: Absence of cardiac dysrhythmias or at baseline rhythm  Description: INTERVENTIONS:  - Continuous cardiac monitoring, vital signs, obtain 12 lead EKG if ordered  - Administer antiarrhythmic and heart rate control medications as ordered  - Monitor electrolytes and administer replacement therapy as ordered  Outcome: Progressing

## 2024-09-20 NOTE — ASSESSMENT & PLAN NOTE
Lab Results   Component Value Date    HGBA1C 8.4 (H) 06/10/2024       Recent Labs     09/19/24  1320 09/19/24  1708 09/19/24  2030 09/20/24  0727   POCGLU 160* 277* 283* 177*       Blood Sugar Average: Last 72 hrs:  (P) 162.8  On Lantus 20 units SQ at bedtime, Humalog 5 units 3 times daily with meals.  Will continue.  Continue Humalog sliding scale with Accu-Cheks before every meal and at bedtime  Diabetic diet

## 2024-09-20 NOTE — ASSESSMENT & PLAN NOTE
-Present on admission  -Likely has underlying CKD.  Has moderate atrophic right kidney.  8.8 cm.  Left kidney 12.4 cm.  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  No hydronephrosis.  Moderately atrophic right kidney.  - To note patient was on antibiotics this admission.  Also received one-time Lasix IV 9/17.  And did receive initial intravenous fluids.  - To note patient is on perindopril at home    Overall, patient presents with LEANDRO on admission with creatinine 1.9 mg/dL on 9/15 and has remained essentially unchanged at 1.8 mg/dL on 9/19 prompting nephrology consultation.  Creatinine was 1.2 mg/dL in June 2024.  Initially had presented with SVT with rapid heart rate but I do not note significant hypotensive episodes.  Was with leukocytosis initially.  There was initial concern for possible urosepsis but urine culture has returned with mixed contaminants.  Blood cultures without any growth.  Patient may have had LEANDRO in the setting of ATN in the setting of hemodynamic perturbations while on ARB at home..  With patient does have atrophic right kidney and we will likely need to obtain a renal Doppler study.  Patient appears to have been adequately volume expanded initially and chest x-ray was with pulmonary edema.  Therefore I am not suspecting a large prerenal component at this juncture.  To note, patient has had a ureteral stent since October and did not follow-up with urology.  Had basket extraction of large bladder stone and right stent exchange.  Overall, I am concerned that there is likely ATN but patient also may have had progression of kidney disease given right renal atrophy.  And this may be patient's baseline.  On 9/20, creatinine is slightly improved to 1.6 mg/dL.  Patient is  mildly acidotic and slightly elevated anion gap.  Unclear if related to chloride repletion and LR yesterday but the cause of the anion gap is unclear and we will repeat labs in AM.  Will give low-dose bicarbonate tablets.  Also her white count is rising but clinically patient appears well and no sign of active infection.  She is completing antibiotics.  Patient feels well overall and states she is ready to go home.    Plan    - Agree with holding ACE inhibitor  - Lab work again in a.m.  - Consider renal Doppler ultrasound inpatient versus outpatient given atrophic kidney  - Give bicarbonate tablets.  Hold bicarbonate once serum bicarbonate levels are above 25  - Follow-up anion gap in a.m.

## 2024-09-20 NOTE — PLAN OF CARE
Problem: PAIN - ADULT  Goal: Verbalizes/displays adequate comfort level or baseline comfort level  Description: Interventions:  - Encourage patient to monitor pain and request assistance  - Assess pain using appropriate pain scale  - Administer analgesics based on type and severity of pain and evaluate response  - Implement non-pharmacological measures as appropriate and evaluate response  - Consider cultural and social influences on pain and pain management  - Notify physician/advanced practitioner if interventions unsuccessful or patient reports new pain  Outcome: Progressing     Problem: INFECTION - ADULT  Goal: Absence or prevention of progression during hospitalization  Description: INTERVENTIONS:  - Assess and monitor for signs and symptoms of infection  - Monitor lab/diagnostic results  - Monitor all insertion sites, i.e. indwelling lines, tubes, and drains  - Monitor endotracheal if appropriate and nasal secretions for changes in amount and color  - [place] appropriate cooling/warming therapies per order  - Administer medications as ordered  - Instruct and encourage patient and family to use good hand hygiene technique  - Identify and instruct in appropriate isolation precautions for identified infection/condition  Outcome: Progressing     Problem: SAFETY ADULT  Goal: Patient will remain free of falls  Description: INTERVENTIONS:  - Educate patient/family on patient safety including physical limitations  - Instruct patient to call for assistance with activity   - Consult OT/PT to assist with strengthening/mobility   - Keep Call bell within reach  - Keep bed low and locked with side rails adjusted as appropriate  - Keep care items and personal belongings within reach  - Initiate and maintain comfort rounds  - Make Fall Risk Sign visible to staff  - Offer Toileting every 2 Hours, in advance of need  - Initiate/Maintain bed/chair alarm  - Obtain necessary fall risk management equipment: bracelet/socks   -  Apply yellow socks and bracelet for high fall risk patients  - Consider moving patient to room near nurses station  Outcome: Progressing  Goal: Maintain or return to baseline ADL function  Description: INTERVENTIONS:  -  Assess patient's ability to carry out ADLs; assess patient's baseline for ADL function and identify physical deficits which impact ability to perform ADLs (bathing, care of mouth/teeth, toileting, grooming, dressing, etc.)  - Assess/evaluate cause of self-care deficits   - Assess range of motion  - Assess patient's mobility; develop plan if impaired  - Assess patient's need for assistive devices and provide as appropriate  - Encourage maximum independence but intervene and supervise when necessary  - Involve family in performance of ADLs  - Assess for home care needs following discharge   - Consider OT consult to assist with ADL evaluation and planning for discharge  - Provide patient education as appropriate  Outcome: Progressing  Goal: Maintains/Returns to pre admission functional level  Description: INTERVENTIONS:  - Perform AM-PAC 6 Click Basic Mobility/ Daily Activity assessment daily.  - Set and communicate daily mobility goal to care team and patient/family/caregiver.   - Collaborate with rehabilitation services on mobility goals if consulted  - Perform Range of Motion 12 times a day.  - Reposition patient every 2 hours.  - Dangle patient 3 times a day  - Stand patient 3 times a day  - Ambulate patient 3 times a day  - Out of bed to chair 3 times a day   - Out of bed for meals 3 times a day  - Out of bed for toileting  - Record patient progress and toleration of activity level   Outcome: Progressing     Problem: DISCHARGE PLANNING  Goal: Discharge to home or other facility with appropriate resources  Description: INTERVENTIONS:  - Identify barriers to discharge w/patient and caregiver  - Arrange for needed discharge resources and transportation as appropriate  - Identify discharge learning  needs (meds, wound care, etc.)  - Arrange for interpretive services to assist at discharge as needed  - Refer to Case Management Department for coordinating discharge planning if the patient needs post-hospital services based on physician/advanced practitioner order or complex needs related to functional status, cognitive ability, or social support system  Outcome: Progressing     Problem: Knowledge Deficit  Goal: Patient/family/caregiver demonstrates understanding of disease process, treatment plan, medications, and discharge instructions  Description: Complete learning assessment and assess knowledge base.  Interventions:  - Provide teaching at level of understanding  - Provide teaching via preferred learning methods  Outcome: Progressing     Problem: CARDIOVASCULAR - ADULT  Goal: Maintains optimal cardiac output and hemodynamic stability  Description: INTERVENTIONS:  - Monitor I/O, vital signs and rhythm  - Monitor for S/S and trends of decreased cardiac output  - Administer and titrate ordered vasoactive medications to optimize hemodynamic stability  - Assess quality of pulses, skin color and temperature  - Assess for signs of decreased coronary artery perfusion  - Instruct patient to report change in severity of symptoms  Outcome: Progressing  Goal: Absence of cardiac dysrhythmias or at baseline rhythm  Description: INTERVENTIONS:  - Continuous cardiac monitoring, vital signs, obtain 12 lead EKG if ordered  - Administer antiarrhythmic and heart rate control medications as ordered  - Monitor electrolytes and administer replacement therapy as ordered  Outcome: Progressing

## 2024-09-21 VITALS
HEIGHT: 66 IN | WEIGHT: 255 LBS | BODY MASS INDEX: 40.98 KG/M2 | HEART RATE: 63 BPM | OXYGEN SATURATION: 94 % | DIASTOLIC BLOOD PRESSURE: 77 MMHG | SYSTOLIC BLOOD PRESSURE: 142 MMHG | TEMPERATURE: 97.6 F | RESPIRATION RATE: 16 BRPM

## 2024-09-21 PROBLEM — R93.89 ABNORMAL CHEST X-RAY: Status: RESOLVED | Noted: 2024-09-16 | Resolved: 2024-09-21

## 2024-09-21 PROBLEM — E87.20 METABOLIC ACIDOSIS: Status: ACTIVE | Noted: 2024-09-21

## 2024-09-21 PROBLEM — Z91.89 AT RISK FOR ACID-BASE IMBALANCE: Status: RESOLVED | Noted: 2024-09-19 | Resolved: 2024-09-21

## 2024-09-21 PROBLEM — A41.9 SEPSIS (HCC): Status: RESOLVED | Noted: 2024-09-16 | Resolved: 2024-09-21

## 2024-09-21 PROBLEM — Z91.89 ELECTROLYTE IMBALANCE RISK: Status: RESOLVED | Noted: 2024-09-19 | Resolved: 2024-09-21

## 2024-09-21 PROBLEM — N39.0 UTI (URINARY TRACT INFECTION): Status: RESOLVED | Noted: 2024-09-16 | Resolved: 2024-09-21

## 2024-09-21 PROBLEM — E87.20 METABOLIC ACIDOSIS: Status: RESOLVED | Noted: 2024-09-21 | Resolved: 2024-09-21

## 2024-09-21 LAB
ANION GAP SERPL CALCULATED.3IONS-SCNC: 9 MMOL/L (ref 4–13)
BACTERIA BLD CULT: NORMAL
BACTERIA BLD CULT: NORMAL
BUN SERPL-MCNC: 37 MG/DL (ref 5–25)
CALCIUM SERPL-MCNC: 8.4 MG/DL (ref 8.4–10.2)
CHLORIDE SERPL-SCNC: 107 MMOL/L (ref 96–108)
CO2 SERPL-SCNC: 21 MMOL/L (ref 21–32)
CREAT SERPL-MCNC: 1.67 MG/DL (ref 0.6–1.3)
ERYTHROCYTE [DISTWIDTH] IN BLOOD BY AUTOMATED COUNT: 13.2 % (ref 11.6–15.1)
GFR SERPL CREATININE-BSD FRML MDRD: 33 ML/MIN/1.73SQ M
GLUCOSE SERPL-MCNC: 107 MG/DL (ref 65–140)
GLUCOSE SERPL-MCNC: 124 MG/DL (ref 65–140)
GLUCOSE SERPL-MCNC: 129 MG/DL (ref 65–140)
HCT VFR BLD AUTO: 33.9 % (ref 34.8–46.1)
HGB BLD-MCNC: 11.2 G/DL (ref 11.5–15.4)
MAGNESIUM SERPL-MCNC: 1.8 MG/DL (ref 1.9–2.7)
MCH RBC QN AUTO: 27.9 PG (ref 26.8–34.3)
MCHC RBC AUTO-ENTMCNC: 33 G/DL (ref 31.4–37.4)
MCV RBC AUTO: 85 FL (ref 82–98)
PHOSPHATE SERPL-MCNC: 3.7 MG/DL (ref 2.3–4.1)
PLATELET # BLD AUTO: 643 THOUSANDS/UL (ref 149–390)
PMV BLD AUTO: 8.2 FL (ref 8.9–12.7)
POTASSIUM SERPL-SCNC: 4 MMOL/L (ref 3.5–5.3)
RBC # BLD AUTO: 4.01 MILLION/UL (ref 3.81–5.12)
SODIUM SERPL-SCNC: 137 MMOL/L (ref 135–147)
WBC # BLD AUTO: 19.28 THOUSAND/UL (ref 4.31–10.16)

## 2024-09-21 PROCEDURE — 82948 REAGENT STRIP/BLOOD GLUCOSE: CPT

## 2024-09-21 PROCEDURE — 83735 ASSAY OF MAGNESIUM: CPT | Performed by: INTERNAL MEDICINE

## 2024-09-21 PROCEDURE — 99232 SBSQ HOSP IP/OBS MODERATE 35: CPT | Performed by: STUDENT IN AN ORGANIZED HEALTH CARE EDUCATION/TRAINING PROGRAM

## 2024-09-21 PROCEDURE — 84100 ASSAY OF PHOSPHORUS: CPT | Performed by: INTERNAL MEDICINE

## 2024-09-21 PROCEDURE — 99239 HOSP IP/OBS DSCHRG MGMT >30: CPT

## 2024-09-21 PROCEDURE — 85027 COMPLETE CBC AUTOMATED: CPT | Performed by: INTERNAL MEDICINE

## 2024-09-21 PROCEDURE — 80048 BASIC METABOLIC PNL TOTAL CA: CPT | Performed by: INTERNAL MEDICINE

## 2024-09-21 RX ORDER — CIPROFLOXACIN 500 MG/1
500 TABLET, FILM COATED ORAL EVERY 24 HOURS
Qty: 1 TABLET | Refills: 0 | Status: SHIPPED | OUTPATIENT
Start: 2024-09-22 | End: 2024-09-23

## 2024-09-21 RX ORDER — METOPROLOL TARTRATE 25 MG/1
12.5 TABLET, FILM COATED ORAL EVERY 12 HOURS SCHEDULED
Qty: 30 TABLET | Refills: 0 | Status: SHIPPED | OUTPATIENT
Start: 2024-09-21 | End: 2024-10-21

## 2024-09-21 RX ORDER — MAGNESIUM SULFATE HEPTAHYDRATE 40 MG/ML
2 INJECTION, SOLUTION INTRAVENOUS ONCE
Status: COMPLETED | OUTPATIENT
Start: 2024-09-21 | End: 2024-09-21

## 2024-09-21 RX ORDER — INSULIN GLARGINE 100 [IU]/ML
INJECTION, SOLUTION SUBCUTANEOUS
Qty: 15 ML | Refills: 0 | Status: SHIPPED | OUTPATIENT
Start: 2024-09-21

## 2024-09-21 RX ORDER — SODIUM BICARBONATE 650 MG/1
650 TABLET ORAL
Status: DISCONTINUED | OUTPATIENT
Start: 2024-09-21 | End: 2024-09-21 | Stop reason: HOSPADM

## 2024-09-21 RX ADMIN — SODIUM BICARBONATE 650 MG TABLET 650 MG: at 08:36

## 2024-09-21 RX ADMIN — MAGNESIUM SULFATE HEPTAHYDRATE 2 G: 40 INJECTION, SOLUTION INTRAVENOUS at 09:35

## 2024-09-21 RX ADMIN — AMLODIPINE BESYLATE 5 MG: 5 TABLET ORAL at 08:36

## 2024-09-21 RX ADMIN — HEPARIN SODIUM 5000 UNITS: 5000 INJECTION, SOLUTION INTRAVENOUS; SUBCUTANEOUS at 06:28

## 2024-09-21 RX ADMIN — SODIUM BICARBONATE 650 MG TABLET 650 MG: at 12:11

## 2024-09-21 RX ADMIN — INSULIN LISPRO 5 UNITS: 100 INJECTION, SOLUTION INTRAVENOUS; SUBCUTANEOUS at 08:29

## 2024-09-21 RX ADMIN — METOPROLOL TARTRATE 50 MG: 50 TABLET, FILM COATED ORAL at 08:36

## 2024-09-21 RX ADMIN — B-COMPLEX W/ C & FOLIC ACID TAB 1 TABLET: TAB at 08:36

## 2024-09-21 RX ADMIN — EZETIMIBE 10 MG: 10 TABLET ORAL at 08:36

## 2024-09-21 RX ADMIN — CEFTRIAXONE 1000 MG: 1 INJECTION, SOLUTION INTRAVENOUS at 08:33

## 2024-09-21 RX ADMIN — PRAVASTATIN SODIUM 40 MG: 40 TABLET ORAL at 08:36

## 2024-09-21 RX ADMIN — INSULIN LISPRO 5 UNITS: 100 INJECTION, SOLUTION INTRAVENOUS; SUBCUTANEOUS at 12:11

## 2024-09-21 RX ADMIN — PAROXETINE 20 MG: 20 TABLET, FILM COATED ORAL at 08:36

## 2024-09-21 NOTE — ASSESSMENT & PLAN NOTE
Patient presents with acute onset palpitation while watching TV, with associated dizziness when getting onto stretcher with EMS.  Denies chest pain SOB.  Reports history of same thing 20 years ago and another episode a few years ago and both episodes resolved when she made herself throw up. Patient reports not feeling well, started with productive cough on Thursday 3 days ago.  Reports has a right ureteral stent since last hospitalization in October last year.  Initial EKG in ED showed SVT, rate 224  Received adenosine x 2 and metoprolol 5 mg IV x 1 in ED  Currently in sinus rhythm  Telemetry showed no atrial fibrillation or further episodes of SVT  2D echo showed EF of 60%, unable to assess diastolic function without any significant valvular abnormalities, moderately elevated right ventricular systolic pressure at 50 mmHg-night pulse oximetry ordered rule out sleep apnea    Cardiology team following  Patient started on metoprolol 25 twice daily per cardiology.  Intermittent bradycardia will discharge on metoprolol 12.5 twice daily  Outpatient follow-up with cardiology

## 2024-09-21 NOTE — NURSING NOTE
AVS reviewed with patient including follow up appointments, discharge instructions and medications. Patient states understanding. All questions answered. Discharged to home with belongings.

## 2024-09-21 NOTE — ASSESSMENT & PLAN NOTE
Volume:euvolemic   Blood pressure: Borderline hypertension, /76  Recommend:  Recommend low-sodium diet  Maintain goal less than 140/90  Holding ACE inhibitors/ARB for now due to LEANDRO  Continue with amlodipine 5 mg, recommend increase to 10 mg if BP above goal  Metoprolol 50 mg twice daily  Advised to maintain a good BP control to prevent progression of CKD

## 2024-09-21 NOTE — ASSESSMENT & PLAN NOTE
Continue with ceftriaxone as per primary team  BP stable  Will monitor closely due to risk of worsening LEANDRO

## 2024-09-21 NOTE — ASSESSMENT & PLAN NOTE
Baseline creatinine 0.9-1.2 in past year    Unclear etiology.  Patient is on perindopril at home.  Renal ultrasound: Right ureteral stent in proper position without any evidence of hydronephrosis with moderately atrophic right kidney  S/p cystoscopy with stone removal on 9/19   Rpeat BMP 9/21 with improvement in kidney functions     Case was discussed with nephrology team and they agreed on repeating BMP in 1 week given downtrending creatinine.  Discontinue perindopril  Continue Norvasc  Ambulatory follow-up with results with PCP      Results from last 7 days   Lab Units 09/21/24  0628 09/20/24  0427 09/19/24  0550 09/18/24  0531 09/17/24  0531 09/16/24  0547 09/15/24  2212   BUN mg/dL 37* 37* 38* 35* 31* 30* 32*   CREATININE mg/dL 1.67* 1.61* 1.81* 1.79* 1.69* 1.85* 1.95*

## 2024-09-21 NOTE — ASSESSMENT & PLAN NOTE
Serum bicarbonate 21 mmol/L  HAGMA resolving   Plan:  Increase sodium bicarbonate to 650 mg 3 times daily

## 2024-09-21 NOTE — ASSESSMENT & PLAN NOTE
On perindopril at home.  Hold due to LEANDRO  Blood pressure slightly on the high side  Continue amlodipine 5 mg OD  Ambulatory follow-up with PCP

## 2024-09-21 NOTE — ASSESSMENT & PLAN NOTE
nitrates, leukocyturia, bacteriuria  Stent in place since October 2023   Antibiotics as per primary team   Urology following

## 2024-09-21 NOTE — PLAN OF CARE
Problem: PAIN - ADULT  Goal: Verbalizes/displays adequate comfort level or baseline comfort level  Description: Interventions:  - Encourage patient to monitor pain and request assistance  - Assess pain using appropriate pain scale  - Administer analgesics based on type and severity of pain and evaluate response  - Implement non-pharmacological measures as appropriate and evaluate response  - Consider cultural and social influences on pain and pain management  - Notify physician/advanced practitioner if interventions unsuccessful or patient reports new pain  Outcome: Progressing     Problem: INFECTION - ADULT  Goal: Absence or prevention of progression during hospitalization  Description: INTERVENTIONS:  - Assess and monitor for signs and symptoms of infection  - Monitor lab/diagnostic results  - Monitor all insertion sites, i.e. indwelling lines, tubes, and drains  - Monitor endotracheal if appropriate and nasal secretions for changes in amount and color  - [place] appropriate cooling/warming therapies per order  - Administer medications as ordered  - Instruct and encourage patient and family to use good hand hygiene technique  - Identify and instruct in appropriate isolation precautions for identified infection/condition  Outcome: Progressing     Problem: SAFETY ADULT  Goal: Patient will remain free of falls  Description: INTERVENTIONS:  - Educate patient/family on patient safety including physical limitations  - Instruct patient to call for assistance with activity   - Consult OT/PT to assist with strengthening/mobility   - Keep Call bell within reach  - Keep bed low and locked with side rails adjusted as appropriate  - Keep care items and personal belongings within reach  - Initiate and maintain comfort rounds  - Make Fall Risk Sign visible to staff  - Offer Toileting every 2 Hours, in advance of need  - Initiate/Maintain bed alarm  - Obtain necessary fall risk management equipment: walker   - Apply yellow  socks and bracelet for high fall risk patients  - Consider moving patient to room near nurses station  Outcome: Progressing  Goal: Maintain or return to baseline ADL function  Description: INTERVENTIONS:  -  Assess patient's ability to carry out ADLs; assess patient's baseline for ADL function and identify physical deficits which impact ability to perform ADLs (bathing, care of mouth/teeth, toileting, grooming, dressing, etc.)  - Assess/evaluate cause of self-care deficits   - Assess range of motion  - Assess patient's mobility; develop plan if impaired  - Assess patient's need for assistive devices and provide as appropriate  - Encourage maximum independence but intervene and supervise when necessary  - Involve family in performance of ADLs  - Assess for home care needs following discharge   - Consider OT consult to assist with ADL evaluation and planning for discharge  - Provide patient education as appropriate  Outcome: Progressing  Goal: Maintains/Returns to pre admission functional level  Description: INTERVENTIONS:  - Perform AM-PAC 6 Click Basic Mobility/ Daily Activity assessment daily.  - Set and communicate daily mobility goal to care team and patient/family/caregiver.   - Collaborate with rehabilitation services on mobility goals if consulted  - Perform Range of Motion 3 times a day.  - Reposition patient every 2 hours.  - Dangle patient 3 times a day  - Stand patient 3 times a day  - Ambulate patient 3 times a day  - Out of bed to chair 3 times a day   - Out of bed for meals 3 times a day  - Out of bed for toileting  - Record patient progress and toleration of activity level   Outcome: Progressing     Problem: DISCHARGE PLANNING  Goal: Discharge to home or other facility with appropriate resources  Description: INTERVENTIONS:  - Identify barriers to discharge w/patient and caregiver  - Arrange for needed discharge resources and transportation as appropriate  - Identify discharge learning needs (meds,  wound care, etc.)  - Arrange for interpretive services to assist at discharge as needed  - Refer to Case Management Department for coordinating discharge planning if the patient needs post-hospital services based on physician/advanced practitioner order or complex needs related to functional status, cognitive ability, or social support system  Outcome: Progressing     Problem: Knowledge Deficit  Goal: Patient/family/caregiver demonstrates understanding of disease process, treatment plan, medications, and discharge instructions  Description: Complete learning assessment and assess knowledge base.  Interventions:  - Provide teaching at level of understanding  - Provide teaching via preferred learning methods  Outcome: Progressing     Problem: CARDIOVASCULAR - ADULT  Goal: Maintains optimal cardiac output and hemodynamic stability  Description: INTERVENTIONS:  - Monitor I/O, vital signs and rhythm  - Monitor for S/S and trends of decreased cardiac output  - Administer and titrate ordered vasoactive medications to optimize hemodynamic stability  - Assess quality of pulses, skin color and temperature  - Assess for signs of decreased coronary artery perfusion  - Instruct patient to report change in severity of symptoms  Outcome: Progressing  Goal: Absence of cardiac dysrhythmias or at baseline rhythm  Description: INTERVENTIONS:  - Continuous cardiac monitoring, vital signs, obtain 12 lead EKG if ordered  - Administer antiarrhythmic and heart rate control medications as ordered  - Monitor electrolytes and administer replacement therapy as ordered  Outcome: Progressing

## 2024-09-21 NOTE — PROGRESS NOTES
Progress Note - Nephrology   Name: Bonnie SANDHU Apgar 59 y.o. female I MRN: [id number]  Unit/Bed#: [address] [place] [address] Date of Admission: 9/15/2024   Date of Service: 2024 I Hospital Day: 6     Assessment & Plan  LEANDRO (acute kidney injury) (HCC)  Non-Oliguric KDIGO LEANDRO stage 2/moderate  Etiology: Valla secondary to ATN with possible progression to CKD in the settings of right renal atrophy  Current creatinine: Plateauing at 1.6 mg/dL   Peak creatinine: 1.9 mg/dL  UA: Leukocyturia, bacteriuria with positive nitrates  Renal imaging : As symmetric kidneys, right kidney smaller with ureteral stent with no hydronephrosis    Treatment:  No indication of dialysis, kidney function is improving  Stable for discharge from my end, can follow-up in the office with nephrology  Recommend maintain MAP:  Over 65 mmHg if possible/avoid hypoperfusion:  Hold parameters on blood pressure medications  Avoid nephrotoxic agents such as NSAIDs, and IV contrast if possible. Avoid opioids   Adjust medications to GFR  No indication of IV fluids for now enforce fluid intake  Avoid  ACE inhibitors/ARB at this time    Electrolyte imbalance risk  -Initially with hyponatremia which has resolved.  With volume expansion.  Essential hypertension  Volume:euvolemic   Blood pressure: Borderline hypertension, /76  Recommend:  Recommend low-sodium diet  Maintain goal less than 140/90  Holding ACE inhibitors/ARB for now due to LEANDRO  Continue with amlodipine 5 mg, recommend increase to 10 mg if BP above goal  Metoprolol 50 mg twice daily  Advised to maintain a good BP control to prevent progression of CKD   SVT (supraventricular tachycardia)  Currently on metoprolol  Management as per cardiology and primary team  UTI (urinary tract infection)  nitrates, leukocyturia, bacteriuria  Stent in place since 2023   Antibiotics as per primary team   Urology following   Type 2 diabetes mellitus with hyperglycemia, without long-term current use of insulin  ([facility])  Lab Results   Component Value Date    HGBA1C 8.4 (H) 06/10/2024       Recent Labs     09/20/24  0727 09/20/24  1134 09/20/24  1625 09/20/24  2101   POCGLU 177* 155* 129 147*       Blood Sugar Average: Last 72 hrs:  (P) [id number]    HbA1c 8.4, above goal  Advised to maintain a good DM control to prevent progression of CKD   Maintain healthy diet (vegetables, fruits, whole grains, nonfat or low fat)  Weight loss  Physical activity (5 to 10 minutes to start the increase to 30 min a day)    Sepsis ([facility])  Continue with ceftriaxone as per primary team  BP stable  Will monitor closely due to risk of worsening LEANDRO  Morbid obesity ([facility])  BMI 41.16  Recommend weight loss , heathy diet  Lifestyle modification      Metabolic acidosis  Serum bicarbonate 21 mmol/L  HAGMA resolving   Plan:  Increase sodium bicarbonate to 650 mg 3 times daily    I have reviewed the nephrology recommendations including sodium bicarb repeltion  , with primary team , and we are in agreement with renal plan including the information outlined above. Ok for discharge from Nephrology service perspective.    History of Present Illness   Brief History of Admission - 60 yo woman with PMH of LEANDRO in August 2023 in the settings of emphysematous pyelonephritis, hyperlipidemia, hypertension, depression.  Nephrology is consulted for management of LEANDRO      Feels well, no SOB, no CP, no recent hospitalizations. No issues with medication         Objective      Temp:  [97.5 °F (36.4 °C)-97.6 °F (36.4 °C)] 97.5 °F (36.4 °C)  HR:  [58-80] 58  Resp:  [16-18] 16  BP: (139-143)/() 140/76  O2 Device: None (Room air)         Vitals:    09/16/24 0113 09/16/24 1005   Weight: 116 kg (255 lb 3.2 oz) 116 kg (255 lb)     I/O last 24 hours:  In: 240 [P.O.:240]  Out: 2550 [Urine:2550]  Lines/Drains/Airways       Active Status       Name Placement date Placement time Site Days    Ureteral Internal Stent Right ureter 6 Fr. 09/19/24  1249  Right ureter  1                   Physical Exam   General:  no acute distress at this time  Skin:  No acute rash  Eyes:  No scleral icterus and noninjected  ENT:  mucous membranes moist  Neck:  no carotid bruits  Chest:  Clear to auscultation percussion, good respiratory effort, no use of accessory respiratory muscles  CVS:  Regular rate and rhythm without rub   Abdomen:  soft and nontender   Extremities: no significant lower extremity edema  Neuro:  No gross focality  Psych:  Alert , cooperative     Medications:    Current Facility-Administered Medications:     acetaminophen (TYLENOL) tablet 650 mg, 650 mg, Oral, Q8H PRN, Chau Hassan MD    aluminum-magnesium hydroxide-simethicone (MAALOX) oral suspension 30 mL, 30 mL, Oral, Q6H PRN, Chau Hassan MD    amLODIPine (NORVASC) tablet 5 mg, 5 mg, Oral, Daily, Chau Hassan MD, 5 mg at 09/20/24 0903    cefTRIAXone (ROCEPHIN) IVPB (premix in dextrose) 1,000 mg 50 mL, 1,000 mg, Intravenous, Q24H, Jose Gomez MD, Last Rate: 100 mL/hr at 09/20/24 1049, 1,000 mg at 09/20/24 1049    ezetimibe (ZETIA) tablet 10 mg, 10 mg, Oral, Daily, Chau Hassan MD, 10 mg at 09/20/24 0902    heparin (porcine) subcutaneous injection 5,000 Units, 5,000 Units, Subcutaneous, Q8H NAZ, Chau Hassan MD, 5,000 Units at 09/21/24 0628    insulin glargine (LANTUS) subcutaneous injection 10 Units 0.1 mL, 10 Units, Subcutaneous, HS, Chau Hassan MD, 10 Units at 09/20/24 2137    insulin lispro (HumALOG/ADMELOG) 100 units/mL subcutaneous injection 1-5 Units, 1-5 Units, Subcutaneous, TID AC, 1 Units at 09/20/24 1153 **AND** Fingerstick Glucose (POCT), , , TID AC, Chau Hassan MD    insulin lispro (HumALOG/ADMELOG) 100 units/mL subcutaneous injection 1-5 Units, 1-5 Units, Subcutaneous, HS, Chau Hassan MD, 2 Units at 09/19/24 2115    insulin lispro (HumALOG/ADMELOG) 100 units/mL subcutaneous injection 5 Units, 5 Units, Subcutaneous, TID With Meals, Chau Hassan MD, 5 Units at 09/20/24 3569     "metoprolol tartrate (LOPRESSOR) tablet 50 mg, 50 mg, Oral, Q12H NAZ, Chau Hassan MD, 50 mg at 09/20/24 2138    multivitamin stress formula tablet 1 tablet, 1 tablet, Oral, Daily, Chau Hassan MD, 1 tablet at 09/20/24 0903    ondansetron (ZOFRAN) injection 4 mg, 4 mg, Intravenous, Q6H PRN, Chau Hassan MD    PARoxetine (PAXIL) tablet 20 mg, 20 mg, Oral, Daily, Chau Hassan MD, 20 mg at 09/20/24 0904    pravastatin (PRAVACHOL) tablet 40 mg, 40 mg, Oral, Daily, Chau Hassan MD, 40 mg at 09/20/24 0903    sodium bicarbonate tablet 650 mg, 650 mg, Oral, BID after meals, Juana Martinez MD, 650 mg at 09/20/24 1735      Lab Results: I have reviewed the following results:   Results from last 7 days   Lab Units 09/21/24  0628 09/20/24  0427 09/19/24  0550 09/18/24  0531 09/17/24  0531 09/16/24  0547 09/15/24  2212   WBC Thousand/uL 19.28* 19.46* 16.16* 15.88* 14.15* 11.42* 14.46*   HEMOGLOBIN g/dL 11.2* 11.4* 11.6 11.5 12.3 11.2* 12.0   HEMATOCRIT % 33.9* 34.4* 35.0 34.4* 38.1 33.5* 35.6   PLATELETS Thousands/uL 643* 578* 521* 412* 306 279 277   POTASSIUM mmol/L  --  4.3 3.8 3.8 3.6 3.8 3.9   CHLORIDE mmol/L  --  105 106 104 104 101 97   CO2 mmol/L  --  17* 25 24 19* 20* 19*   BUN mg/dL  --  37* 38* 35* 31* 30* 32*   CREATININE mg/dL  --  1.61* 1.81* 1.79* 1.69* 1.85* 1.95*   CALCIUM mg/dL  --  8.7 8.7 8.5 8.2* 8.1* 8.4   MAGNESIUM mg/dL  --  1.9 2.1 1.8*  --  2.3 1.7*   PHOSPHORUS mg/dL  --  4.0  --   --   --   --   --    ALBUMIN g/dL  --  3.2* 3.1*  --  3.4* 3.2* 3.3*       Administrative Statements     Portions of the record may have been created with voice recognition software. Occasional wrong word or \"sound a like\" substitutions may have occurred due to the inherent limitations of voice recognition software. Read the chart carefully and recognize, using context, where substitutions have occurred.If you have any questions, please contact the dictating provider.  "

## 2024-09-21 NOTE — ASSESSMENT & PLAN NOTE
Sepsis, present on admission, as evidenced by leukocytosis, tachycardia, with suspected source of infection UTI  Lactic acid normal  Initial procalcitonin 0.33 >0.26  UA positive nitrite large leukocytes.  Completed 3 days of azithromycin.  Urine antigens negative.  Chest x-ray with no consolidation noted.  Blood cultures and urine cultures negative to date  Most probably sepsis secondary to UTI.  Completed 6 days of ceftriaxone  S/p cystoscopy on 9/19 with uptrending WBC on 9/20.  Antibiotics were continued and white blood cell count trended down to 9/21  Most probably UTI secondary to nephrolithiasis   Complete 2 days of antibiotics to end on 9/22/2024.  Case was discussed with urology with plan for follow-up in the next 7 days for stent removal      Results from last 7 days   Lab Units 09/17/24  0531 09/16/24  0013 09/15/24  2212   LACTIC ACID mmol/L  --  0.7  --    PROCALCITONIN ng/ml 0.26*  --  0.33*     Results from last 7 days   Lab Units 09/21/24  0628 09/20/24  0427 09/19/24  0550 09/18/24  0531 09/17/24  0531 09/16/24  0547 09/15/24  2212   WBC Thousand/uL 19.28* 19.46* 16.16* 15.88* 14.15* 11.42* 14.46*   TOTAL NEUT ABS Thousand/uL  --   --  12.60*  --   --   --   --

## 2024-09-21 NOTE — ASSESSMENT & PLAN NOTE
Lab Results   Component Value Date    HGBA1C 8.4 (H) 06/10/2024       Recent Labs     09/20/24  1134 09/20/24  1625 09/20/24  2101 09/21/24  0730   POCGLU 155* 129 147* 129       Blood Sugar Average: Last 72 hrs:  (P) 160.4456671423894593  On Lantus 20 units SQ at bedtime, Humalog 5 units 3 times daily with meals.  Will continue.  Continue Humalog sliding scale with Accu-Cheks before every meal and at bedtime  Diabetic diet

## 2024-09-21 NOTE — ASSESSMENT & PLAN NOTE
History of right-sided emphysematous pyelonephritis in 10/2023, required ureteral stent placement.   Completed 6 days of ceftriaxone  S/p cystoscopy on 9/19 with uptrending WBC on 9/20.  Antibiotics were continued and white blood cell count trended down to 9/21  Most probably UTI secondary to nephrolithiasis   Complete 2 days of antibiotics to end on 9/22/2024.  Case was discussed with urology with plan for follow-up in the next 7 days for stent removal    Urine cultures with mixed contaminants  Repeat CBC and BMP in 1 week

## 2024-09-21 NOTE — ASSESSMENT & PLAN NOTE
Non-Oliguric KDIGO LEANDRO stage 2/moderate  Etiology: Valla secondary to ATN with possible progression to CKD in the settings of right renal atrophy  Current creatinine: Plateauing at 1.6 mg/dL   Peak creatinine: 1.9 mg/dL  UA: Leukocyturia, bacteriuria with positive nitrates  Renal imaging : As symmetric kidneys, right kidney smaller with ureteral stent with no hydronephrosis    Treatment:  No indication of dialysis, kidney function is improving  Stable for discharge from my end, can follow-up in the office with nephrology  Recommend maintain MAP:  Over 65 mmHg if possible/avoid hypoperfusion:  Hold parameters on blood pressure medications  Avoid nephrotoxic agents such as NSAIDs, and IV contrast if possible. Avoid opioids   Adjust medications to GFR  No indication of IV fluids for now enforce fluid intake  Avoid  ACE inhibitors/ARB at this time

## 2024-09-21 NOTE — ASSESSMENT & PLAN NOTE
Lab Results   Component Value Date    HGBA1C 8.4 (H) 06/10/2024       Recent Labs     09/20/24  0727 09/20/24  1134 09/20/24  1625 09/20/24  2101   POCGLU 177* 155* 129 147*       Blood Sugar Average: Last 72 hrs:  (P) [id number]    HbA1c 8.4, above goal  Advised to maintain a good DM control to prevent progression of CKD   Maintain healthy diet (vegetables, fruits, whole grains, nonfat or low fat)  Weight loss  Physical activity (5 to 10 minutes to start the increase to 30 min a day)

## 2024-09-21 NOTE — DISCHARGE SUMMARY
Discharge Summary - Hospitalist   Name: Bonnie SANDHU Apgar 59 y.o. female I MRN: [id number]  Unit/Bed#: [address] [place] [address] Date of Admission: 9/15/2024   Date of Service: 2024 I Hospital Day: 6     Assessment & Plan  SVT (supraventricular tachycardia)  Patient presents with acute onset palpitation while watching TV, with associated dizziness when getting onto stretcher with EMS.  Denies chest pain SOB.  Reports history of same thing 20 years ago and another episode a few years ago and both episodes resolved when she made herself throw up. Patient reports not feeling well, started with productive cough on Thursday 3 days ago.  Reports has a right ureteral stent since last hospitalization in October last year.  Initial EKG in ED showed SVT, rate 224  Received adenosine x 2 and metoprolol 5 mg IV x 1 in ED  Currently in sinus rhythm  Telemetry showed no atrial fibrillation or further episodes of SVT  2D echo showed EF of 60%, unable to assess diastolic function without any significant valvular abnormalities, moderately elevated right ventricular systolic pressure at 50 mmHg-night pulse oximetry ordered rule out sleep apnea    Cardiology team following  Patient started on metoprolol 25 twice daily per cardiology.  Intermittent bradycardia will discharge on metoprolol 12.5 twice daily  Outpatient follow-up with cardiology    Sepsis ([facility]) (Resolved: 2024)  Sepsis, present on admission, as evidenced by leukocytosis, tachycardia, with suspected source of infection UTI  Lactic acid normal  Initial procalcitonin 0.33 >0.26  UA positive nitrite large leukocytes.  Completed 3 days of azithromycin.  Urine antigens negative.  Chest x-ray with no consolidation noted.  Blood cultures and urine cultures negative to date  Most probably sepsis secondary to UTI.  Completed 6 days of ceftriaxone  S/p cystoscopy on  with uptrending WBC on .  Antibiotics were continued and white blood cell count trended down to   Most  probably UTI secondary to nephrolithiasis   Complete 2 days of antibiotics to end on 9/22/2024.  Case was discussed with urology with plan for follow-up in the next 7 days for stent removal      Results from last 7 days   Lab Units 09/17/24  0531 09/16/24  0013 09/15/24  2212   LACTIC ACID mmol/L  --  0.7  --    PROCALCITONIN ng/ml 0.26*  --  0.33*     Results from last 7 days   Lab Units 09/21/24  0628 09/20/24  0427 09/19/24  0550 09/18/24  0531 09/17/24  0531 09/16/24  0547 09/15/24  2212   WBC Thousand/uL 19.28* 19.46* 16.16* 15.88* 14.15* 11.42* 14.46*   TOTAL NEUT ABS Thousand/uL  --   --  12.60*  --   --   --   --         LEANDRO (acute kidney injury) ([facility])  Baseline creatinine 0.9-1.2 in past year    Unclear etiology.  Patient is on perindopril at home.  Renal ultrasound: Right ureteral stent in proper position without any evidence of hydronephrosis with moderately atrophic right kidney  S/p cystoscopy with stone removal on 9/19   Rpeat BMP 9/21 with improvement in kidney functions     Case was discussed with nephrology team and they agreed on repeating BMP in 1 week given downtrending creatinine.  Discontinue perindopril  Continue Norvasc  Ambulatory follow-up with results with PCP      Results from last 7 days   Lab Units 09/21/24  0628 09/20/24  0427 09/19/24  0550 09/18/24  0531 09/17/24  0531 09/16/24  0547 09/15/24  2212   BUN mg/dL 37* 37* 38* 35* 31* 30* 32*   CREATININE mg/dL 1.67* 1.61* 1.81* 1.79* 1.69* 1.85* 1.95*          UTI (urinary tract infection) (Resolved: 9/21/2024)  History of right-sided emphysematous pyelonephritis in 10/2023, required ureteral stent placement.   Completed 6 days of ceftriaxone  S/p cystoscopy on 9/19 with uptrending WBC on 9/20.  Antibiotics were continued and white blood cell count trended down to 9/21  Most probably UTI secondary to nephrolithiasis   Complete 2 days of antibiotics to end on 9/22/2024.  Case was discussed with urology with plan for follow-up in the next  7 days for stent removal    Urine cultures with mixed contaminants  Repeat CBC and BMP in 1 week    Essential hypertension  On perindopril at home.  Hold due to LEANDRO  Blood pressure slightly on the high side  Continue amlodipine 5 mg OD  Ambulatory follow-up with PCP  Hyperlipidemia  Continue statin  Depression  Continue Paxil  Type 2 diabetes mellitus with hyperglycemia, without long-term current use of insulin ([facility])  Lab Results   Component Value Date    HGBA1C 8.4 (H) 06/10/2024       Recent Labs     09/20/24  1134 09/20/24  1625 09/20/24  2101 09/21/24  0730   POCGLU 155* 129 147* 129       Blood Sugar Average: Last 72 hrs:  (P) [id number]  On Lantus 20 units SQ at bedtime, Humalog 5 units 3 times daily with meals.  Will continue.  Continue Humalog sliding scale with Accu-Cheks before every meal and at bedtime  Diabetic diet  Morbid obesity ([facility])  Diet and lifestyle modification  Abnormal chest x-ray (Resolved: 9/21/2024)  Chest x-ray showed mild central pulmonary vascular congestion  S/p lasix x 1 with improvement in breathing  On room air with oxygen needs at night.   Mostly secondary to undiagnosed JENNIFER     Suspected sleep apnea  Outpatient follow-up with sleep medicine  Pulmonary hypertension ([facility])  Ambulatory referral to cardiology and sleep medicine  Preop cardiovascular exam    At risk for acid-base imbalance (Resolved: 9/21/2024)  Resolved.  Nephrology with no further recommendations at this time  Electrolyte imbalance risk (Resolved: 9/21/2024)  Repleted per protocol.  Currently stable from nephrology standpoint  Metabolic acidosis (Resolved: 9/21/2024)  Resolved.  Nephrology team following with no further recommendations at this time     Medical Problems       Resolved Problems  Date Reviewed: 9/21/2024            Resolved    Hyponatremia 9/17/2024     Resolved by  Rosalia Germain PA-C    Sepsis ([facility]) 9/21/2024     Resolved by  Jose Gomez MD    Elevated LFTs 9/17/2024     Resolved by  Jose  MD Patricia    UTI (urinary tract infection) 9/21/2024     Resolved by  Jose Gomez MD    Abnormal chest x-ray 9/21/2024     Resolved by  Jose Gomez MD    Pulmonary vascular congestion 9/19/2024     Resolved by  Jose Gomez MD    At risk for acid-base imbalance 9/21/2024     Resolved by  Jose Gomez MD    Electrolyte imbalance risk 9/21/2024     Resolved by  Jose Gomez MD    Metabolic acidosis 9/21/2024     Resolved by  Jose Gomez MD        Discharging Physician / Practitioner: Jose Gomez MD  PCP: Venessa Downs DO  Admission Date:   Admission Orders (From admission, onward)       Ordered        09/15/24 4197  INPATIENT ADMISSION  Once                          Discharge Date: 09/21/24    Consultations During Hospital Stay:  Urology  Nephrology    Procedures Performed:   FL retrograde pyelogram   Final Result      Fluoroscopic guidance provided for right retrograde pyelogram.      Please see separate procedure report for additional details.         Workstation performed: [id number]         XR chest portable   Final Result      Pulmonary vascular congestion.            Workstation performed: [id number]         US kidney and bladder   Final Result      Right ureteral stent in proper position. No evidence of hydronephrosis.      Moderately atrophic right kidney.            Workstation performed: [id number]         XR chest 1 view portable   Final Result      Mild central pulmonary vascular congestion.            Workstation performed: [id number]               Significant Findings / Test Results:   Results for orders placed or performed during the hospital encounter of 09/15/24   FLU/RSV/COVID - if FLU/RSV clinically relevant (2hr TAT)    Specimen: Nose; Nares   Result Value Ref Range    SARS-CoV-2 Negative Negative    INFLUENZA A PCR Negative Negative    INFLUENZA B PCR Negative Negative    RSV PCR Negative Negative   Blood culture    Specimen: Hand, Right; Blood   Result  Value Ref Range    Blood Culture No Growth After 4 Days.    Blood culture    Specimen: Arm, Right; Blood   Result Value Ref Range    Blood Culture No Growth After 4 Days.    Strep Pneumoniae, Urine    Specimen: Urine, Clean Catch   Result Value Ref Range    Strep pneumoniae antigen, urine Negative Negative   Legionella antigen, Urine    Specimen: Urine, Clean Catch   Result Value Ref Range    Legionella Urinary Antigen Negative Negative   Urine culture    Specimen: Urine, Clean Catch   Result Value Ref Range    Urine Culture 10,000-19,000 cfu/ml    Comprehensive metabolic panel   Result Value Ref Range    Sodium 128 (L) 135 - 147 mmol/L    Potassium 3.9 3.5 - 5.3 mmol/L    Chloride 97 96 - 108 mmol/L    CO2 19 (L) 21 - 32 mmol/L    ANION GAP 12 4 - 13 mmol/L    BUN 32 (H) 5 - 25 mg/dL    Creatinine 1.95 (H) 0.60 - 1.30 mg/dL    Glucose 290 (H) 65 - 140 mg/dL    Calcium 8.4 8.4 - 10.2 mg/dL    Corrected Calcium 9.0 8.3 - 10.1 mg/dL    AST 47 (H) 13 - 39 U/L    ALT 54 (H) 7 - 52 U/L    Alkaline Phosphatase 116 (H) 34 - 104 U/L    Total Protein 6.9 6.4 - 8.4 g/dL    Albumin 3.3 (L) 3.5 - 5.0 g/dL    Total Bilirubin 0.66 0.20 - 1.00 mg/dL    eGFR 27 ml/min/1.73sq m   CBC and differential   Result Value Ref Range    WBC 14.46 (H) 4.31 - 10.16 Thousand/uL    RBC 4.30 3.81 - 5.12 Million/uL    Hemoglobin 12.0 11.5 - 15.4 g/dL    Hematocrit 35.6 34.8 - 46.1 %    MCV 83 82 - 98 fL    MCH 27.9 26.8 - 34.3 pg    MCHC 33.7 31.4 - 37.4 g/dL    RDW 12.7 11.6 - 15.1 %    MPV 8.7 (L) 8.9 - 12.7 fL    Platelets 277 149 - 390 Thousands/uL    nRBC 0 /100 WBCs    Segmented % 75 43 - 75 %    Immature Grans % 1 0 - 2 %    Lymphocytes % 10 (L) 14 - 44 %    Monocytes % 11 4 - 12 %    Eosinophils Relative 2 0 - 6 %    Basophils Relative 1 0 - 1 %    Absolute Neutrophils 11.00 (H) 1.85 - 7.62 Thousands/µL    Absolute Immature Grans 0.11 0.00 - 0.20 Thousand/uL    Absolute Lymphocytes 1.48 0.60 - 4.47 Thousands/µL    Absolute Monocytes 1.57  "(H) 0.17 - 1.22 Thousand/µL    Eosinophils Absolute 0.23 0.00 - 0.61 Thousand/µL    Basophils Absolute 0.07 0.00 - 0.10 Thousands/µL   Magnesium   Result Value Ref Range    Magnesium 1.7 (L) 1.9 - 2.7 mg/dL   HS Troponin 0hr (reflex protocol)   Result Value Ref Range    hs TnI 0hr 14 \"Refer to ACS Flowchart\"- see link ng/L   TSH, 3rd generation with Free T4 reflex   Result Value Ref Range    TSH 3RD GENERATON 1.992 0.450 - 4.500 uIU/mL   HS Troponin I 2hr   Result Value Ref Range    hs TnI 2hr 33 \"Refer to ACS Flowchart\"- see link ng/L    Delta 2hr hsTnI 19 <20 ng/L   UA w Reflex to Microscopic w Reflex to Culture    Specimen: Urine, Clean Catch   Result Value Ref Range    Color, UA Yellow     Clarity, UA Clear     Specific Gravity, UA <1.005 (L) 1.005 - 1.030    pH, UA 6.5 4.5, 5.0, 5.5, 6.0, 6.5, 7.0, 7.5, 8.0    Leukocytes, UA Large (A) Negative    Nitrite, UA Positive (A) Negative    Protein, UA Trace (A) Negative mg/dl    Glucose, UA Negative Negative mg/dl    Ketones, UA Negative Negative mg/dl    Urobilinogen, UA <2.0 <2.0 mg/dl mg/dl    Bilirubin, UA Negative Negative    Occult Blood, UA Small (A) Negative   Procalcitonin   Result Value Ref Range    Procalcitonin 0.33 (H) <=0.25 ng/ml   Lactic acid, plasma (w/reflex if result > 2.0)   Result Value Ref Range    LACTIC ACID 0.7 0.5 - 2.0 mmol/L   Urine Microscopic   Result Value Ref Range    RBC, UA None Seen None Seen, 0-1, 1-2, 2-4, 0-5 /hpf    WBC, UA 10-20 (A) None Seen, 0-1, 1-2, 0-5, 2-4 /hpf    Epithelial Cells None Seen None Seen, Occasional /hpf    Bacteria, UA Innumerable (A) None Seen, Occasional /hpf   Comprehensive metabolic panel   Result Value Ref Range    Sodium 132 (L) 135 - 147 mmol/L    Potassium 3.8 3.5 - 5.3 mmol/L    Chloride 101 96 - 108 mmol/L    CO2 20 (L) 21 - 32 mmol/L    ANION GAP 11 4 - 13 mmol/L    BUN 30 (H) 5 - 25 mg/dL    Creatinine 1.85 (H) 0.60 - 1.30 mg/dL    Glucose 212 (H) 65 - 140 mg/dL    Calcium 8.1 (L) 8.4 - 10.2 mg/dL " "   Corrected Calcium 8.7 8.3 - 10.1 mg/dL    AST 31 13 - 39 U/L    ALT 45 7 - 52 U/L    Alkaline Phosphatase 128 (H) 34 - 104 U/L    Total Protein 6.3 (L) 6.4 - 8.4 g/dL    Albumin 3.2 (L) 3.5 - 5.0 g/dL    Total Bilirubin 0.43 0.20 - 1.00 mg/dL    eGFR 29 ml/min/1.73sq m   Magnesium   Result Value Ref Range    Magnesium 2.3 1.9 - 2.7 mg/dL   CBC and differential   Result Value Ref Range    WBC 11.42 (H) 4.31 - 10.16 Thousand/uL    RBC 4.02 3.81 - 5.12 Million/uL    Hemoglobin 11.2 (L) 11.5 - 15.4 g/dL    Hematocrit 33.5 (L) 34.8 - 46.1 %    MCV 83 82 - 98 fL    MCH 27.9 26.8 - 34.3 pg    MCHC 33.4 31.4 - 37.4 g/dL    RDW 12.6 11.6 - 15.1 %    MPV 8.4 (L) 8.9 - 12.7 fL    Platelets 279 149 - 390 Thousands/uL    nRBC 0 /100 WBCs    Segmented % 74 43 - 75 %    Immature Grans % 1 0 - 2 %    Lymphocytes % 13 (L) 14 - 44 %    Monocytes % 11 4 - 12 %    Eosinophils Relative 1 0 - 6 %    Basophils Relative 0 0 - 1 %    Absolute Neutrophils 8.48 (H) 1.85 - 7.62 Thousands/µL    Absolute Immature Grans 0.08 0.00 - 0.20 Thousand/uL    Absolute Lymphocytes 1.47 0.60 - 4.47 Thousands/µL    Absolute Monocytes 1.22 0.17 - 1.22 Thousand/µL    Eosinophils Absolute 0.13 0.00 - 0.61 Thousand/µL    Basophils Absolute 0.04 0.00 - 0.10 Thousands/µL   HS Troponin I 4hr   Result Value Ref Range    hs TnI 4hr 34 \"Refer to ACS Flowchart\"- see link ng/L    Delta 4hr hsTnI 20 (H) <20 ng/L   Procalcitonin, Next Day AM Collection   Result Value Ref Range    Procalcitonin 0.26 (H) <=0.25 ng/ml   CBC and differential   Result Value Ref Range    WBC 14.15 (H) 4.31 - 10.16 Thousand/uL    RBC 4.43 3.81 - 5.12 Million/uL    Hemoglobin 12.3 11.5 - 15.4 g/dL    Hematocrit 38.1 34.8 - 46.1 %    MCV 86 82 - 98 fL    MCH 27.8 26.8 - 34.3 pg    MCHC 32.3 31.4 - 37.4 g/dL    RDW 13.0 11.6 - 15.1 %    MPV 9.0 8.9 - 12.7 fL    Platelets 306 149 - 390 Thousands/uL    nRBC 0 /100 WBCs    Segmented % 78 (H) 43 - 75 %    Immature Grans % 1 0 - 2 %    Lymphocytes % " 10 (L) 14 - 44 %    Monocytes % 8 4 - 12 %    Eosinophils Relative 2 0 - 6 %    Basophils Relative 1 0 - 1 %    Absolute Neutrophils 11.26 (H) 1.85 - 7.62 Thousands/µL    Absolute Immature Grans 0.14 0.00 - 0.20 Thousand/uL    Absolute Lymphocytes 1.35 0.60 - 4.47 Thousands/µL    Absolute Monocytes 1.12 0.17 - 1.22 Thousand/µL    Eosinophils Absolute 0.21 0.00 - 0.61 Thousand/µL    Basophils Absolute 0.07 0.00 - 0.10 Thousands/µL   Comprehensive metabolic panel   Result Value Ref Range    Sodium 135 135 - 147 mmol/L    Potassium 3.6 3.5 - 5.3 mmol/L    Chloride 104 96 - 108 mmol/L    CO2 19 (L) 21 - 32 mmol/L    ANION GAP 12 4 - 13 mmol/L    BUN 31 (H) 5 - 25 mg/dL    Creatinine 1.69 (H) 0.60 - 1.30 mg/dL    Glucose 146 (H) 65 - 140 mg/dL    Calcium 8.2 (L) 8.4 - 10.2 mg/dL    Corrected Calcium 8.7 8.3 - 10.1 mg/dL    AST 33 13 - 39 U/L    ALT 45 7 - 52 U/L    Alkaline Phosphatase 158 (H) 34 - 104 U/L    Total Protein 6.8 6.4 - 8.4 g/dL    Albumin 3.4 (L) 3.5 - 5.0 g/dL    Total Bilirubin 0.49 0.20 - 1.00 mg/dL    eGFR 32 ml/min/1.73sq m   Magnesium   Result Value Ref Range    Magnesium 1.8 (L) 1.9 - 2.7 mg/dL   CBC and differential   Result Value Ref Range    WBC 15.88 (H) 4.31 - 10.16 Thousand/uL    RBC 4.13 3.81 - 5.12 Million/uL    Hemoglobin 11.5 11.5 - 15.4 g/dL    Hematocrit 34.4 (L) 34.8 - 46.1 %    MCV 83 82 - 98 fL    MCH 27.8 26.8 - 34.3 pg    MCHC 33.4 31.4 - 37.4 g/dL    RDW 12.9 11.6 - 15.1 %    MPV 8.5 (L) 8.9 - 12.7 fL    Platelets 412 (H) 149 - 390 Thousands/uL    nRBC 0 /100 WBCs    Segmented % 72 43 - 75 %    Immature Grans % 2 0 - 2 %    Lymphocytes % 14 14 - 44 %    Monocytes % 7 4 - 12 %    Eosinophils Relative 4 0 - 6 %    Basophils Relative 1 0 - 1 %    Absolute Neutrophils 11.56 (H) 1.85 - 7.62 Thousands/µL    Absolute Immature Grans 0.35 (H) 0.00 - 0.20 Thousand/uL    Absolute Lymphocytes 2.14 0.60 - 4.47 Thousands/µL    Absolute Monocytes 1.14 0.17 - 1.22 Thousand/µL    Eosinophils  Absolute 0.58 0.00 - 0.61 Thousand/µL    Basophils Absolute 0.11 (H) 0.00 - 0.10 Thousands/µL   Basic metabolic panel   Result Value Ref Range    Sodium 138 135 - 147 mmol/L    Potassium 3.8 3.5 - 5.3 mmol/L    Chloride 104 96 - 108 mmol/L    CO2 24 21 - 32 mmol/L    ANION GAP 10 4 - 13 mmol/L    BUN 35 (H) 5 - 25 mg/dL    Creatinine 1.79 (H) 0.60 - 1.30 mg/dL    Glucose 118 65 - 140 mg/dL    Calcium 8.5 8.4 - 10.2 mg/dL    eGFR 30 ml/min/1.73sq m   Magnesium   Result Value Ref Range    Magnesium 2.1 1.9 - 2.7 mg/dL   CBC and differential   Result Value Ref Range    WBC 16.16 (H) 4.31 - 10.16 Thousand/uL    RBC 4.20 3.81 - 5.12 Million/uL    Hemoglobin 11.6 11.5 - 15.4 g/dL    Hematocrit 35.0 34.8 - 46.1 %    MCV 83 82 - 98 fL    MCH 27.6 26.8 - 34.3 pg    MCHC 33.1 31.4 - 37.4 g/dL    RDW 13.0 11.6 - 15.1 %    MPV 8.4 (L) 8.9 - 12.7 fL    Platelets 521 (H) 149 - 390 Thousands/uL   Comprehensive metabolic panel   Result Value Ref Range    Sodium 141 135 - 147 mmol/L    Potassium 3.8 3.5 - 5.3 mmol/L    Chloride 106 96 - 108 mmol/L    CO2 25 21 - 32 mmol/L    ANION GAP 10 4 - 13 mmol/L    BUN 38 (H) 5 - 25 mg/dL    Creatinine 1.81 (H) 0.60 - 1.30 mg/dL    Glucose 132 65 - 140 mg/dL    Calcium 8.7 8.4 - 10.2 mg/dL    Corrected Calcium 9.4 8.3 - 10.1 mg/dL    AST 18 13 - 39 U/L    ALT 37 7 - 52 U/L    Alkaline Phosphatase 145 (H) 34 - 104 U/L    Total Protein 6.8 6.4 - 8.4 g/dL    Albumin 3.1 (L) 3.5 - 5.0 g/dL    Total Bilirubin 0.35 0.20 - 1.00 mg/dL    eGFR 29 ml/min/1.73sq m   Phosphorus   Result Value Ref Range    Phosphorus 4.0 2.3 - 4.1 mg/dL   Magnesium   Result Value Ref Range    Magnesium 1.9 1.9 - 2.7 mg/dL   CBC and differential   Result Value Ref Range    WBC 19.46 (H) 4.31 - 10.16 Thousand/uL    RBC 4.08 3.81 - 5.12 Million/uL    Hemoglobin 11.4 (L) 11.5 - 15.4 g/dL    Hematocrit 34.4 (L) 34.8 - 46.1 %    MCV 84 82 - 98 fL    MCH 27.9 26.8 - 34.3 pg    MCHC 33.1 31.4 - 37.4 g/dL    RDW 13.0 11.6 - 15.1 %     MPV 8.3 (L) 8.9 - 12.7 fL    Platelets 578 (H) 149 - 390 Thousands/uL    nRBC 0 /100 WBCs   Comprehensive metabolic panel   Result Value Ref Range    Sodium 136 135 - 147 mmol/L    Potassium 4.3 3.5 - 5.3 mmol/L    Chloride 105 96 - 108 mmol/L    CO2 17 (L) 21 - 32 mmol/L    ANION GAP 14 (H) 4 - 13 mmol/L    BUN 37 (H) 5 - 25 mg/dL    Creatinine 1.61 (H) 0.60 - 1.30 mg/dL    Glucose 188 (H) 65 - 140 mg/dL    Calcium 8.7 8.4 - 10.2 mg/dL    Corrected Calcium 9.3 8.3 - 10.1 mg/dL    AST 15 13 - 39 U/L    ALT 28 7 - 52 U/L    Alkaline Phosphatase 150 (H) 34 - 104 U/L    Total Protein 6.8 6.4 - 8.4 g/dL    Albumin 3.2 (L) 3.5 - 5.0 g/dL    Total Bilirubin 0.30 0.20 - 1.00 mg/dL    eGFR 34 ml/min/1.73sq m   CBC   Result Value Ref Range    WBC 19.28 (H) 4.31 - 10.16 Thousand/uL    RBC 4.01 3.81 - 5.12 Million/uL    Hemoglobin 11.2 (L) 11.5 - 15.4 g/dL    Hematocrit 33.9 (L) 34.8 - 46.1 %    MCV 85 82 - 98 fL    MCH 27.9 26.8 - 34.3 pg    MCHC 33.0 31.4 - 37.4 g/dL    RDW 13.2 11.6 - 15.1 %    Platelets 643 (H) 149 - 390 Thousands/uL    MPV 8.2 (L) 8.9 - 12.7 fL   Phosphorus   Result Value Ref Range    Phosphorus 3.7 2.3 - 4.1 mg/dL   Magnesium   Result Value Ref Range    Magnesium 1.8 (L) 1.9 - 2.7 mg/dL   Basic metabolic panel   Result Value Ref Range    Sodium 137 135 - 147 mmol/L    Potassium 4.0 3.5 - 5.3 mmol/L    Chloride 107 96 - 108 mmol/L    CO2 21 21 - 32 mmol/L    ANION GAP 9 4 - 13 mmol/L    BUN 37 (H) 5 - 25 mg/dL    Creatinine 1.67 (H) 0.60 - 1.30 mg/dL    Glucose 124 65 - 140 mg/dL    Calcium 8.4 8.4 - 10.2 mg/dL    eGFR 33 ml/min/1.73sq m   ECG 12 lead   Result Value Ref Range    Ventricular Rate 224 BPM    Atrial Rate 220 BPM    NH Interval  ms    QRSD Interval 162 ms    QT Interval 206 ms    QTC Interval 397 ms    P Axis  degrees    QRS Axis 10 degrees    T Wave Axis -21 degrees   ECG 12 lead   Result Value Ref Range    Ventricular Rate 127 BPM    Atrial Rate 127 BPM    NH Interval 138 ms    QRSD  Interval 84 ms    QT Interval 282 ms    QTC Interval 409 ms    P [place] 46 degrees    QRS Axis 24 degrees    T Wave Axis 27 degrees   ECG 12 lead   Result Value Ref Range    Ventricular Rate 223 BPM    Atrial Rate 220 BPM    NV Interval  ms    QRSD Interval 136 ms    QT Interval 206 ms    QTC Interval 396 ms    P Axis  degrees    QRS Axis 35 degrees    T Wave Axis 0 degrees   ECG 12 lead   Result Value Ref Range    Ventricular Rate 127 BPM    Atrial Rate 127 BPM    NV Interval 126 ms    QRSD Interval 76 ms    QT Interval 258 ms    QTC Interval 374 ms    P Axis 55 degrees    QRS Axis 33 degrees    T Wave Axis 24 degrees   Echo complete w/ contrast if indicated   Result Value Ref Range    BSA 2.22 m2    A4C EF 59 %    LVIDd 4.90 cm    LVIDS 3.20 cm    IVSd 0.80 cm    LVPWd 0.90 cm    LVOT diameter 1.8 cm    FS 35 28 - 44    MV E' Tissue Velocity Septal 13 cm/s    MV E' Tissue Velocity Lateral 16 cm/s    LA Volume Index (BP) 39.2 mL/m2    E/A ratio 1.36     E wave deceleration time 187 ms    MV Peak E Pk 122 cm/s    MV Peak A Pk 0.9 m/s    AV LVOT peak gradient 5 mmHg    RVID d 3.8 cm    Tricuspid annular plane systolic excursion 1.70 cm    LA size 3.9 cm    LA length (A2C) 5.50 cm    LA volume (BP) 87 mL    RAA A4C 16.5 cm2    AV peak gradient 13 mmHg    MV stenosis pressure 1/2 time 54 ms    MV valve area p 1/2 method 4.07     TR Peak Pk 3.8 m/s    Ao root 2.80 cm    Tricuspid valve peak regurgitation velocity 3.84 m/s    Left ventricular stroke volume (2D) 72.00 mL    IVS 0.8 cm    LEFT VENTRICLE SYSTOLIC VOLUME (MOD BIPLANE) 2D 42 mL    LV DIASTOLIC VOLUME (MOD BIPLANE) 2D 113 mL    Left Atrium Area-systolic Four Chamber 23.6 cm2    Left Atrium Area-systolic Apical Two Chamber 24.5 cm2    LVSV, 2D 72 mL    LVOT area 2.54 cm2    LV EF 60    Fingerstick Glucose (POCT)   Result Value Ref Range    POC Glucose 313 (H) 65 - 140 mg/dl   Fingerstick Glucose (POCT)   Result Value Ref Range    POC Glucose 170 (H) 65 - 140  mg/dl   Fingerstick Glucose (POCT)   Result Value Ref Range    POC Glucose 117 65 - 140 mg/dl   Fingerstick Glucose (POCT)   Result Value Ref Range    POC Glucose 107 65 - 140 mg/dl   Fingerstick Glucose (POCT)   Result Value Ref Range    POC Glucose 187 (H) 65 - 140 mg/dl   Fingerstick Glucose (POCT)   Result Value Ref Range    POC Glucose 155 (H) 65 - 140 mg/dl   Fingerstick Glucose (POCT)   Result Value Ref Range    POC Glucose 171 (H) 65 - 140 mg/dl   Fingerstick Glucose (POCT)   Result Value Ref Range    POC Glucose 97 65 - 140 mg/dl   Fingerstick Glucose (POCT)   Result Value Ref Range    POC Glucose 174 (H) 65 - 140 mg/dl   Fingerstick Glucose (POCT)   Result Value Ref Range    POC Glucose 121 65 - 140 mg/dl   Fingerstick Glucose (POCT)   Result Value Ref Range    POC Glucose 182 (H) 65 - 140 mg/dl   Fingerstick Glucose (POCT)   Result Value Ref Range    POC Glucose 152 (H) 65 - 140 mg/dl   Fingerstick Glucose (POCT)   Result Value Ref Range    POC Glucose 79 65 - 140 mg/dl   Fingerstick Glucose (POCT)   Result Value Ref Range    POC Glucose 123 65 - 140 mg/dl   Fingerstick Glucose (POCT)   Result Value Ref Range    POC Glucose 137 65 - 140 mg/dl   Fingerstick Glucose (POCT)   Result Value Ref Range    POC Glucose 154 (H) 65 - 140 mg/dl   Fingerstick Glucose (POCT)   Result Value Ref Range    POC Glucose 160 (H) 65 - 140 mg/dl   Fingerstick Glucose (POCT)   Result Value Ref Range    POC Glucose 277 (H) 65 - 140 mg/dl   Fingerstick Glucose (POCT)   Result Value Ref Range    POC Glucose 283 (H) 65 - 140 mg/dl   Fingerstick Glucose (POCT)   Result Value Ref Range    POC Glucose 177 (H) 65 - 140 mg/dl   Fingerstick Glucose (POCT)   Result Value Ref Range    POC Glucose 155 (H) 65 - 140 mg/dl   Fingerstick Glucose (POCT)   Result Value Ref Range    POC Glucose 129 65 - 140 mg/dl   Fingerstick Glucose (POCT)   Result Value Ref Range    POC Glucose 147 (H) 65 - 140 mg/dl   Fingerstick Glucose (POCT)   Result Value  Ref Range    POC Glucose 129 65 - 140 mg/dl   Manual Differential(PHLEBS Do Not Order)   Result Value Ref Range    Segmented % 78 (H) 43 - 75 %    Lymphocytes % 10 (L) 14 - 44 %    Monocytes % 5 4 - 12 %    Eosinophils % 6 0 - 6 %    Basophils % 0 0 - 1 %    Myelocytes % 1 0 - 1 %    Absolute Neutrophils 12.60 (H) 1.85 - 7.62 Thousand/uL    Absolute Lymphocytes 1.62 0.60 - 4.47 Thousand/uL    Absolute Monocytes 0.81 0.00 - 1.22 Thousand/uL    Absolute Eosinophils 0.97 (H) 0.00 - 0.40 Thousand/uL    Absolute Basophils 0.00 0.00 - 0.10 Thousand/uL    Absolute Myelocytes 0.16 (H) 0.00 - 0.10 Thousand/uL    Total Counted      RBC Morphology Normal     Platelet Estimate Increased (A) Adequate         Incidental Findings:   Nne    I reviewed the above mentioned incidental findings with the patient and/or family and they expressed understanding.    Test Results Pending at Discharge (will require follow up):   none     Outpatient Tests Requested:   BMP   CBC     Complications:  none     Reason for Admission: Sepsis ,LEANDRO , renal stone, UTI     Hospital Course:   Brenda S Apgar is a 59 y.o. female patient who originally presented to the hospital on 9/15/2024 due to LEANDRO/UTI and sepsis state.  She received 3 days of azithromycin given the presumption of pneumonia.  Chest x-ray was negative.  Azithromycin was discontinued.  Completed 6 days of ceftriaxone.  Patient underwent cystoscopy with stone extraction where white blood cell count trended up postop.  Will complete another day of antibiotics.  LEANDRO up trended up plateaued during the hospitalization.  Nephrology team are consulted and currently LEANDRO is resolving.  No further recommendations from nephrology.  She was noted to have SVT during the hospitalization.  Cardiology team are consulted and started patient on metoprolol and will follow-up in outpatient basis.  Patient will follow-up with urology in the next 7 days  For stent removal.       Hospital Course: No notes on  "file      Please see above list of diagnoses and related plan for additional information.     Condition at Discharge: good    Discharge Day Visit / Exam:   Subjective: Patient was seen today at bedside.  Feeling well.  No chest pain or tightness, SOB or cough, dizziness or light headedness, N/V, Diarrhea of constipation.   No active urinary symptoms  Tolerating oral diet.     Vitals: Blood Pressure: 140/76 (09/21/24 0122)  Pulse: 58 (09/21/24 0129)  Temperature: 97.5 °F (36.4 °C) (09/21/24 0129)  Temp Source: Oral (09/20/24 1958)  Respirations: 16 (09/21/24 0129)  Height: 5' 6\" (167.6 cm) (09/16/24 1005)  Weight - Scale: 116 kg (255 lb) (09/16/24 1005)  SpO2: 94 % (09/21/24 0129)  Exam:   Physical Exam  Vitals and nursing note reviewed.   Constitutional:       General: She is not in acute distress.     Appearance: Normal appearance. She is not ill-appearing or diaphoretic.   HENT:      Head: Normocephalic and atraumatic.   Eyes:      Conjunctiva/sclera: Conjunctivae normal.   Cardiovascular:      Rate and Rhythm: Normal rate and regular rhythm.      Heart sounds: Normal heart sounds. No murmur heard.  Pulmonary:      Effort: Pulmonary effort is normal. No respiratory distress.      Breath sounds: Normal breath sounds. No wheezing, rhonchi or rales.   Abdominal:      General: There is no distension.      Palpations: Abdomen is soft. There is no mass.      Tenderness: There is no abdominal tenderness. There is no guarding or rebound.   Musculoskeletal:      Cervical back: Normal range of motion and neck supple. No rigidity.      Right lower leg: No edema.      Left lower leg: No edema.   Lymphadenopathy:      Cervical: No cervical adenopathy.   Neurological:      Mental Status: She is alert and oriented to person, place, and time.   Psychiatric:         Mood and Affect: Mood normal.         Behavior: Behavior normal.         Thought Content: Thought content normal.         Judgment: Judgment normal.      "     Discussion with Family:  patient .     Discharge instructions/Information to patient and family:   See after visit summary for information provided to patient and family.      Provisions for Follow-Up Care:  See after visit summary for information related to follow-up care and any pertinent home health orders.      Mobility at time of Discharge:   Basic Mobility Inpatient Raw Score: 24  JH-HLM Goal: 8: Walk 250 feet or more  JH-HLM Achieved: 7: Walk 25 feet or more  HLM Goal achieved. Continue to encourage appropriate mobility.     Disposition:   Home    Planned Readmission: none     Discharge Medications:  See after visit summary for reconciled discharge medications provided to patient and/or family.      Administrative Statements   Discharge Statement:  I have spent a total time of 45 minutes in caring for this patient on the day of the visit/encounter. >30 minutes of time was spent on: Diagnostic results, Prognosis, Risks and benefits of tx options, and Instructions for management.    **Please Note: This note may have been constructed using a voice recognition system**

## 2024-09-22 NOTE — ASSESSMENT & PLAN NOTE
Lab Results   Component Value Date    HGBA1C 8.4 (H) 06/10/2024       Recent Labs     09/20/24  1625 09/20/24  2101 09/21/24  0730 09/21/24  1147   POCGLU 129 147* 129 107         Blood Sugar Average: Last 72 hrs:  (P) [id number]  On Lantus 20 units SQ at bedtime, Humalog 5 units 3 times daily with meals.  Will continue.  Continue Humalog sliding scale with Accu-Cheks before every meal and at bedtime  Diabetic diet

## 2024-09-23 NOTE — UTILIZATION REVIEW
NOTIFICATION OF ADMISSION DISCHARGE   This is a Notification of Discharge from [facility]. Please be advised that this patient has been discharge from our facility. Below you will find the admission and discharge date and time including the patient’s disposition.   UTILIZATION REVIEW CONTACT:  Samira Tate  Utilization   Network Utilization Review Department  Phone: [phone] x carefully listen to the prompts. All voicemails are confidential.  Email: [email or web address]     ADMISSION INFORMATION  PRESENTATION DATE: 9/15/2024  9:47 PM  OBERVATION ADMISSION DATE: N/A  INPATIENT ADMISSION DATE: 9/15/24 11:58 PM   DISCHARGE DATE: 9/21/2024  1:15 PM   DISPOSITION:Home/Self Care    Network Utilization Review Department  ATTENTION: Please call with any questions or concerns to [phone] and carefully listen to the prompts so that you are directed to the right person. All voicemails are confidential.   For Discharge needs, contact Care Management DC Support Team at [phone] opt. 2  Send all requests for admission clinical reviews, approved or denied determinations and any other requests to dedicated fax number below belonging to the campus where the patient is receiving treatment. List of dedicated fax numbers for the Facilities:  FACILITY NAME UR FAX NUMBER   ADMISSION DENIALS (Administrative/Medical Necessity) [phone]   DISCHARGE SUPPORT TEAM ([facility]) [phone]   PARENT CHILD HEALTH (Maternity/NICU/Pediatrics) [phone]   [facility] [phone]   [facility] [phone]   [facility] [phone]   [facility] [phone]   [facility] [phone]   [facility] [phone]   [facility] [phone]   [facility] [phone]    [facility] [phone]   [facility] [phone]   [facility] [phone]   [facility] [phone]

## 2024-09-24 NOTE — OR NURSING
No specimen sent on 9/19. Stone discarded as waste due to small negligible size, per request of Dr. Hassan.

## 2024-09-25 ENCOUNTER — TELEPHONE (OUTPATIENT)
Dept: NEPHROLOGY | Facility: CLINIC | Age: 59
End: 2024-09-25

## 2024-09-25 DIAGNOSIS — N17.9 AKI (ACUTE KIDNEY INJURY) (HCC): Primary | ICD-10-CM

## 2024-09-25 NOTE — TELEPHONE ENCOUNTER
----- Message from Joselyn Reyes Bahamonde, MD sent at 2024  5:34 AM EDT -----  GM,     Mrs Apgar has been dc from [facility] and will need follow up within 6-8 weeks with Bmp    Thanks    ASHLYN

## 2024-09-26 ENCOUNTER — OFFICE VISIT (OUTPATIENT)
Dept: ENDOCRINOLOGY | Facility: CLINIC | Age: 59
End: 2024-09-26
Payer: COMMERCIAL

## 2024-09-26 VITALS
HEIGHT: 66 IN | WEIGHT: 255 LBS | DIASTOLIC BLOOD PRESSURE: 80 MMHG | HEART RATE: 63 BPM | OXYGEN SATURATION: 97 % | BODY MASS INDEX: 40.98 KG/M2 | SYSTOLIC BLOOD PRESSURE: 140 MMHG

## 2024-09-26 DIAGNOSIS — E11.65 TYPE 2 DIABETES MELLITUS WITH HYPERGLYCEMIA, WITHOUT LONG-TERM CURRENT USE OF INSULIN (HCC): ICD-10-CM

## 2024-09-26 DIAGNOSIS — I10 ESSENTIAL HYPERTENSION: Primary | ICD-10-CM

## 2024-09-26 DIAGNOSIS — E78.5 HYPERLIPIDEMIA, UNSPECIFIED HYPERLIPIDEMIA TYPE: ICD-10-CM

## 2024-09-26 PROCEDURE — 99204 OFFICE O/P NEW MOD 45 MIN: CPT | Performed by: NURSE PRACTITIONER

## 2024-09-26 RX ORDER — GLUCAGON 3 MG/1
POWDER NASAL
Qty: 1 EACH | Refills: 0 | Status: SHIPPED | OUTPATIENT
Start: 2024-09-26

## 2024-09-26 NOTE — ASSESSMENT & PLAN NOTE
Hemoglobin A1c today reported at 7.2%    HGA1C close to goal will continue current regimen. She will send in glucose logs for review in 1-2 weeks and will adjust insulin regimen as necessary.     Discussed risks/complications associated with uncontrolled diabetes including organ involvement, heart attack, stroke, death.    Advised lifestyle modifications including attention to diet including the amount and types of carbohydrates consumed and regular activity.     Call for blood sugars less than 70 mg/dl or patterns over 250 mg/dl.     Monitor blood glucose levels before all insulin administration times.     Discussed use of CGM to collect additional blood glucose data to reveal patterns that can be used to improve glucose levels and adjust insulin regimen. Declined.    Discussed symptoms and treatment of hypoglycemia.  Reviewed risks associated with hypoglycemia. Always carry rapid acting carbohydrates and a glucometer (a way to check your blood sugar).    Recommendation for medical identification either bracelet, necklace.    Recommendation for glucagon if on insulin.     Recommend routine follow up for diabetic eye and foot exams.     Ordered blood work to complete prior to next visit.    Send glucose logs/CGM download in 1-2 weeks for review    Follow up in 3 months.     Orders:    POCT hemoglobin A1c    Albumin / creatinine urine ratio; Future    TSH, 3rd generation with Free T4 reflex; Future    Glucagon (Baqsimi Two Pack) 3 MG/DOSE POWD; Use for severe hypoglycemia if unconscious, seizing, or unable to safely swallow.

## 2024-09-26 NOTE — PROGRESS NOTES
Ambulatory Visit  Name: Brenda S Apgar      : 1965      MRN: [id number]  Encounter Provider: KWAME Brewer  Encounter Date: 2024   Encounter department: [facility] [place]    Assessment & Plan  Type 2 diabetes mellitus with hyperglycemia, without long-term current use of insulin (HCC)    Hemoglobin A1c today reported at 7.2%    HGA1C close to goal will continue current regimen. She will send in glucose logs for review in 1-2 weeks and will adjust insulin regimen as necessary.     Discussed risks/complications associated with uncontrolled diabetes including organ involvement, heart attack, stroke, death.    Advised lifestyle modifications including attention to diet including the amount and types of carbohydrates consumed and regular activity.     Call for blood sugars less than 70 mg/dl or patterns over 250 mg/dl.     Monitor blood glucose levels before all insulin administration times.     Discussed use of CGM to collect additional blood glucose data to reveal patterns that can be used to improve glucose levels and adjust insulin regimen. Declined.    Discussed symptoms and treatment of hypoglycemia.  Reviewed risks associated with hypoglycemia. Always carry rapid acting carbohydrates and a glucometer (a way to check your blood sugar).    Recommendation for medical identification either bracelet, necklace.    Recommendation for glucagon if on insulin.     Recommend routine follow up for diabetic eye and foot exams.     Ordered blood work to complete prior to next visit.    Send glucose logs/CGM download in 1-2 weeks for review    Follow up in 3 months.     Orders:    POCT hemoglobin A1c    Albumin / creatinine urine ratio; Future    TSH, 3rd generation with Free T4 reflex; Future    Glucagon (Baqsimi Two Pack) 3 MG/DOSE POWD; Use for severe hypoglycemia if unconscious, seizing, or unable to safely swallow.    Essential hypertension    Hyperlipidemia,  unspecified hyperlipidemia type  Continues on pravastatin.     Orders:    Lipid panel; Future      History of Present Illness     Brenda S Apgar is a 59 y.o. female  with a history of type 2 diabetes mellitus on insulin therapy since hospitalization in 2023 for HHS. Has been diagnosed for approximately 10 years.     UTD diabetic foot exam, no history of neuropathy. No numbness or tingling in feet/hands.    Due for eye exam, no history of diabetic retinopathy. No vision changes.     Denies polyuria or polydipsia.     Carb conscious diet with limited exercise.     Admitted September 15, 2024 for SVT found to be in sepsis from UTI secondary to nephrolithiasis.      Recent hospital discharge without glucose levels for review. Not interested in CGM.      Current regimen:   Lantus 10 units subcutaneously at bedtime  Humalog 5 units with meals 3 times a day     Never on metformin.      On ACE and statin   No history of thyroid disease  No history of pancreatitis     History of adrenal nodule noted on imaging in  work up completed 2024 with normal dexamethasone suppression test, aldosterone/renin ratio, metanephrines, and catecholamines.     History obtained from : patient  Review of Systems  See HPI.   All other systems reviewed and are negative.    Medical History Reviewed by provider this encounter:       Current Outpatient Medications on File Prior to Visit   Medication Sig Dispense Refill    amLODIPine (NORVASC) 5 mg tablet Take 5 mg by mouth daily      ezetimibe (ZETIA) 10 mg tablet Take 10 mg by mouth daily      Insulin Glargine Solostar (Lantus SoloStar) 100 UNIT/ML SOPN Inject 10 units at bedtime 15 mL 0    insulin lispro (HumaLOG KwikPen) 100 units/mL injection pen Inject 5 Units under the skin 3 (three) times a day with meals 15 mL 2    Insulin Pen Needle (B-D UF III MINI PEN NEEDLES) 31G X 5 MM MISC Use 4 (four) times a day To inject insulin 400 each 2    metoprolol tartrate (LOPRESSOR) 25  "mg tablet Take 0.5 tablets (12.5 mg total) by mouth every 12 (twelve) hours 30 tablet 0    multivitamin (THERAGRAN) TABS Take 1 tablet by mouth daily      PARoxetine (PAXIL) 20 mg tablet Take 20 mg by mouth daily      pravastatin (PRAVACHOL) 40 mg tablet Take 1 tablet (40 mg total) by mouth daily 90 tablet 2     No current facility-administered medications on file prior to visit.          Objective     /80 (BP Location: Left arm, Patient Position: Sitting, Cuff Size: Large)   Pulse 63   Ht 5' 6\" (1.676 m)   Wt 116 kg (255 lb)   SpO2 97%   BMI 41.16 kg/m²        Physical Exam  Vitals reviewed.   Constitutional:       Appearance: Normal appearance.   Cardiovascular:      Rate and Rhythm: Normal rate and regular rhythm.      Pulses: Normal pulses.      Heart sounds: Normal heart sounds.   Pulmonary:      Effort: Pulmonary effort is normal.      Breath sounds: Normal breath sounds.   Skin:     General: Skin is warm and dry.      Capillary Refill: Capillary refill takes less than 2 seconds.   Neurological:      General: No focal deficit present.      Mental Status: She is alert and oriented to person, place, and time.   Psychiatric:         Mood and Affect: Mood normal.         Behavior: Behavior normal.     Administrative Statements     "

## 2024-09-26 NOTE — PATIENT INSTRUCTIONS
"Please send in glucose levels for review in 1-2 weeks.   Continue current regimen for now.  Call for blood sugars less than 70 or patterns over 250 mg/dL.     Low blood sugar in people with diabetes   The Basics   Written by the doctors and editors at [facility]   What is low blood sugar? -- This is when the level of sugar in a person's blood gets too low. It is also called \"hypoglycemia.\"  Low blood sugar can cause symptoms ranging from sweating and feeling hungry to passing out.  Low blood sugar can happen in people with diabetes who take certain medicines, including insulin, other medicines given as shots, and some types of pills.  When can people with diabetes get low blood sugar? -- People with diabetes can get low blood sugar when they:   Take too much medicine, including insulin, other medicines given as shots, or certain diabetes pills   Do not eat enough food   Exercise too much without eating a snack or reducing their insulin dose   Wait too long between meals   Drink too much alcohol or drink alcohol on an empty stomach  What are the symptoms of low blood sugar? -- The symptoms can be different from person to person, and can change over time. During the early stages of low blood sugar, a person can:   Sweat or tremble   Feel hungry   Feel worried  People who have early symptoms should check their blood sugar level to see if it is low and needs to be treated. If low blood sugar levels are not treated, severe symptoms can occur. These can include:   Trouble walking or feeling weak   Trouble seeing clearly   Being confused or acting in a strange way   Passing out or having a seizure  Some people do not get symptoms during the early stages of low blood sugar. Doctors sometimes call this \"hypoglycemia unawareness.\" People with hypoglycemia unawareness are more likely to have severe symptoms, because they might not know that they have low blood sugar until they have severe symptoms. Hypoglycemia unawareness is " more likely in people who:   Have had type 1 diabetes for more than 5 to 10 years   Have frequent episodes of low blood sugar   Use insulin to keep their blood sugar level tightly managed   Are tired   Drink a lot of alcohol   Take certain medicines for high blood pressure or diabetes  How is low blood sugar treated? -- It can be treated with:   Quick sources of sugar - People can eat or drink quick sources of sugar (table 1). Foods that have fat, such as chocolate or cheese, do not treat low blood sugar as quickly. You and a family member should carry a quick source of sugar at all times.   A dose of glucagon - Glucagon is a hormone that can quickly raise blood sugar levels and stop severe symptoms. It comes as a shot (figure 1) or a nose spray. If your doctor recommends that you carry glucagon with you, they will tell you when and how to use it. If possible, it's also a good idea to have a family member, friend, or roommate learn how to give you glucagon. That way, they can give it to you if you can't do it yourself.  After treating low blood sugar, it is very important to recheck your blood sugar level to make sure that it rises and stays in the normal range. Once your blood sugar is normal, eat a small snack that contains protein, fat, and carbohydrate. This can help keep your blood sugar stable.  What should I do after treatment? -- After treatment for low blood sugar, most people can get back to their usual routine. But your doctor or nurse might recommend that you check your blood sugar level more often during the next 2 to 3 days.  If your low blood sugar was treated with glucagon, call your doctor or nurse. They might change the dose of your diabetes medicine.  How can I prevent low blood sugar? -- The best way is to:   Check your blood sugar levels often - Your doctor or nurse will tell you how and when to check your blood sugar levels at home. They will also tell you what your blood sugar levels should  "be, and when to treat low blood sugar.   Learn the symptoms of low blood sugar, and be ready to treat it in the early stages. Treating low blood sugar early can prevent severe symptoms.  When should I go to a hospital or call for an ambulance? -- A family member or friend should take you to a hospital or call for an ambulance (in the [place] and [place], call 9-1-1) if you:   Are still confused 15 minutes after being treated with a dose of glucagon   Have passed out, and there is no glucagon nearby   Still have low blood sugar after treatment  If you have low blood sugar, do not try to drive yourself to the hospital. Driving with low blood sugar can be dangerous.  All topics are updated as new evidence becomes available and our peer review process is complete.  This topic retrieved from [employer] on: Apr 11, 2024.  Topic 21101 Version 22.0  Release: 32.3.2 - C32.100  © 2024 UpToDate, Inc. and/or its affiliates. All rights reserved.  table 1: Quick sources of sugar to treat low blood sugar  3 or 4 glucose tablets   ½ cup of juice or regular soda (not sugar-free)   2 tablespoons of raisins   4 or 5 saltine crackers   1 tablespoon of sugar   1 tablespoon of honey or corn syrup   6 to 8 hard candies   These sources of sugar act quickly to treat low blood sugar levels. People with diabetes who use insulin or certain other diabetes medicines should carry at least 1 of these items at all times.  Graphic 94356 Version 4.0  figure 1: Glucagon autoinjector     Some people carry glucagon in the form of an autoinjector \"pen.\" This makes it easy to give a dose into the upper arm, thigh, or belly.  Graphic 482276 Version 2.0  Consumer Information Use and Disclaimer   Disclaimer: This generalized information is a limited summary of diagnosis, treatment, and/or medication information. It is not meant to be comprehensive and should be used as a tool to help the user understand and/or assess potential diagnostic and treatment options. It " does NOT include all information about conditions, treatments, medications, side effects, or risks that may apply to a specific patient. It is not intended to be medical advice or a substitute for the medical advice, diagnosis, or treatment of a health care provider based on the health care provider's examination and assessment of a patient's specific and unique circumstances. Patients must speak with a health care provider for complete information about their health, medical questions, and treatment options, including any risks or benefits regarding use of medications. This information does not endorse any treatments or medications as safe, effective, or approved for treating a specific patient. UpToDate, Inc. and its affiliates disclaim any warranty or liability relating to this information or the use thereof.The use of this information is governed by the Terms of Use, available at https://www.[employer].com/en/know/clinical-effectiveness-terms. 2024© UpToDate, Inc. and its affiliates and/or licensors. All rights reserved.  Copyright   © 2024 UpToDate, Inc. and/or its affiliates. All rights reserved.

## 2024-10-18 DIAGNOSIS — I47.10 SVT (SUPRAVENTRICULAR TACHYCARDIA) (HCC): ICD-10-CM

## 2024-10-18 RX ORDER — METOPROLOL TARTRATE 25 MG/1
12.5 TABLET, FILM COATED ORAL EVERY 12 HOURS SCHEDULED
Qty: 30 TABLET | Refills: 0 | Status: SHIPPED | OUTPATIENT
Start: 2024-10-18 | End: 2024-11-17

## 2024-10-21 ENCOUNTER — TELEPHONE (OUTPATIENT)
Age: 59
End: 2024-10-21

## 2024-10-21 NOTE — TELEPHONE ENCOUNTER
Pt cancelled hospital follow up for tomorrow in [place] office. She would prefer the [facility]     Please call pt to reschedule hospital follow up .

## 2024-10-21 NOTE — TELEPHONE ENCOUNTER
LM for pt informing her in [facility] we are booking out to March. If she would like to reschedule and wait until then we can place her on wait list. Otherwise advise to keep appt for tomorrow in [place]

## 2024-11-15 DIAGNOSIS — E78.5 HYPERLIPIDEMIA, UNSPECIFIED HYPERLIPIDEMIA TYPE: ICD-10-CM

## 2024-11-18 RX ORDER — PRAVASTATIN SODIUM 40 MG
40 TABLET ORAL DAILY
Qty: 90 TABLET | Refills: 2 | Status: SHIPPED | OUTPATIENT
Start: 2024-11-18

## 2025-05-09 ENCOUNTER — TELEPHONE (OUTPATIENT)
Age: 60
End: 2025-05-09

## 2025-05-09 NOTE — TELEPHONE ENCOUNTER
"Liyah from [facility] calling in regard to patient    States \"we were calling to see about our patient getting a sooner follow up appointment because of recent blood work levels and September is too far\"    No sooner appointment was available.  Added patient wait list.    Unable to reach appropriate team member at this time.    Liyah is requesting a call back in regards to discuss. Provided number [phone].  "

## 2025-05-13 ENCOUNTER — OFFICE VISIT (OUTPATIENT)
Dept: ENDOCRINOLOGY | Facility: CLINIC | Age: 60
End: 2025-05-13
Payer: COMMERCIAL

## 2025-05-13 VITALS
BODY MASS INDEX: 40.18 KG/M2 | DIASTOLIC BLOOD PRESSURE: 89 MMHG | WEIGHT: 250 LBS | HEIGHT: 66 IN | HEART RATE: 72 BPM | OXYGEN SATURATION: 98 % | SYSTOLIC BLOOD PRESSURE: 140 MMHG

## 2025-05-13 DIAGNOSIS — I10 ESSENTIAL HYPERTENSION: ICD-10-CM

## 2025-05-13 DIAGNOSIS — E11.65 TYPE 2 DIABETES MELLITUS WITH HYPERGLYCEMIA, WITHOUT LONG-TERM CURRENT USE OF INSULIN (HCC): ICD-10-CM

## 2025-05-13 DIAGNOSIS — E78.2 MIXED HYPERLIPIDEMIA: ICD-10-CM

## 2025-05-13 LAB — SL AMB POCT HEMOGLOBIN AIC: 11.8 (ref ?–6.5)

## 2025-05-13 PROCEDURE — 99214 OFFICE O/P EST MOD 30 MIN: CPT | Performed by: NURSE PRACTITIONER

## 2025-05-13 PROCEDURE — 83036 HEMOGLOBIN GLYCOSYLATED A1C: CPT | Performed by: NURSE PRACTITIONER

## 2025-05-13 RX ORDER — SODIUM, POTASSIUM,MAG SULFATES 17.5-3.13G
SOLUTION, RECONSTITUTED, ORAL ORAL
COMMUNITY
Start: 2025-03-25

## 2025-05-13 RX ORDER — INSULIN GLARGINE 100 [IU]/ML
INJECTION, SOLUTION SUBCUTANEOUS
Qty: 15 ML | Refills: 6 | Status: SHIPPED | OUTPATIENT
Start: 2025-05-13

## 2025-05-13 RX ORDER — INSULIN LISPRO 100 [IU]/ML
8 INJECTION, SOLUTION INTRAVENOUS; SUBCUTANEOUS
Qty: 15 ML | Refills: 6 | Status: SHIPPED | OUTPATIENT
Start: 2025-05-13

## 2025-05-13 NOTE — PATIENT INSTRUCTIONS
"Increase lantus to 13 units daily  Increase humalog to 8 units with meals three times per day    In 1-2 weeks send in blood sugar logs for review or sooner if blood sugar patterns are less than 70 or over 250 mg/dl.     Low blood sugar in people with diabetes   The Basics   Written by the doctors and editors at [facility]   What is low blood sugar? -- This is when the level of sugar in a person's blood gets too low. It is also called \"hypoglycemia.\"  Low blood sugar can cause symptoms ranging from sweating and feeling hungry to passing out.  Low blood sugar can happen in people with diabetes who take certain medicines, including insulin, other medicines given as shots, and some types of pills.  When can people with diabetes get low blood sugar? -- People with diabetes can get low blood sugar when they:   Take too much medicine, including insulin, other medicines given as shots, or certain diabetes pills   Do not eat enough food   Exercise too much without eating a snack or reducing their insulin dose   Wait too long between meals   Drink too much alcohol or drink alcohol on an empty stomach  What are the symptoms of low blood sugar? -- The symptoms can be different from person to person, and can change over time. During the early stages of low blood sugar, a person can:   Sweat or tremble   Feel hungry   Feel worried  People who have early symptoms should check their blood sugar level to see if it is low and needs to be treated. If low blood sugar levels are not treated, severe symptoms can occur. These can include:   Trouble walking or feeling weak   Trouble seeing clearly   Being confused or acting in a strange way   Passing out or having a seizure  Some people do not get symptoms during the early stages of low blood sugar. Doctors sometimes call this \"hypoglycemia unawareness.\" People with hypoglycemia unawareness are more likely to have severe symptoms, because they might not know that they have low blood sugar " until they have severe symptoms. Hypoglycemia unawareness is more likely in people who:   Have had type 1 diabetes for more than 5 to 10 years   Have frequent episodes of low blood sugar   Use insulin to keep their blood sugar level tightly managed   Are tired   Drink a lot of alcohol   Take certain medicines for high blood pressure or diabetes  How is low blood sugar treated? -- It can be treated with:   Quick sources of sugar - People can eat or drink quick sources of sugar (table 1). Foods that have fat, such as chocolate or cheese, do not treat low blood sugar as quickly. You and a family member should carry a quick source of sugar at all times.   A dose of glucagon - Glucagon is a hormone that can quickly raise blood sugar levels and stop severe symptoms. It comes as a shot (figure 1) or a nose spray. If your doctor recommends that you carry glucagon with you, they will tell you when and how to use it. If possible, it's also a good idea to have a family member, friend, or roommate learn how to give you glucagon. That way, they can give it to you if you can't do it yourself.  After treating low blood sugar, it is very important to recheck your blood sugar level to make sure that it rises and stays in the normal range. Once your blood sugar is normal, eat a small snack that contains protein, fat, and carbohydrate. This can help keep your blood sugar stable.  What should I do after treatment? -- After treatment for low blood sugar, most people can get back to their usual routine. But your doctor or nurse might recommend that you check your blood sugar level more often during the next 2 to 3 days.  If your low blood sugar was treated with glucagon, call your doctor or nurse. They might change the dose of your diabetes medicine.  How can I prevent low blood sugar? -- The best way is to:   Check your blood sugar levels often - Your doctor or nurse will tell you how and when to check your blood sugar levels at home.  "They will also tell you what your blood sugar levels should be, and when to treat low blood sugar.   Learn the symptoms of low blood sugar, and be ready to treat it in the early stages. Treating low blood sugar early can prevent severe symptoms.  When should I go to a hospital or call for an ambulance? -- A family member or friend should take you to a hospital or call for an ambulance (in the [place] and [place], call 9-1-1) if you:   Are still confused 15 minutes after being treated with a dose of glucagon   Have passed out, and there is no glucagon nearby   Still have low blood sugar after treatment  If you have low blood sugar, do not try to drive yourself to the hospital. Driving with low blood sugar can be dangerous.  All topics are updated as new evidence becomes available and our peer review process is complete.  This topic retrieved from [employer] on: Apr 11, 2024.  Topic 41538 Version 22.0  Release: 32.3.2 - C32.100  © 2024 UpToDate, Inc. and/or its affiliates. All rights reserved.  table 1: Quick sources of sugar to treat low blood sugar  3 or 4 glucose tablets   ½ cup of juice or regular soda (not sugar-free)   2 tablespoons of raisins   4 or 5 saltine crackers   1 tablespoon of sugar   1 tablespoon of honey or corn syrup   6 to 8 hard candies   These sources of sugar act quickly to treat low blood sugar levels. People with diabetes who use insulin or certain other diabetes medicines should carry at least 1 of these items at all times.  Graphic 89292 Version 4.0  figure 1: Glucagon autoinjector     Some people carry glucagon in the form of an autoinjector \"pen.\" This makes it easy to give a dose into the upper arm, thigh, or belly.  Graphic 695004 Version 2.0  Consumer Information Use and Disclaimer   Disclaimer: This generalized information is a limited summary of diagnosis, treatment, and/or medication information. It is not meant to be comprehensive and should be used as a tool to help the user understand " and/or assess potential diagnostic and treatment options. It does NOT include all information about conditions, treatments, medications, side effects, or risks that may apply to a specific patient. It is not intended to be medical advice or a substitute for the medical advice, diagnosis, or treatment of a health care provider based on the health care provider's examination and assessment of a patient's specific and unique circumstances. Patients must speak with a health care provider for complete information about their health, medical questions, and treatment options, including any risks or benefits regarding use of medications. This information does not endorse any treatments or medications as safe, effective, or approved for treating a specific patient. UpToDate, Inc. and its affiliates disclaim any warranty or liability relating to this information or the use thereof.The use of this information is governed by the Terms of Use, available at https://www.[employer].com/en/know/clinical-effectiveness-terms. 2024© UpToDate, Inc. and its affiliates and/or licensors. All rights reserved.  Copyright   © 2024 UpToDate, Inc. and/or its affiliates. All rights reserved.

## 2025-05-13 NOTE — PROGRESS NOTES
Name: Brenda S Apgar      : 1965      MRN: [id number]  Encounter Provider: KWAME Brewer  Encounter Date: 2025   Encounter department: [facility] [place]  Assessment & Plan  Type 2 diabetes mellitus with hyperglycemia, without long-term current use of insulin ([facility])    Lab Results   Component Value Date    HGBA1C 8.4 (H) 06/10/2024     HGA1C increased indicating poor control. Patient stopped insulin for one month and had fasting glucose at pcp reported >800 mg/dL.     Recommendations for adjustment to insulin regimen:   Increase lantus to 13 units daily  Increase humalog to 8 units with meals three times per day    Discussed risks/complications associated with uncontrolled diabetes including organ involvement, heart attack, stroke, death.    Advised lifestyle modifications including attention to diet including the amount and types of carbohydrates consumed and regular activity.     Call for blood sugars less than 70 mg/dl or patterns over 250 mg/dl.     Monitor blood glucose levels  before all insulin administration times.     Discussed use of CGM to collect additional blood glucose data to reveal patterns that can be used to improve glucose levels and adjust insulin regimen. Declined at this time.     Discussed symptoms and treatment of hypoglycemia.  Reviewed risks associated with hypoglycemia. Always carry rapid acting carbohydrates and a glucometer (a way to check your blood sugar).    Recommendation for medical identification either bracelet, necklace.    Recommendation for glucagon if on insulin.     Routine follow up for diabetic eye and foot exams.     Ordered blood work to complete prior to next visit.    Send glucose logs/CGM download in 1-2 weeks for review    Follow up in 3 months.       Orders:    POCT hemoglobin A1c    Insulin Glargine Solostar (Lantus SoloStar) 100 UNIT/ML SOPN; Inject 13 units at bedtime    insulin lispro (HumaLOG KwikPen) 100  units/mL injection pen; Inject 8 Units under the skin 3 (three) times a day with meals    Comprehensive metabolic panel; Future    Albumin / creatinine urine ratio; Future    Lipid panel; Future    Hemoglobin A1C; Future    TSH, 3rd generation with Free T4 reflex; Future    Essential hypertension  BP close to goal on current regimen.        Mixed hyperlipidemia  Continues on statin and zetia.   Recommend repeat lipid panel.            History of Present Illness     Brenda S Apgar is a 59 y.o. female with a history of type 2 diabetes mellitus on insulin therapy since hospitalization in 2023 for HHS. Has been diagnosed for approximately 10 years.      Off insulin for about a month with fasting glucose >800 mg/dL on lab work, asymptomatic for DKA/HHS. Has frequent thirst and urination. No blurry vision.     UTD diabetic foot exam, no history of neuropathy. No numbness or tingling in feet/hands.     Due for eye exam, no history of diabetic retinopathy. No vision changes.      Dietary recall:  Breakfast: oatmeal (1 cup) with cranberries.   AM snack: poptart  Lunch: No carb-- protein and veggie  PM snack: nuts   Dinner: Pasta, rice (larger portions)  Evening snack: none  Drink: water or tea (unsweetened)     Physical activity: none      Blood glucose levels:    Breakfast: 213-335 mg/dL  Lunch: 238-396 mg/dL   Dinner: 180-384 mg/dL      Current regimen:   Lantus 10 units subcutaneously at bedtime  Humalog 5 units with meals 3 times a day     Never on metformin.      On ACE and statin   No history of thyroid disease  No history of pancreatitis     History of adrenal nodule noted on imaging in  work up completed 2024 with normal dexamethasone suppression test, aldosterone/renin ratio, metanephrines, and catecholamines. Clinically asymptomatic.          Review of Systems as per HPI  Medical History Reviewed by provider this encounter:     .  Current Outpatient Medications on File Prior to Visit  "  Medication Sig Dispense Refill    amLODIPine (NORVASC) 5 mg tablet Take 5 mg by mouth daily      ezetimibe (ZETIA) 10 mg tablet Take 10 mg by mouth daily      Glucagon (Baqsimi Two Pack) 3 MG/DOSE POWD Use for severe hypoglycemia if unconscious, seizing, or unable to safely swallow. 1 each 0    Insulin Glargine Solostar (Lantus SoloStar) 100 UNIT/ML SOPN Inject 10 units at bedtime 15 mL 0    insulin lispro (HumaLOG KwikPen) 100 units/mL injection pen Inject 5 Units under the skin 3 (three) times a day with meals 15 mL 2    Insulin Pen Needle (B-D UF III MINI PEN NEEDLES) 31G X 5 MM MISC Use 4 (four) times a day To inject insulin 400 each 2    metoprolol tartrate (LOPRESSOR) 25 mg tablet TAKE 0.5 TABLETS (12.5 MG TOTAL) BY MOUTH EVERY 12 (TWELVE) HOURS 30 tablet 0    multivitamin (THERAGRAN) TABS Take 1 tablet by mouth daily      Na Sulfate-K Sulfate-Mg Sulf 17.5-3.13-1.6 GM/177ML SOLN AS INSTRUCTED      PARoxetine (PAXIL) 20 mg tablet Take 20 mg by mouth daily      pravastatin (PRAVACHOL) 40 mg tablet Take 1 tablet (40 mg total) by mouth daily 90 tablet 2     No current facility-administered medications on file prior to visit.         Medical History Reviewed by provider this encounter:     .    Objective   /89 (BP Location: Left arm, Patient Position: Sitting, Cuff Size: Large)   Pulse 72   Ht 5' 6\" (1.676 m)   Wt 113 kg (250 lb)   SpO2 98%   BMI 40.35 kg/m²      Body mass index is 40.35 kg/m².  Wt Readings from Last 3 Encounters:   05/13/25 113 kg (250 lb)   09/26/24 116 kg (255 lb)   09/16/24 116 kg (255 lb)        Physical Exam  Vitals reviewed.   Constitutional:       Appearance: Normal appearance.   Cardiovascular:      Rate and Rhythm: Normal rate and regular rhythm.   Pulmonary:      Effort: Pulmonary effort is normal.   Skin:     General: Skin is warm and dry.   Neurological:      General: No focal deficit present.      Mental Status: She is alert and oriented to person, place, and time. " "  Psychiatric:         Mood and Affect: Mood normal.         Behavior: Behavior normal.       Labs:   Lab Results   Component Value Date    HGBA1C 8.4 (H) 06/10/2024    HGBA1C 7.7 (H) 02/19/2024    HGBA1C 15.0 (H) 10/18/2023     Lab Results   Component Value Date    CREATININE 1.67 (H) 09/21/2024    CREATININE 1.61 (H) 09/20/2024    CREATININE 1.81 (H) 09/19/2024    BUN 37 (H) 09/21/2024    K 4.0 09/21/2024     09/21/2024    CO2 21 09/21/2024     eGFR   Date Value Ref Range Status   09/21/2024 33 ml/min/1.73sq m Final     Lab Results   Component Value Date    HDL 39 (L) 06/10/2024    TRIG 157 (H) 06/10/2024     Lab Results   Component Value Date    ALT 28 09/20/2024    AST 15 09/20/2024    ALKPHOS 150 (H) 09/20/2024     Lab Results   Component Value Date    QII3QMZSVQCC 1.992 09/15/2024    [id number] 1.419 10/18/2023     No results found for: \"FREET4\", \"TSI\"    Discussed with the patient and all questioned fully answered. She will call me if any problems arise.  "

## 2025-05-13 NOTE — ASSESSMENT & PLAN NOTE
Lab Results   Component Value Date    HGBA1C 8.4 (H) 06/10/2024     HGA1C increased indicating poor control. Patient stopped insulin for one month and had fasting glucose at pcp reported >800 mg/dL.     Recommendations for adjustment to insulin regimen:   Increase lantus to 13 units daily  Increase humalog to 8 units with meals three times per day    Discussed risks/complications associated with uncontrolled diabetes including organ involvement, heart attack, stroke, death.    Advised lifestyle modifications including attention to diet including the amount and types of carbohydrates consumed and regular activity.     Call for blood sugars less than 70 mg/dl or patterns over 250 mg/dl.     Monitor blood glucose levels  before all insulin administration times.     Discussed use of CGM to collect additional blood glucose data to reveal patterns that can be used to improve glucose levels and adjust insulin regimen. Declined at this time.     Discussed symptoms and treatment of hypoglycemia.  Reviewed risks associated with hypoglycemia. Always carry rapid acting carbohydrates and a glucometer (a way to check your blood sugar).    Recommendation for medical identification either bracelet, necklace.    Recommendation for glucagon if on insulin.     Routine follow up for diabetic eye and foot exams.     Ordered blood work to complete prior to next visit.    Send glucose logs/CGM download in 1-2 weeks for review    Follow up in 3 months.       Orders:    POCT hemoglobin A1c    Insulin Glargine Solostar (Lantus SoloStar) 100 UNIT/ML SOPN; Inject 13 units at bedtime    insulin lispro (HumaLOG KwikPen) 100 units/mL injection pen; Inject 8 Units under the skin 3 (three) times a day with meals    Comprehensive metabolic panel; Future    Albumin / creatinine urine ratio; Future    Lipid panel; Future    Hemoglobin A1C; Future    TSH, 3rd generation with Free T4 reflex; Future

## 2025-05-13 NOTE — ASSESSMENT & PLAN NOTE
Orders:    Insulin Glargine Solostar (Lantus SoloStar) 100 UNIT/ML SOPN; Inject 13 units at bedtime    insulin lispro (HumaLOG KwikPen) 100 units/mL injection pen; Inject 8 Units under the skin 3 (three) times a day with meals

## 2025-07-17 NOTE — PROGRESS NOTES
"Subjective:      Patient ID: Lisandra Toussaint is a 61 y.o. female.    Vitals:  height is 5' 5" (1.651 m) and weight is 91.6 kg (202 lb). Her oral temperature is 98.5 °F (36.9 °C). Her blood pressure is 130/77 and her pulse is 94. Her respiration is 18 and oxygen saturation is 99%.     Chief Complaint: Insect Bite    60 yo female presenting to clinic for assessment of pain, hematoma, and rash to back of left leg following tick bite. Pt reports 1 week ago she was in [place] (drove, did not fly) and the day she returned she found a tick bite on her abdomen and multiple chigger bites on bilateral lower legs (7/10/25). Pt reports the day after removing tick, she found rash (red and oozing) to back of left leg, hematoma developed, and on Tuesday (7/15/25) she noticed a large bulge to left leg; and as of yesterday (7/16/25) she has increased difficulty ambulating. Pt states as of today her whole left leg feels stiff, swollen, and firm to the touch, now ambulating with crutches. Pt endorses sweats, chills, myalgia, headache, brain fog, fatigue, as of today (unsure temp). Pt endorses left foot numbness and right leg weakness (suspects from over compensation). Pt states while in clinic in triage she felt dizzy. Pt self medicated with pressure, ice, heating pad, tylenol, and bactroban; pain level is a 6.       Provider note:   Patient is a 61 year old female who presents for left leg rash and bruising that started about 1 week ago when she came back from a road trip to [place] where she was the . She states that the rash is itchy, she has tried hydrocortisone and bactroban with no relief and worsening symptoms. She noticed the bruising today after her boyfriend told her she was bruised. She denies any recent falls or trauma. Left leg pain has gradually worsened. Mainly from the left lower buttock to the left lateral knee. She has noticed swelling around the bruising. She is ambulating with crutches now because the " [id number] [facility]  Progress Note  Name: Bronwen Maw Apgar I  MRN: [id number]  Unit/Bed#: ICU 06 I Date of Admission: 10/17/2023   Date of Service: 10/18/2023 I Hospital Day: 1      Assessment & Plan:    * Hyperosmolar hyperglycemic state (HHS) ([address])  Assessment & Plan  Presentedwith abdominal discomfort with nausea/vomiting and a profoundly elevated serum glucose of 849 -> progressively improved overnight into this morning now in the 150-200s   BMP without evidence of anion gap and urinalysis without evidence of ketones  Suspect hyperosmolar hyperglycemic state -> initiated on a non-DKA insulin drip  Continue aggressive IV fluid hydration  Monitor serial Accu-Cheks  Chronically on oral hypoglycemics for type 2 diabetes mellitus -> states that due to recent nausea/vomiting over the last few days, doses were missed  Suspect pyelonephritis as likely triggering factor (see below)  Pending endocrinology input    Emphysematous pyelonephritis  Assessment & Plan  Presented with nonspecific abdominal discomfort and CT imaging evidence of right-sided emphysematous pyelonephritis without abscess  Antibiotic coverage broadened to IV Zosyn pending urine culture   No blood cultures were drawn in the ED  Pending appreciate urology input  Leukocytosis noted, however, remains afebrile -> hemodynamically stable and currently nontoxic-appearing  PRN pain/emesis control    Type 2 diabetes mellitus (HCC)  Assessment & Plan  A1c significantly uncontrolled at 15.0  Initiated on an insulin drip due to marked hyperglycemia (see plan for primary issue above)  Carbohydrate restriction  Holding oral hypoglycemics while hospitalized  Endocrinology consultation pending    Leukocytosis  Assessment & Plan  Likely reactive due to acute medical issue(s) coupled with hemoconcentration  Monitor WBC count    Hyponatremia  Assessment & Plan  Serum sodium only decreased at 119, however, in the setting of marked hyperglycemia, "corrected" pain is so bad. She denies any chest pain. She is having SOB, from working harder to get around with crutches. Denies ever having any calf swelling. Tick bite to the abdomen that she removed Saturday, no surrounding rash or irritation.     Insect Bite  This is a new problem. The current episode started in the past 7 days. The problem occurs constantly. The problem has been unchanged. Associated symptoms include chills, fatigue, headaches, joint swelling, myalgias and a rash. Pertinent negatives include no abdominal pain, anorexia, arthralgias, change in bowel habit, chest pain, congestion, coughing, diaphoresis, fever, nausea, neck pain, numbness, sore throat, swollen glands, urinary symptoms, vertigo, visual change, vomiting or weakness. Nothing aggravates the symptoms. She has tried heat, ice, acetaminophen, lying down and rest for the symptoms. The treatment provided mild relief.       Constitution: Positive for chills and fatigue. Negative for sweating and fever.   HENT:  Negative for congestion and sore throat.    Neck: Negative for neck pain.   Cardiovascular:  Negative for chest pain.   Respiratory:  Negative for cough.    Gastrointestinal:  Negative for abdominal pain, nausea and vomiting.   Musculoskeletal:  Positive for joint swelling and muscle ache. Negative for joint pain.   Skin:  Positive for rash.   Neurological:  Positive for headaches. Negative for history of vertigo and numbness.      Objective:     Physical Exam   Constitutional: She is oriented to person, place, and time. She appears well-developed. She is cooperative.  Non-toxic appearance. She does not appear ill. No distress.      Comments:Patient sits comfortably in exam chair. Answers questions in complete sentences. Does not show any signs of distress or discoloration.        HENT:   Head: Normocephalic and atraumatic.   Ears:   Right Ear: Hearing, tympanic membrane, external ear and ear canal normal. no impacted cerumen  Left Ear:  sodium was 131  Anticipate progressive improvement in sodium levels with correction of blood sugars -> serum sodium improved to 128 today  Okay to continue IV fluids due to primary issue  Continue serial serum sodium monitoring    Elevated serum creatinine  Assessment & Plan  Presenting creatinine of 1.67 -> continue IV fluid hydration -> at 1.64 this morning  Unclear if acute kidney injury or CKD as no prior baseline available  Likely contributed to by renal hypoperfusion and insensible volume loss from recent vomiting  Limit/avoid nephrotoxins as possible - holding HCTZ/ACEI  Monitor renal function and urine output    Morbid obesity (HCC)  Assessment & Plan  BMI of 36.88  Lifestyle/diet modifications    Thrombocytopenia (HCC)  Assessment & Plan  Monitor platelet count - monitor for bleeding    Essential hypertension  Assessment & Plan  Holding HCTZ/ACEI in the setting of acute kidney injury  PRN IV Hydralazine for BP spikes    Hyperlipidemia  Assessment & Plan  Continue statin/Zetia    Depression  Assessment & Plan  Continue Paxil      DVT Prophylaxis:  Heparin SC    Patient Centered Rounds:  I have performed bedside rounds and discussed plan of care with nursing today. Discussions with Specialists or Other Care Team Provider:  see above assessments if applicable    Education and Discussions with Family / Patient:  Patient at bedside, who will self update family as necessary    Time Spent for Care:  35 minutes. More than 50% of total time spent on counseling and coordination of care, on one or more of the following: performing physical exam; counseling and coordination of care, obtaining or reviewing history, documenting in the medical record, reviewing/ordering tests/medications/procedures, and communicating with other healthcare professionals.     Current Length of Stay: 1 day(s)  Current Patient Status: Inpatient   Certification Statement:  Patient will continue to require additional hospital stay due to Hearing, tympanic membrane, external ear and ear canal normal. no impacted cerumen  Nose: Nose normal. No mucosal edema, rhinorrhea, nasal deformity or congestion. No epistaxis. Right sinus exhibits no maxillary sinus tenderness and no frontal sinus tenderness. Left sinus exhibits no maxillary sinus tenderness and no frontal sinus tenderness.   Mouth/Throat: Uvula is midline, oropharynx is clear and moist and mucous membranes are normal. No trismus in the jaw. Normal dentition. No uvula swelling. No oropharyngeal exudate, posterior oropharyngeal edema or posterior oropharyngeal erythema.   Eyes: Conjunctivae and lids are normal. No scleral icterus.   Neck: Trachea normal and phonation normal. Neck supple. No edema present. No erythema present. No neck rigidity present.   Cardiovascular: Normal rate, regular rhythm, normal heart sounds and normal pulses.   No murmur heard.Exam reveals no gallop and no friction rub.   Pulmonary/Chest: Effort normal and breath sounds normal. No stridor. No respiratory distress. She has no decreased breath sounds. She has no wheezes. She has no rhonchi. She has no rales. She exhibits no tenderness.   Abdominal: Normal appearance.   Musculoskeletal: Normal range of motion.         General: No deformity. Normal range of motion.        Legs:    Neurological: She is alert and oriented to person, place, and time. She exhibits normal muscle tone. Coordination normal.   Skin: Skin is warm, dry, intact, not diaphoretic and not pale.   Psychiatric: Her speech is normal and behavior is normal. Judgment and thought content normal.   Nursing note and vitals reviewed.        Assessment:     1. Hematoma of leg, left, initial encounter    2. Left leg pain    3. Left leg swelling    4. Tick bite of abdominal wall, initial encounter    5. Atopic dermatitis, unspecified type        Plan:   XR KNEE 3 VIEW LEFT  Result Date: 7/17/2025  EXAMINATION: XR KNEE 3 VIEW LEFT CLINICAL HISTORY: Pain in left leg  assessments as noted above. Code Status: Level 1 - Full Code        Subjective:     Encountered earlier in the day. Resting in bed stating that her abdominal discomfort has improved today. A bit less fatigued today. Objective:     Vitals:   Temp (24hrs), Av.3 °F (36.8 °C), Min:97.5 °F (36.4 °C), Max:99.6 °F (37.6 °C)    Temp:  [97.5 °F (36.4 °C)-99.6 °F (37.6 °C)] 98 °F (36.7 °C)  HR:  [75-90] 79  Resp:  [14-35] 21  BP: (110-147)/(56-85) 125/66  SpO2:  [96 %-100 %] 99 %  Body mass index is 36.88 kg/m². Input and Output Summary (last 24 hours):        Intake/Output Summary (Last 24 hours) at 10/18/2023 1448  Last data filed at 10/18/2023 1345  Gross per 24 hour   Intake 3473.82 ml   Output --   Net 3473.82 ml       Physical Exam:     GENERAL Improved weakness/fatigue - obese    HEAD   Normocephalic - atraumatic   EYES   PERRL - EOMI    MOUTH   Mucosa moist   NECK   Supple - full range of motion   CARDIAC Rate controlled - S1/S2 positive   PULMONARY Fairly clear to auscultation - nonlabored respirations   ABDOMEN   Soft - improved generalized discomfort - nondistended - active bowel sounds   MUSCULOSKELETAL   Motor strength/range of motion deconditioned   NEUROLOGIC   Alert/oriented at baseline   SKIN   Chronic wrinkles/blemishes    PSYCHIATRIC   Mood/affect stable         Additional Data:     Labs & Recent Cultures:    Results from last 7 days   Lab Units 10/18/23  0558 10/17/23  1200   WBC Thousand/uL 25.77* 14.07*   HEMOGLOBIN g/dL 12.5 14.9   HEMATOCRIT % 34.8 44.0   PLATELETS Thousands/uL 146* 137*   BANDS PCT %  --  4   LYMPHO PCT %  --  5*   MONO PCT %  --  5   EOS PCT %  --  0     Results from last 7 days   Lab Units 10/18/23  0808 10/18/23  0132 10/17/23  1359   POTASSIUM mmol/L 3.0*   < > 3.9   CHLORIDE mmol/L 96   < > 86*   CO2 mmol/L 22   < > 19*   BUN mg/dL 55*   < > 68*   CREATININE mg/dL 1.64*   < > 1.67*   CALCIUM mg/dL 7.3*   < > 7.6*   ALK PHOS U/L  --   --  85   ALT U/L  --   --  14 AST U/L  --   --  15    < > = values in this interval not displayed.          Results from last 7 days   Lab Units 10/18/23  1343 10/18/23  1143 10/18/23  0959 10/18/23  0803 10/18/23  0600 10/18/23  0407 10/18/23  0132 10/18/23  0007 10/17/23  2310 10/17/23  2206 10/17/23  1946 10/17/23  1756   POC GLUCOSE mg/dl 155* 207* 198* 175* 159* 143* 144* 107 105 77 371* >500*     Results from last 7 days   Lab Units 10/18/23  0558   HEMOGLOBIN A1C % 15.0*     Results from last 7 days   Lab Units 10/18/23  0941 10/18/23  0808   LACTIC ACID mmol/L 1.2  --    PROCALCITONIN ng/ml  --  7.82*                 Lines/Drains:  Invasive Devices       Peripheral Intravenous Line  Duration             Peripheral IV 10/17/23 Right Hand 1 day    Long-Dwell Peripheral IV (Midline) 10/17/23 Left Brachial <1 day    Peripheral IV 10/17/23 Right;Ventral (anterior) External Jugular <1 day                      Last 24 Hours Medication List:   Current Facility-Administered Medications   Medication Dose Route Frequency Provider Last Rate    acetaminophen  650 mg Oral Q6H PRN Reyna Lopez MD      calcium carbonate  1 tablet Oral Daily With Breakfast Reyna Lopez MD      cholecalciferol  2,000 Units Oral Daily Reyna Lopez MD      ezetimibe  10 mg Oral Daily Reyna Lopez MD      heparin (porcine)  5,000 Units Subcutaneous Q8H [address] Reyna Lopez MD      insulin regular (HumuLIN R,NovoLIN R) 1 Units/mL in sodium chloride 0.9 % 100 mL infusion  0.3-21 Units/hr Intravenous Titrated Reyna Lopez MD 3 Units/hr (10/18/23 1345)    ondansetron  4 mg Intravenous Once Jourdan Rivera DO      ondansetron  4 mg Intravenous Q4H PRN Reyna Lopez MD      PARoxetine  20 mg Oral Daily Reyna Lopez MD      piperacillin-tazobactam  3.375 g Intravenous Q6H Reyna Lopez MD Stopped (10/18/23 1300)    potassium phosphate  30 mmol Intravenous Once Reyna Lopez MD 30 mmol (10/18/23 7185)    pravastatin  20 mg Oral Daily With Kaci Castellanos MD      sodium FINDINGS: Knee three views left. There is DJD and a varus deformity.  No fracture, dislocation, bone destruction, or OCD seen. Right: There is DJD and a varus deformity.  No fracture, dislocation, or bone destruction seen. Electronically signed by: Akhil Vicente MD Date:    07/17/2025 Time:    14:29    BP stable in clinic. Continue current regimen.     Hematoma of leg, left, initial encounter    Left leg pain  -     US Lower Extremity Veins Left; Future; Expected date: 07/17/2025  -     XR KNEE 3 VIEW LEFT; Future; Expected date: 07/17/2025    Left leg swelling  -     US Lower Extremity Veins Left; Future; Expected date: 07/17/2025  -     XR KNEE 3 VIEW LEFT; Future; Expected date: 07/17/2025    Tick bite of abdominal wall, initial encounter    Atopic dermatitis, unspecified type                Patient Instructions   - Go to [facility] for ultrasound of the left leg. I will call you with results and we will go over a plan then.     - You must understand that you have received an Urgent Care treatment only and that you may be released before all of your medical problems are known or treated.   - You, the patient, will arrange for follow up care as instructed.   - If your condition worsens or fails to improve we recommend that you receive another evaluation at the ER immediately or contact your PCP to discuss your concerns or return here.   - Follow up with your PCP or specialty clinic as directed in the next 1-2 weeks if not improved or as needed.  You can call [phone] to schedule an appointment with the appropriate provider.    If your symptoms do not improve or worsen, go to the emergency room immediately.         chloride  125 mL/hr Intravenous Continuous Clementina Banuelos  mL/hr (10/18/23 1109)                    ** Please Note: This note is constructed using a voice recognition dictation system. An occasional wrong word/phrase or “sound-a-like” substitution may have been picked up by dictation device due to the inherent limitations of voice recognition software. Read the chart carefully and recognize, using reasonable context, where substitutions may have occurred. **

## (undated) DEVICE — CYSTOSCOPY PACK: Brand: CONVERTORS

## (undated) DEVICE — GLOVE SRG BIOGEL 8

## (undated) DEVICE — SPONGE 4 X 4 XRAY 16 PLY STRL LF RFD

## (undated) DEVICE — POUCH URO CATCHER II STERILE

## (undated) DEVICE — STERILE SURGICAL LUBRICANT,  TUBE: Brand: SURGILUBE

## (undated) DEVICE — LUBRICANT JELLY SURGILUBE TUBE 4OZ FLIP TOP

## (undated) DEVICE — SOLUTION BOWL: Brand: KENDALL

## (undated) DEVICE — SPONGE STICK WITH PVP-I: Brand: KENDALL

## (undated) DEVICE — CYSTO TUBING TUR Y IRRIGATION

## (undated) DEVICE — FABRIC REINFORCED, SURGICAL GOWN, XL: Brand: CONVERTORS

## (undated) DEVICE — BOWL: 16OZ PEELPOUCH 75/CS 16/PLT: Brand: MEDEGEN MEDICAL PRODUCTS, LLC

## (undated) DEVICE — TOWEL SET X-RAY

## (undated) DEVICE — POUCH UR CATCHER STERILE

## (undated) DEVICE — GLOVE SRG LF STRL BGL SKNSNS 8 PF

## (undated) DEVICE — NGAGE, NITINOL STONE EXTRACTOR: Brand: NGAGE

## (undated) DEVICE — GUIDEWIRE STRGHT TIP 0.038 IN SOLO PLUS

## (undated) DEVICE — SPECIMEN CONTAINER STERILE PEEL PACK

## (undated) DEVICE — SEAL BIOPSY PORT ADJUST F/ACCESSORIES UP TO 3FR